# Patient Record
Sex: FEMALE | Race: OTHER | HISPANIC OR LATINO | Employment: OTHER | URBAN - METROPOLITAN AREA
[De-identification: names, ages, dates, MRNs, and addresses within clinical notes are randomized per-mention and may not be internally consistent; named-entity substitution may affect disease eponyms.]

---

## 2017-01-16 ENCOUNTER — APPOINTMENT (EMERGENCY)
Dept: RADIOLOGY | Facility: HOSPITAL | Age: 42
End: 2017-01-16
Payer: COMMERCIAL

## 2017-01-16 ENCOUNTER — HOSPITAL ENCOUNTER (EMERGENCY)
Facility: HOSPITAL | Age: 42
Discharge: HOME | End: 2017-01-16
Attending: EMERGENCY MEDICINE | Admitting: EMERGENCY MEDICINE
Payer: COMMERCIAL

## 2017-01-16 VITALS
RESPIRATION RATE: 18 BRPM | HEART RATE: 100 BPM | DIASTOLIC BLOOD PRESSURE: 86 MMHG | SYSTOLIC BLOOD PRESSURE: 138 MMHG | OXYGEN SATURATION: 98 % | TEMPERATURE: 98.3 F | WEIGHT: 293 LBS

## 2017-01-16 DIAGNOSIS — S76.012A STRAIN OF LEFT HIP, INITIAL ENCOUNTER: ICD-10-CM

## 2017-01-16 DIAGNOSIS — T14.8XXA CONTUSION: ICD-10-CM

## 2017-01-16 DIAGNOSIS — S83.92XA LEFT KNEE SPRAIN: ICD-10-CM

## 2017-01-16 DIAGNOSIS — R26.89 BALANCE PROBLEM: Primary | ICD-10-CM

## 2017-01-16 PROCEDURE — 99283 EMERGENCY DEPT VISIT LOW MDM: CPT

## 2017-01-16 PROCEDURE — 73564 X-RAY EXAM KNEE 4 OR MORE: CPT

## 2017-01-16 PROCEDURE — 73521 X-RAY EXAM HIPS BI 2 VIEWS: CPT

## 2017-01-16 RX ORDER — OXYCODONE HYDROCHLORIDE AND ACETAMINOPHEN 5; 325 MG/1; MG/1
1 TABLET ORAL EVERY 6 HOURS PRN
Qty: 15 TABLET | Refills: 0 | Status: SHIPPED | OUTPATIENT
Start: 2017-01-16 | End: 2017-01-26

## 2017-01-16 RX ORDER — OXYCODONE HYDROCHLORIDE AND ACETAMINOPHEN 5; 325 MG/1; MG/1
1 TABLET ORAL ONCE
Status: COMPLETED | OUTPATIENT
Start: 2017-01-16 | End: 2017-01-16

## 2017-01-16 RX ORDER — METHOCARBAMOL 500 MG/1
500-1000 TABLET, FILM COATED ORAL 3 TIMES DAILY PRN
Qty: 30 TABLET | Refills: 0 | Status: SHIPPED | OUTPATIENT
Start: 2017-01-16 | End: 2017-06-11 | Stop reason: ALTCHOICE

## 2017-01-16 RX ORDER — METHOCARBAMOL 500 MG/1
500 TABLET, FILM COATED ORAL ONCE
Status: COMPLETED | OUTPATIENT
Start: 2017-01-16 | End: 2017-01-16

## 2017-01-16 RX ADMIN — OXYCODONE HYDROCHLORIDE AND ACETAMINOPHEN 1 TABLET: 5; 325 TABLET ORAL at 09:46

## 2017-01-16 RX ADMIN — METHOCARBAMOL 500 MG: 500 TABLET ORAL at 09:46

## 2017-06-11 ENCOUNTER — HOSPITAL ENCOUNTER (EMERGENCY)
Facility: HOSPITAL | Age: 42
Discharge: LEFT BEFORE DISCHARGE | End: 2017-06-11
Attending: EMERGENCY MEDICINE
Payer: COMMERCIAL

## 2017-06-11 ENCOUNTER — APPOINTMENT (EMERGENCY)
Dept: CT IMAGING | Facility: HOSPITAL | Age: 42
End: 2017-06-11
Payer: COMMERCIAL

## 2017-06-11 VITALS
DIASTOLIC BLOOD PRESSURE: 81 MMHG | HEART RATE: 82 BPM | WEIGHT: 293 LBS | TEMPERATURE: 97.7 F | SYSTOLIC BLOOD PRESSURE: 114 MMHG | OXYGEN SATURATION: 100 % | RESPIRATION RATE: 18 BRPM

## 2017-06-11 DIAGNOSIS — F10.929 ALCOHOL INTOXICATION (HCC): ICD-10-CM
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
## 2017-06-11 LAB
ALBUMIN SERPL BCP-MCNC: 2.4 G/DL (ref 3.5–5)
ALP SERPL-CCNC: 172 U/L (ref 46–116)
ALT SERPL W P-5'-P-CCNC: 69 U/L (ref 12–78)
ANION GAP SERPL CALCULATED.3IONS-SCNC: 12 MMOL/L (ref 4–13)
AST SERPL W P-5'-P-CCNC: 147 U/L (ref 5–45)
BASOPHILS # BLD AUTO: 0.03 THOUSANDS/ΜL (ref 0–0.1)
BASOPHILS NFR BLD AUTO: 1 % (ref 0–1)
BILIRUB SERPL-MCNC: 0.92 MG/DL (ref 0.2–1)
BUN SERPL-MCNC: 2 MG/DL (ref 5–25)
CALCIUM SERPL-MCNC: 7.7 MG/DL (ref 8.3–10.1)
CHLORIDE SERPL-SCNC: 110 MMOL/L (ref 100–108)
CK MB SERPL-MCNC: 1.5 NG/ML (ref 0–5)
CK MB SERPL-MCNC: <1 % (ref 0–2.5)
CK SERPL-CCNC: 178 U/L (ref 26–192)
CO2 SERPL-SCNC: 26 MMOL/L (ref 21–32)
CREAT SERPL-MCNC: 0.81 MG/DL (ref 0.6–1.3)
EOSINOPHIL # BLD AUTO: 0.09 THOUSAND/ΜL (ref 0–0.61)
EOSINOPHIL NFR BLD AUTO: 2 % (ref 0–6)
ERYTHROCYTE [DISTWIDTH] IN BLOOD BY AUTOMATED COUNT: 18.8 % (ref 11.6–15.1)
ETHANOL SERPL-MCNC: 310 MG/DL (ref 0–3)
GFR SERPL CREATININE-BSD FRML MDRD: >60 ML/MIN/1.73SQ M
GLUCOSE SERPL-MCNC: 116 MG/DL (ref 65–140)
HCT VFR BLD AUTO: 33.3 % (ref 34.8–46.1)
HGB BLD-MCNC: 11.1 G/DL (ref 11.5–15.4)
LYMPHOCYTES # BLD AUTO: 2.31 THOUSANDS/ΜL (ref 0.6–4.47)
LYMPHOCYTES NFR BLD AUTO: 42 % (ref 14–44)
MCH RBC QN AUTO: 32.9 PG (ref 26.8–34.3)
MCHC RBC AUTO-ENTMCNC: 33.3 G/DL (ref 31.4–37.4)
MCV RBC AUTO: 99 FL (ref 82–98)
MONOCYTES # BLD AUTO: 0.73 THOUSAND/ΜL (ref 0.17–1.22)
MONOCYTES NFR BLD AUTO: 13 % (ref 4–12)
NEUTROPHILS # BLD AUTO: 2.4 THOUSANDS/ΜL (ref 1.85–7.62)
NEUTS SEG NFR BLD AUTO: 42 % (ref 43–75)
NRBC BLD AUTO-RTO: 0 /100 WBCS
NT-PROBNP SERPL-MCNC: 83 PG/ML
PLATELET # BLD AUTO: 280 THOUSANDS/UL (ref 149–390)
PMV BLD AUTO: 9.6 FL (ref 8.9–12.7)
POTASSIUM SERPL-SCNC: 3 MMOL/L (ref 3.5–5.3)
PROT SERPL-MCNC: 6.7 G/DL (ref 6.4–8.2)
RBC # BLD AUTO: 3.37 MILLION/UL (ref 3.81–5.12)
SODIUM SERPL-SCNC: 148 MMOL/L (ref 136–145)
SPECIMEN SOURCE: NORMAL
TROPONIN I BLD-MCNC: 0 NG/ML (ref 0–0.08)
WBC # BLD AUTO: 5.56 THOUSAND/UL (ref 4.31–10.16)

## 2017-06-11 PROCEDURE — 36415 COLL VENOUS BLD VENIPUNCTURE: CPT | Performed by: EMERGENCY MEDICINE

## 2017-06-11 PROCEDURE — 82550 ASSAY OF CK (CPK): CPT | Performed by: EMERGENCY MEDICINE

## 2017-06-11 PROCEDURE — 80320 DRUG SCREEN QUANTALCOHOLS: CPT | Performed by: EMERGENCY MEDICINE

## 2017-06-11 PROCEDURE — 82553 CREATINE MB FRACTION: CPT | Performed by: EMERGENCY MEDICINE

## 2017-06-11 PROCEDURE — 96375 TX/PRO/DX INJ NEW DRUG ADDON: CPT

## 2017-06-11 PROCEDURE — 99284 EMERGENCY DEPT VISIT MOD MDM: CPT

## 2017-06-11 PROCEDURE — 85025 COMPLETE CBC W/AUTO DIFF WBC: CPT | Performed by: EMERGENCY MEDICINE

## 2017-06-11 PROCEDURE — 83880 ASSAY OF NATRIURETIC PEPTIDE: CPT | Performed by: EMERGENCY MEDICINE

## 2017-06-11 PROCEDURE — 84484 ASSAY OF TROPONIN QUANT: CPT

## 2017-06-11 PROCEDURE — 96374 THER/PROPH/DIAG INJ IV PUSH: CPT

## 2017-06-11 PROCEDURE — 80053 COMPREHEN METABOLIC PANEL: CPT | Performed by: EMERGENCY MEDICINE

## 2017-06-11 RX ORDER — DIAZEPAM 5 MG/ML
5 INJECTION, SOLUTION INTRAMUSCULAR; INTRAVENOUS ONCE
Status: COMPLETED | OUTPATIENT
Start: 2017-06-11 | End: 2017-06-11

## 2017-06-11 RX ORDER — MIRTAZAPINE 30 MG/1
30 TABLET, FILM COATED ORAL
COMMUNITY
End: 2017-07-17

## 2017-06-11 RX ORDER — POTASSIUM CHLORIDE 20 MEQ/1
40 TABLET, EXTENDED RELEASE ORAL ONCE
Status: COMPLETED | OUTPATIENT
Start: 2017-06-11 | End: 2017-06-11

## 2017-06-11 RX ORDER — DIAZEPAM 5 MG/ML
INJECTION, SOLUTION INTRAMUSCULAR; INTRAVENOUS
Status: DISCONTINUED
Start: 2017-06-11 | End: 2017-06-12 | Stop reason: HOSPADM

## 2017-06-11 RX ORDER — QUETIAPINE FUMARATE 200 MG/1
200 TABLET, FILM COATED ORAL
COMMUNITY
End: 2017-07-17

## 2017-06-11 RX ORDER — KETOROLAC TROMETHAMINE 30 MG/ML
30 INJECTION, SOLUTION INTRAMUSCULAR; INTRAVENOUS ONCE
Status: COMPLETED | OUTPATIENT
Start: 2017-06-11 | End: 2017-06-11

## 2017-06-11 RX ADMIN — KETOROLAC TROMETHAMINE 30 MG: 30 INJECTION, SOLUTION INTRAMUSCULAR at 20:47

## 2017-06-11 RX ADMIN — POTASSIUM CHLORIDE 40 MEQ: 1500 TABLET, EXTENDED RELEASE ORAL at 21:44

## 2017-06-11 RX ADMIN — DIAZEPAM 5 MG: 5 INJECTION, SOLUTION INTRAMUSCULAR; INTRAVENOUS at 20:48

## 2017-06-22 ENCOUNTER — APPOINTMENT (EMERGENCY)
Dept: CT IMAGING | Facility: HOSPITAL | Age: 42
End: 2017-06-22
Payer: COMMERCIAL

## 2017-06-22 ENCOUNTER — HOSPITAL ENCOUNTER (EMERGENCY)
Facility: HOSPITAL | Age: 42
Discharge: HOME | End: 2017-06-22
Attending: EMERGENCY MEDICINE | Admitting: EMERGENCY MEDICINE
Payer: COMMERCIAL

## 2017-06-22 VITALS
BODY MASS INDEX: 41.95 KG/M2 | HEART RATE: 96 BPM | TEMPERATURE: 97.9 F | DIASTOLIC BLOOD PRESSURE: 74 MMHG | SYSTOLIC BLOOD PRESSURE: 133 MMHG | HEIGHT: 70 IN | OXYGEN SATURATION: 96 % | RESPIRATION RATE: 20 BRPM | WEIGHT: 293 LBS
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
## 2017-06-22 DIAGNOSIS — R10.32 LEFT LOWER QUADRANT PAIN: ICD-10-CM

## 2017-06-22 DIAGNOSIS — E87.6 HYPOKALEMIA: ICD-10-CM

## 2017-06-22 LAB
ALBUMIN SERPL BCP-MCNC: 2.2 G/DL (ref 3.5–5)
ALP SERPL-CCNC: 165 U/L (ref 46–116)
ALT SERPL W P-5'-P-CCNC: 59 U/L (ref 12–78)
ANION GAP SERPL CALCULATED.3IONS-SCNC: 5 MMOL/L (ref 4–13)
AST SERPL W P-5'-P-CCNC: 180 U/L (ref 5–45)
BASOPHILS # BLD AUTO: 0.03 THOUSANDS/ΜL (ref 0–0.1)
BASOPHILS NFR BLD AUTO: 1 % (ref 0–1)
BILIRUB SERPL-MCNC: 0.93 MG/DL (ref 0.2–1)
BUN SERPL-MCNC: 2 MG/DL (ref 5–25)
CALCIUM SERPL-MCNC: 7.8 MG/DL (ref 8.3–10.1)
CHLORIDE SERPL-SCNC: 112 MMOL/L (ref 100–108)
CO2 SERPL-SCNC: 28 MMOL/L (ref 21–32)
CREAT SERPL-MCNC: 0.68 MG/DL (ref 0.6–1.3)
EOSINOPHIL # BLD AUTO: 0.17 THOUSAND/ΜL (ref 0–0.61)
EOSINOPHIL NFR BLD AUTO: 4 % (ref 0–6)
ERYTHROCYTE [DISTWIDTH] IN BLOOD BY AUTOMATED COUNT: 19.5 % (ref 11.6–15.1)
GFR SERPL CREATININE-BSD FRML MDRD: >60 ML/MIN/1.73SQ M
GLUCOSE SERPL-MCNC: 119 MG/DL (ref 65–140)
HCT VFR BLD AUTO: 32.8 % (ref 34.8–46.1)
HGB BLD-MCNC: 11.1 G/DL (ref 11.5–15.4)
LIPASE SERPL-CCNC: 75 U/L (ref 73–393)
LYMPHOCYTES # BLD AUTO: 1.96 THOUSANDS/ΜL (ref 0.6–4.47)
LYMPHOCYTES NFR BLD AUTO: 45 % (ref 14–44)
MCH RBC QN AUTO: 34.5 PG (ref 26.8–34.3)
MCHC RBC AUTO-ENTMCNC: 33.8 G/DL (ref 31.4–37.4)
MCV RBC AUTO: 102 FL (ref 82–98)
MONOCYTES # BLD AUTO: 0.58 THOUSAND/ΜL (ref 0.17–1.22)
MONOCYTES NFR BLD AUTO: 14 % (ref 4–12)
NEUTROPHILS # BLD AUTO: 1.56 THOUSANDS/ΜL (ref 1.85–7.62)
NEUTS SEG NFR BLD AUTO: 36 % (ref 43–75)
PLATELET # BLD AUTO: 274 THOUSANDS/UL (ref 149–390)
PMV BLD AUTO: 9.6 FL (ref 8.9–12.7)
POTASSIUM SERPL-SCNC: 3 MMOL/L (ref 3.5–5.3)
PROT SERPL-MCNC: 6.2 G/DL (ref 6.4–8.2)
RBC # BLD AUTO: 3.22 MILLION/UL (ref 3.81–5.12)
SODIUM SERPL-SCNC: 145 MMOL/L (ref 136–145)
WBC # BLD AUTO: 4.3 THOUSAND/UL (ref 4.31–10.16)

## 2017-06-22 PROCEDURE — 83690 ASSAY OF LIPASE: CPT | Performed by: EMERGENCY MEDICINE

## 2017-06-22 PROCEDURE — 36415 COLL VENOUS BLD VENIPUNCTURE: CPT | Performed by: EMERGENCY MEDICINE

## 2017-06-22 PROCEDURE — 99284 EMERGENCY DEPT VISIT MOD MDM: CPT

## 2017-06-22 PROCEDURE — 96376 TX/PRO/DX INJ SAME DRUG ADON: CPT

## 2017-06-22 PROCEDURE — 85025 COMPLETE CBC W/AUTO DIFF WBC: CPT | Performed by: EMERGENCY MEDICINE

## 2017-06-22 PROCEDURE — 73700 CT LOWER EXTREMITY W/O DYE: CPT

## 2017-06-22 PROCEDURE — 74177 CT ABD & PELVIS W/CONTRAST: CPT

## 2017-06-22 PROCEDURE — 80053 COMPREHEN METABOLIC PANEL: CPT | Performed by: EMERGENCY MEDICINE

## 2017-06-22 PROCEDURE — 96374 THER/PROPH/DIAG INJ IV PUSH: CPT

## 2017-06-22 RX ORDER — MORPHINE SULFATE 4 MG/ML
4 INJECTION, SOLUTION INTRAMUSCULAR; INTRAVENOUS ONCE
Status: COMPLETED | OUTPATIENT
Start: 2017-06-22 | End: 2017-06-22

## 2017-06-22 RX ORDER — DICYCLOMINE HCL 20 MG
20 TABLET ORAL 2 TIMES DAILY
Qty: 10 TABLET | Refills: 0 | Status: SHIPPED | OUTPATIENT
Start: 2017-06-22 | End: 2017-07-17

## 2017-06-22 RX ORDER — POTASSIUM CHLORIDE 20 MEQ/1
40 TABLET, EXTENDED RELEASE ORAL ONCE
Status: COMPLETED | OUTPATIENT
Start: 2017-06-22 | End: 2017-06-22

## 2017-06-22 RX ADMIN — MORPHINE SULFATE 4 MG: 4 INJECTION, SOLUTION INTRAMUSCULAR; INTRAVENOUS at 12:54

## 2017-06-22 RX ADMIN — IOHEXOL 100 ML: 350 INJECTION, SOLUTION INTRAVENOUS at 12:40

## 2017-06-22 RX ADMIN — MORPHINE SULFATE 4 MG: 4 INJECTION, SOLUTION INTRAMUSCULAR; INTRAVENOUS at 11:21

## 2017-06-22 RX ADMIN — POTASSIUM CHLORIDE 40 MEQ: 1500 TABLET, EXTENDED RELEASE ORAL at 13:40

## 2017-07-17 ENCOUNTER — HOSPITAL ENCOUNTER (EMERGENCY)
Facility: HOSPITAL | Age: 42
Discharge: HOME | End: 2017-07-17
Attending: EMERGENCY MEDICINE
Payer: COMMERCIAL

## 2017-07-17 VITALS
OXYGEN SATURATION: 97 % | HEART RATE: 98 BPM | TEMPERATURE: 98.8 F | HEIGHT: 70 IN | SYSTOLIC BLOOD PRESSURE: 145 MMHG | RESPIRATION RATE: 18 BRPM | DIASTOLIC BLOOD PRESSURE: 94 MMHG | WEIGHT: 293 LBS | BODY MASS INDEX: 41.95 KG/M2

## 2017-07-17 DIAGNOSIS — G89.29 CHRONIC PAIN: Primary | ICD-10-CM

## 2017-07-17 DIAGNOSIS — R60.9 DEPENDENT EDEMA: ICD-10-CM

## 2017-07-17 LAB
ANION GAP SERPL CALCULATED.3IONS-SCNC: 9 MMOL/L (ref 4–13)
BASOPHILS # BLD AUTO: 0.04 THOUSANDS/ΜL (ref 0–0.1)
BASOPHILS NFR BLD AUTO: 1 % (ref 0–1)
BUN SERPL-MCNC: 3 MG/DL (ref 5–25)
CALCIUM SERPL-MCNC: 8.1 MG/DL (ref 8.3–10.1)
CHLORIDE SERPL-SCNC: 105 MMOL/L (ref 100–108)
CO2 SERPL-SCNC: 27 MMOL/L (ref 21–32)
CREAT SERPL-MCNC: 0.6 MG/DL (ref 0.6–1.3)
EOSINOPHIL # BLD AUTO: 0.1 THOUSAND/ΜL (ref 0–0.61)
EOSINOPHIL NFR BLD AUTO: 2 % (ref 0–6)
ERYTHROCYTE [DISTWIDTH] IN BLOOD BY AUTOMATED COUNT: 19.5 % (ref 11.6–15.1)
GFR SERPL CREATININE-BSD FRML MDRD: >60 ML/MIN/1.73SQ M
GLUCOSE SERPL-MCNC: 92 MG/DL (ref 65–140)
HCT VFR BLD AUTO: 31.5 % (ref 34.8–46.1)
HGB BLD-MCNC: 10.3 G/DL (ref 11.5–15.4)
LYMPHOCYTES # BLD AUTO: 1.21 THOUSANDS/ΜL (ref 0.6–4.47)
LYMPHOCYTES NFR BLD AUTO: 24 % (ref 14–44)
MCH RBC QN AUTO: 34.7 PG (ref 26.8–34.3)
MCHC RBC AUTO-ENTMCNC: 32.7 G/DL (ref 31.4–37.4)
MCV RBC AUTO: 106 FL (ref 82–98)
MONOCYTES # BLD AUTO: 0.68 THOUSAND/ΜL (ref 0.17–1.22)
MONOCYTES NFR BLD AUTO: 14 % (ref 4–12)
NEUTROPHILS # BLD AUTO: 2.97 THOUSANDS/ΜL (ref 1.85–7.62)
NEUTS SEG NFR BLD AUTO: 59 % (ref 43–75)
NRBC BLD AUTO-RTO: 0 /100 WBCS
NT-PROBNP SERPL-MCNC: 43 PG/ML
PLATELET # BLD AUTO: 209 THOUSANDS/UL (ref 149–390)
PMV BLD AUTO: 10.4 FL (ref 8.9–12.7)
POTASSIUM SERPL-SCNC: 3.1 MMOL/L (ref 3.5–5.3)
RBC # BLD AUTO: 2.97 MILLION/UL (ref 3.81–5.12)
SODIUM SERPL-SCNC: 141 MMOL/L (ref 136–145)
WBC # BLD AUTO: 5 THOUSAND/UL (ref 4.31–10.16)

## 2017-07-17 PROCEDURE — 36415 COLL VENOUS BLD VENIPUNCTURE: CPT | Performed by: EMERGENCY MEDICINE

## 2017-07-17 PROCEDURE — 83880 ASSAY OF NATRIURETIC PEPTIDE: CPT | Performed by: EMERGENCY MEDICINE

## 2017-07-17 PROCEDURE — 99284 EMERGENCY DEPT VISIT MOD MDM: CPT

## 2017-07-17 PROCEDURE — 80048 BASIC METABOLIC PNL TOTAL CA: CPT | Performed by: EMERGENCY MEDICINE

## 2017-07-17 PROCEDURE — 85025 COMPLETE CBC W/AUTO DIFF WBC: CPT | Performed by: EMERGENCY MEDICINE

## 2017-07-17 RX ORDER — ONDANSETRON 4 MG/1
4 TABLET, ORALLY DISINTEGRATING ORAL ONCE
Status: COMPLETED | OUTPATIENT
Start: 2017-07-17 | End: 2017-07-17

## 2017-07-17 RX ORDER — ACETAMINOPHEN 325 MG/1
650 TABLET ORAL ONCE
Status: COMPLETED | OUTPATIENT
Start: 2017-07-17 | End: 2017-07-17

## 2017-07-17 RX ADMIN — ONDANSETRON 4 MG: 4 TABLET, ORALLY DISINTEGRATING ORAL at 15:02

## 2017-07-17 RX ADMIN — ACETAMINOPHEN 650 MG: 325 TABLET, FILM COATED ORAL at 13:28

## 2017-08-29 ENCOUNTER — APPOINTMENT (EMERGENCY)
Dept: CT IMAGING | Facility: HOSPITAL | Age: 42
DRG: 058 | End: 2017-08-29
Payer: COMMERCIAL

## 2017-08-29 ENCOUNTER — APPOINTMENT (EMERGENCY)
Dept: RADIOLOGY | Facility: HOSPITAL | Age: 42
DRG: 058 | End: 2017-08-29
Payer: COMMERCIAL

## 2017-08-29 ENCOUNTER — HOSPITAL ENCOUNTER (EMERGENCY)
Facility: HOSPITAL | Age: 42
Discharge: HOME | DRG: 058 | End: 2017-08-29
Attending: EMERGENCY MEDICINE | Admitting: EMERGENCY MEDICINE
Payer: COMMERCIAL

## 2017-08-29 ENCOUNTER — HOSPITAL ENCOUNTER (INPATIENT)
Facility: HOSPITAL | Age: 42
LOS: 21 days | Discharge: HOME HEALTH | DRG: 058 | End: 2017-09-19
Attending: EMERGENCY MEDICINE | Admitting: INTERNAL MEDICINE
Payer: COMMERCIAL

## 2017-08-29 VITALS
RESPIRATION RATE: 20 BRPM | SYSTOLIC BLOOD PRESSURE: 114 MMHG | BODY MASS INDEX: 59.28 KG/M2 | DIASTOLIC BLOOD PRESSURE: 60 MMHG | HEART RATE: 95 BPM | OXYGEN SATURATION: 97 % | TEMPERATURE: 98.4 F | WEIGHT: 293 LBS

## 2017-08-29 DIAGNOSIS — R51.9 HEADACHE: ICD-10-CM

## 2017-08-29 DIAGNOSIS — R60.9 PERIPHERAL EDEMA: ICD-10-CM

## 2017-08-29 DIAGNOSIS — M54.9 ACUTE BACK PAIN: ICD-10-CM

## 2017-08-29 DIAGNOSIS — Z76.5 DRUG-SEEKING BEHAVIOR: ICD-10-CM

## 2017-08-29 DIAGNOSIS — R26.2 AMBULATORY DYSFUNCTION: Primary | ICD-10-CM

## 2017-08-29 DIAGNOSIS — E66.01 MORBID OBESITY DUE TO EXCESS CALORIES (HCC): ICD-10-CM

## 2017-08-29 DIAGNOSIS — R10.9 ACUTE ABDOMINAL PAIN: ICD-10-CM

## 2017-08-29 DIAGNOSIS — Z91.11 NONCOMPLIANCE WITH THERAPEUTIC PLAN: ICD-10-CM

## 2017-08-29 DIAGNOSIS — R29.6 FREQUENT FALLS: ICD-10-CM

## 2017-08-29 DIAGNOSIS — B37.2 CANDIDIASIS, CUTANEOUS: ICD-10-CM
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
## 2017-08-29 PROBLEM — M79.7 FIBROMYALGIA: Status: ACTIVE | Noted: 2017-08-29

## 2017-08-29 PROBLEM — G89.4 CHRONIC PAIN SYNDROME: Status: ACTIVE | Noted: 2017-08-29

## 2017-08-29 LAB
ANION GAP BLD CALC-SCNC: 19 MMOL/L (ref 4–13)
ANION GAP SERPL CALCULATED.3IONS-SCNC: 9 MMOL/L (ref 4–13)
BASOPHILS # BLD AUTO: 0.03 THOUSANDS/ΜL (ref 0–0.1)
BASOPHILS NFR BLD AUTO: 0 % (ref 0–1)
BUN BLD-MCNC: <3 MG/DL (ref 5–25)
BUN SERPL-MCNC: 1 MG/DL (ref 5–25)
CA-I BLD-SCNC: 1.03 MMOL/L (ref 1.12–1.32)
CALCIUM SERPL-MCNC: 7.8 MG/DL (ref 8.3–10.1)
CHLORIDE BLD-SCNC: 102 MMOL/L (ref 100–108)
CHLORIDE SERPL-SCNC: 105 MMOL/L (ref 100–108)
CO2 SERPL-SCNC: 27 MMOL/L (ref 21–32)
CREAT BLD-MCNC: 0.7 MG/DL (ref 0.6–1.3)
CREAT SERPL-MCNC: 0.75 MG/DL (ref 0.6–1.3)
EOSINOPHIL # BLD AUTO: 0.08 THOUSAND/ΜL (ref 0–0.61)
EOSINOPHIL NFR BLD AUTO: 1 % (ref 0–6)
ERYTHROCYTE [DISTWIDTH] IN BLOOD BY AUTOMATED COUNT: 15.9 % (ref 11.6–15.1)
GFR SERPL CREATININE-BSD FRML MDRD: 106 ML/MIN/1.73SQ M
GFR SERPL CREATININE-BSD FRML MDRD: 98 ML/MIN/1.73SQ M
GLUCOSE SERPL-MCNC: 89 MG/DL (ref 65–140)
GLUCOSE SERPL-MCNC: 94 MG/DL (ref 65–140)
HCT VFR BLD AUTO: 28.9 % (ref 34.8–46.1)
HCT VFR BLD CALC: 29 % (ref 34.8–46.1)
HGB BLD-MCNC: 9.9 G/DL (ref 11.5–15.4)
HGB BLDA-MCNC: 9.9 G/DL (ref 11.5–15.4)
LYMPHOCYTES # BLD AUTO: 1.36 THOUSANDS/ΜL (ref 0.6–4.47)
LYMPHOCYTES NFR BLD AUTO: 18 % (ref 14–44)
MCH RBC QN AUTO: 36.8 PG (ref 26.8–34.3)
MCHC RBC AUTO-ENTMCNC: 34.3 G/DL (ref 31.4–37.4)
MCV RBC AUTO: 107 FL (ref 82–98)
MONOCYTES # BLD AUTO: 1.25 THOUSAND/ΜL (ref 0.17–1.22)
MONOCYTES NFR BLD AUTO: 17 % (ref 4–12)
NEUTROPHILS # BLD AUTO: 4.69 THOUSANDS/ΜL (ref 1.85–7.62)
NEUTS SEG NFR BLD AUTO: 64 % (ref 43–75)
NRBC BLD AUTO-RTO: 0 /100 WBCS
PCO2 BLD: 23 MMOL/L (ref 21–32)
PLATELET # BLD AUTO: 263 THOUSANDS/UL (ref 149–390)
PMV BLD AUTO: 9.2 FL (ref 8.9–12.7)
POTASSIUM BLD-SCNC: 3.4 MMOL/L (ref 3.5–5.3)
POTASSIUM SERPL-SCNC: 4.1 MMOL/L (ref 3.5–5.3)
RBC # BLD AUTO: 2.69 MILLION/UL (ref 3.81–5.12)
SODIUM BLD-SCNC: 139 MMOL/L (ref 136–145)
SODIUM SERPL-SCNC: 141 MMOL/L (ref 136–145)
SPECIMEN SOURCE: ABNORMAL
WBC # BLD AUTO: 7.41 THOUSAND/UL (ref 4.31–10.16)

## 2017-08-29 PROCEDURE — 96361 HYDRATE IV INFUSION ADD-ON: CPT

## 2017-08-29 PROCEDURE — 96374 THER/PROPH/DIAG INJ IV PUSH: CPT

## 2017-08-29 PROCEDURE — 85025 COMPLETE CBC W/AUTO DIFF WBC: CPT | Performed by: EMERGENCY MEDICINE

## 2017-08-29 PROCEDURE — 74177 CT ABD & PELVIS W/CONTRAST: CPT

## 2017-08-29 PROCEDURE — 36415 COLL VENOUS BLD VENIPUNCTURE: CPT | Performed by: EMERGENCY MEDICINE

## 2017-08-29 PROCEDURE — 80047 BASIC METABLC PNL IONIZED CA: CPT

## 2017-08-29 PROCEDURE — 71260 CT THORAX DX C+: CPT

## 2017-08-29 PROCEDURE — 80048 BASIC METABOLIC PNL TOTAL CA: CPT | Performed by: EMERGENCY MEDICINE

## 2017-08-29 PROCEDURE — 96360 HYDRATION IV INFUSION INIT: CPT

## 2017-08-29 PROCEDURE — 70450 CT HEAD/BRAIN W/O DYE: CPT

## 2017-08-29 PROCEDURE — 99284 EMERGENCY DEPT VISIT MOD MDM: CPT

## 2017-08-29 PROCEDURE — 85014 HEMATOCRIT: CPT

## 2017-08-29 PROCEDURE — 99285 EMERGENCY DEPT VISIT HI MDM: CPT

## 2017-08-29 RX ORDER — GUAIFENESIN 600 MG
TABLET, EXTENDED RELEASE 12 HR ORAL
Status: COMPLETED
Start: 2017-08-29 | End: 2017-08-29

## 2017-08-29 RX ORDER — GUAIFENESIN 600 MG
600 TABLET, EXTENDED RELEASE 12 HR ORAL ONCE
Status: COMPLETED | OUTPATIENT
Start: 2017-08-29 | End: 2017-08-29

## 2017-08-29 RX ORDER — CALCIUM CARBONATE 500(1250)
TABLET ORAL
COMMUNITY
Start: 2016-04-11 | End: 2019-01-01

## 2017-08-29 RX ORDER — PANTOPRAZOLE SODIUM 40 MG/1
40 TABLET, DELAYED RELEASE ORAL
Status: DISCONTINUED | OUTPATIENT
Start: 2017-08-30 | End: 2017-09-11

## 2017-08-29 RX ORDER — FOLIC ACID 1 MG/1
1 TABLET ORAL DAILY
Status: DISCONTINUED | OUTPATIENT
Start: 2017-08-30 | End: 2017-09-19 | Stop reason: HOSPADM

## 2017-08-29 RX ORDER — FERROUS SULFATE 325(65) MG
325 TABLET ORAL DAILY
Status: DISCONTINUED | OUTPATIENT
Start: 2017-08-30 | End: 2017-09-19 | Stop reason: HOSPADM

## 2017-08-29 RX ORDER — FERROUS SULFATE 325(65) MG
1 TABLET ORAL DAILY
COMMUNITY
Start: 2016-04-18

## 2017-08-29 RX ORDER — LIDOCAINE 50 MG/G
2 PATCH TOPICAL EVERY 24 HOURS
Status: DISCONTINUED | OUTPATIENT
Start: 2017-08-30 | End: 2017-09-19 | Stop reason: HOSPADM

## 2017-08-29 RX ORDER — HYDROXYZINE HYDROCHLORIDE 25 MG/1
25 TABLET, FILM COATED ORAL ONCE
Status: COMPLETED | OUTPATIENT
Start: 2017-08-29 | End: 2017-08-29

## 2017-08-29 RX ORDER — CHOLECALCIFEROL (VITAMIN D3) 125 MCG
1000 CAPSULE ORAL DAILY
Status: DISCONTINUED | OUTPATIENT
Start: 2017-08-30 | End: 2017-09-19 | Stop reason: HOSPADM

## 2017-08-29 RX ORDER — CALCIUM CARBONATE 500(1250)
1 TABLET ORAL
Status: DISCONTINUED | OUTPATIENT
Start: 2017-08-30 | End: 2017-09-19 | Stop reason: HOSPADM

## 2017-08-29 RX ORDER — METHOCARBAMOL 500 MG/1
500 TABLET, FILM COATED ORAL EVERY 6 HOURS PRN
Status: DISCONTINUED | OUTPATIENT
Start: 2017-08-29 | End: 2017-08-30

## 2017-08-29 RX ORDER — LIDOCAINE 50 MG/G
1 PATCH TOPICAL EVERY 24 HOURS
Qty: 7 PATCH | Refills: 0 | Status: SHIPPED | OUTPATIENT
Start: 2017-08-29 | End: 2017-12-14 | Stop reason: HOSPADM

## 2017-08-29 RX ORDER — GLIPIZIDE 10 MG/1
10 TABLET ORAL DAILY
COMMUNITY
End: 2017-09-19 | Stop reason: HOSPADM

## 2017-08-29 RX ORDER — ONDANSETRON 2 MG/ML
4 INJECTION INTRAMUSCULAR; INTRAVENOUS EVERY 6 HOURS PRN
Status: DISCONTINUED | OUTPATIENT
Start: 2017-08-29 | End: 2017-09-05

## 2017-08-29 RX ORDER — FAMOTIDINE 40 MG/1
40 TABLET, FILM COATED ORAL DAILY
COMMUNITY
End: 2017-09-19 | Stop reason: HOSPADM

## 2017-08-29 RX ORDER — DOCUSATE SODIUM 100 MG/1
100 CAPSULE, LIQUID FILLED ORAL 2 TIMES DAILY
Status: DISCONTINUED | OUTPATIENT
Start: 2017-08-30 | End: 2017-09-19 | Stop reason: HOSPADM

## 2017-08-29 RX ORDER — GLIPIZIDE 10 MG/1
10 TABLET ORAL DAILY
Status: DISCONTINUED | OUTPATIENT
Start: 2017-08-30 | End: 2017-08-31

## 2017-08-29 RX ORDER — KETOROLAC TROMETHAMINE 30 MG/ML
30 INJECTION, SOLUTION INTRAMUSCULAR; INTRAVENOUS ONCE
Status: COMPLETED | OUTPATIENT
Start: 2017-08-29 | End: 2017-08-29

## 2017-08-29 RX ORDER — FOLIC ACID 1 MG/1
TABLET ORAL DAILY
COMMUNITY

## 2017-08-29 RX ORDER — KETOROLAC TROMETHAMINE 30 MG/ML
15 INJECTION, SOLUTION INTRAMUSCULAR; INTRAVENOUS EVERY 6 HOURS SCHEDULED
Status: DISCONTINUED | OUTPATIENT
Start: 2017-08-30 | End: 2017-08-30

## 2017-08-29 RX ORDER — LANOLIN ALCOHOL/MO/W.PET/CERES
CREAM (GRAM) TOPICAL
COMMUNITY
Start: 2016-04-20 | End: 2018-01-01 | Stop reason: ALTCHOICE

## 2017-08-29 RX ORDER — FAMOTIDINE 20 MG/1
40 TABLET, FILM COATED ORAL DAILY
Status: DISCONTINUED | OUTPATIENT
Start: 2017-08-30 | End: 2017-09-12

## 2017-08-29 RX ORDER — GABAPENTIN 300 MG/1
300 CAPSULE ORAL 3 TIMES DAILY
Status: DISCONTINUED | OUTPATIENT
Start: 2017-08-30 | End: 2017-09-10 | Stop reason: SDUPTHER

## 2017-08-29 RX ORDER — NYSTATIN 100000 [USP'U]/G
POWDER TOPICAL 4 TIMES DAILY
Qty: 30 G | Refills: 0 | Status: SHIPPED | OUTPATIENT
Start: 2017-08-29 | End: 2018-01-01 | Stop reason: HOSPADM

## 2017-08-29 RX ORDER — HYDROXYZINE HYDROCHLORIDE 25 MG/1
TABLET, FILM COATED ORAL
Status: COMPLETED
Start: 2017-08-29 | End: 2017-08-29

## 2017-08-29 RX ORDER — METHOCARBAMOL 750 MG/1
500 TABLET, FILM COATED ORAL EVERY 6 HOURS PRN
COMMUNITY
End: 2017-09-19 | Stop reason: HOSPADM

## 2017-08-29 RX ORDER — PANTOPRAZOLE SODIUM 40 MG/1
40 TABLET, DELAYED RELEASE ORAL DAILY
COMMUNITY
End: 2018-01-01

## 2017-08-29 RX ORDER — KETOROLAC TROMETHAMINE 30 MG/ML
INJECTION, SOLUTION INTRAMUSCULAR; INTRAVENOUS
Status: COMPLETED
Start: 2017-08-29 | End: 2017-08-29

## 2017-08-29 RX ADMIN — HYDROXYZINE HYDROCHLORIDE 25 MG: 25 TABLET, FILM COATED ORAL at 21:13

## 2017-08-29 RX ADMIN — SODIUM CHLORIDE 1000 ML: 0.9 INJECTION, SOLUTION INTRAVENOUS at 15:38

## 2017-08-29 RX ADMIN — KETOROLAC TROMETHAMINE 30 MG: 30 INJECTION, SOLUTION INTRAMUSCULAR at 21:13

## 2017-08-29 RX ADMIN — IOHEXOL 100 ML: 350 INJECTION, SOLUTION INTRAVENOUS at 16:33

## 2017-08-29 RX ADMIN — GUAIFENESIN 600 MG: 600 TABLET, EXTENDED RELEASE ORAL at 22:09

## 2017-08-29 RX ADMIN — SODIUM CHLORIDE 1000 ML: 0.9 INJECTION, SOLUTION INTRAVENOUS at 21:11

## 2017-08-29 RX ADMIN — KETOROLAC TROMETHAMINE 30 MG: 30 INJECTION, SOLUTION INTRAMUSCULAR; INTRAVENOUS at 21:13

## 2017-08-29 RX ADMIN — Medication 600 MG: at 22:09

## 2017-08-30 LAB
ANION GAP SERPL CALCULATED.3IONS-SCNC: 10 MMOL/L (ref 4–13)
BUN SERPL-MCNC: 1 MG/DL (ref 5–25)
CALCIUM SERPL-MCNC: 7.9 MG/DL (ref 8.3–10.1)
CHLORIDE SERPL-SCNC: 106 MMOL/L (ref 100–108)
CO2 SERPL-SCNC: 25 MMOL/L (ref 21–32)
CREAT SERPL-MCNC: 0.65 MG/DL (ref 0.6–1.3)
ERYTHROCYTE [DISTWIDTH] IN BLOOD BY AUTOMATED COUNT: 16 % (ref 11.6–15.1)
GFR SERPL CREATININE-BSD FRML MDRD: 109 ML/MIN/1.73SQ M
GLUCOSE SERPL-MCNC: 195 MG/DL (ref 65–140)
GLUCOSE SERPL-MCNC: 256 MG/DL (ref 65–140)
GLUCOSE SERPL-MCNC: 60 MG/DL (ref 65–140)
GLUCOSE SERPL-MCNC: 83 MG/DL (ref 65–140)
GLUCOSE SERPL-MCNC: 85 MG/DL (ref 65–140)
HCT VFR BLD AUTO: 27.6 % (ref 34.8–46.1)
HGB BLD-MCNC: 9.3 G/DL (ref 11.5–15.4)
MCH RBC QN AUTO: 36.5 PG (ref 26.8–34.3)
MCHC RBC AUTO-ENTMCNC: 33.7 G/DL (ref 31.4–37.4)
MCV RBC AUTO: 108 FL (ref 82–98)
PLATELET # BLD AUTO: 251 THOUSANDS/UL (ref 149–390)
PLATELET # BLD AUTO: 287 THOUSANDS/UL (ref 149–390)
PMV BLD AUTO: 9.5 FL (ref 8.9–12.7)
PMV BLD AUTO: 9.8 FL (ref 8.9–12.7)
POTASSIUM SERPL-SCNC: 3.6 MMOL/L (ref 3.5–5.3)
RBC # BLD AUTO: 2.55 MILLION/UL (ref 3.81–5.12)
SODIUM SERPL-SCNC: 141 MMOL/L (ref 136–145)
TSH SERPL DL<=0.05 MIU/L-ACNC: 3.12 UIU/ML (ref 0.36–3.74)
WBC # BLD AUTO: 6.26 THOUSAND/UL (ref 4.31–10.16)

## 2017-08-30 PROCEDURE — 80048 BASIC METABOLIC PNL TOTAL CA: CPT | Performed by: INTERNAL MEDICINE

## 2017-08-30 PROCEDURE — 82948 REAGENT STRIP/BLOOD GLUCOSE: CPT

## 2017-08-30 PROCEDURE — 85049 AUTOMATED PLATELET COUNT: CPT | Performed by: INTERNAL MEDICINE

## 2017-08-30 PROCEDURE — 85027 COMPLETE CBC AUTOMATED: CPT | Performed by: INTERNAL MEDICINE

## 2017-08-30 PROCEDURE — 84443 ASSAY THYROID STIM HORMONE: CPT | Performed by: INTERNAL MEDICINE

## 2017-08-30 RX ORDER — ALBUTEROL SULFATE 90 UG/1
2 AEROSOL, METERED RESPIRATORY (INHALATION) EVERY 4 HOURS PRN
Status: DISCONTINUED | OUTPATIENT
Start: 2017-08-30 | End: 2017-09-19 | Stop reason: HOSPADM

## 2017-08-30 RX ORDER — HEPARIN SODIUM 5000 [USP'U]/ML
5000 INJECTION, SOLUTION INTRAVENOUS; SUBCUTANEOUS EVERY 8 HOURS SCHEDULED
Status: DISCONTINUED | OUTPATIENT
Start: 2017-08-30 | End: 2017-09-04

## 2017-08-30 RX ORDER — FUROSEMIDE 10 MG/ML
20 INJECTION INTRAMUSCULAR; INTRAVENOUS DAILY
Status: DISCONTINUED | OUTPATIENT
Start: 2017-08-30 | End: 2017-09-02

## 2017-08-30 RX ORDER — TRAMADOL HYDROCHLORIDE 50 MG/1
50 TABLET ORAL EVERY 8 HOURS PRN
Status: DISCONTINUED | OUTPATIENT
Start: 2017-08-30 | End: 2017-08-31

## 2017-08-30 RX ORDER — GUAIFENESIN 100 MG/5ML
200 SOLUTION ORAL EVERY 4 HOURS PRN
Status: DISCONTINUED | OUTPATIENT
Start: 2017-08-30 | End: 2017-09-05

## 2017-08-30 RX ORDER — TRAMADOL HYDROCHLORIDE 50 MG/1
50 TABLET ORAL EVERY 6 HOURS PRN
Status: DISCONTINUED | OUTPATIENT
Start: 2017-08-30 | End: 2017-08-30

## 2017-08-30 RX ADMIN — CYANOCOBALAMIN TAB 500 MCG 1000 MCG: 500 TAB at 08:05

## 2017-08-30 RX ADMIN — HEPARIN SODIUM 5000 UNITS: 5000 INJECTION, SOLUTION INTRAVENOUS; SUBCUTANEOUS at 16:19

## 2017-08-30 RX ADMIN — FOLIC ACID 1 MG: 1 TABLET ORAL at 08:06

## 2017-08-30 RX ADMIN — KETOROLAC TROMETHAMINE 15 MG: 30 INJECTION, SOLUTION INTRAMUSCULAR at 12:05

## 2017-08-30 RX ADMIN — DOCUSATE SODIUM 100 MG: 100 CAPSULE, LIQUID FILLED ORAL at 08:06

## 2017-08-30 RX ADMIN — GUAIFENESIN 200 MG: 100 SOLUTION ORAL at 22:23

## 2017-08-30 RX ADMIN — INSULIN LISPRO 2 UNITS: 100 INJECTION, SOLUTION INTRAVENOUS; SUBCUTANEOUS at 22:23

## 2017-08-30 RX ADMIN — GABAPENTIN 300 MG: 300 CAPSULE ORAL at 22:23

## 2017-08-30 RX ADMIN — METHOCARBAMOL 500 MG: 500 TABLET ORAL at 08:00

## 2017-08-30 RX ADMIN — INSULIN LISPRO 2 UNITS: 100 INJECTION, SOLUTION INTRAVENOUS; SUBCUTANEOUS at 17:04

## 2017-08-30 RX ADMIN — FUROSEMIDE 20 MG: 10 INJECTION, SOLUTION INTRAMUSCULAR; INTRAVENOUS at 16:19

## 2017-08-30 RX ADMIN — PANTOPRAZOLE SODIUM 40 MG: 40 TABLET, DELAYED RELEASE ORAL at 06:04

## 2017-08-30 RX ADMIN — ALBUTEROL SULFATE 2 PUFF: 90 AEROSOL, METERED RESPIRATORY (INHALATION) at 16:19

## 2017-08-30 RX ADMIN — FERROUS SULFATE TAB 325 MG (65 MG ELEMENTAL FE) 325 MG: 325 (65 FE) TAB at 08:06

## 2017-08-30 RX ADMIN — GUAIFENESIN 200 MG: 100 SOLUTION ORAL at 16:19

## 2017-08-30 RX ADMIN — LIDOCAINE 2 PATCH: 50 PATCH CUTANEOUS at 08:14

## 2017-08-30 RX ADMIN — DOCUSATE SODIUM 100 MG: 100 CAPSULE, LIQUID FILLED ORAL at 17:04

## 2017-08-30 RX ADMIN — MENTHOL 5.4 MG: 5.4 LOZENGE ORAL at 12:56

## 2017-08-30 RX ADMIN — GABAPENTIN 300 MG: 300 CAPSULE ORAL at 16:19

## 2017-08-30 RX ADMIN — ALBUTEROL SULFATE 2 PUFF: 90 AEROSOL, METERED RESPIRATORY (INHALATION) at 09:45

## 2017-08-30 RX ADMIN — Medication 1 TABLET: at 07:59

## 2017-08-30 RX ADMIN — ENOXAPARIN SODIUM 40 MG: 40 INJECTION SUBCUTANEOUS at 08:01

## 2017-08-30 RX ADMIN — GLIPIZIDE 10 MG: 10 TABLET ORAL at 08:01

## 2017-08-30 RX ADMIN — HEPARIN SODIUM 5000 UNITS: 5000 INJECTION, SOLUTION INTRAVENOUS; SUBCUTANEOUS at 22:23

## 2017-08-30 RX ADMIN — GABAPENTIN 300 MG: 300 CAPSULE ORAL at 00:09

## 2017-08-30 RX ADMIN — TRAMADOL HYDROCHLORIDE 50 MG: 50 TABLET, COATED ORAL at 10:20

## 2017-08-30 RX ADMIN — GABAPENTIN 300 MG: 300 CAPSULE ORAL at 08:00

## 2017-08-30 RX ADMIN — MENTHOL 5.4 MG: 5.4 LOZENGE ORAL at 23:20

## 2017-08-30 RX ADMIN — TRAMADOL HYDROCHLORIDE 50 MG: 50 TABLET, COATED ORAL at 19:09

## 2017-08-30 RX ADMIN — KETOROLAC TROMETHAMINE 15 MG: 30 INJECTION, SOLUTION INTRAMUSCULAR at 00:11

## 2017-08-30 RX ADMIN — ENOXAPARIN SODIUM 40 MG: 40 INJECTION SUBCUTANEOUS at 00:09

## 2017-08-30 RX ADMIN — ALBUTEROL SULFATE 2 PUFF: 90 AEROSOL, METERED RESPIRATORY (INHALATION) at 22:24

## 2017-08-30 RX ADMIN — FAMOTIDINE 40 MG: 20 TABLET ORAL at 08:05

## 2017-08-30 RX ADMIN — METHOCARBAMOL 500 MG: 500 TABLET ORAL at 02:01

## 2017-08-30 RX ADMIN — KETOROLAC TROMETHAMINE 15 MG: 30 INJECTION, SOLUTION INTRAMUSCULAR at 06:04

## 2017-08-31 LAB
ANION GAP SERPL CALCULATED.3IONS-SCNC: 8 MMOL/L (ref 4–13)
BUN SERPL-MCNC: 1 MG/DL (ref 5–25)
CALCIUM SERPL-MCNC: 7.7 MG/DL (ref 8.3–10.1)
CHLORIDE SERPL-SCNC: 104 MMOL/L (ref 100–108)
CO2 SERPL-SCNC: 29 MMOL/L (ref 21–32)
CREAT SERPL-MCNC: 0.66 MG/DL (ref 0.6–1.3)
ERYTHROCYTE [DISTWIDTH] IN BLOOD BY AUTOMATED COUNT: 15.9 % (ref 11.6–15.1)
GFR SERPL CREATININE-BSD FRML MDRD: 109 ML/MIN/1.73SQ M
GLUCOSE SERPL-MCNC: 30 MG/DL (ref 65–140)
GLUCOSE SERPL-MCNC: 40 MG/DL (ref 65–140)
GLUCOSE SERPL-MCNC: 46 MG/DL (ref 65–140)
GLUCOSE SERPL-MCNC: 51 MG/DL (ref 65–140)
GLUCOSE SERPL-MCNC: 63 MG/DL (ref 65–140)
GLUCOSE SERPL-MCNC: 69 MG/DL (ref 65–140)
GLUCOSE SERPL-MCNC: 72 MG/DL (ref 65–140)
GLUCOSE SERPL-MCNC: 73 MG/DL (ref 65–140)
GLUCOSE SERPL-MCNC: 78 MG/DL (ref 65–140)
GLUCOSE SERPL-MCNC: 80 MG/DL (ref 65–140)
GLUCOSE SERPL-MCNC: 89 MG/DL (ref 65–140)
GLUCOSE SERPL-MCNC: 99 MG/DL (ref 65–140)
HCT VFR BLD AUTO: 28.4 % (ref 34.8–46.1)
HGB BLD-MCNC: 9.5 G/DL (ref 11.5–15.4)
MCH RBC QN AUTO: 36.5 PG (ref 26.8–34.3)
MCHC RBC AUTO-ENTMCNC: 33.5 G/DL (ref 31.4–37.4)
MCV RBC AUTO: 109 FL (ref 82–98)
PLATELET # BLD AUTO: 305 THOUSANDS/UL (ref 149–390)
PMV BLD AUTO: 9.3 FL (ref 8.9–12.7)
POTASSIUM SERPL-SCNC: 3.2 MMOL/L (ref 3.5–5.3)
RBC # BLD AUTO: 2.6 MILLION/UL (ref 3.81–5.12)
SODIUM SERPL-SCNC: 141 MMOL/L (ref 136–145)
WBC # BLD AUTO: 8.79 THOUSAND/UL (ref 4.31–10.16)

## 2017-08-31 PROCEDURE — 80048 BASIC METABOLIC PNL TOTAL CA: CPT | Performed by: INTERNAL MEDICINE

## 2017-08-31 PROCEDURE — 97163 PT EVAL HIGH COMPLEX 45 MIN: CPT | Performed by: PHYSICAL THERAPIST

## 2017-08-31 PROCEDURE — G8979 MOBILITY GOAL STATUS: HCPCS | Performed by: PHYSICAL THERAPIST

## 2017-08-31 PROCEDURE — G8978 MOBILITY CURRENT STATUS: HCPCS | Performed by: PHYSICAL THERAPIST

## 2017-08-31 PROCEDURE — 82948 REAGENT STRIP/BLOOD GLUCOSE: CPT

## 2017-08-31 PROCEDURE — 85027 COMPLETE CBC AUTOMATED: CPT | Performed by: INTERNAL MEDICINE

## 2017-08-31 PROCEDURE — 83036 HEMOGLOBIN GLYCOSYLATED A1C: CPT | Performed by: INTERNAL MEDICINE

## 2017-08-31 RX ORDER — DEXTROSE MONOHYDRATE 25 G/50ML
INJECTION, SOLUTION INTRAVENOUS
Status: DISPENSED
Start: 2017-08-31 | End: 2017-08-31

## 2017-08-31 RX ORDER — DEXTROSE MONOHYDRATE 25 G/50ML
50 INJECTION, SOLUTION INTRAVENOUS ONCE
Status: COMPLETED | OUTPATIENT
Start: 2017-08-31 | End: 2017-08-31

## 2017-08-31 RX ORDER — CALCIUM CARBONATE 200(500)MG
500 TABLET,CHEWABLE ORAL DAILY PRN
Status: DISCONTINUED | OUTPATIENT
Start: 2017-08-31 | End: 2017-09-05

## 2017-08-31 RX ORDER — POTASSIUM CHLORIDE 20 MEQ/1
40 TABLET, EXTENDED RELEASE ORAL ONCE
Status: COMPLETED | OUTPATIENT
Start: 2017-08-31 | End: 2017-08-31

## 2017-08-31 RX ORDER — DEXTROSE AND SODIUM CHLORIDE 5; .45 G/100ML; G/100ML
75 INJECTION, SOLUTION INTRAVENOUS CONTINUOUS
Status: DISCONTINUED | OUTPATIENT
Start: 2017-08-31 | End: 2017-09-01

## 2017-08-31 RX ORDER — DEXTROSE MONOHYDRATE 25 G/50ML
INJECTION, SOLUTION INTRAVENOUS
Status: COMPLETED
Start: 2017-08-31 | End: 2017-08-31

## 2017-08-31 RX ORDER — TRAMADOL HYDROCHLORIDE 50 MG/1
50 TABLET ORAL EVERY 12 HOURS PRN
Status: DISCONTINUED | OUTPATIENT
Start: 2017-08-31 | End: 2017-09-01

## 2017-08-31 RX ORDER — IBUPROFEN 400 MG/1
400 TABLET ORAL EVERY 6 HOURS PRN
Status: DISCONTINUED | OUTPATIENT
Start: 2017-08-31 | End: 2017-09-05

## 2017-08-31 RX ADMIN — HEPARIN SODIUM 5000 UNITS: 5000 INJECTION, SOLUTION INTRAVENOUS; SUBCUTANEOUS at 21:08

## 2017-08-31 RX ADMIN — GABAPENTIN 300 MG: 300 CAPSULE ORAL at 08:19

## 2017-08-31 RX ADMIN — FUROSEMIDE 20 MG: 10 INJECTION, SOLUTION INTRAMUSCULAR; INTRAVENOUS at 08:19

## 2017-08-31 RX ADMIN — DEXTROSE MONOHYDRATE 50 ML: 25 INJECTION, SOLUTION INTRAVENOUS at 23:15

## 2017-08-31 RX ADMIN — FOLIC ACID 1 MG: 1 TABLET ORAL at 08:19

## 2017-08-31 RX ADMIN — DEXTROSE AND SODIUM CHLORIDE 50 ML/HR: 5; .45 INJECTION, SOLUTION INTRAVENOUS at 12:38

## 2017-08-31 RX ADMIN — ALBUTEROL SULFATE 2 PUFF: 90 AEROSOL, METERED RESPIRATORY (INHALATION) at 03:05

## 2017-08-31 RX ADMIN — IBUPROFEN 400 MG: 400 TABLET ORAL at 19:39

## 2017-08-31 RX ADMIN — HEPARIN SODIUM 5000 UNITS: 5000 INJECTION, SOLUTION INTRAVENOUS; SUBCUTANEOUS at 06:07

## 2017-08-31 RX ADMIN — DOCUSATE SODIUM 100 MG: 100 CAPSULE, LIQUID FILLED ORAL at 08:19

## 2017-08-31 RX ADMIN — DEXTROSE MONOHYDRATE 50 ML: 25 INJECTION, SOLUTION INTRAVENOUS at 18:15

## 2017-08-31 RX ADMIN — DEXTROSE MONOHYDRATE 50 ML: 25 INJECTION, SOLUTION INTRAVENOUS at 16:16

## 2017-08-31 RX ADMIN — GUAIFENESIN 200 MG: 100 SOLUTION ORAL at 09:32

## 2017-08-31 RX ADMIN — DEXTROSE MONOHYDRATE 50 ML: 25 INJECTION, SOLUTION INTRAVENOUS at 11:07

## 2017-08-31 RX ADMIN — GABAPENTIN 300 MG: 300 CAPSULE ORAL at 17:05

## 2017-08-31 RX ADMIN — DEXTROSE MONOHYDRATE 50 ML: 25 INJECTION, SOLUTION INTRAVENOUS at 07:10

## 2017-08-31 RX ADMIN — FAMOTIDINE 40 MG: 20 TABLET ORAL at 08:19

## 2017-08-31 RX ADMIN — TRAMADOL HYDROCHLORIDE 50 MG: 50 TABLET, COATED ORAL at 03:04

## 2017-08-31 RX ADMIN — GABAPENTIN 300 MG: 300 CAPSULE ORAL at 21:07

## 2017-08-31 RX ADMIN — POTASSIUM CHLORIDE 40 MEQ: 1500 TABLET, EXTENDED RELEASE ORAL at 17:05

## 2017-08-31 RX ADMIN — LIDOCAINE 2 PATCH: 50 PATCH CUTANEOUS at 08:20

## 2017-08-31 RX ADMIN — CYANOCOBALAMIN TAB 500 MCG 1000 MCG: 500 TAB at 08:19

## 2017-08-31 RX ADMIN — MENTHOL 5.4 MG: 5.4 LOZENGE ORAL at 09:32

## 2017-08-31 RX ADMIN — ALBUTEROL SULFATE 2 PUFF: 90 AEROSOL, METERED RESPIRATORY (INHALATION) at 21:07

## 2017-08-31 RX ADMIN — DEXTROSE MONOHYDRATE 50 ML: 25 INJECTION, SOLUTION INTRAVENOUS at 16:01

## 2017-08-31 RX ADMIN — FERROUS SULFATE TAB 325 MG (65 MG ELEMENTAL FE) 325 MG: 325 (65 FE) TAB at 08:19

## 2017-08-31 RX ADMIN — TRAMADOL HYDROCHLORIDE 50 MG: 50 TABLET, COATED ORAL at 11:07

## 2017-08-31 RX ADMIN — PANTOPRAZOLE SODIUM 40 MG: 40 TABLET, DELAYED RELEASE ORAL at 06:07

## 2017-08-31 RX ADMIN — ALBUTEROL SULFATE 2 PUFF: 90 AEROSOL, METERED RESPIRATORY (INHALATION) at 07:45

## 2017-08-31 RX ADMIN — Medication 500 MG: at 18:22

## 2017-08-31 RX ADMIN — HEPARIN SODIUM 5000 UNITS: 5000 INJECTION, SOLUTION INTRAVENOUS; SUBCUTANEOUS at 14:13

## 2017-08-31 RX ADMIN — Medication 1 TABLET: at 08:19

## 2017-09-01 LAB
GLUCOSE SERPL-MCNC: 117 MG/DL (ref 65–140)
GLUCOSE SERPL-MCNC: 139 MG/DL (ref 65–140)
GLUCOSE SERPL-MCNC: 66 MG/DL (ref 65–140)
GLUCOSE SERPL-MCNC: 68 MG/DL (ref 65–140)
GLUCOSE SERPL-MCNC: 69 MG/DL (ref 65–140)
GLUCOSE SERPL-MCNC: 70 MG/DL (ref 65–140)
GLUCOSE SERPL-MCNC: 71 MG/DL (ref 65–140)
GLUCOSE SERPL-MCNC: 72 MG/DL (ref 65–140)
GLUCOSE SERPL-MCNC: 73 MG/DL (ref 65–140)
GLUCOSE SERPL-MCNC: 76 MG/DL (ref 65–140)
GLUCOSE SERPL-MCNC: 89 MG/DL (ref 65–140)
GLUCOSE SERPL-MCNC: 95 MG/DL (ref 65–140)
GLUCOSE SERPL-MCNC: 97 MG/DL (ref 65–140)

## 2017-09-01 PROCEDURE — 82948 REAGENT STRIP/BLOOD GLUCOSE: CPT

## 2017-09-01 RX ORDER — TRAMADOL HYDROCHLORIDE 50 MG/1
50 TABLET ORAL DAILY PRN
Status: DISCONTINUED | OUTPATIENT
Start: 2017-09-01 | End: 2017-09-02

## 2017-09-01 RX ORDER — DEXTROSE MONOHYDRATE 25 G/50ML
50 INJECTION, SOLUTION INTRAVENOUS ONCE
Status: COMPLETED | OUTPATIENT
Start: 2017-09-01 | End: 2017-09-01

## 2017-09-01 RX ORDER — DEXTROSE MONOHYDRATE 25 G/50ML
INJECTION, SOLUTION INTRAVENOUS
Status: DISPENSED
Start: 2017-09-01 | End: 2017-09-02

## 2017-09-01 RX ADMIN — LIDOCAINE 2 PATCH: 50 PATCH CUTANEOUS at 08:34

## 2017-09-01 RX ADMIN — FUROSEMIDE 20 MG: 10 INJECTION, SOLUTION INTRAMUSCULAR; INTRAVENOUS at 08:36

## 2017-09-01 RX ADMIN — ALBUTEROL SULFATE 2 PUFF: 90 AEROSOL, METERED RESPIRATORY (INHALATION) at 16:51

## 2017-09-01 RX ADMIN — GABAPENTIN 300 MG: 300 CAPSULE ORAL at 21:08

## 2017-09-01 RX ADMIN — CYANOCOBALAMIN TAB 500 MCG 1000 MCG: 500 TAB at 08:35

## 2017-09-01 RX ADMIN — DEXTROSE MONOHYDRATE 50 ML: 25 INJECTION, SOLUTION INTRAVENOUS at 16:23

## 2017-09-01 RX ADMIN — MENTHOL 5.4 MG: 5.4 LOZENGE ORAL at 12:06

## 2017-09-01 RX ADMIN — ALBUTEROL SULFATE 2 PUFF: 90 AEROSOL, METERED RESPIRATORY (INHALATION) at 03:02

## 2017-09-01 RX ADMIN — Medication 1 TABLET: at 08:35

## 2017-09-01 RX ADMIN — GABAPENTIN 300 MG: 300 CAPSULE ORAL at 08:36

## 2017-09-01 RX ADMIN — HEPARIN SODIUM 5000 UNITS: 5000 INJECTION, SOLUTION INTRAVENOUS; SUBCUTANEOUS at 14:50

## 2017-09-01 RX ADMIN — FOLIC ACID 1 MG: 1 TABLET ORAL at 08:35

## 2017-09-01 RX ADMIN — HEPARIN SODIUM 5000 UNITS: 5000 INJECTION, SOLUTION INTRAVENOUS; SUBCUTANEOUS at 21:08

## 2017-09-01 RX ADMIN — Medication 500 MG: at 08:35

## 2017-09-01 RX ADMIN — IBUPROFEN 400 MG: 400 TABLET ORAL at 08:35

## 2017-09-01 RX ADMIN — FERROUS SULFATE TAB 325 MG (65 MG ELEMENTAL FE) 325 MG: 325 (65 FE) TAB at 08:35

## 2017-09-01 RX ADMIN — GUAIFENESIN 200 MG: 100 SOLUTION ORAL at 08:35

## 2017-09-01 RX ADMIN — ALBUTEROL SULFATE 2 PUFF: 90 AEROSOL, METERED RESPIRATORY (INHALATION) at 10:40

## 2017-09-01 RX ADMIN — FAMOTIDINE 40 MG: 20 TABLET ORAL at 08:35

## 2017-09-01 RX ADMIN — GABAPENTIN 300 MG: 300 CAPSULE ORAL at 16:18

## 2017-09-01 RX ADMIN — DOCUSATE SODIUM 100 MG: 100 CAPSULE, LIQUID FILLED ORAL at 08:35

## 2017-09-01 RX ADMIN — HEPARIN SODIUM 5000 UNITS: 5000 INJECTION, SOLUTION INTRAVENOUS; SUBCUTANEOUS at 05:32

## 2017-09-02 LAB
GLUCOSE SERPL-MCNC: 83 MG/DL (ref 65–140)
GLUCOSE SERPL-MCNC: 84 MG/DL (ref 65–140)
GLUCOSE SERPL-MCNC: 84 MG/DL (ref 65–140)
GLUCOSE SERPL-MCNC: 85 MG/DL (ref 65–140)
GLUCOSE SERPL-MCNC: 89 MG/DL (ref 65–140)
GLUCOSE SERPL-MCNC: 92 MG/DL (ref 65–140)
GLUCOSE SERPL-MCNC: 95 MG/DL (ref 65–140)
GLUCOSE SERPL-MCNC: 95 MG/DL (ref 65–140)
GLUCOSE SERPL-MCNC: 97 MG/DL (ref 65–140)

## 2017-09-02 PROCEDURE — 82948 REAGENT STRIP/BLOOD GLUCOSE: CPT

## 2017-09-02 RX ADMIN — GABAPENTIN 300 MG: 300 CAPSULE ORAL at 16:40

## 2017-09-02 RX ADMIN — GUAIFENESIN 200 MG: 100 SOLUTION ORAL at 07:21

## 2017-09-02 RX ADMIN — GABAPENTIN 300 MG: 300 CAPSULE ORAL at 21:21

## 2017-09-02 RX ADMIN — GABAPENTIN 300 MG: 300 CAPSULE ORAL at 08:27

## 2017-09-02 RX ADMIN — GUAIFENESIN 200 MG: 100 SOLUTION ORAL at 16:42

## 2017-09-02 RX ADMIN — HEPARIN SODIUM 5000 UNITS: 5000 INJECTION, SOLUTION INTRAVENOUS; SUBCUTANEOUS at 21:21

## 2017-09-02 RX ADMIN — Medication 1 TABLET: at 07:11

## 2017-09-02 RX ADMIN — IBUPROFEN 400 MG: 400 TABLET ORAL at 22:04

## 2017-09-02 RX ADMIN — HEPARIN SODIUM 5000 UNITS: 5000 INJECTION, SOLUTION INTRAVENOUS; SUBCUTANEOUS at 15:00

## 2017-09-02 RX ADMIN — IBUPROFEN 400 MG: 400 TABLET ORAL at 07:11

## 2017-09-02 RX ADMIN — IBUPROFEN 400 MG: 400 TABLET ORAL at 16:42

## 2017-09-02 RX ADMIN — FUROSEMIDE 20 MG: 10 INJECTION, SOLUTION INTRAMUSCULAR; INTRAVENOUS at 08:00

## 2017-09-03 LAB
GLUCOSE SERPL-MCNC: 122 MG/DL (ref 65–140)
GLUCOSE SERPL-MCNC: 73 MG/DL (ref 65–140)
GLUCOSE SERPL-MCNC: 82 MG/DL (ref 65–140)
GLUCOSE SERPL-MCNC: 91 MG/DL (ref 65–140)
GLUCOSE SERPL-MCNC: 93 MG/DL (ref 65–140)

## 2017-09-03 PROCEDURE — 97530 THERAPEUTIC ACTIVITIES: CPT

## 2017-09-03 PROCEDURE — 97167 OT EVAL HIGH COMPLEX 60 MIN: CPT

## 2017-09-03 PROCEDURE — G8988 SELF CARE GOAL STATUS: HCPCS

## 2017-09-03 PROCEDURE — 97116 GAIT TRAINING THERAPY: CPT

## 2017-09-03 PROCEDURE — 82948 REAGENT STRIP/BLOOD GLUCOSE: CPT

## 2017-09-03 PROCEDURE — G8987 SELF CARE CURRENT STATUS: HCPCS

## 2017-09-03 RX ORDER — FUROSEMIDE 10 MG/ML
20 INJECTION INTRAMUSCULAR; INTRAVENOUS DAILY
Status: DISCONTINUED | OUTPATIENT
Start: 2017-09-03 | End: 2017-09-05

## 2017-09-03 RX ADMIN — LIDOCAINE 2 PATCH: 50 PATCH CUTANEOUS at 23:44

## 2017-09-03 RX ADMIN — FAMOTIDINE 40 MG: 20 TABLET ORAL at 08:05

## 2017-09-03 RX ADMIN — FERROUS SULFATE TAB 325 MG (65 MG ELEMENTAL FE) 325 MG: 325 (65 FE) TAB at 08:05

## 2017-09-03 RX ADMIN — FUROSEMIDE 20 MG: 10 INJECTION, SOLUTION INTRAMUSCULAR; INTRAVENOUS at 13:44

## 2017-09-03 RX ADMIN — HEPARIN SODIUM 5000 UNITS: 5000 INJECTION, SOLUTION INTRAVENOUS; SUBCUTANEOUS at 06:01

## 2017-09-03 RX ADMIN — GABAPENTIN 300 MG: 300 CAPSULE ORAL at 21:09

## 2017-09-03 RX ADMIN — IBUPROFEN 400 MG: 400 TABLET ORAL at 04:06

## 2017-09-03 RX ADMIN — GUAIFENESIN 200 MG: 100 SOLUTION ORAL at 21:08

## 2017-09-03 RX ADMIN — FOLIC ACID 1 MG: 1 TABLET ORAL at 08:04

## 2017-09-03 RX ADMIN — CYANOCOBALAMIN TAB 500 MCG 1000 MCG: 500 TAB at 08:04

## 2017-09-03 RX ADMIN — ALBUTEROL SULFATE 2 PUFF: 90 AEROSOL, METERED RESPIRATORY (INHALATION) at 21:08

## 2017-09-03 RX ADMIN — Medication 1 TABLET: at 08:04

## 2017-09-03 RX ADMIN — IBUPROFEN 400 MG: 400 TABLET ORAL at 12:24

## 2017-09-03 RX ADMIN — GABAPENTIN 300 MG: 300 CAPSULE ORAL at 08:04

## 2017-09-03 RX ADMIN — ALBUTEROL SULFATE 2 PUFF: 90 AEROSOL, METERED RESPIRATORY (INHALATION) at 08:30

## 2017-09-03 RX ADMIN — GABAPENTIN 300 MG: 300 CAPSULE ORAL at 16:48

## 2017-09-04 RX ADMIN — Medication 1 TABLET: at 08:22

## 2017-09-04 RX ADMIN — IBUPROFEN 400 MG: 400 TABLET ORAL at 03:44

## 2017-09-04 RX ADMIN — GABAPENTIN 300 MG: 300 CAPSULE ORAL at 21:03

## 2017-09-04 RX ADMIN — FERROUS SULFATE TAB 325 MG (65 MG ELEMENTAL FE) 325 MG: 325 (65 FE) TAB at 08:23

## 2017-09-04 RX ADMIN — ONDANSETRON 4 MG: 2 INJECTION INTRAMUSCULAR; INTRAVENOUS at 12:34

## 2017-09-04 RX ADMIN — CYANOCOBALAMIN TAB 500 MCG 1000 MCG: 500 TAB at 08:22

## 2017-09-04 RX ADMIN — IBUPROFEN 400 MG: 400 TABLET ORAL at 22:40

## 2017-09-04 RX ADMIN — GABAPENTIN 300 MG: 300 CAPSULE ORAL at 17:05

## 2017-09-04 RX ADMIN — ONDANSETRON 4 MG: 2 INJECTION INTRAMUSCULAR; INTRAVENOUS at 06:31

## 2017-09-04 RX ADMIN — IBUPROFEN 400 MG: 400 TABLET ORAL at 13:29

## 2017-09-04 RX ADMIN — HEPARIN SODIUM 5000 UNITS: 5000 INJECTION, SOLUTION INTRAVENOUS; SUBCUTANEOUS at 05:19

## 2017-09-04 RX ADMIN — Medication 500 MG: at 11:08

## 2017-09-04 RX ADMIN — GABAPENTIN 300 MG: 300 CAPSULE ORAL at 08:22

## 2017-09-04 RX ADMIN — ONDANSETRON 4 MG: 2 INJECTION INTRAMUSCULAR; INTRAVENOUS at 23:35

## 2017-09-04 RX ADMIN — ENOXAPARIN SODIUM 40 MG: 40 INJECTION SUBCUTANEOUS at 11:10

## 2017-09-04 RX ADMIN — ALBUTEROL SULFATE 2 PUFF: 90 AEROSOL, METERED RESPIRATORY (INHALATION) at 22:40

## 2017-09-05 LAB — GLUCOSE SERPL-MCNC: 94 MG/DL (ref 65–140)

## 2017-09-05 PROCEDURE — 82948 REAGENT STRIP/BLOOD GLUCOSE: CPT

## 2017-09-05 RX ORDER — ACETAMINOPHEN 325 MG/1
650 TABLET ORAL EVERY 6 HOURS PRN
Status: DISCONTINUED | OUTPATIENT
Start: 2017-09-05 | End: 2017-09-19 | Stop reason: HOSPADM

## 2017-09-05 RX ORDER — ALPRAZOLAM 0.5 MG/1
0.5 TABLET ORAL 3 TIMES DAILY PRN
Status: DISCONTINUED | OUTPATIENT
Start: 2017-09-05 | End: 2017-09-19 | Stop reason: HOSPADM

## 2017-09-05 RX ORDER — FUROSEMIDE 10 MG/ML
40 INJECTION INTRAMUSCULAR; INTRAVENOUS
Status: DISCONTINUED | OUTPATIENT
Start: 2017-09-05 | End: 2017-09-09

## 2017-09-05 RX ADMIN — FOLIC ACID 1 MG: 1 TABLET ORAL at 09:39

## 2017-09-05 RX ADMIN — CYANOCOBALAMIN TAB 500 MCG 1000 MCG: 500 TAB at 09:39

## 2017-09-05 RX ADMIN — GABAPENTIN 300 MG: 300 CAPSULE ORAL at 09:38

## 2017-09-05 RX ADMIN — ENOXAPARIN SODIUM 40 MG: 40 INJECTION SUBCUTANEOUS at 09:38

## 2017-09-05 RX ADMIN — FERROUS SULFATE TAB 325 MG (65 MG ELEMENTAL FE) 325 MG: 325 (65 FE) TAB at 09:39

## 2017-09-05 RX ADMIN — ONDANSETRON 4 MG: 2 INJECTION INTRAMUSCULAR; INTRAVENOUS at 06:13

## 2017-09-05 RX ADMIN — GABAPENTIN 300 MG: 300 CAPSULE ORAL at 17:06

## 2017-09-05 RX ADMIN — GABAPENTIN 300 MG: 300 CAPSULE ORAL at 21:10

## 2017-09-05 RX ADMIN — Medication 500 MG: at 10:16

## 2017-09-05 RX ADMIN — DOCUSATE SODIUM 100 MG: 100 CAPSULE, LIQUID FILLED ORAL at 17:07

## 2017-09-05 RX ADMIN — ALPRAZOLAM 0.5 MG: 0.5 TABLET ORAL at 23:29

## 2017-09-05 RX ADMIN — IBUPROFEN 400 MG: 400 TABLET ORAL at 15:08

## 2017-09-05 RX ADMIN — FAMOTIDINE 40 MG: 20 TABLET ORAL at 09:39

## 2017-09-05 RX ADMIN — ONDANSETRON 4 MG: 2 INJECTION INTRAMUSCULAR; INTRAVENOUS at 12:30

## 2017-09-06 RX ORDER — ONDANSETRON 4 MG/1
4 TABLET, ORALLY DISINTEGRATING ORAL EVERY 6 HOURS PRN
Status: DISCONTINUED | OUTPATIENT
Start: 2017-09-06 | End: 2017-09-19 | Stop reason: HOSPADM

## 2017-09-06 RX ADMIN — FAMOTIDINE 40 MG: 20 TABLET ORAL at 09:06

## 2017-09-06 RX ADMIN — GABAPENTIN 300 MG: 300 CAPSULE ORAL at 16:06

## 2017-09-06 RX ADMIN — ONDANSETRON 4 MG: 4 TABLET, ORALLY DISINTEGRATING ORAL at 16:06

## 2017-09-06 RX ADMIN — ALPRAZOLAM 0.5 MG: 0.5 TABLET ORAL at 16:06

## 2017-09-06 RX ADMIN — ONDANSETRON 4 MG: 4 TABLET, ORALLY DISINTEGRATING ORAL at 11:12

## 2017-09-06 RX ADMIN — GABAPENTIN 300 MG: 300 CAPSULE ORAL at 20:32

## 2017-09-06 RX ADMIN — GABAPENTIN 300 MG: 300 CAPSULE ORAL at 09:05

## 2017-09-06 RX ADMIN — PANTOPRAZOLE SODIUM 40 MG: 40 TABLET, DELAYED RELEASE ORAL at 05:55

## 2017-09-06 RX ADMIN — ALPRAZOLAM 0.5 MG: 0.5 TABLET ORAL at 05:58

## 2017-09-07 LAB
EST. AVERAGE GLUCOSE BLD GHB EST-MCNC: 91 MG/DL
HBA1C MFR BLD: 4.8 % (ref 4.2–6.3)

## 2017-09-07 PROCEDURE — 97116 GAIT TRAINING THERAPY: CPT | Performed by: PHYSICAL THERAPIST

## 2017-09-07 PROCEDURE — 97530 THERAPEUTIC ACTIVITIES: CPT | Performed by: PHYSICAL THERAPIST

## 2017-09-07 PROCEDURE — 97110 THERAPEUTIC EXERCISES: CPT | Performed by: PHYSICAL THERAPIST

## 2017-09-07 RX ADMIN — FOLIC ACID 1 MG: 1 TABLET ORAL at 09:26

## 2017-09-07 RX ADMIN — FUROSEMIDE 40 MG: 10 INJECTION, SOLUTION INTRAMUSCULAR; INTRAVENOUS at 17:06

## 2017-09-07 RX ADMIN — LIDOCAINE 1 PATCH: 50 PATCH CUTANEOUS at 00:16

## 2017-09-07 RX ADMIN — CYANOCOBALAMIN TAB 500 MCG 1000 MCG: 500 TAB at 09:26

## 2017-09-07 RX ADMIN — FERROUS SULFATE TAB 325 MG (65 MG ELEMENTAL FE) 325 MG: 325 (65 FE) TAB at 09:26

## 2017-09-07 RX ADMIN — DOCUSATE SODIUM 100 MG: 100 CAPSULE, LIQUID FILLED ORAL at 09:26

## 2017-09-07 RX ADMIN — ACETAMINOPHEN 650 MG: 325 TABLET, FILM COATED ORAL at 00:21

## 2017-09-07 RX ADMIN — GABAPENTIN 300 MG: 300 CAPSULE ORAL at 09:26

## 2017-09-07 RX ADMIN — Medication 1 TABLET: at 09:25

## 2017-09-07 RX ADMIN — GABAPENTIN 300 MG: 300 CAPSULE ORAL at 20:28

## 2017-09-07 RX ADMIN — FAMOTIDINE 40 MG: 20 TABLET ORAL at 09:26

## 2017-09-07 RX ADMIN — PANTOPRAZOLE SODIUM 40 MG: 40 TABLET, DELAYED RELEASE ORAL at 06:21

## 2017-09-07 RX ADMIN — GABAPENTIN 300 MG: 300 CAPSULE ORAL at 17:10

## 2017-09-07 RX ADMIN — ENOXAPARIN SODIUM 40 MG: 40 INJECTION SUBCUTANEOUS at 09:26

## 2017-09-08 PROCEDURE — 97116 GAIT TRAINING THERAPY: CPT

## 2017-09-08 PROCEDURE — 97535 SELF CARE MNGMENT TRAINING: CPT

## 2017-09-08 PROCEDURE — 97110 THERAPEUTIC EXERCISES: CPT

## 2017-09-08 PROCEDURE — 97530 THERAPEUTIC ACTIVITIES: CPT

## 2017-09-08 RX ORDER — GINSENG 100 MG
1 CAPSULE ORAL 2 TIMES DAILY
Status: DISCONTINUED | OUTPATIENT
Start: 2017-09-08 | End: 2017-09-19 | Stop reason: HOSPADM

## 2017-09-08 RX ADMIN — BACITRACIN ZINC 1 SMALL APPLICATION: 500 OINTMENT TOPICAL at 18:04

## 2017-09-08 RX ADMIN — FUROSEMIDE 40 MG: 10 INJECTION, SOLUTION INTRAMUSCULAR; INTRAVENOUS at 08:39

## 2017-09-08 RX ADMIN — Medication 1 TABLET: at 08:38

## 2017-09-08 RX ADMIN — LIDOCAINE 2 PATCH: 50 PATCH CUTANEOUS at 08:38

## 2017-09-08 RX ADMIN — ENOXAPARIN SODIUM 40 MG: 40 INJECTION SUBCUTANEOUS at 08:39

## 2017-09-08 RX ADMIN — FERROUS SULFATE TAB 325 MG (65 MG ELEMENTAL FE) 325 MG: 325 (65 FE) TAB at 08:40

## 2017-09-08 RX ADMIN — CYANOCOBALAMIN TAB 500 MCG 1000 MCG: 500 TAB at 08:39

## 2017-09-08 RX ADMIN — PANTOPRAZOLE SODIUM 40 MG: 40 TABLET, DELAYED RELEASE ORAL at 08:39

## 2017-09-08 RX ADMIN — FOLIC ACID 1 MG: 1 TABLET ORAL at 08:39

## 2017-09-08 RX ADMIN — DOCUSATE SODIUM 100 MG: 100 CAPSULE, LIQUID FILLED ORAL at 08:39

## 2017-09-08 RX ADMIN — FUROSEMIDE 40 MG: 10 INJECTION, SOLUTION INTRAMUSCULAR; INTRAVENOUS at 16:10

## 2017-09-08 RX ADMIN — GABAPENTIN 300 MG: 300 CAPSULE ORAL at 16:10

## 2017-09-08 RX ADMIN — GABAPENTIN 300 MG: 300 CAPSULE ORAL at 08:39

## 2017-09-08 RX ADMIN — FAMOTIDINE 40 MG: 20 TABLET ORAL at 08:39

## 2017-09-08 RX ADMIN — ALPRAZOLAM 0.5 MG: 0.5 TABLET ORAL at 17:02

## 2017-09-08 RX ADMIN — GABAPENTIN 300 MG: 300 CAPSULE ORAL at 21:54

## 2017-09-09 PROBLEM — R60.9 PERIPHERAL EDEMA: Status: ACTIVE | Noted: 2017-09-09

## 2017-09-09 RX ORDER — FUROSEMIDE 40 MG/1
40 TABLET ORAL
Status: DISCONTINUED | OUTPATIENT
Start: 2017-09-09 | End: 2017-09-15

## 2017-09-09 RX ADMIN — ALBUTEROL SULFATE 2 PUFF: 90 AEROSOL, METERED RESPIRATORY (INHALATION) at 16:05

## 2017-09-09 RX ADMIN — CYANOCOBALAMIN TAB 500 MCG 1000 MCG: 500 TAB at 08:59

## 2017-09-09 RX ADMIN — ALBUTEROL SULFATE 2 PUFF: 90 AEROSOL, METERED RESPIRATORY (INHALATION) at 09:39

## 2017-09-09 RX ADMIN — PANTOPRAZOLE SODIUM 40 MG: 40 TABLET, DELAYED RELEASE ORAL at 05:52

## 2017-09-09 RX ADMIN — FUROSEMIDE 40 MG: 40 TABLET ORAL at 09:39

## 2017-09-09 RX ADMIN — ALBUTEROL SULFATE 2 PUFF: 90 AEROSOL, METERED RESPIRATORY (INHALATION) at 02:46

## 2017-09-09 RX ADMIN — GABAPENTIN 300 MG: 300 CAPSULE ORAL at 21:38

## 2017-09-09 RX ADMIN — BACITRACIN ZINC 1 SMALL APPLICATION: 500 OINTMENT TOPICAL at 17:16

## 2017-09-09 RX ADMIN — GABAPENTIN 300 MG: 300 CAPSULE ORAL at 08:59

## 2017-09-09 RX ADMIN — GABAPENTIN 300 MG: 300 CAPSULE ORAL at 16:05

## 2017-09-09 RX ADMIN — ENOXAPARIN SODIUM 40 MG: 40 INJECTION SUBCUTANEOUS at 09:10

## 2017-09-09 RX ADMIN — ACETAMINOPHEN 650 MG: 325 TABLET, FILM COATED ORAL at 09:00

## 2017-09-09 RX ADMIN — FAMOTIDINE 40 MG: 20 TABLET ORAL at 08:59

## 2017-09-09 RX ADMIN — BACITRACIN ZINC 1 SMALL APPLICATION: 500 OINTMENT TOPICAL at 09:00

## 2017-09-09 RX ADMIN — FOLIC ACID 1 MG: 1 TABLET ORAL at 08:59

## 2017-09-09 RX ADMIN — FERROUS SULFATE TAB 325 MG (65 MG ELEMENTAL FE) 325 MG: 325 (65 FE) TAB at 08:59

## 2017-09-09 RX ADMIN — FUROSEMIDE 40 MG: 40 TABLET ORAL at 16:05

## 2017-09-09 RX ADMIN — Medication 1 TABLET: at 09:00

## 2017-09-10 LAB
ANION GAP SERPL CALCULATED.3IONS-SCNC: 2 MMOL/L (ref 4–13)
BASOPHILS # BLD AUTO: 0.04 THOUSANDS/ΜL (ref 0–0.1)
BASOPHILS NFR BLD AUTO: 1 % (ref 0–1)
BUN SERPL-MCNC: 5 MG/DL (ref 5–25)
CALCIUM SERPL-MCNC: 7.7 MG/DL (ref 8.3–10.1)
CHLORIDE SERPL-SCNC: 104 MMOL/L (ref 100–108)
CO2 SERPL-SCNC: 36 MMOL/L (ref 21–32)
CREAT SERPL-MCNC: 0.91 MG/DL (ref 0.6–1.3)
EOSINOPHIL # BLD AUTO: 0.12 THOUSAND/ΜL (ref 0–0.61)
EOSINOPHIL NFR BLD AUTO: 2 % (ref 0–6)
ERYTHROCYTE [DISTWIDTH] IN BLOOD BY AUTOMATED COUNT: 16.1 % (ref 11.6–15.1)
GFR SERPL CREATININE-BSD FRML MDRD: 78 ML/MIN/1.73SQ M
GLUCOSE SERPL-MCNC: 80 MG/DL (ref 65–140)
HCT VFR BLD AUTO: 28.6 % (ref 34.8–46.1)
HGB BLD-MCNC: 9.9 G/DL (ref 11.5–15.4)
LYMPHOCYTES # BLD AUTO: 1.62 THOUSANDS/ΜL (ref 0.6–4.47)
LYMPHOCYTES NFR BLD AUTO: 33 % (ref 14–44)
MCH RBC QN AUTO: 37.5 PG (ref 26.8–34.3)
MCHC RBC AUTO-ENTMCNC: 34.6 G/DL (ref 31.4–37.4)
MCV RBC AUTO: 108 FL (ref 82–98)
MONOCYTES # BLD AUTO: 0.95 THOUSAND/ΜL (ref 0.17–1.22)
MONOCYTES NFR BLD AUTO: 19 % (ref 4–12)
NEUTROPHILS # BLD AUTO: 2.2 THOUSANDS/ΜL (ref 1.85–7.62)
NEUTS SEG NFR BLD AUTO: 45 % (ref 43–75)
NRBC BLD AUTO-RTO: 0 /100 WBCS
PLATELET # BLD AUTO: 225 THOUSANDS/UL (ref 149–390)
PMV BLD AUTO: 9.6 FL (ref 8.9–12.7)
POTASSIUM SERPL-SCNC: 3 MMOL/L (ref 3.5–5.3)
RBC # BLD AUTO: 2.64 MILLION/UL (ref 3.81–5.12)
SODIUM SERPL-SCNC: 142 MMOL/L (ref 136–145)
WBC # BLD AUTO: 4.93 THOUSAND/UL (ref 4.31–10.16)

## 2017-09-10 PROCEDURE — 80048 BASIC METABOLIC PNL TOTAL CA: CPT | Performed by: INTERNAL MEDICINE

## 2017-09-10 PROCEDURE — 85025 COMPLETE CBC W/AUTO DIFF WBC: CPT | Performed by: INTERNAL MEDICINE

## 2017-09-10 RX ORDER — GABAPENTIN 300 MG/1
300 CAPSULE ORAL 3 TIMES DAILY
Status: DISCONTINUED | OUTPATIENT
Start: 2017-09-10 | End: 2017-09-19 | Stop reason: HOSPADM

## 2017-09-10 RX ORDER — POTASSIUM CHLORIDE 20 MEQ/1
40 TABLET, EXTENDED RELEASE ORAL 2 TIMES DAILY
Status: DISCONTINUED | OUTPATIENT
Start: 2017-09-10 | End: 2017-09-13

## 2017-09-10 RX ADMIN — ENOXAPARIN SODIUM 40 MG: 40 INJECTION SUBCUTANEOUS at 08:57

## 2017-09-10 RX ADMIN — ONDANSETRON 4 MG: 4 TABLET, ORALLY DISINTEGRATING ORAL at 14:45

## 2017-09-10 RX ADMIN — GABAPENTIN 300 MG: 300 CAPSULE ORAL at 21:39

## 2017-09-10 RX ADMIN — POTASSIUM CHLORIDE 40 MEQ: 1500 TABLET, EXTENDED RELEASE ORAL at 17:25

## 2017-09-10 RX ADMIN — BACITRACIN ZINC 1 SMALL APPLICATION: 500 OINTMENT TOPICAL at 08:56

## 2017-09-10 RX ADMIN — ALBUTEROL SULFATE 2 PUFF: 90 AEROSOL, METERED RESPIRATORY (INHALATION) at 04:41

## 2017-09-10 RX ADMIN — BACITRACIN ZINC 1 SMALL APPLICATION: 500 OINTMENT TOPICAL at 17:25

## 2017-09-10 RX ADMIN — FUROSEMIDE 40 MG: 40 TABLET ORAL at 08:57

## 2017-09-10 RX ADMIN — ALPRAZOLAM 0.5 MG: 0.5 TABLET ORAL at 21:39

## 2017-09-10 RX ADMIN — FUROSEMIDE 40 MG: 40 TABLET ORAL at 16:00

## 2017-09-10 RX ADMIN — ACETAMINOPHEN 650 MG: 325 TABLET, FILM COATED ORAL at 06:57

## 2017-09-10 RX ADMIN — CYANOCOBALAMIN TAB 500 MCG 1000 MCG: 500 TAB at 08:56

## 2017-09-10 RX ADMIN — ALBUTEROL SULFATE 2 PUFF: 90 AEROSOL, METERED RESPIRATORY (INHALATION) at 21:40

## 2017-09-10 RX ADMIN — Medication 1 TABLET: at 06:57

## 2017-09-10 RX ADMIN — GABAPENTIN 300 MG: 300 CAPSULE ORAL at 11:20

## 2017-09-10 RX ADMIN — PANTOPRAZOLE SODIUM 40 MG: 40 TABLET, DELAYED RELEASE ORAL at 05:32

## 2017-09-10 RX ADMIN — FERROUS SULFATE TAB 325 MG (65 MG ELEMENTAL FE) 325 MG: 325 (65 FE) TAB at 08:56

## 2017-09-10 RX ADMIN — GABAPENTIN 300 MG: 300 CAPSULE ORAL at 08:56

## 2017-09-10 RX ADMIN — FOLIC ACID 1 MG: 1 TABLET ORAL at 08:57

## 2017-09-10 RX ADMIN — ONDANSETRON 4 MG: 4 TABLET, ORALLY DISINTEGRATING ORAL at 08:58

## 2017-09-10 RX ADMIN — FAMOTIDINE 40 MG: 20 TABLET ORAL at 08:56

## 2017-09-11 LAB
GLUCOSE SERPL-MCNC: 114 MG/DL (ref 65–140)
GLUCOSE SERPL-MCNC: 80 MG/DL (ref 65–140)
GLUCOSE SERPL-MCNC: 88 MG/DL (ref 65–140)

## 2017-09-11 PROCEDURE — 97116 GAIT TRAINING THERAPY: CPT

## 2017-09-11 PROCEDURE — 97110 THERAPEUTIC EXERCISES: CPT

## 2017-09-11 PROCEDURE — 82948 REAGENT STRIP/BLOOD GLUCOSE: CPT

## 2017-09-11 PROCEDURE — 97530 THERAPEUTIC ACTIVITIES: CPT

## 2017-09-11 RX ADMIN — FUROSEMIDE 40 MG: 40 TABLET ORAL at 16:03

## 2017-09-11 RX ADMIN — Medication 1 TABLET: at 07:08

## 2017-09-11 RX ADMIN — FUROSEMIDE 40 MG: 40 TABLET ORAL at 08:05

## 2017-09-11 RX ADMIN — ALPRAZOLAM 0.5 MG: 0.5 TABLET ORAL at 21:39

## 2017-09-11 RX ADMIN — GABAPENTIN 300 MG: 300 CAPSULE ORAL at 08:05

## 2017-09-11 RX ADMIN — GABAPENTIN 300 MG: 300 CAPSULE ORAL at 16:02

## 2017-09-11 RX ADMIN — FAMOTIDINE 40 MG: 20 TABLET ORAL at 08:05

## 2017-09-11 RX ADMIN — POTASSIUM CHLORIDE 40 MEQ: 1500 TABLET, EXTENDED RELEASE ORAL at 08:06

## 2017-09-11 RX ADMIN — ENOXAPARIN SODIUM 40 MG: 40 INJECTION SUBCUTANEOUS at 08:06

## 2017-09-11 RX ADMIN — FOLIC ACID 1 MG: 1 TABLET ORAL at 08:05

## 2017-09-11 RX ADMIN — FERROUS SULFATE TAB 325 MG (65 MG ELEMENTAL FE) 325 MG: 325 (65 FE) TAB at 08:06

## 2017-09-11 RX ADMIN — GABAPENTIN 300 MG: 300 CAPSULE ORAL at 20:28

## 2017-09-11 RX ADMIN — BACITRACIN ZINC 1 SMALL APPLICATION: 500 OINTMENT TOPICAL at 17:02

## 2017-09-11 RX ADMIN — ACETAMINOPHEN 650 MG: 325 TABLET, FILM COATED ORAL at 20:34

## 2017-09-11 RX ADMIN — ONDANSETRON 4 MG: 4 TABLET, ORALLY DISINTEGRATING ORAL at 00:01

## 2017-09-11 RX ADMIN — BACITRACIN ZINC 1 SMALL APPLICATION: 500 OINTMENT TOPICAL at 08:07

## 2017-09-11 RX ADMIN — ALBUTEROL SULFATE 2 PUFF: 90 AEROSOL, METERED RESPIRATORY (INHALATION) at 21:39

## 2017-09-11 RX ADMIN — ACETAMINOPHEN 650 MG: 325 TABLET, FILM COATED ORAL at 06:07

## 2017-09-11 RX ADMIN — POTASSIUM CHLORIDE 40 MEQ: 1500 TABLET, EXTENDED RELEASE ORAL at 17:02

## 2017-09-11 RX ADMIN — CYANOCOBALAMIN TAB 500 MCG 1000 MCG: 500 TAB at 08:07

## 2017-09-11 RX ADMIN — ONDANSETRON 4 MG: 4 TABLET, ORALLY DISINTEGRATING ORAL at 23:56

## 2017-09-11 RX ADMIN — ONDANSETRON 4 MG: 4 TABLET, ORALLY DISINTEGRATING ORAL at 14:02

## 2017-09-11 RX ADMIN — ALPRAZOLAM 0.5 MG: 0.5 TABLET ORAL at 07:07

## 2017-09-12 LAB
GLUCOSE SERPL-MCNC: 107 MG/DL (ref 65–140)
GLUCOSE SERPL-MCNC: 69 MG/DL (ref 65–140)
GLUCOSE SERPL-MCNC: 97 MG/DL (ref 65–140)

## 2017-09-12 PROCEDURE — 97535 SELF CARE MNGMENT TRAINING: CPT

## 2017-09-12 PROCEDURE — 97110 THERAPEUTIC EXERCISES: CPT

## 2017-09-12 PROCEDURE — 82948 REAGENT STRIP/BLOOD GLUCOSE: CPT

## 2017-09-12 PROCEDURE — 97116 GAIT TRAINING THERAPY: CPT

## 2017-09-12 PROCEDURE — 97530 THERAPEUTIC ACTIVITIES: CPT

## 2017-09-12 RX ORDER — FAMOTIDINE 20 MG/1
20 TABLET, FILM COATED ORAL
Status: DISCONTINUED | OUTPATIENT
Start: 2017-09-13 | End: 2017-09-19 | Stop reason: HOSPADM

## 2017-09-12 RX ADMIN — ALBUTEROL SULFATE 2 PUFF: 90 AEROSOL, METERED RESPIRATORY (INHALATION) at 21:47

## 2017-09-12 RX ADMIN — ONDANSETRON 4 MG: 4 TABLET, ORALLY DISINTEGRATING ORAL at 08:52

## 2017-09-12 RX ADMIN — FUROSEMIDE 40 MG: 40 TABLET ORAL at 09:42

## 2017-09-12 RX ADMIN — ENOXAPARIN SODIUM 40 MG: 40 INJECTION SUBCUTANEOUS at 09:42

## 2017-09-12 RX ADMIN — ALPRAZOLAM 0.5 MG: 0.5 TABLET ORAL at 21:49

## 2017-09-12 RX ADMIN — POTASSIUM CHLORIDE 40 MEQ: 1500 TABLET, EXTENDED RELEASE ORAL at 10:44

## 2017-09-12 RX ADMIN — POTASSIUM CHLORIDE 40 MEQ: 1500 TABLET, EXTENDED RELEASE ORAL at 19:23

## 2017-09-12 RX ADMIN — BACITRACIN ZINC 1 SMALL APPLICATION: 500 OINTMENT TOPICAL at 09:43

## 2017-09-12 RX ADMIN — GABAPENTIN 300 MG: 300 CAPSULE ORAL at 21:23

## 2017-09-12 RX ADMIN — GABAPENTIN 300 MG: 300 CAPSULE ORAL at 17:11

## 2017-09-12 RX ADMIN — ALPRAZOLAM 0.5 MG: 0.5 TABLET ORAL at 10:03

## 2017-09-12 RX ADMIN — FAMOTIDINE 40 MG: 20 TABLET ORAL at 09:43

## 2017-09-13 LAB
ANION GAP SERPL CALCULATED.3IONS-SCNC: 3 MMOL/L (ref 4–13)
BASOPHILS # BLD AUTO: 0.05 THOUSANDS/ΜL (ref 0–0.1)
BASOPHILS NFR BLD AUTO: 1 % (ref 0–1)
BUN SERPL-MCNC: 5 MG/DL (ref 5–25)
CALCIUM SERPL-MCNC: 7.6 MG/DL (ref 8.3–10.1)
CHLORIDE SERPL-SCNC: 103 MMOL/L (ref 100–108)
CO2 SERPL-SCNC: 32 MMOL/L (ref 21–32)
CREAT SERPL-MCNC: 0.86 MG/DL (ref 0.6–1.3)
EOSINOPHIL # BLD AUTO: 0.18 THOUSAND/ΜL (ref 0–0.61)
EOSINOPHIL NFR BLD AUTO: 2 % (ref 0–6)
ERYTHROCYTE [DISTWIDTH] IN BLOOD BY AUTOMATED COUNT: 16.6 % (ref 11.6–15.1)
FOLATE SERPL-MCNC: 16.6 NG/ML (ref 3.1–17.5)
GFR SERPL CREATININE-BSD FRML MDRD: 83 ML/MIN/1.73SQ M
GLUCOSE SERPL-MCNC: 116 MG/DL (ref 65–140)
GLUCOSE SERPL-MCNC: 77 MG/DL (ref 65–140)
GLUCOSE SERPL-MCNC: 96 MG/DL (ref 65–140)
HCT VFR BLD AUTO: 29 % (ref 34.8–46.1)
HGB BLD-MCNC: 9.7 G/DL (ref 11.5–15.4)
LYMPHOCYTES # BLD AUTO: 2.91 THOUSANDS/ΜL (ref 0.6–4.47)
LYMPHOCYTES NFR BLD AUTO: 39 % (ref 14–44)
MCH RBC QN AUTO: 35.8 PG (ref 26.8–34.3)
MCHC RBC AUTO-ENTMCNC: 33.4 G/DL (ref 31.4–37.4)
MCV RBC AUTO: 107 FL (ref 82–98)
MONOCYTES # BLD AUTO: 1.22 THOUSAND/ΜL (ref 0.17–1.22)
MONOCYTES NFR BLD AUTO: 16 % (ref 4–12)
NEUTROPHILS # BLD AUTO: 3.14 THOUSANDS/ΜL (ref 1.85–7.62)
NEUTS SEG NFR BLD AUTO: 42 % (ref 43–75)
NRBC BLD AUTO-RTO: 0 /100 WBCS
PLATELET # BLD AUTO: 239 THOUSANDS/UL (ref 149–390)
PMV BLD AUTO: 9.8 FL (ref 8.9–12.7)
POTASSIUM SERPL-SCNC: 3.4 MMOL/L (ref 3.5–5.3)
RBC # BLD AUTO: 2.71 MILLION/UL (ref 3.81–5.12)
SODIUM SERPL-SCNC: 138 MMOL/L (ref 136–145)
VIT B12 SERPL-MCNC: 1844 PG/ML (ref 100–900)
WBC # BLD AUTO: 7.5 THOUSAND/UL (ref 4.31–10.16)

## 2017-09-13 PROCEDURE — 97530 THERAPEUTIC ACTIVITIES: CPT

## 2017-09-13 PROCEDURE — 80048 BASIC METABOLIC PNL TOTAL CA: CPT | Performed by: INTERNAL MEDICINE

## 2017-09-13 PROCEDURE — 97116 GAIT TRAINING THERAPY: CPT

## 2017-09-13 PROCEDURE — 97110 THERAPEUTIC EXERCISES: CPT

## 2017-09-13 PROCEDURE — 82948 REAGENT STRIP/BLOOD GLUCOSE: CPT

## 2017-09-13 PROCEDURE — 82746 ASSAY OF FOLIC ACID SERUM: CPT | Performed by: INTERNAL MEDICINE

## 2017-09-13 PROCEDURE — 82607 VITAMIN B-12: CPT | Performed by: INTERNAL MEDICINE

## 2017-09-13 PROCEDURE — 85025 COMPLETE CBC W/AUTO DIFF WBC: CPT | Performed by: INTERNAL MEDICINE

## 2017-09-13 RX ORDER — SPIRONOLACTONE 50 MG/1
50 TABLET, FILM COATED ORAL 2 TIMES DAILY
Status: DISCONTINUED | OUTPATIENT
Start: 2017-09-13 | End: 2017-09-19

## 2017-09-13 RX ADMIN — ONDANSETRON 4 MG: 4 TABLET, ORALLY DISINTEGRATING ORAL at 06:22

## 2017-09-13 RX ADMIN — ALPRAZOLAM 0.5 MG: 0.5 TABLET ORAL at 22:10

## 2017-09-13 RX ADMIN — SPIRONOLACTONE 50 MG: 50 TABLET ORAL at 19:01

## 2017-09-13 RX ADMIN — ALBUTEROL SULFATE 2 PUFF: 90 AEROSOL, METERED RESPIRATORY (INHALATION) at 22:09

## 2017-09-13 RX ADMIN — GABAPENTIN 300 MG: 300 CAPSULE ORAL at 22:09

## 2017-09-13 RX ADMIN — CYANOCOBALAMIN TAB 500 MCG 1000 MCG: 500 TAB at 09:11

## 2017-09-13 RX ADMIN — GABAPENTIN 300 MG: 300 CAPSULE ORAL at 09:11

## 2017-09-13 RX ADMIN — ALPRAZOLAM 0.5 MG: 0.5 TABLET ORAL at 09:11

## 2017-09-13 RX ADMIN — FUROSEMIDE 40 MG: 40 TABLET ORAL at 16:58

## 2017-09-13 RX ADMIN — BACITRACIN ZINC 1 SMALL APPLICATION: 500 OINTMENT TOPICAL at 09:11

## 2017-09-13 RX ADMIN — FOLIC ACID 1 MG: 1 TABLET ORAL at 09:11

## 2017-09-13 RX ADMIN — POTASSIUM CHLORIDE 40 MEQ: 1500 TABLET, EXTENDED RELEASE ORAL at 09:11

## 2017-09-13 RX ADMIN — GABAPENTIN 300 MG: 300 CAPSULE ORAL at 16:58

## 2017-09-13 RX ADMIN — ENOXAPARIN SODIUM 40 MG: 40 INJECTION SUBCUTANEOUS at 09:10

## 2017-09-13 RX ADMIN — POTASSIUM CHLORIDE 40 MEQ: 1500 TABLET, EXTENDED RELEASE ORAL at 17:00

## 2017-09-13 RX ADMIN — Medication 1 TABLET: at 09:11

## 2017-09-13 RX ADMIN — FAMOTIDINE 20 MG: 20 TABLET ORAL at 06:22

## 2017-09-13 RX ADMIN — FERROUS SULFATE TAB 325 MG (65 MG ELEMENTAL FE) 325 MG: 325 (65 FE) TAB at 09:11

## 2017-09-13 RX ADMIN — BACITRACIN ZINC 1 SMALL APPLICATION: 500 OINTMENT TOPICAL at 17:00

## 2017-09-13 RX ADMIN — BACITRACIN ZINC 1 SMALL APPLICATION: 500 OINTMENT TOPICAL at 02:01

## 2017-09-13 RX ADMIN — FUROSEMIDE 40 MG: 40 TABLET ORAL at 09:13

## 2017-09-13 RX ADMIN — ACETAMINOPHEN 650 MG: 325 TABLET, FILM COATED ORAL at 13:31

## 2017-09-14 PROCEDURE — 97530 THERAPEUTIC ACTIVITIES: CPT | Performed by: PHYSICAL THERAPIST

## 2017-09-14 PROCEDURE — 97116 GAIT TRAINING THERAPY: CPT | Performed by: PHYSICAL THERAPIST

## 2017-09-14 RX ADMIN — ALBUTEROL SULFATE 2 PUFF: 90 AEROSOL, METERED RESPIRATORY (INHALATION) at 22:22

## 2017-09-14 RX ADMIN — CYANOCOBALAMIN TAB 500 MCG 1000 MCG: 500 TAB at 08:51

## 2017-09-14 RX ADMIN — ALPRAZOLAM 0.5 MG: 0.5 TABLET ORAL at 15:41

## 2017-09-14 RX ADMIN — ONDANSETRON 4 MG: 4 TABLET, ORALLY DISINTEGRATING ORAL at 07:35

## 2017-09-14 RX ADMIN — BACITRACIN ZINC 1 SMALL APPLICATION: 500 OINTMENT TOPICAL at 08:50

## 2017-09-14 RX ADMIN — GABAPENTIN 300 MG: 300 CAPSULE ORAL at 20:59

## 2017-09-14 RX ADMIN — SPIRONOLACTONE 50 MG: 50 TABLET ORAL at 17:05

## 2017-09-14 RX ADMIN — FUROSEMIDE 40 MG: 40 TABLET ORAL at 17:05

## 2017-09-14 RX ADMIN — DOCUSATE SODIUM 100 MG: 100 CAPSULE, LIQUID FILLED ORAL at 17:05

## 2017-09-14 RX ADMIN — GABAPENTIN 300 MG: 300 CAPSULE ORAL at 08:49

## 2017-09-14 RX ADMIN — FUROSEMIDE 40 MG: 40 TABLET ORAL at 08:49

## 2017-09-14 RX ADMIN — Medication 1 TABLET: at 07:35

## 2017-09-14 RX ADMIN — ACETAMINOPHEN 650 MG: 325 TABLET, FILM COATED ORAL at 00:51

## 2017-09-14 RX ADMIN — BACITRACIN ZINC 1 SMALL APPLICATION: 500 OINTMENT TOPICAL at 17:05

## 2017-09-14 RX ADMIN — GABAPENTIN 300 MG: 300 CAPSULE ORAL at 17:05

## 2017-09-14 RX ADMIN — FAMOTIDINE 20 MG: 20 TABLET ORAL at 06:21

## 2017-09-14 RX ADMIN — SPIRONOLACTONE 50 MG: 50 TABLET ORAL at 08:50

## 2017-09-14 RX ADMIN — ACETAMINOPHEN 650 MG: 325 TABLET, FILM COATED ORAL at 23:55

## 2017-09-14 RX ADMIN — FERROUS SULFATE TAB 325 MG (65 MG ELEMENTAL FE) 325 MG: 325 (65 FE) TAB at 08:50

## 2017-09-14 RX ADMIN — ALPRAZOLAM 0.5 MG: 0.5 TABLET ORAL at 22:22

## 2017-09-14 RX ADMIN — ENOXAPARIN SODIUM 40 MG: 40 INJECTION SUBCUTANEOUS at 08:50

## 2017-09-14 RX ADMIN — FOLIC ACID 1 MG: 1 TABLET ORAL at 08:49

## 2017-09-15 PROCEDURE — 97116 GAIT TRAINING THERAPY: CPT

## 2017-09-15 PROCEDURE — 97110 THERAPEUTIC EXERCISES: CPT

## 2017-09-15 RX ORDER — FUROSEMIDE 40 MG/1
80 TABLET ORAL
Status: DISCONTINUED | OUTPATIENT
Start: 2017-09-15 | End: 2017-09-19 | Stop reason: HOSPADM

## 2017-09-15 RX ADMIN — GABAPENTIN 300 MG: 300 CAPSULE ORAL at 20:18

## 2017-09-15 RX ADMIN — SPIRONOLACTONE 50 MG: 50 TABLET ORAL at 19:24

## 2017-09-15 RX ADMIN — ALPRAZOLAM 0.5 MG: 0.5 TABLET ORAL at 20:18

## 2017-09-15 RX ADMIN — FOLIC ACID 1 MG: 1 TABLET ORAL at 08:26

## 2017-09-15 RX ADMIN — ACETAMINOPHEN 650 MG: 325 TABLET, FILM COATED ORAL at 21:12

## 2017-09-15 RX ADMIN — BACITRACIN ZINC 1 SMALL APPLICATION: 500 OINTMENT TOPICAL at 08:27

## 2017-09-15 RX ADMIN — ENOXAPARIN SODIUM 40 MG: 40 INJECTION SUBCUTANEOUS at 08:26

## 2017-09-15 RX ADMIN — FERROUS SULFATE TAB 325 MG (65 MG ELEMENTAL FE) 325 MG: 325 (65 FE) TAB at 08:26

## 2017-09-15 RX ADMIN — Medication 1 TABLET: at 08:26

## 2017-09-15 RX ADMIN — GABAPENTIN 300 MG: 300 CAPSULE ORAL at 16:06

## 2017-09-15 RX ADMIN — SPIRONOLACTONE 50 MG: 50 TABLET ORAL at 08:26

## 2017-09-15 RX ADMIN — DOCUSATE SODIUM 100 MG: 100 CAPSULE, LIQUID FILLED ORAL at 08:26

## 2017-09-15 RX ADMIN — ACETAMINOPHEN 650 MG: 325 TABLET, FILM COATED ORAL at 06:16

## 2017-09-15 RX ADMIN — BACITRACIN ZINC 1 SMALL APPLICATION: 500 OINTMENT TOPICAL at 19:24

## 2017-09-15 RX ADMIN — ALBUTEROL SULFATE 2 PUFF: 90 AEROSOL, METERED RESPIRATORY (INHALATION) at 15:10

## 2017-09-15 RX ADMIN — FUROSEMIDE 80 MG: 40 TABLET ORAL at 16:06

## 2017-09-15 RX ADMIN — CYANOCOBALAMIN TAB 500 MCG 1000 MCG: 500 TAB at 08:26

## 2017-09-15 RX ADMIN — GABAPENTIN 300 MG: 300 CAPSULE ORAL at 08:26

## 2017-09-15 RX ADMIN — FUROSEMIDE 40 MG: 40 TABLET ORAL at 08:26

## 2017-09-15 RX ADMIN — FAMOTIDINE 20 MG: 20 TABLET ORAL at 06:16

## 2017-09-16 LAB
ANION GAP SERPL CALCULATED.3IONS-SCNC: 6 MMOL/L (ref 4–13)
BASOPHILS # BLD MANUAL: 0 THOUSAND/UL (ref 0–0.1)
BASOPHILS NFR MAR MANUAL: 0 % (ref 0–1)
BUN SERPL-MCNC: 6 MG/DL (ref 5–25)
CALCIUM SERPL-MCNC: 8.1 MG/DL (ref 8.3–10.1)
CHLORIDE SERPL-SCNC: 102 MMOL/L (ref 100–108)
CO2 SERPL-SCNC: 32 MMOL/L (ref 21–32)
CREAT SERPL-MCNC: 0.83 MG/DL (ref 0.6–1.3)
EOSINOPHIL # BLD MANUAL: 0.26 THOUSAND/UL (ref 0–0.4)
EOSINOPHIL NFR BLD MANUAL: 3 % (ref 0–6)
ERYTHROCYTE [DISTWIDTH] IN BLOOD BY AUTOMATED COUNT: 16 % (ref 11.6–15.1)
GFR SERPL CREATININE-BSD FRML MDRD: 87 ML/MIN/1.73SQ M
GLUCOSE SERPL-MCNC: 95 MG/DL (ref 65–140)
HCT VFR BLD AUTO: 28.1 % (ref 34.8–46.1)
HGB BLD-MCNC: 9.3 G/DL (ref 11.5–15.4)
LYMPHOCYTES # BLD AUTO: 2.88 THOUSAND/UL (ref 0.6–4.47)
LYMPHOCYTES # BLD AUTO: 33 % (ref 14–44)
MACROCYTES BLD QL AUTO: PRESENT
MCH RBC QN AUTO: 35.2 PG (ref 26.8–34.3)
MCHC RBC AUTO-ENTMCNC: 33.1 G/DL (ref 31.4–37.4)
MCV RBC AUTO: 106 FL (ref 82–98)
MONOCYTES # BLD AUTO: 1.05 THOUSAND/UL (ref 0–1.22)
MONOCYTES NFR BLD: 12 % (ref 4–12)
MYELOCYTES NFR BLD MANUAL: 1 % (ref 0–1)
NEUTROPHILS # BLD MANUAL: 4.28 THOUSAND/UL (ref 1.85–7.62)
NEUTS SEG NFR BLD AUTO: 49 % (ref 43–75)
NRBC BLD AUTO-RTO: 0 /100 WBCS
PLATELET # BLD AUTO: 280 THOUSANDS/UL (ref 149–390)
PLATELET BLD QL SMEAR: ADEQUATE
PMV BLD AUTO: 9.9 FL (ref 8.9–12.7)
POTASSIUM SERPL-SCNC: 3.5 MMOL/L (ref 3.5–5.3)
RBC # BLD AUTO: 2.64 MILLION/UL (ref 3.81–5.12)
SODIUM SERPL-SCNC: 140 MMOL/L (ref 136–145)
TOTAL CELLS COUNTED SPEC: 100
VARIANT LYMPHS # BLD AUTO: 2 %
WBC # BLD AUTO: 8.74 THOUSAND/UL (ref 4.31–10.16)

## 2017-09-16 PROCEDURE — 80048 BASIC METABOLIC PNL TOTAL CA: CPT | Performed by: INTERNAL MEDICINE

## 2017-09-16 PROCEDURE — 85027 COMPLETE CBC AUTOMATED: CPT | Performed by: INTERNAL MEDICINE

## 2017-09-16 PROCEDURE — 85007 BL SMEAR W/DIFF WBC COUNT: CPT | Performed by: INTERNAL MEDICINE

## 2017-09-16 RX ADMIN — ONDANSETRON 4 MG: 4 TABLET, ORALLY DISINTEGRATING ORAL at 15:25

## 2017-09-16 RX ADMIN — FUROSEMIDE 80 MG: 40 TABLET ORAL at 09:45

## 2017-09-16 RX ADMIN — GABAPENTIN 300 MG: 300 CAPSULE ORAL at 21:12

## 2017-09-16 RX ADMIN — ONDANSETRON 4 MG: 4 TABLET, ORALLY DISINTEGRATING ORAL at 08:01

## 2017-09-16 RX ADMIN — SPIRONOLACTONE 50 MG: 50 TABLET ORAL at 09:45

## 2017-09-16 RX ADMIN — ALPRAZOLAM 0.5 MG: 0.5 TABLET ORAL at 21:12

## 2017-09-16 RX ADMIN — FERROUS SULFATE TAB 325 MG (65 MG ELEMENTAL FE) 325 MG: 325 (65 FE) TAB at 09:45

## 2017-09-16 RX ADMIN — CYANOCOBALAMIN TAB 500 MCG 1000 MCG: 500 TAB at 09:45

## 2017-09-16 RX ADMIN — FAMOTIDINE 20 MG: 20 TABLET ORAL at 06:21

## 2017-09-16 RX ADMIN — ONDANSETRON 4 MG: 4 TABLET, ORALLY DISINTEGRATING ORAL at 21:12

## 2017-09-16 RX ADMIN — Medication 1 TABLET: at 09:44

## 2017-09-16 RX ADMIN — FOLIC ACID 1 MG: 1 TABLET ORAL at 09:45

## 2017-09-16 RX ADMIN — GABAPENTIN 300 MG: 300 CAPSULE ORAL at 09:45

## 2017-09-16 RX ADMIN — ENOXAPARIN SODIUM 40 MG: 40 INJECTION SUBCUTANEOUS at 09:46

## 2017-09-17 LAB
BASOPHILS # BLD AUTO: 0.08 THOUSANDS/ΜL (ref 0–0.1)
BASOPHILS NFR BLD AUTO: 1 % (ref 0–1)
EOSINOPHIL # BLD AUTO: 0.18 THOUSAND/ΜL (ref 0–0.61)
EOSINOPHIL NFR BLD AUTO: 2 % (ref 0–6)
ERYTHROCYTE [DISTWIDTH] IN BLOOD BY AUTOMATED COUNT: 15.9 % (ref 11.6–15.1)
HCT VFR BLD AUTO: 30.8 % (ref 34.8–46.1)
HGB BLD-MCNC: 10.3 G/DL (ref 11.5–15.4)
LYMPHOCYTES # BLD AUTO: 3.81 THOUSANDS/ΜL (ref 0.6–4.47)
LYMPHOCYTES NFR BLD AUTO: 39 % (ref 14–44)
MCH RBC QN AUTO: 35.6 PG (ref 26.8–34.3)
MCHC RBC AUTO-ENTMCNC: 33.4 G/DL (ref 31.4–37.4)
MCV RBC AUTO: 107 FL (ref 82–98)
MONOCYTES # BLD AUTO: 1.52 THOUSAND/ΜL (ref 0.17–1.22)
MONOCYTES NFR BLD AUTO: 16 % (ref 4–12)
NEUTROPHILS # BLD AUTO: 4.21 THOUSANDS/ΜL (ref 1.85–7.62)
NEUTS SEG NFR BLD AUTO: 42 % (ref 43–75)
NRBC BLD AUTO-RTO: 0 /100 WBCS
PLATELET # BLD AUTO: 355 THOUSANDS/UL (ref 149–390)
PMV BLD AUTO: 10 FL (ref 8.9–12.7)
RBC # BLD AUTO: 2.89 MILLION/UL (ref 3.81–5.12)
WBC # BLD AUTO: 9.8 THOUSAND/UL (ref 4.31–10.16)

## 2017-09-17 PROCEDURE — 97110 THERAPEUTIC EXERCISES: CPT

## 2017-09-17 PROCEDURE — 97116 GAIT TRAINING THERAPY: CPT

## 2017-09-17 PROCEDURE — 85025 COMPLETE CBC W/AUTO DIFF WBC: CPT | Performed by: INTERNAL MEDICINE

## 2017-09-17 RX ADMIN — ONDANSETRON 4 MG: 4 TABLET, ORALLY DISINTEGRATING ORAL at 09:24

## 2017-09-17 RX ADMIN — FUROSEMIDE 80 MG: 40 TABLET ORAL at 15:23

## 2017-09-17 RX ADMIN — SPIRONOLACTONE 50 MG: 50 TABLET ORAL at 17:48

## 2017-09-17 RX ADMIN — SPIRONOLACTONE 50 MG: 50 TABLET ORAL at 10:00

## 2017-09-17 RX ADMIN — GABAPENTIN 300 MG: 300 CAPSULE ORAL at 10:00

## 2017-09-17 RX ADMIN — ALBUTEROL SULFATE 2 PUFF: 90 AEROSOL, METERED RESPIRATORY (INHALATION) at 21:10

## 2017-09-17 RX ADMIN — Medication 1 TABLET: at 10:00

## 2017-09-17 RX ADMIN — ALBUTEROL SULFATE 2 PUFF: 90 AEROSOL, METERED RESPIRATORY (INHALATION) at 07:36

## 2017-09-17 RX ADMIN — ALPRAZOLAM 0.5 MG: 0.5 TABLET ORAL at 21:10

## 2017-09-17 RX ADMIN — FAMOTIDINE 20 MG: 20 TABLET ORAL at 07:34

## 2017-09-17 RX ADMIN — FUROSEMIDE 80 MG: 40 TABLET ORAL at 10:00

## 2017-09-17 RX ADMIN — LIDOCAINE 2 PATCH: 50 PATCH CUTANEOUS at 09:59

## 2017-09-17 RX ADMIN — GABAPENTIN 300 MG: 300 CAPSULE ORAL at 15:23

## 2017-09-17 RX ADMIN — FERROUS SULFATE TAB 325 MG (65 MG ELEMENTAL FE) 325 MG: 325 (65 FE) TAB at 10:00

## 2017-09-17 RX ADMIN — GABAPENTIN 300 MG: 300 CAPSULE ORAL at 21:10

## 2017-09-17 RX ADMIN — CYANOCOBALAMIN TAB 500 MCG 1000 MCG: 500 TAB at 10:00

## 2017-09-17 RX ADMIN — FOLIC ACID 1 MG: 1 TABLET ORAL at 10:00

## 2017-09-17 RX ADMIN — ENOXAPARIN SODIUM 40 MG: 40 INJECTION SUBCUTANEOUS at 10:00

## 2017-09-18 LAB
ANION GAP SERPL CALCULATED.3IONS-SCNC: 5 MMOL/L (ref 4–13)
BUN SERPL-MCNC: 7 MG/DL (ref 5–25)
CALCIUM SERPL-MCNC: 8.2 MG/DL (ref 8.3–10.1)
CHLORIDE SERPL-SCNC: 101 MMOL/L (ref 100–108)
CO2 SERPL-SCNC: 34 MMOL/L (ref 21–32)
CREAT SERPL-MCNC: 0.8 MG/DL (ref 0.6–1.3)
GFR SERPL CREATININE-BSD FRML MDRD: 91 ML/MIN/1.73SQ M
GLUCOSE SERPL-MCNC: 84 MG/DL (ref 65–140)
POTASSIUM SERPL-SCNC: 2.7 MMOL/L (ref 3.5–5.3)
SODIUM SERPL-SCNC: 140 MMOL/L (ref 136–145)

## 2017-09-18 PROCEDURE — 80048 BASIC METABOLIC PNL TOTAL CA: CPT | Performed by: INTERNAL MEDICINE

## 2017-09-18 RX ORDER — POTASSIUM CHLORIDE 20 MEQ/1
20 TABLET, EXTENDED RELEASE ORAL
Status: DISCONTINUED | OUTPATIENT
Start: 2017-09-18 | End: 2017-09-19 | Stop reason: HOSPADM

## 2017-09-18 RX ORDER — NYSTATIN 100000 [USP'U]/G
POWDER TOPICAL 2 TIMES DAILY
Status: DISCONTINUED | OUTPATIENT
Start: 2017-09-18 | End: 2017-09-19 | Stop reason: HOSPADM

## 2017-09-18 RX ORDER — POTASSIUM CHLORIDE 20 MEQ/1
40 TABLET, EXTENDED RELEASE ORAL ONCE
Status: COMPLETED | OUTPATIENT
Start: 2017-09-18 | End: 2017-09-18

## 2017-09-18 RX ORDER — POTASSIUM CHLORIDE 20 MEQ/1
20 TABLET, EXTENDED RELEASE ORAL 2 TIMES DAILY
Status: DISCONTINUED | OUTPATIENT
Start: 2017-09-18 | End: 2017-09-18

## 2017-09-18 RX ORDER — IBUPROFEN 400 MG/1
400 TABLET ORAL EVERY 6 HOURS PRN
Status: DISCONTINUED | OUTPATIENT
Start: 2017-09-18 | End: 2017-09-19 | Stop reason: HOSPADM

## 2017-09-18 RX ORDER — POTASSIUM CHLORIDE 20 MEQ/1
40 TABLET, EXTENDED RELEASE ORAL ONCE
Status: DISCONTINUED | OUTPATIENT
Start: 2017-09-18 | End: 2017-09-18

## 2017-09-18 RX ADMIN — GABAPENTIN 300 MG: 300 CAPSULE ORAL at 20:33

## 2017-09-18 RX ADMIN — POTASSIUM CHLORIDE 20 MEQ: 1500 TABLET, EXTENDED RELEASE ORAL at 08:30

## 2017-09-18 RX ADMIN — POTASSIUM CHLORIDE 20 MEQ: 1500 TABLET, EXTENDED RELEASE ORAL at 21:17

## 2017-09-18 RX ADMIN — IBUPROFEN 400 MG: 400 TABLET, FILM COATED ORAL at 20:34

## 2017-09-18 RX ADMIN — CYANOCOBALAMIN TAB 500 MCG 1000 MCG: 500 TAB at 08:30

## 2017-09-18 RX ADMIN — NYSTATIN: 100000 POWDER TOPICAL at 17:50

## 2017-09-18 RX ADMIN — DOCUSATE SODIUM 100 MG: 100 CAPSULE, LIQUID FILLED ORAL at 08:30

## 2017-09-18 RX ADMIN — GABAPENTIN 300 MG: 300 CAPSULE ORAL at 15:52

## 2017-09-18 RX ADMIN — NYSTATIN: 100000 POWDER TOPICAL at 11:53

## 2017-09-18 RX ADMIN — SPIRONOLACTONE 50 MG: 50 TABLET ORAL at 08:29

## 2017-09-18 RX ADMIN — GABAPENTIN 300 MG: 300 CAPSULE ORAL at 08:29

## 2017-09-18 RX ADMIN — FERROUS SULFATE TAB 325 MG (65 MG ELEMENTAL FE) 325 MG: 325 (65 FE) TAB at 08:30

## 2017-09-18 RX ADMIN — ACETAMINOPHEN 650 MG: 325 TABLET, FILM COATED ORAL at 23:08

## 2017-09-18 RX ADMIN — POTASSIUM CHLORIDE 40 MEQ: 1500 TABLET, EXTENDED RELEASE ORAL at 07:20

## 2017-09-18 RX ADMIN — POTASSIUM CHLORIDE 20 MEQ: 1500 TABLET, EXTENDED RELEASE ORAL at 11:55

## 2017-09-18 RX ADMIN — ALPRAZOLAM 0.5 MG: 0.5 TABLET ORAL at 20:33

## 2017-09-18 RX ADMIN — ENOXAPARIN SODIUM 40 MG: 40 INJECTION SUBCUTANEOUS at 08:31

## 2017-09-18 RX ADMIN — Medication 1 TABLET: at 08:31

## 2017-09-18 RX ADMIN — FAMOTIDINE 20 MG: 20 TABLET ORAL at 08:31

## 2017-09-18 RX ADMIN — BACITRACIN ZINC 1 SMALL APPLICATION: 500 OINTMENT TOPICAL at 17:51

## 2017-09-18 RX ADMIN — ALBUTEROL SULFATE 2 PUFF: 90 AEROSOL, METERED RESPIRATORY (INHALATION) at 21:20

## 2017-09-18 RX ADMIN — FOLIC ACID 1 MG: 1 TABLET ORAL at 08:30

## 2017-09-18 RX ADMIN — FUROSEMIDE 80 MG: 40 TABLET ORAL at 08:29

## 2017-09-18 RX ADMIN — ONDANSETRON 4 MG: 4 TABLET, ORALLY DISINTEGRATING ORAL at 15:52

## 2017-09-18 RX ADMIN — BACITRACIN ZINC 1 SMALL APPLICATION: 500 OINTMENT TOPICAL at 08:33

## 2017-09-18 RX ADMIN — LIDOCAINE 2 PATCH: 50 PATCH CUTANEOUS at 08:32

## 2017-09-18 RX ADMIN — ONDANSETRON 4 MG: 4 TABLET, ORALLY DISINTEGRATING ORAL at 09:51

## 2017-09-18 RX ADMIN — FUROSEMIDE 80 MG: 40 TABLET ORAL at 15:52

## 2017-09-19 VITALS
OXYGEN SATURATION: 92 % | TEMPERATURE: 97.5 F | WEIGHT: 293 LBS | SYSTOLIC BLOOD PRESSURE: 112 MMHG | HEIGHT: 70 IN | DIASTOLIC BLOOD PRESSURE: 55 MMHG | BODY MASS INDEX: 41.95 KG/M2 | RESPIRATION RATE: 18 BRPM | HEART RATE: 83 BPM

## 2017-09-19 PROCEDURE — 97535 SELF CARE MNGMENT TRAINING: CPT

## 2017-09-19 PROCEDURE — 97530 THERAPEUTIC ACTIVITIES: CPT

## 2017-09-19 RX ORDER — SPIRONOLACTONE 50 MG/1
50 TABLET, FILM COATED ORAL 2 TIMES DAILY
Status: DISCONTINUED | OUTPATIENT
Start: 2017-09-19 | End: 2017-09-19 | Stop reason: HOSPADM

## 2017-09-19 RX ORDER — POTASSIUM CHLORIDE 20 MEQ/1
20 TABLET, EXTENDED RELEASE ORAL
Qty: 60 TABLET | Refills: 0 | Status: ON HOLD | OUTPATIENT
Start: 2017-09-19 | End: 2019-01-01 | Stop reason: SDUPTHER

## 2017-09-19 RX ORDER — SPIRONOLACTONE 50 MG/1
50 TABLET, FILM COATED ORAL 2 TIMES DAILY
Qty: 60 TABLET | Refills: 0 | Status: ON HOLD | OUTPATIENT
Start: 2017-09-19 | End: 2018-01-01

## 2017-09-19 RX ORDER — FUROSEMIDE 80 MG
80 TABLET ORAL 2 TIMES DAILY
Qty: 60 TABLET | Refills: 0 | Status: SHIPPED | OUTPATIENT
Start: 2017-09-19 | End: 2018-01-01 | Stop reason: HOSPADM

## 2017-09-19 RX ORDER — ALPRAZOLAM 0.5 MG/1
0.5 TABLET ORAL 3 TIMES DAILY PRN
Qty: 30 TABLET | Refills: 0 | Status: SHIPPED | OUTPATIENT
Start: 2017-09-19 | End: 2017-12-14 | Stop reason: HOSPADM

## 2017-09-19 RX ORDER — ACETAMINOPHEN 325 MG/1
650 TABLET ORAL EVERY 4 HOURS PRN
Qty: 30 TABLET | Refills: 0 | Status: SHIPPED | OUTPATIENT
Start: 2017-09-19 | End: 2017-12-14 | Stop reason: HOSPADM

## 2017-09-19 RX ADMIN — FUROSEMIDE 80 MG: 40 TABLET ORAL at 09:09

## 2017-09-19 RX ADMIN — NYSTATIN: 100000 POWDER TOPICAL at 09:09

## 2017-09-19 RX ADMIN — SPIRONOLACTONE 50 MG: 50 TABLET ORAL at 09:09

## 2017-09-19 RX ADMIN — FERROUS SULFATE TAB 325 MG (65 MG ELEMENTAL FE) 325 MG: 325 (65 FE) TAB at 09:09

## 2017-09-19 RX ADMIN — GABAPENTIN 300 MG: 300 CAPSULE ORAL at 09:08

## 2017-09-19 RX ADMIN — FAMOTIDINE 20 MG: 20 TABLET ORAL at 09:08

## 2017-09-19 RX ADMIN — ONDANSETRON 4 MG: 4 TABLET, ORALLY DISINTEGRATING ORAL at 16:30

## 2017-09-19 RX ADMIN — POTASSIUM CHLORIDE 20 MEQ: 1500 TABLET, EXTENDED RELEASE ORAL at 12:41

## 2017-09-19 RX ADMIN — IBUPROFEN 400 MG: 400 TABLET, FILM COATED ORAL at 09:07

## 2017-09-19 RX ADMIN — FOLIC ACID 1 MG: 1 TABLET ORAL at 09:09

## 2017-09-19 RX ADMIN — ALPRAZOLAM 0.5 MG: 0.5 TABLET ORAL at 15:04

## 2017-09-19 RX ADMIN — POTASSIUM CHLORIDE 20 MEQ: 1500 TABLET, EXTENDED RELEASE ORAL at 16:29

## 2017-09-19 RX ADMIN — Medication 1 TABLET: at 09:08

## 2017-09-19 RX ADMIN — POTASSIUM CHLORIDE 20 MEQ: 1500 TABLET, EXTENDED RELEASE ORAL at 09:08

## 2017-09-19 RX ADMIN — ENOXAPARIN SODIUM 40 MG: 40 INJECTION SUBCUTANEOUS at 09:08

## 2017-09-19 RX ADMIN — CYANOCOBALAMIN TAB 500 MCG 1000 MCG: 500 TAB at 09:08

## 2017-09-19 RX ADMIN — GABAPENTIN 300 MG: 300 CAPSULE ORAL at 16:30

## 2017-11-25 ENCOUNTER — APPOINTMENT (EMERGENCY)
Dept: RADIOLOGY | Facility: HOSPITAL | Age: 42
DRG: 280 | End: 2017-11-25
Payer: COMMERCIAL

## 2017-11-25 ENCOUNTER — APPOINTMENT (EMERGENCY)
Dept: CT IMAGING | Facility: HOSPITAL | Age: 42
DRG: 280 | End: 2017-11-25
Payer: COMMERCIAL

## 2017-11-25 ENCOUNTER — HOSPITAL ENCOUNTER (INPATIENT)
Facility: HOSPITAL | Age: 42
LOS: 19 days | Discharge: NURSING, REHAB OR LONG-TERM CARE | DRG: 280 | End: 2017-12-14
Attending: INTERNAL MEDICINE | Admitting: INTERNAL MEDICINE
Payer: COMMERCIAL

## 2017-11-25 DIAGNOSIS — E87.6 HYPOKALEMIA: ICD-10-CM

## 2017-11-25 DIAGNOSIS — R29.6 RECURRENT FALLS: ICD-10-CM

## 2017-11-25 DIAGNOSIS — F32.A DEPRESSION: ICD-10-CM

## 2017-11-25 DIAGNOSIS — E66.01 MORBID OBESITY (HCC): Primary | ICD-10-CM

## 2017-11-25 DIAGNOSIS — R26.2 AMBULATORY DYSFUNCTION: ICD-10-CM

## 2017-11-25 DIAGNOSIS — R60.1 ANASARCA: ICD-10-CM

## 2017-11-25 DIAGNOSIS — M25.572 ANKLE PAIN, LEFT: ICD-10-CM

## 2017-11-25 DIAGNOSIS — R50.9 FEBRILE ILLNESS: ICD-10-CM

## 2017-11-25 DIAGNOSIS — G89.4 CHRONIC PAIN SYNDROME: ICD-10-CM

## 2017-11-25 DIAGNOSIS — E66.01 MORBID OBESITY DUE TO EXCESS CALORIES (HCC): ICD-10-CM

## 2017-11-25 DIAGNOSIS — R07.9 CHEST PAIN: ICD-10-CM

## 2017-11-25 DIAGNOSIS — R60.9 PERIPHERAL EDEMA: ICD-10-CM

## 2017-11-25 DIAGNOSIS — K74.60 CIRRHOSIS (HCC): ICD-10-CM

## 2017-11-25 LAB
ALBUMIN SERPL BCP-MCNC: 1.4 G/DL (ref 3.5–5)
ALP SERPL-CCNC: 157 U/L (ref 46–116)
ALT SERPL W P-5'-P-CCNC: 25 U/L (ref 12–78)
ANION GAP SERPL CALCULATED.3IONS-SCNC: 10 MMOL/L (ref 4–13)
AST SERPL W P-5'-P-CCNC: 75 U/L (ref 5–45)
BASOPHILS # BLD AUTO: 0.06 THOUSANDS/ΜL (ref 0–0.1)
BASOPHILS NFR BLD AUTO: 1 % (ref 0–1)
BILIRUB SERPL-MCNC: 4.65 MG/DL (ref 0.2–1)
BUN SERPL-MCNC: 4 MG/DL (ref 5–25)
CALCIUM SERPL-MCNC: 8.3 MG/DL (ref 8.3–10.1)
CHLORIDE SERPL-SCNC: 103 MMOL/L (ref 100–108)
CO2 SERPL-SCNC: 28 MMOL/L (ref 21–32)
CREAT SERPL-MCNC: 0.88 MG/DL (ref 0.6–1.3)
EOSINOPHIL # BLD AUTO: 0.05 THOUSAND/ΜL (ref 0–0.61)
EOSINOPHIL NFR BLD AUTO: 1 % (ref 0–6)
ERYTHROCYTE [DISTWIDTH] IN BLOOD BY AUTOMATED COUNT: 19.6 % (ref 11.6–15.1)
GFR SERPL CREATININE-BSD FRML MDRD: 81 ML/MIN/1.73SQ M
GLUCOSE SERPL-MCNC: 88 MG/DL (ref 65–140)
HCT VFR BLD AUTO: 28.9 % (ref 34.8–46.1)
HGB BLD-MCNC: 9.7 G/DL (ref 11.5–15.4)
LYMPHOCYTES # BLD AUTO: 1.57 THOUSANDS/ΜL (ref 0.6–4.47)
LYMPHOCYTES NFR BLD AUTO: 18 % (ref 14–44)
MCH RBC QN AUTO: 36.2 PG (ref 26.8–34.3)
MCHC RBC AUTO-ENTMCNC: 33.6 G/DL (ref 31.4–37.4)
MCV RBC AUTO: 108 FL (ref 82–98)
MONOCYTES # BLD AUTO: 1.54 THOUSAND/ΜL (ref 0.17–1.22)
MONOCYTES NFR BLD AUTO: 18 % (ref 4–12)
NEUTROPHILS # BLD AUTO: 5.56 THOUSANDS/ΜL (ref 1.85–7.62)
NEUTS SEG NFR BLD AUTO: 62 % (ref 43–75)
NRBC BLD AUTO-RTO: 0 /100 WBCS
PLATELET # BLD AUTO: 253 THOUSANDS/UL (ref 149–390)
PMV BLD AUTO: 9.4 FL (ref 8.9–12.7)
POTASSIUM SERPL-SCNC: 3 MMOL/L (ref 3.5–5.3)
PROT SERPL-MCNC: 7.1 G/DL (ref 6.4–8.2)
RBC # BLD AUTO: 2.68 MILLION/UL (ref 3.81–5.12)
SODIUM SERPL-SCNC: 141 MMOL/L (ref 136–145)
WBC # BLD AUTO: 8.78 THOUSAND/UL (ref 4.31–10.16)

## 2017-11-25 PROCEDURE — 71275 CT ANGIOGRAPHY CHEST: CPT

## 2017-11-25 PROCEDURE — 83735 ASSAY OF MAGNESIUM: CPT | Performed by: INTERNAL MEDICINE

## 2017-11-25 PROCEDURE — 80053 COMPREHEN METABOLIC PANEL: CPT | Performed by: PHYSICIAN ASSISTANT

## 2017-11-25 PROCEDURE — 74177 CT ABD & PELVIS W/CONTRAST: CPT

## 2017-11-25 PROCEDURE — 85025 COMPLETE CBC W/AUTO DIFF WBC: CPT | Performed by: PHYSICIAN ASSISTANT

## 2017-11-25 PROCEDURE — 73564 X-RAY EXAM KNEE 4 OR MORE: CPT

## 2017-11-25 PROCEDURE — 36415 COLL VENOUS BLD VENIPUNCTURE: CPT | Performed by: PHYSICIAN ASSISTANT

## 2017-11-25 PROCEDURE — 73610 X-RAY EXAM OF ANKLE: CPT

## 2017-11-25 PROCEDURE — 93005 ELECTROCARDIOGRAM TRACING: CPT | Performed by: PHYSICIAN ASSISTANT

## 2017-11-25 RX ADMIN — IOHEXOL 100 ML: 350 INJECTION, SOLUTION INTRAVENOUS at 22:33

## 2017-11-26 PROBLEM — E87.6 HYPOKALEMIA: Status: ACTIVE | Noted: 2017-11-26

## 2017-11-26 PROBLEM — R60.1 ANASARCA: Status: ACTIVE | Noted: 2017-11-26

## 2017-11-26 PROBLEM — R07.9 CHEST PAIN: Status: ACTIVE | Noted: 2017-11-26

## 2017-11-26 LAB
AMMONIA PLAS-SCNC: 55 UMOL/L (ref 11–35)
ANION GAP SERPL CALCULATED.3IONS-SCNC: 7 MMOL/L (ref 4–13)
ATRIAL RATE: 92 BPM
BUN SERPL-MCNC: 3 MG/DL (ref 5–25)
CALCIUM SERPL-MCNC: 8.1 MG/DL (ref 8.3–10.1)
CHLORIDE SERPL-SCNC: 104 MMOL/L (ref 100–108)
CO2 SERPL-SCNC: 29 MMOL/L (ref 21–32)
CREAT SERPL-MCNC: 0.79 MG/DL (ref 0.6–1.3)
ERYTHROCYTE [DISTWIDTH] IN BLOOD BY AUTOMATED COUNT: 19.5 % (ref 11.6–15.1)
GFR SERPL CREATININE-BSD FRML MDRD: 92 ML/MIN/1.73SQ M
GLUCOSE SERPL-MCNC: 95 MG/DL (ref 65–140)
GLUCOSE SERPL-MCNC: 95 MG/DL (ref 65–140)
HCT VFR BLD AUTO: 27.9 % (ref 34.8–46.1)
HGB BLD-MCNC: 9.1 G/DL (ref 11.5–15.4)
MAGNESIUM SERPL-MCNC: 1.6 MG/DL (ref 1.6–2.6)
MCH RBC QN AUTO: 35.7 PG (ref 26.8–34.3)
MCHC RBC AUTO-ENTMCNC: 32.6 G/DL (ref 31.4–37.4)
MCV RBC AUTO: 109 FL (ref 82–98)
NT-PROBNP SERPL-MCNC: 616 PG/ML
P AXIS: 55 DEGREES
PLATELET # BLD AUTO: 237 THOUSANDS/UL (ref 149–390)
PMV BLD AUTO: 9.6 FL (ref 8.9–12.7)
POTASSIUM SERPL-SCNC: 2.8 MMOL/L (ref 3.5–5.3)
PR INTERVAL: 150 MS
QRS AXIS: 63 DEGREES
QRSD INTERVAL: 88 MS
QT INTERVAL: 326 MS
QTC INTERVAL: 403 MS
RBC # BLD AUTO: 2.55 MILLION/UL (ref 3.81–5.12)
SODIUM SERPL-SCNC: 140 MMOL/L (ref 136–145)
T WAVE AXIS: 151 DEGREES
TROPONIN I SERPL-MCNC: 0.05 NG/ML
TROPONIN I SERPL-MCNC: 0.05 NG/ML
TROPONIN I SERPL-MCNC: 0.06 NG/ML
VENTRICULAR RATE: 92 BPM
WBC # BLD AUTO: 11.73 THOUSAND/UL (ref 4.31–10.16)

## 2017-11-26 PROCEDURE — 97163 PT EVAL HIGH COMPLEX 45 MIN: CPT

## 2017-11-26 PROCEDURE — G8982 BODY POS GOAL STATUS: HCPCS

## 2017-11-26 PROCEDURE — 80048 BASIC METABOLIC PNL TOTAL CA: CPT | Performed by: PHYSICIAN ASSISTANT

## 2017-11-26 PROCEDURE — G8981 BODY POS CURRENT STATUS: HCPCS

## 2017-11-26 PROCEDURE — 99285 EMERGENCY DEPT VISIT HI MDM: CPT

## 2017-11-26 PROCEDURE — 84484 ASSAY OF TROPONIN QUANT: CPT | Performed by: PHYSICIAN ASSISTANT

## 2017-11-26 PROCEDURE — 85027 COMPLETE CBC AUTOMATED: CPT | Performed by: PHYSICIAN ASSISTANT

## 2017-11-26 PROCEDURE — 82948 REAGENT STRIP/BLOOD GLUCOSE: CPT

## 2017-11-26 PROCEDURE — 82140 ASSAY OF AMMONIA: CPT | Performed by: INTERNAL MEDICINE

## 2017-11-26 PROCEDURE — 83880 ASSAY OF NATRIURETIC PEPTIDE: CPT | Performed by: PHYSICIAN ASSISTANT

## 2017-11-26 RX ORDER — ACETAMINOPHEN 325 MG/1
650 TABLET ORAL EVERY 4 HOURS PRN
Status: DISCONTINUED | OUTPATIENT
Start: 2017-11-26 | End: 2017-11-30

## 2017-11-26 RX ORDER — NYSTATIN 100000 [USP'U]/G
POWDER TOPICAL 4 TIMES DAILY
Status: DISCONTINUED | OUTPATIENT
Start: 2017-11-26 | End: 2017-12-14 | Stop reason: HOSPADM

## 2017-11-26 RX ORDER — ONDANSETRON 2 MG/ML
4 INJECTION INTRAMUSCULAR; INTRAVENOUS EVERY 6 HOURS PRN
Status: DISCONTINUED | OUTPATIENT
Start: 2017-11-26 | End: 2017-12-13

## 2017-11-26 RX ORDER — MAGNESIUM HYDROXIDE/ALUMINUM HYDROXICE/SIMETHICONE 120; 1200; 1200 MG/30ML; MG/30ML; MG/30ML
30 SUSPENSION ORAL EVERY 6 HOURS PRN
Status: DISCONTINUED | OUTPATIENT
Start: 2017-11-26 | End: 2017-12-14 | Stop reason: HOSPADM

## 2017-11-26 RX ORDER — FERROUS SULFATE 325(65) MG
325 TABLET ORAL DAILY
Status: DISCONTINUED | OUTPATIENT
Start: 2017-11-26 | End: 2017-11-26

## 2017-11-26 RX ORDER — LORAZEPAM 2 MG/ML
1 INJECTION INTRAMUSCULAR ONCE
Status: COMPLETED | OUTPATIENT
Start: 2017-11-26 | End: 2017-11-26

## 2017-11-26 RX ORDER — POTASSIUM CHLORIDE 20 MEQ/1
20 TABLET, EXTENDED RELEASE ORAL
Status: DISCONTINUED | OUTPATIENT
Start: 2017-11-26 | End: 2017-11-26

## 2017-11-26 RX ORDER — LACTULOSE 20 G/30ML
20 SOLUTION ORAL 2 TIMES DAILY
Status: DISCONTINUED | OUTPATIENT
Start: 2017-11-26 | End: 2017-12-02

## 2017-11-26 RX ORDER — SORBITOL SOLUTION 70 %
30 SOLUTION, ORAL MISCELLANEOUS 2 TIMES DAILY
Status: DISCONTINUED | OUTPATIENT
Start: 2017-11-26 | End: 2017-11-26

## 2017-11-26 RX ORDER — FUROSEMIDE 10 MG/ML
20 INJECTION INTRAMUSCULAR; INTRAVENOUS ONCE
Status: COMPLETED | OUTPATIENT
Start: 2017-11-26 | End: 2017-11-26

## 2017-11-26 RX ORDER — POTASSIUM CHLORIDE 20 MEQ/1
40 TABLET, EXTENDED RELEASE ORAL 2 TIMES DAILY
Status: DISCONTINUED | OUTPATIENT
Start: 2017-11-26 | End: 2017-11-26

## 2017-11-26 RX ORDER — ONDANSETRON 2 MG/ML
4 INJECTION INTRAMUSCULAR; INTRAVENOUS ONCE AS NEEDED
Status: DISCONTINUED | OUTPATIENT
Start: 2017-11-26 | End: 2017-11-27

## 2017-11-26 RX ORDER — ALPRAZOLAM 0.5 MG/1
0.5 TABLET ORAL 3 TIMES DAILY PRN
Status: DISCONTINUED | OUTPATIENT
Start: 2017-11-26 | End: 2017-11-26

## 2017-11-26 RX ORDER — POTASSIUM CHLORIDE 20 MEQ/1
40 TABLET, EXTENDED RELEASE ORAL
Status: DISCONTINUED | OUTPATIENT
Start: 2017-11-26 | End: 2017-11-27

## 2017-11-26 RX ORDER — LORAZEPAM 2 MG/ML
0.5 INJECTION INTRAMUSCULAR EVERY 6 HOURS PRN
Status: DISCONTINUED | OUTPATIENT
Start: 2017-11-26 | End: 2017-11-27

## 2017-11-26 RX ORDER — FUROSEMIDE 10 MG/ML
40 INJECTION INTRAMUSCULAR; INTRAVENOUS
Status: DISCONTINUED | OUTPATIENT
Start: 2017-11-26 | End: 2017-11-27

## 2017-11-26 RX ORDER — CHOLECALCIFEROL (VITAMIN D3) 125 MCG
1000 CAPSULE ORAL DAILY
Status: DISCONTINUED | OUTPATIENT
Start: 2017-11-26 | End: 2017-11-26

## 2017-11-26 RX ORDER — SPIRONOLACTONE 50 MG/1
50 TABLET, FILM COATED ORAL 2 TIMES DAILY
Status: DISCONTINUED | OUTPATIENT
Start: 2017-11-26 | End: 2017-12-14 | Stop reason: HOSPADM

## 2017-11-26 RX ORDER — GABAPENTIN 300 MG/1
300 CAPSULE ORAL 3 TIMES DAILY
Status: DISCONTINUED | OUTPATIENT
Start: 2017-11-26 | End: 2017-12-14 | Stop reason: HOSPADM

## 2017-11-26 RX ORDER — FOLIC ACID 1 MG/1
1 TABLET ORAL DAILY
Status: DISCONTINUED | OUTPATIENT
Start: 2017-11-26 | End: 2017-11-26

## 2017-11-26 RX ORDER — PANTOPRAZOLE SODIUM 40 MG/1
40 TABLET, DELAYED RELEASE ORAL
Status: DISCONTINUED | OUTPATIENT
Start: 2017-11-26 | End: 2017-12-14 | Stop reason: HOSPADM

## 2017-11-26 RX ORDER — CALCIUM CARBONATE 500(1250)
1 TABLET ORAL
Status: DISCONTINUED | OUTPATIENT
Start: 2017-11-26 | End: 2017-11-26

## 2017-11-26 RX ADMIN — GABAPENTIN 300 MG: 300 CAPSULE ORAL at 16:59

## 2017-11-26 RX ADMIN — LORAZEPAM 1 MG: 2 INJECTION INTRAMUSCULAR; INTRAVENOUS at 03:39

## 2017-11-26 RX ADMIN — SPIRONOLACTONE 50 MG: 50 TABLET ORAL at 09:05

## 2017-11-26 RX ADMIN — GABAPENTIN 300 MG: 300 CAPSULE ORAL at 09:06

## 2017-11-26 RX ADMIN — ACETAMINOPHEN 650 MG: 325 TABLET, FILM COATED ORAL at 09:12

## 2017-11-26 RX ADMIN — POTASSIUM CHLORIDE 40 MEQ: 1500 TABLET, EXTENDED RELEASE ORAL at 16:59

## 2017-11-26 RX ADMIN — GABAPENTIN 300 MG: 300 CAPSULE ORAL at 21:16

## 2017-11-26 RX ADMIN — Medication 1 TABLET: at 09:05

## 2017-11-26 RX ADMIN — HYDROMORPHONE HYDROCHLORIDE 0.5 MG: 1 INJECTION, SOLUTION INTRAMUSCULAR; INTRAVENOUS; SUBCUTANEOUS at 01:59

## 2017-11-26 RX ADMIN — FERROUS SULFATE TAB 325 MG (65 MG ELEMENTAL FE) 325 MG: 325 (65 FE) TAB at 09:05

## 2017-11-26 RX ADMIN — SORBITOL SOLUTION (BULK) 30 ML: 70 SOLUTION at 13:13

## 2017-11-26 RX ADMIN — FOLIC ACID 1 MG: 1 TABLET ORAL at 09:05

## 2017-11-26 RX ADMIN — FUROSEMIDE 20 MG: 10 INJECTION, SOLUTION INTRAMUSCULAR; INTRAVENOUS at 01:59

## 2017-11-26 RX ADMIN — ALPRAZOLAM 0.5 MG: 0.5 TABLET ORAL at 01:59

## 2017-11-26 RX ADMIN — NYSTATIN: 100000 POWDER TOPICAL at 21:16

## 2017-11-26 RX ADMIN — NYSTATIN: 100000 POWDER TOPICAL at 17:00

## 2017-11-26 RX ADMIN — NYSTATIN: 100000 POWDER TOPICAL at 12:59

## 2017-11-26 RX ADMIN — HYDROMORPHONE HYDROCHLORIDE 1 MG: 1 INJECTION, SOLUTION INTRAMUSCULAR; INTRAVENOUS; SUBCUTANEOUS at 03:39

## 2017-11-26 RX ADMIN — NYSTATIN: 100000 POWDER TOPICAL at 01:59

## 2017-11-26 RX ADMIN — POTASSIUM CHLORIDE 40 MEQ: 1500 TABLET, EXTENDED RELEASE ORAL at 12:55

## 2017-11-26 RX ADMIN — ENOXAPARIN SODIUM 40 MG: 40 INJECTION SUBCUTANEOUS at 09:06

## 2017-11-26 RX ADMIN — PANTOPRAZOLE SODIUM 40 MG: 40 TABLET, DELAYED RELEASE ORAL at 05:40

## 2017-11-26 RX ADMIN — CYANOCOBALAMIN TAB 500 MCG 1000 MCG: 500 TAB at 09:06

## 2017-11-26 RX ADMIN — SPIRONOLACTONE 50 MG: 50 TABLET ORAL at 17:00

## 2017-11-26 RX ADMIN — POTASSIUM CHLORIDE 20 MEQ: 1500 TABLET, EXTENDED RELEASE ORAL at 09:06

## 2017-11-26 RX ADMIN — FUROSEMIDE 40 MG: 10 INJECTION, SOLUTION INTRAMUSCULAR; INTRAVENOUS at 10:42

## 2017-11-26 RX ADMIN — LORAZEPAM 0.5 MG: 2 INJECTION INTRAMUSCULAR; INTRAVENOUS at 19:39

## 2017-11-26 RX ADMIN — FUROSEMIDE 40 MG: 10 INJECTION, SOLUTION INTRAMUSCULAR; INTRAVENOUS at 16:59

## 2017-11-26 RX ADMIN — NYSTATIN: 100000 POWDER TOPICAL at 09:07

## 2017-11-26 NOTE — ED PROVIDER NOTES
History  Chief Complaint   Patient presents with    Fall     Pt reports tripped and fell off toliet, reports did not hit head and no LOC, pt denies taking blood thinners, c/o left leg pain  18-year-old female well known to the department and hospital for chronic pain ambulatory dysfunction anxiety and other significant comorbidities  Patient presents to the emergency department for left ankle pain status post fall this day  Patient has chronic history of multiple falls in past   Patient has pain everywhere on exam   Patient has edematous skin diffusely  Patient recently discharged from the hospital with a weight of 150 or so kilos patient is now 171 kilos  Patient is noncooperative during evaluation  Will line lab obtain x-rays of knee and ankle  Will obtain CT of the chest abdomen pelvis  Will observe  Prior to Admission Medications   Prescriptions Last Dose Informant Patient Reported? Taking?    ALPRAZolam (XANAX) 0 5 mg tablet   No Yes   Sig: Take 1 tablet by mouth 3 (three) times a day as needed for anxiety for up to 10 days   GABAPENTIN PO   Yes No   Sig: Take 300 mg by mouth 3 (three) times a day     Oyster Shell (OYSTER CALCIUM) 500 MG TABS   Yes No   acetaminophen (TYLENOL) 325 mg tablet   No No   Sig: Take 2 tablets by mouth every 4 (four) hours as needed for mild pain   cyanocobalamin (VITAMIN B-12) 1,000 mcg tablet   Yes No   Si tablet daily- not taking   ferrous sulfate 325 (65 Fe) mg tablet   Yes No   Sig: Take 1 tablet by mouth daily   folic acid (FOLVITE) 1 mg tablet   Yes No   Sig: Take by mouth daily   furosemide (LASIX) 80 mg tablet   No No   Sig: Take 1 tablet by mouth 2 (two) times a day   lidocaine (LIDODERM) 5 %   No No   Sig: Place 1 patch on the skin every 24 hours for 7 doses Remove & Discard patch within 12 hours or as directed by MD   nystatin (MYCOSTATIN) powder   No No   Sig: Apply topically 4 (four) times a day   pantoprazole (PROTONIX) 40 mg tablet   Yes No Sig: Take 40 mg by mouth daily   potassium chloride (K-DUR,KLOR-CON) 20 mEq tablet   No No   Sig: Take 1 tablet by mouth 4 (four) times a day (with meals and at bedtime)   spironolactone (ALDACTONE) 50 mg tablet   No No   Sig: Take 1 tablet by mouth 2 (two) times a day      Facility-Administered Medications: None       Past Medical History:   Diagnosis Date    Arthritis     Chronic pain     Fibromyalgia     Herniated cervical disc     Hypertension     Obesities, morbid ([address] )     Psychiatric disorder     Sarcoidosis     Scoliosis        Past Surgical History:   Procedure Laterality Date    GASTRIC BYPASS      HERNIA REPAIR      TOTAL ABDOMINAL HYSTERECTOMY W/ BILATERAL SALPINGOOPHORECTOMY      TUBAL LIGATION         History reviewed  No pertinent family history  I have reviewed and agree with the history as documented  Social History   Substance Use Topics    Smoking status: Never Smoker    Smokeless tobacco: Never Used    Alcohol use Yes      Comment: occassional        Review of Systems   Constitutional: Positive for fatigue  Negative for chills and fever  HENT: Negative for ear pain  Eyes: Negative for pain  Respiratory: Negative for cough, chest tightness and shortness of breath  Cardiovascular: Negative for chest pain  Gastrointestinal: Positive for abdominal pain and constipation  Genitourinary: Negative for flank pain  Musculoskeletal: Positive for arthralgias, back pain, gait problem, myalgias and neck pain  Neurological: Positive for light-headedness and headaches  Patient admits to generalized weakness         Physical Exam  ED Triage Vitals   Temperature Pulse Respirations Blood Pressure SpO2   11/25/17 1934 11/25/17 1934 11/25/17 1934 11/25/17 1934 11/25/17 1934   98 3 °F (36 8 °C) 97 20 139/68 94 %      Temp Source Heart Rate Source Patient Position - Orthostatic VS BP Location FiO2 (%)   11/25/17 1934 11/25/17 1934 11/25/17 1934 11/25/17 2045 --   Oral Monitor Lying Left arm       Pain Score       11/25/17 1934       Worst Possible Pain           Orthostatic Vital Signs  Vitals:    11/25/17 2045 11/25/17 2241 11/25/17 2250 11/26/17 0123   BP: 125/60 122/68 123/95 136/54   Pulse: 92 97 97 91   Patient Position - Orthostatic VS: Lying Lying Sitting Lying       Physical Exam   Constitutional: She appears well-developed and well-nourished  Significant emotional distress on exam    HENT:   Head: Normocephalic and atraumatic  Right Ear: External ear normal    Left Ear: External ear normal    Nose: Nose normal    Mouth/Throat: Oropharynx is clear and moist    Eyes: Conjunctivae are normal    Neck: Neck supple  No JVD present  Cardiovascular: Normal rate and normal heart sounds  Pulmonary/Chest: Effort normal    Abdominal:   Morbid obese abdomen  Diffuse tenderness  Musculoskeletal: She exhibits tenderness  She exhibits no edema  Tenderness appreciated throughout the whole spine  Lymphadenopathy:     She has no cervical adenopathy  Neurological: She is alert  Patient alert to person only not time or date  Skin: Skin is warm  Capillary refill takes less than 2 seconds  Diffuse edema noted  Bilateral lower extremity pitting edema  Psychiatric: Her affect is inappropriate  Her speech is rapid and/or pressured  She is agitated  Thought content is delusional  She expresses inappropriate judgment  She exhibits a depressed mood  She is inattentive  Nursing note and vitals reviewed        ED Medications  Medications   ondansetron (ZOFRAN) injection 4 mg (not administered)   acetaminophen (TYLENOL) tablet 650 mg (not administered)   cyanocobalamin (VITAMIN B-12) tablet 1,000 mcg (not administered)   ferrous sulfate tablet 325 mg (not administered)   folic acid (FOLVITE) tablet 1 mg (not administered)   gabapentin (NEURONTIN) capsule 300 mg (not administered)   nystatin (MYCOSTATIN) powder ( Topical Given 11/26/17 0159)   calcium carbonate (OYSTER SHELL,OSCAL) 500 mg tablet 1 tablet (not administered)   pantoprazole (PROTONIX) EC tablet 40 mg (not administered)   potassium chloride (K-DUR,KLOR-CON) CR tablet 20 mEq (not administered)   spironolactone (ALDACTONE) tablet 50 mg (not administered)   HYDROmorphone (DILAUDID) 1 mg/mL injection 0 5 mg (0 5 mg Intravenous Given 11/26/17 0159)   ondansetron (ZOFRAN) injection 4 mg (not administered)   aluminum-magnesium hydroxide-simethicone (MYLANTA) 200-200-20 mg/5 mL oral suspension 30 mL (not administered)   enoxaparin (LOVENOX) subcutaneous injection 40 mg (not administered)   ALPRAZolam (XANAX) tablet 0 5 mg (0 5 mg Oral Given 11/26/17 0159)   iohexol (OMNIPAQUE) 350 MG/ML injection (MULTI-DOSE) 100 mL (100 mL Intravenous Given 11/25/17 2233)   furosemide (LASIX) injection 20 mg (20 mg Intravenous Given 11/26/17 0159)   HYDROmorphone (DILAUDID) 1 mg/mL injection 1 mg (1 mg Intravenous Given 11/26/17 0339)   LORazepam (ATIVAN) 2 mg/mL injection 1 mg (1 mg Intravenous Given 11/26/17 0339)       Diagnostic Studies  Results Reviewed     Procedure Component Value Units Date/Time    Comprehensive metabolic panel [[id number]]  (Abnormal) Collected:  11/25/17 2038    Lab Status:  Final result Specimen:  Blood from Arm, Right Updated:  11/25/17 2127     Sodium 141 mmol/L      Potassium 3 0 (L) mmol/L      Chloride 103 mmol/L      CO2 28 mmol/L      Anion Gap 10 mmol/L      BUN 4 (L) mg/dL      Creatinine 0 88 mg/dL      Glucose 88 mg/dL      Calcium 8 3 mg/dL      AST 75 (H) U/L      ALT 25 U/L      Alkaline Phosphatase 157 (H) U/L      Total Protein 7 1 g/dL      Albumin 1 4 (L) g/dL      Total Bilirubin 4 65 (H) mg/dL      eGFR 81 ml/min/1 73sq m     Narrative:         National Kidney Disease Education Program recommendations are as follows:  GFR calculation is accurate only with a steady state creatinine  Chronic Kidney disease less than 60 ml/min/1 73 sq  meters  Kidney failure less than 15 ml/min/1 73 sq  meters  CBC and differential [[id number]]  (Abnormal) Collected:  11/25/17 2038    Lab Status:  Final result Specimen:  Blood from Arm, Right Updated:  11/25/17 2106     WBC 8 78 Thousand/uL      RBC 2 68 (L) Million/uL      Hemoglobin 9 7 (L) g/dL      Hematocrit 28 9 (L) %       (H) fL      MCH 36 2 (H) pg      MCHC 33 6 g/dL      RDW 19 6 (H) %      MPV 9 4 fL      Platelets 357 Thousands/uL      nRBC 0 /100 WBCs      Neutrophils Relative 62 %      Lymphocytes Relative 18 %      Monocytes Relative 18 (H) %      Eosinophils Relative 1 %      Basophils Relative 1 %      Neutrophils Absolute 5 56 Thousands/µL      Lymphocytes Absolute 1 57 Thousands/µL      Monocytes Absolute 1 54 (H) Thousand/µL      Eosinophils Absolute 0 05 Thousand/µL      Basophils Absolute 0 06 Thousands/µL                  PE Study with CT Abdomen and Pelvis with contrast   ED Interpretation by Eder Hansen PA-C (11/25 3766)   IMPRESSION:   1  There is motion artifact  Given this limitation there is no definite pulmonary embolism seen with   the distal arteries not well-visualized  The pulmonary trunk appears to be prominent suggesting   pulmonary arterial hypertension  2  Patchy alveolar densities are noted in the upper lobes presumably infectious or inflammatory  Correlate  Possible small nodule left upper lobe medially adjacent to the fissure  3  Bibasilar atelectasis  4  Areas of air trapping in both lungs  IMPRESSION:   1  Diffuse fatty infiltration of the liver  2  Previous gastric surgery  3  Diffuse anasarca  4  Free fluid in the abdomen and pelvis is of uncertain etiology, volume overall small  5  Moderate fecal retention  Thank you for allowing us to participate in the care of your patient     Dictated and Authenticated by: Mague Snyder MD   11/25/2017 11:23 PM [place] Time ([address])      XR knee 4+ vw left injury   ED Interpretation by Eder Hansen PA-C (11/25 9325)   No evidence of fracture dislocation  XR ankle 3+ views LEFT   ED Interpretation by Satish Jiménez PA-C (11/25 7722)   No evidence of fracture dislocation  Procedures  Procedures       Phone Contacts  ED Phone Contact    ED Course  ED Course                                MDM  Number of Diagnoses or Management Options  Ambulatory dysfunction:   Anasarca: Ankle pain, left:   Chronic pain syndrome:   Hypokalemia:   Morbid obesity [facility]):   Peripheral edema:   Recurrent falls:   Diagnosis management comments: CT reviewed  Labs reviewed  X-rays of knee and ankle reviewed  Patient unable to become ambulatory  Patient with increase weight gain  Patient with anasarca noted on exam   Patient with significant underlying psychiatric disorder  Per history patient noncompliant with medications or medical management  Due to findings on CT scan and laboratory data as well as patient's ambulatory dysfunction patient is felt need for inpatient management and evaluation  Patient and  agreeable  Patient remained stable while in the department and upon transfer to the standard nursing care floor  Amount and/or Complexity of Data Reviewed  Clinical lab tests: ordered and reviewed  Tests in the radiology section of CPT®: ordered and reviewed      CritCare Time    Disposition  Final diagnoses:    Morbid obesity ([facility] )   Ambulatory dysfunction   Ankle pain, left   Recurrent falls   Anasarca   Peripheral edema   Hypokalemia   Chronic pain syndrome     Time reflects when diagnosis was documented in both MDM as applicable and the Disposition within this note     Time User Action Codes Description Comment    11/25/2017 10:30 PM Gely Furnish Add [E66 01] Morbid obesity ([facility] )     11/25/2017 10:30 PM Gely Furnish Add [R26 2] Ambulatory dysfunction     11/25/2017 10:31 PM Gely Furnish Add [M25 572] Ankle pain, left     11/25/2017 10:31 PM Gely Furnish Add [R29 6] Recurrent falls     11/25/2017 10:32 PM Alfonso Rocha Anneliese [R60 1] Anasarca     11/25/2017 10:33 PM Mabeline Adas Add [R60 9] Peripheral edema     11/25/2017 10:33 PM Mabeline Adas Add [E87 6] Hypokalemia     11/25/2017 10:33 PM Adonay Rocha Add [G89 4] Chronic pain syndrome     11/26/2017  1:03 AM Saritha Rain Add [R07 9] Chest pain       ED Disposition     ED Disposition Condition Comment    Admit  Case was discussed with ERON and the patient's admission status was agreed to be Admission Status: inpatient status to the service of Dr Jose Tolentino             Follow-up Information    None       Current Discharge Medication List      CONTINUE these medications which have NOT CHANGED    Details   ALPRAZolam (XANAX) 0 5 mg tablet Take 1 tablet by mouth 3 (three) times a day as needed for anxiety for up to 10 days  Qty: 30 tablet, Refills: 0      acetaminophen (TYLENOL) 325 mg tablet Take 2 tablets by mouth every 4 (four) hours as needed for mild pain  Qty: 30 tablet, Refills: 0      cyanocobalamin (VITAMIN B-12) 1,000 mcg tablet 1 tablet daily- not taking      ferrous sulfate 325 (65 Fe) mg tablet Take 1 tablet by mouth daily      folic acid (FOLVITE) 1 mg tablet Take by mouth daily      furosemide (LASIX) 80 mg tablet Take 1 tablet by mouth 2 (two) times a day  Qty: 60 tablet, Refills: 0      GABAPENTIN PO Take 300 mg by mouth 3 (three) times a day        lidocaine (LIDODERM) 5 % Place 1 patch on the skin every 24 hours for 7 doses Remove & Discard patch within 12 hours or as directed by MD  Qty: 7 patch, Refills: 0      nystatin (MYCOSTATIN) powder Apply topically 4 (four) times a day  Qty: 30 g, Refills: 0      Oyster Shell (OYSTER CALCIUM) 500 MG TABS       pantoprazole (PROTONIX) 40 mg tablet Take 40 mg by mouth daily      potassium chloride (K-DUR,KLOR-CON) 20 mEq tablet Take 1 tablet by mouth 4 (four) times a day (with meals and at bedtime)  Qty: 60 tablet, Refills: 0      spironolactone (ALDACTONE) 50 mg tablet Take 1 tablet by mouth 2 (two) times a day  Qty: 60 tablet, Refills: 0           No discharge procedures on file      ED Provider  Electronically Signed by           Zuleyma Jasso PA-C  11/26/17 5281

## 2017-11-26 NOTE — CONSULTS
Consult - Cardiology   Edenilson Núñez 36 y o  female MRN: [id number]  Unit/Bed#: [place] [address] -01 Encounter: [id number]          Reason For Consult:  Edema  History Of Present Illness: The patient is a 54-year-old female with history of morbid obesity  She has had known anasarca presumably due to liver disease  She also has diabetes mellitus, ambulatory dysfunction, sarcoidosis, restrictive lung disease and anemia  She apparently fell at home which is not unusual for her  We are being consulted for apparent increase in edema  The patient is a poor historian and very groggy at this time  She does complain of shortness of breath  She denies chest pain  She is aware of the edema but can't tell me if this is worse or not  She denies palpitations or lightheadedness at this time  Past Medical History:   Chronic lower extremity edema/anasarca which has been attributed to liver disease  Fibromyalgia  Morbid obesity  Restrictive lung disease  Lumbar spondylosis  Sarcoidosis  Hypertension  Depression  Migraine history  History of hemorrhoids  Anemia            Allergy:  Allergies   Allergen Reactions    Pregabalin Swelling    Clonazepam Anxiety       Medications:  Tylenol p r n  Xanax p r n  Furosemide 80 mg b i d  Potassium dose unspecified q i d  Spironolactone 50 mg b i d  Iron  Vitamin B12  Lighted derm patch  Calcium  Folic acid  Gabapentin  Protonix    Family History:  Noncontributory    Social History:  Quit smoking 5 years ago  Denies heavy alcohol use although her records indicate there may have been alcohol abuse in the past      ROS:  Fatigue  Diffuse body aches    Exam:  148/62 pulse 95  General:  Morbidly obese middle-aged female  Somewhat groggy  In no acute distress   Head: Normocephalic, atraumatic  Eyes:  No Icterus    pale Conjunctiva  Oropharynx: normal-appearing mucosa and no pharyngitis, no exudate  Neck: supple, symmetrical, trachea midline, thyroid: not enlarged, symmetric, no tenderness/mass/nodules, no carotid bruit and no JVD   Heart: RRR, No: murmer, rub or gallop,   Lungs:  Decreased breath sounds without wheezing rhonchi or rales  Abdomen: obese, normal findings: no organomegaly and soft, non-tender  Lower Limbs:  3+ lower extremity edema which is only partially pitting  Cannot palpate distal pulses in the lower extremity    EKG shows sinus rhythm with nonspecific T-wave abnormalities  Hemoglobin 9 1, white blood count 11 73, platelets [id number]  Troponin peak 0 06  ProBNP 616  Potassium 3, BUN 4, creatinine 0 88, AST 75, glucose 88 bilirubin 4 65 albumin 1 4      ASSESSMENT AND PLAN:   1  Fluid overload apparently on the basis of chronic liver disease  I suspect patient has low albumin may have a lot to do with this  Believe in this case a diuretics may have limited benefit and just lead to electrolyte disturbances  Would cautiously diurese the patient intravenously as ordered  Replete potassium and continue spironolactone at her usual dosage  There does not appear to be much in the way of pulmonary congestion in this patient  An echocardiogram has been ordered  Hopefully we will get some information from this study since I expect her to have a very limited study  2   Systemic hypertension  Currently not on antihypertensive medications  Might consider adding nadolol for treatment of this in addition to her chronic liver disease  Prognosis of this patient appears to be poor due to morbid obesity and poor overall status  Will review echo and make further suggestions tomorrow        Michela Snyder MD

## 2017-11-26 NOTE — PLAN OF CARE
Problem: PHYSICAL THERAPY ADULT  Goal: Performs mobility at highest level of function for planned discharge setting  See evaluation for individualized goals  Treatment/Interventions: LE strengthening/ROM, Therapeutic exercise, Endurance training, Patient/family training, Bed mobility, Compensatory technique education, Continued evaluation  Equipment Recommended: Other (Comment) (pt needs dependent means for out of bed mobilization )       See flowsheet documentation for full assessment, interventions and recommendations  Prognosis: Fair  Problem List: Decreased strength, Decreased range of motion, Decreased endurance, Decreased mobility, Decreased cognition, Decreased safety awareness, Obesity, Pain  Assessment: Pt presents with fall at home last night  Has had several recent falls  Pt lives at home with her   Pt does use a walker  Has required admission for these falls in the past  Denies any injury but was unable to get herself up off the floor  Pt has had increase in her leg swelling and weight gain  She had an episode of chest pain yesterday  Dx: anasarca, hypokalemia, falls, encephalopathy, and morbid obesity  order placed for PT eval and tx, w/ activity order of up w/ A  pt presents w/ comorbidities of arthritis, chronic pain/fibromyalgia, herniated cervical disc, HTN, morbid obesity, and scoliosis and personal factors of lives in 2 story house, ambulating w/ assistive device and positive fall history  pt presents w/ pain, weakness, decreased ROM, decreased endurance, impaired cognition, decreased safety awareness and fall risk  these impairments are evident in findings from physical examination (weakness and decreased ROM), mobility assessment (need for total assist for rolling and repositioning in bed, inability to sit edge of bed, need for frequent cues for task focus/mobility technique), and Barthel Index: 0/100  pt needed input for task focus and mobility technique   pt is at risk for falls due to physical and safety awareness deficits  pt's clinical presentation is unstable/unpredictable (evident in need for total assist for rolling/repositioning in bed when usually mobilizing independently, pain impacting overall mobility status, pending x-ray results, need for input for task focus and mobility technique/safety w/ limited carryover of education provided)  pt needs inpatient PT tx to improve mobility deficits  discharge recommendation is for IP rehab to reduce fall risk and maximize level of functional independence  Pt would benefit from OT consult to address cognition and safety awareness  Pt presently needs dependent means for out of bed mobilization  Pt would benefit from [address] to accommodate body habitus  Pt would likely benefit from imaging of left foot (dependent on imaging finding from those already taken)  Recommendation: OT consult, Post acute IP rehab [facility] Total Care Bed, dedicated imaging left foot )          See flowsheet documentation for full assessment

## 2017-11-26 NOTE — PHYSICAL THERAPY NOTE
PHYSICAL THERAPY EVALUATION NOTE    Patient Name: Kj MORRISON Date: 11/26/2017  AGE:   36 y o  Mrn:   [id number]  ADMIT DX:  Morbid obesity ([facility] [address] ) [E66 01]  Hypokalemia [E87 6]  Anasarca [R60 1]  Chronic pain syndrome [G89 4]  Peripheral edema [R60 9]  Toe injury [S99 929A]  Ankle pain, left [M25 572]  Recurrent falls [R29 6]  Ambulatory dysfunction [R26 2]    Past Medical History:   Diagnosis Date    Arthritis     Chronic pain     Fibromyalgia     Herniated cervical disc     Hypertension     Obesities, morbid ([facility] [address] )     Psychiatric disorder     Sarcoidosis     Scoliosis      Length Of Stay: 1  PHYSICAL THERAPY EVALUATION :    11/26/17 1519   Pain Assessment   Pain Assessment 0-10   Pain Score Worst Possible Pain   Pain Location Leg   Pain Orientation Left   Home Living   Type of Home House   Home Layout Two level;Bed/bath upstairs   Additional Comments lives w/ spouse  ambulated w/ roller walker  owns cane  had more than 10 falls in last 6 months (pt was unable to specify quantity)  Prior Function   Comments pt seen supine in bed  pt needed frequent input to maintain task focus  had eyes closed during most of session  pt followed 50% of 1 step commands and followed 50% of 1 step commands  Restrictions/Precautions   Other Precautions Contact/isolation;Cognitive; Bed Alarm;Telemetry; Fall Risk;Pain   General   Additional Pertinent History pt is pending x-rays of left knee and ankle  Dr Ibarra Learn requested pt be evaluated due to current status  Family/Caregiver Present No   Cognition   Overall Cognitive Status Impaired   Arousal/Participation Persistent stimuli required   Following Commands Follows one step commands inconsistently   Comments pt was tender to palpation of left foot, especially at MTP joint  dedicated x-rays of left foot may be needed (depending on findings from x-rays already performed)      JAY Assessment   RUE Assessment X  (3-/5)   LUE Assessment   LUE Assessment X  (3-/5)   RLE Assessment   RLE Assessment X  (3-/5)   LLE Assessment   LLE Assessment (not tested due to pending x-rays )   Bed Mobility   Rolling R 1  Dependent   Additional items Assist x 1; Increased time required;Verbal cues;LE management  (unable to fully roll onto side due to bed width)   Rolling L 1  Dependent   Additional items Assist x 1; Increased time required;Verbal cues;LE management  (unable to fully roll onto side due to bed width)   Supine to Sit Unable to assess   Additional Comments pt needed total assist for rolling and repositioning in bed  sitting on edge of bed not appropriate due to pending x-rays, limited communication/active participation, and poor ability to assist w/ rolling and repositioning in bed  Balance   Static Sitting Zero   Activity Tolerance   Activity Tolerance Patient limited by fatigue;Patient limited by pain   Nurse Made Aware spoke to Dr Alyson Leung   Assessment   Prognosis Fair   Problem List Decreased strength;Decreased range of motion;Decreased endurance;Decreased mobility; Decreased cognition;Decreased safety awareness; Obesity;Pain   Assessment Pt presents with fall at home last night  Has had several recent falls  Pt lives at home with her   Pt does use a walker  Has required admission for these falls in the past  Denies any injury but was unable to get herself up off the floor  Pt has had increase in her leg swelling and weight gain  She had an episode of chest pain yesterday  Dx: anasarca, hypokalemia, falls, encephalopathy, and morbid obesity  order placed for PT eval and tx, w/ activity order of up w/ A  pt presents w/ comorbidities of arthritis, chronic pain/fibromyalgia, herniated cervical disc, HTN, morbid obesity, and scoliosis and personal factors of lives in 2 story house, ambulating w/ assistive device and positive fall history   pt presents w/ pain, weakness, decreased ROM, decreased endurance, impaired cognition, decreased safety awareness and fall risk  these impairments are evident in findings from physical examination (weakness and decreased ROM), mobility assessment (need for total assist for rolling and repositioning in bed, inability to sit edge of bed, need for frequent cues for task focus/mobility technique), and Barthel Index: 0/100  pt needed input for task focus and mobility technique  pt is at risk for falls due to physical and safety awareness deficits  pt's clinical presentation is unstable/unpredictable (evident in need for total assist for rolling/repositioning in bed when usually mobilizing independently, pain impacting overall mobility status, pending x-ray results, need for input for task focus and mobility technique/safety w/ limited carryover of education provided)  pt needs inpatient PT tx to improve mobility deficits  discharge recommendation is for IP rehab to reduce fall risk and maximize level of functional independence  Pt would benefit from OT consult to address cognition and safety awareness  Pt presently needs dependent means for out of bed mobilization  Pt would benefit from [address] to accommodate body habitus  Pt would likely benefit from imaging of left foot (dependent on imaging finding from those already taken)  Goals   Patient Goals pt was unable to state goals when asked   STG Expiration Date 12/03/17   Short Term Goal #1 Pt will: 1  follow all 1 step commands and provide responses to all simple questions 2  roll and reposition in bed w/ modx1 3  improve R LE strength to 3+/5 or greater 4  complete mobility assessment   Treatment Day 0   Plan   Treatment/Interventions LE strengthening/ROM; Therapeutic exercise; Endurance training;Patient/family training;Bed mobility; Compensatory technique education;Continued evaluation   PT Frequency 5x/wk  (PT to see next session to complete mobility assessment)   Recommendation Recommendation OT consult; Post acute IP rehab  [facility] Total Care Bed, dedicated imaging left foot )   Equipment Recommended Other (Comment)  (pt needs dependent means for out of bed mobilization )   Additional Comments Pt would benefit from OT consult to address cognition and safety awareness  Pt presently needs dependent means for out of bed mobilization  Pt would benefit from [address] to accommodate body habitus  Pt would likely benefit from imaging of left foot (dependent on imaging finding from x-rays already taken)  Barthel Index   Feeding 0   Bathing 0   Grooming Score 0   Dressing Score 0   Bladder Score 0   Bowels Score 0   Toilet Use Score 0   Transfers (Bed/Chair) Score 0   Mobility (Level Surface) Score 0   Stairs Score 0   Barthel Index Score 0     Skilled PT recommended while in hospital and upon DC to progress pt toward treatment goals       Mary Ann Mckeon , PT
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
## 2017-11-26 NOTE — CASE MANAGEMENT
Initial Clinical Review    Admission: Date/Time/Statement: 11/25/17 @ 2340     Orders Placed This Encounter   Procedures    Inpatient Admission (expected length of stay for this patient is greater than two midnights)     Standing Status:   Standing     Number of Occurrences:   1     Order Specific Question:   Admitting Physician     Answer:   Carolann Esqueda     Order Specific Question:   Level of Care     Answer:   Med Surg [16]     Order Specific Question:   Estimated length of stay     Answer:   More than 2 Midnights     Order Specific Question:   Certification     Answer:   I certify that inpatient services are medically necessary for this patient for a duration of greater than two midnights  See H&P and MD Progress Notes for additional information about the patient's course of treatment  ED: Date/Time/Mode of Arrival:   ED Arrival Information     Expected Arrival Acuity Means of Arrival Escorted By Service Admission Type    - 11/25/2017 19:28 Urgent Ambulance [facility] Urgent    Arrival Complaint    FALL- FOOT PAIN          Chief Complaint:   Chief Complaint   Patient presents with    Fall     Pt reports tripped and fell off toliet, reports did not hit head and no LOC, pt denies taking blood thinners, c/o left leg pain  History of Illness:   Milena Gee is a 36 y o  female who presents with fall at home last night  She has had several recent falls  She lives at home with her   She does use a walker  She has required admission for these falls in the past  She denies any injury but she was unable to get herself up off the floor  She has had increase in her leg swelling and weight gain   She had an episode of chest pain yesterday         ED Vital Signs:   ED Triage Vitals   Temperature Pulse Respirations Blood Pressure SpO2   11/25/17 1934 11/25/17 1934 11/25/17 1934 11/25/17 1934 11/25/17 1934   98 3 °F (36 8 °C) 97 20 139/68 94 %      Temp Source Heart Rate Source Patient Position - Orthostatic VS BP Location FiO2 (%)   11/25/17 1934 11/25/17 1934 11/25/17 1934 11/25/17 2045 --   Oral Monitor Lying Left arm       Pain Score       11/25/17 1934       Worst Possible Pain        Wt Readings from Last 1 Encounters:   11/25/17 (!) 171 kg (377 lb 10 4 oz)       Vital Signs (abnormal):    above    Abnormal Labs/Diagnostic Test Results:    K  3 0  BUN  4  AST   75  Albumin   1 4  Total  Bili   4 65  Alk phos  157  H/H   9 7/28 9  Rbc  2 68  cT  ABD/PELVIS:  No acute traumatic abnormality  Patchy alveolar densities in the lung apices likely infectious or inflammatory in origin  5 mm nodule adjacent to the major fissure, left upper lobe (series 2 image 18)  Negative for pulmonary embolism within the limitations of the study  X ray  L ankle:no fracture    ED Treatment:   Medication Administration from 11/25/2017 1928 to 11/26/2017 0041       Date/Time Order Dose Route Action Action by Comments     11/25/2017 2233 iohexol (OMNIPAQUE) 350 MG/ML injection (MULTI-DOSE) 100 mL 100 mL Intravenous Given Dayana Friday           Past Medical/Surgical History:    Active Ambulatory Problems     Diagnosis Date Noted    Ambulatory dysfunction 08/29/2017    Recurrent falls 08/29/2017    Chronic pain syndrome 08/29/2017    Morbid obesity due to excess calories ([address] ) 08/29/2017    Peripheral edema 09/09/2017     Resolved Ambulatory Problems     Diagnosis Date Noted    No Resolved Ambulatory Problems     Past Medical History:   Diagnosis Date    Arthritis     Chronic pain     Fibromyalgia     Herniated cervical disc     Hypertension     Obesities, morbid ([address] )     Psychiatric disorder     Sarcoidosis     Scoliosis        Admitting Diagnosis: Morbid obesity ([address] ) [E66 01]  Hypokalemia [E87 6]  Anasarca [R60 1]  Chronic pain syndrome [G89 4]  Peripheral edema [R60 9]  Toe injury [S99 929A]  Ankle pain, left [M25 572]  Recurrent falls [R29 6]  Ambulatory dysfunction [R26 2]    Age/Sex: 36 y o  female    · Assessment/Plan:    Recurrent falls/ambulatory dysfunction  ? Admit to med/surg on telemetry  Patient with significant swelling and weight gain, see below  Will need PT/OT/case management consult  CT negative for acute injuries     Plan for Additional Problems:   · Anasarca/edema- lower leg swelling severe at present  Her discharge weight in September was 159kg and weight today is 171kg  Give 1 dose of IV lasix and monitor for response  She has not had a recent ECHO, will order  Will also add BNP to admission bloodwork  · Hypokalemia- on PO potassium QID, recheck in AM  · Chest pain- trend troponin and consult cardiology       VTE Prophylaxis: Enoxaparin (Lovenox)  / reason for no mechanical VTE prophylaxis severe swelling   Code Status: full code  POLST: There is no POLST form on file for this patient (pre-hospital)     Anticipated Length of Stay:  Patient will be admitted on an Inpatient basis with an anticipated length of stay of  Greater than 2 midnights  Justification for Hospital Stay: patient requires IV lasix, PT/OT consult    Admission Orders:    IP     11/25  @    2340  Scheduled Meds:   calcium carbonate 1 tablet Oral Daily With Breakfast   cyanocobalamin 1,000 mcg Oral Daily   enoxaparin 40 mg Subcutaneous Daily   ferrous sulfate 325 mg Oral Daily   folic acid 1 mg Oral Daily   gabapentin 300 mg Oral TID   nystatin  Topical 4x Daily   pantoprazole 40 mg Oral Early Morning   potassium chloride 20 mEq Oral 4x Daily (with meals and at bedtime)   spironolactone 50 mg Oral BID     Continuous Infusions:    PRN Meds:   acetaminophen    ALPRAZolam    aluminum-magnesium hydroxide-simethicone    HYDROmorphone    ondansetron    ondansetron     Tele  Reg diet  Serial troponin  2 DE  PT/OT  Cons cardiology  IV  Lasix  BID    Per  Cardiology  Consult:  Fluid overload apparently on the basis of chronic liver disease    I suspect patient has low albumin may have a lot to do with this  Believe in this case a diuretics may have limited benefit and just lead to electrolyte disturbances  Would cautiously diurese the patient intravenously as ordered  Replete potassium and continue spironolactone at her usual dosage  There does not appear to be much in the way of pulmonary congestion in this patient  An echocardiogram has been ordered  Hopefully we will get some information from this study since I expect her to have a very limited study  2   Systemic hypertension  Currently not on antihypertensive medications  Might consider adding nadolol for treatment of this in addition to her chronic liver disease      Prognosis of this patient appears to be poor due to morbid obesity and poor overall status  Will review echo and make further suggestions tomorrow           [facility] in the [facility] by Orlando Gomez for 2017  Network Utilization Review Department  Phone: [phone]; Fax [phone]  ATTENTION: The Network Utilization Review Department is now centralized for our 7 Facilities  Make a note that we have a new phone and fax numbers for our Department  Please call with any questions or concerns to [phone] and carefully follow the prompts so that you are directed to the right person  All voicemails are confidential  Fax any determinations, approvals, denials, and requests for initial or continue stay review clinical to [phone]  Due to HIGH CALL volume, it would be easier if you could please send faxed requests to expedite your requests and in part, help us provide discharge notifications faster

## 2017-11-26 NOTE — H&P
History and Physical - [facility] Internal Medicine    Patient Information: Ezequiel Gloria 36 y o  female MRN: [id number]  [facility] [address] [place] [address] [id number] Encounter: [id number]  Admitting Physician: Nick Villagomez PA-C  PCP: Adina Brandt MD  Date of Admission:  11/26/17    Assessment/Plan:    Hospital Problem List:     Principal Problem:    Recurrent falls  Active Problems:    Ambulatory dysfunction    Chronic pain syndrome    Morbid obesity due to excess calories ([address] )    Peripheral edema    Hypokalemia    Anasarca    Chest pain      Plan for the Primary Problem(s):  · Recurrent falls/ambulatory dysfunction  · Admit to med/surg on telemetry  Patient with significant swelling and weight gain, see below  Will need PT/OT/case management consult  CT negative for acute injuries    Plan for Additional Problems:   · Anasarca/edema- lower leg swelling severe at present  Her discharge weight in September was 159kg and weight today is 171kg  Give 1 dose of IV lasix and monitor for response  She has not had a recent ECHO, will order  Will also add BNP to admission bloodwork  · Hypokalemia- on PO potassium QID, recheck in AM  · Chest pain- trend troponin and consult cardiology  VTE Prophylaxis: Enoxaparin (Lovenox)  / reason for no mechanical VTE prophylaxis severe swelling   Code Status: full code  POLST: There is no POLST form on file for this patient (pre-hospital)    Anticipated Length of Stay:  Patient will be admitted on an Inpatient basis with an anticipated length of stay of  Greater than 2 midnights  Justification for Hospital Stay: patient requires IV lasix, PT/OT consult    Total Time for Visit, including Counseling / Coordination of Care: 45 minutes  Greater than 50% of this total time spent on direct patient counseling and coordination of care  Chief Complaint:   Recurrent falls at home    History of Present Illness:    Ezequiel Gloria is a 36 y o  female who presents with fall at home last night   She has had several recent falls  She lives at home with her   She does use a walker  She has required admission for these falls in the past  She denies any injury but she was unable to get herself up off the floor  She has had increase in her leg swelling and weight gain  She had an episode of chest pain yesterday  Review of Systems:    Review of Systems   Constitutional: Positive for activity change and unexpected weight change  HENT: Negative  Eyes: Negative  Respiratory: Negative  Cardiovascular: Positive for chest pain and leg swelling  Gastrointestinal: Negative  Endocrine: Negative  Genitourinary: Negative  Musculoskeletal: Positive for arthralgias, back pain and gait problem  Skin: Negative  Allergic/Immunologic: Negative  Hematological: Negative  Psychiatric/Behavioral: The patient is nervous/anxious  Past Medical and Surgical History:     Past Medical History:   Diagnosis Date    Arthritis     Chronic pain     Fibromyalgia     Herniated cervical disc     Hypertension     Obesities, morbid ([address] )     Psychiatric disorder     Sarcoidosis     Scoliosis        Past Surgical History:   Procedure Laterality Date    GASTRIC BYPASS      HERNIA REPAIR      TOTAL ABDOMINAL HYSTERECTOMY W/ BILATERAL SALPINGOOPHORECTOMY      TUBAL LIGATION         Meds/Allergies:    Prior to Admission medications    Medication Sig Start Date End Date Taking?  Authorizing Provider   acetaminophen (TYLENOL) 325 mg tablet Take 2 tablets by mouth every 4 (four) hours as needed for mild pain 9/19/17   Marky Burris MD   ALPRAZolam Areatha Solano) 0 5 mg tablet Take 1 tablet by mouth 3 (three) times a day as needed for anxiety for up to 10 days 9/19/17 9/29/17  Marky Burris MD   cyanocobalamin (VITAMIN B-12) 1,000 mcg tablet 1 tablet daily- not taking 4/20/16   Historical Provider, MD   ferrous sulfate 325 (65 Fe) mg tablet Take 1 tablet by mouth daily 4/18/16   Historical Provider, MD   folic acid (FOLVITE) 1 mg tablet Take by mouth daily    Historical Provider, MD   furosemide (LASIX) 80 mg tablet Take 1 tablet by mouth 2 (two) times a day 9/19/17   Willie Nguyen MD   GABAPENTIN PO Take 300 mg by mouth 3 (three) times a day      Historical Provider, MD   lidocaine (LIDODERM) 5 % Place 1 patch on the skin every 24 hours for 7 doses Remove & Discard patch within 12 hours or as directed by MD 8/29/17 9/5/17  Marsha Blanc MD   nystatin (MYCOSTATIN) powder Apply topically 4 (four) times a day 8/29/17   Marsha Blanc MD   Kanwal Huy (OYSTER CALCIUM) 500 MG TABS  4/11/16   Historical Provider, MD   pantoprazole (PROTONIX) 40 mg tablet Take 40 mg by mouth daily    Historical Provider, MD   potassium chloride (K-DUR,KLOR-CON) 20 mEq tablet Take 1 tablet by mouth 4 (four) times a day (with meals and at bedtime) 9/19/17   Willie Nguyen MD   spironolactone (ALDACTONE) 50 mg tablet Take 1 tablet by mouth 2 (two) times a day 9/19/17   Willie Nguyen MD     I have reviewed home medications with patient personally  Allergies:    Allergies   Allergen Reactions    Pregabalin Swelling    Clonazepam Anxiety       Social History:     Marital Status:      Patient Pre-hospital Living Situation: lives with   Patient Pre-hospital Level of Mobility: limited, frequent falls  Patient Pre-hospital Diet Restrictions: diabetic  Substance Use History:   History   Alcohol Use    Yes     Comment: occassional     History   Smoking Status    Never Smoker   Smokeless Tobacco    Never Used     History   Drug Use No       Family History:    non-contributory    Physical Exam:     Vitals:   Blood Pressure: 123/95 (11/25/17 2250)  Pulse: 97 (11/25/17 2250)  Temperature: 98 3 °F (36 8 °C) (11/25/17 1934)  Temp Source: Oral (11/25/17 1934)  Respirations: 18 (11/25/17 2250)  Weight - Scale: (!) 171 kg (377 lb 10 4 oz) (11/25/17 1934)  SpO2: 97 % (11/25/17 2250)    Physical Exam   Constitutional: She is oriented to person, place, and time  Obese, crying because she feels cold   HENT:   Head: Normocephalic and atraumatic  Eyes: Conjunctivae are normal  Pupils are equal, round, and reactive to light  Neck: Normal range of motion  Neck supple  No JVD present  No tracheal deviation present  No thyromegaly present  Cardiovascular: Normal rate and regular rhythm  Pulmonary/Chest: Effort normal and breath sounds normal  No respiratory distress  Abdominal: Soft  Bowel sounds are normal  She exhibits no distension  There is no tenderness  Musculoskeletal: She exhibits no deformity  Severe pitting pretibial edema bilaterally with associated pain in both legs  Lymphadenopathy:     She has no cervical adenopathy  Neurological: She is alert and oriented to person, place, and time  Skin: Skin is warm and dry  Psychiatric:   Anxious and crying   Vitals reviewed  Additional Data:     Lab Results: I have personally reviewed pertinent reports  Results from last 7 days  Lab Units 11/25/17 2038   WBC Thousand/uL 8 78   HEMOGLOBIN g/dL 9 7*   HEMATOCRIT % 28 9*   PLATELETS Thousands/uL 253   NEUTROS PCT % 62   LYMPHS PCT % 18   MONOS PCT % 18*   EOS PCT % 1       Results from last 7 days  Lab Units 11/25/17 2038   SODIUM mmol/L 141   POTASSIUM mmol/L 3 0*   CHLORIDE mmol/L 103   CO2 mmol/L 28   BUN mg/dL 4*   CREATININE mg/dL 0 88   CALCIUM mg/dL 8 3   TOTAL PROTEIN g/dL 7 1   BILIRUBIN TOTAL mg/dL 4 65*   ALK PHOS U/L 157*   ALT U/L 25   AST U/L 75*   GLUCOSE RANDOM mg/dL 88           Imaging: I have personally reviewed pertinent reports  No results found  EKG, Pathology, and Other Studies Reviewed on Admission:   · EKG: no ischemic changes    [facility] Records Reviewed: Yes     ** Please Note: This note has been constructed using a voice recognition system   **

## 2017-11-27 LAB
ALBUMIN SERPL BCP-MCNC: 1.2 G/DL (ref 3.5–5)
ALP SERPL-CCNC: 133 U/L (ref 46–116)
ALT SERPL W P-5'-P-CCNC: 25 U/L (ref 12–78)
ANION GAP SERPL CALCULATED.3IONS-SCNC: 8 MMOL/L (ref 4–13)
AST SERPL W P-5'-P-CCNC: 106 U/L (ref 5–45)
BILIRUB SERPL-MCNC: 3.87 MG/DL (ref 0.2–1)
BUN SERPL-MCNC: 4 MG/DL (ref 5–25)
C DIFF TOX GENS STL QL NAA+PROBE: NORMAL
CALCIUM SERPL-MCNC: 7.8 MG/DL (ref 8.3–10.1)
CHLORIDE SERPL-SCNC: 105 MMOL/L (ref 100–108)
CO2 SERPL-SCNC: 28 MMOL/L (ref 21–32)
CREAT SERPL-MCNC: 0.67 MG/DL (ref 0.6–1.3)
GFR SERPL CREATININE-BSD FRML MDRD: 108 ML/MIN/1.73SQ M
GLUCOSE SERPL-MCNC: 82 MG/DL (ref 65–140)
MAGNESIUM SERPL-MCNC: 1.5 MG/DL (ref 1.6–2.6)
POTASSIUM SERPL-SCNC: 4.2 MMOL/L (ref 3.5–5.3)
PROT SERPL-MCNC: 6.6 G/DL (ref 6.4–8.2)
SODIUM SERPL-SCNC: 141 MMOL/L (ref 136–145)

## 2017-11-27 PROCEDURE — 97167 OT EVAL HIGH COMPLEX 60 MIN: CPT

## 2017-11-27 PROCEDURE — G8988 SELF CARE GOAL STATUS: HCPCS

## 2017-11-27 PROCEDURE — 83735 ASSAY OF MAGNESIUM: CPT | Performed by: INTERNAL MEDICINE

## 2017-11-27 PROCEDURE — G8987 SELF CARE CURRENT STATUS: HCPCS

## 2017-11-27 PROCEDURE — 97530 THERAPEUTIC ACTIVITIES: CPT

## 2017-11-27 PROCEDURE — 80053 COMPREHEN METABOLIC PANEL: CPT | Performed by: INTERNAL MEDICINE

## 2017-11-27 PROCEDURE — 87493 C DIFF AMPLIFIED PROBE: CPT | Performed by: INTERNAL MEDICINE

## 2017-11-27 RX ORDER — FUROSEMIDE 10 MG/ML
80 INJECTION INTRAMUSCULAR; INTRAVENOUS
Status: DISCONTINUED | OUTPATIENT
Start: 2017-11-27 | End: 2017-11-28

## 2017-11-27 RX ORDER — LEVALBUTEROL 1.25 MG/.5ML
1.25 SOLUTION, CONCENTRATE RESPIRATORY (INHALATION) EVERY 6 HOURS PRN
Status: DISCONTINUED | OUTPATIENT
Start: 2017-11-27 | End: 2017-12-05

## 2017-11-27 RX ORDER — METOLAZONE 5 MG/1
5 TABLET ORAL DAILY
Status: DISCONTINUED | OUTPATIENT
Start: 2017-11-27 | End: 2017-11-30

## 2017-11-27 RX ORDER — POTASSIUM CHLORIDE 20 MEQ/1
40 TABLET, EXTENDED RELEASE ORAL 4 TIMES DAILY
Status: DISCONTINUED | OUTPATIENT
Start: 2017-11-27 | End: 2017-12-04

## 2017-11-27 RX ADMIN — FUROSEMIDE 40 MG: 10 INJECTION, SOLUTION INTRAMUSCULAR; INTRAVENOUS at 08:51

## 2017-11-27 RX ADMIN — GABAPENTIN 300 MG: 300 CAPSULE ORAL at 08:50

## 2017-11-27 RX ADMIN — ACETAMINOPHEN 650 MG: 325 TABLET, FILM COATED ORAL at 22:19

## 2017-11-27 RX ADMIN — POTASSIUM CHLORIDE 40 MEQ: 1500 TABLET, EXTENDED RELEASE ORAL at 08:51

## 2017-11-27 RX ADMIN — NYSTATIN: 100000 POWDER TOPICAL at 22:34

## 2017-11-27 RX ADMIN — NYSTATIN: 100000 POWDER TOPICAL at 08:58

## 2017-11-27 RX ADMIN — METOLAZONE 5 MG: 5 TABLET ORAL at 14:07

## 2017-11-27 RX ADMIN — SPIRONOLACTONE 50 MG: 50 TABLET ORAL at 08:51

## 2017-11-27 RX ADMIN — LACTULOSE 20 G: 20 SOLUTION ORAL at 08:50

## 2017-11-27 RX ADMIN — LORAZEPAM 0.5 MG: 2 INJECTION INTRAMUSCULAR; INTRAVENOUS at 01:53

## 2017-11-27 RX ADMIN — POTASSIUM CHLORIDE 40 MEQ: 1500 TABLET, EXTENDED RELEASE ORAL at 14:07

## 2017-11-27 RX ADMIN — FUROSEMIDE 80 MG: 10 INJECTION, SOLUTION INTRAMUSCULAR; INTRAVENOUS at 17:07

## 2017-11-27 RX ADMIN — PANTOPRAZOLE SODIUM 40 MG: 40 TABLET, DELAYED RELEASE ORAL at 05:43

## 2017-11-27 RX ADMIN — GABAPENTIN 300 MG: 300 CAPSULE ORAL at 22:33

## 2017-11-27 RX ADMIN — ENOXAPARIN SODIUM 40 MG: 40 INJECTION SUBCUTANEOUS at 08:50

## 2017-11-27 RX ADMIN — POTASSIUM CHLORIDE 40 MEQ: 1500 TABLET, EXTENDED RELEASE ORAL at 22:34

## 2017-11-27 RX ADMIN — NYSTATIN: 100000 POWDER TOPICAL at 14:08

## 2017-11-27 NOTE — PROGRESS NOTES
Patient yelling out, refusing to cooperate and stating " Im not taking any medication until you give my ativan"  Pt made aware it was stopped and rationale  Dr Meme Cabrales aware of patients behaviors and no new orders  Pt refusing all pm medications including diuretics and stating " Ill only take it when I get the medications I want, I want ativan"  Reoffered and educated use of medications multiple times and pt refusing
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
## 2017-11-27 NOTE — OCCUPATIONAL THERAPY NOTE
[address]  Evaluation     Patient Name: Patt Veliz  AWPGW'Z Date: 11/27/2017  Problem List  Patient Active Problem List   Diagnosis    Ambulatory dysfunction    Recurrent falls    Chronic pain syndrome    Morbid obesity due to excess calories ([address] )    Peripheral edema    Hypokalemia    Anasarca    Chest pain     Past Medical History  Past Medical History:   Diagnosis Date    Arthritis     Chronic pain     Fibromyalgia     Herniated cervical disc     Hypertension     Obesities, morbid ([address] )     Psychiatric disorder     Sarcoidosis     Scoliosis      Past Surgical History  Past Surgical History:   Procedure Laterality Date    GASTRIC BYPASS      HERNIA REPAIR      TOTAL ABDOMINAL HYSTERECTOMY W/ BILATERAL SALPINGOOPHORECTOMY      TUBAL LIGATION             11/27/17 1025   Note Type   Note type Eval only   Restrictions/Precautions   Other Precautions Contact/isolation;Cognitive; Bed Alarm;Telemetry; Fall Risk;Pain   Pain Assessment   Pain Assessment 0-10   Pain Score 9   Pain Type Chronic pain   Pain Location Foot;Leg   Pain Orientation [facility] Pain Intervention(s) Ambulation/increased activity;Repositioned; Emotional support   Response to Interventions tolerated   Home Living   Type of [address] [place] [address] Two level;Bed/bath upstairs;Stairs to enter with rails   Bathroom Shower/Tub Tub/shower unit   Bathroom Toilet Standard   Bathroom Equipment Shower chair   Bathroom Accessibility Accessible   Home Equipment Walker;Cane   Additional Comments lives w/ spouse, ambulated w/ RW; owns cane; reports falls every month   Prior Function   Level of [place] Needs assistance with IADLs; Independent with ADLs and functional mobility  (RW)   Lives With Spouse   Receives Help From Family;Home health  (home aide 2x wk for cleaning and bathing)   ADL Assistance Independent   IADLs Needs assistance   Falls in the last 6 months >10   Vocational On disability   Comments pt provided w/ emotional support t/o session, pt tearful and saying, "I can't do this, I have too much pain and keep going to the bathroom"   Lifestyle   Autonomy per pt independent w/ ADLs, independent mobility w/ RW, assist for cleaning and cooking, reports home aides assist w/ cooking, reports spouse comes and goes   Reciprocal Relationships spouse and mother   Service to Others on disability   Intrinsic Gratification watching tv   Psychosocial   Psychosocial (WDL) X   Patient Behaviors/Mood Anxious; Tearful   ADL   Where Assessed Edge of bed   Eating Assistance 5  Supervision/Setup   Grooming Assistance 3  Moderate Assistance   UB Bathing Assistance 3  Moderate Assistance   LB Bathing Assistance 2  Maximal [address] 3  Moderate Assistance   LB Dressing Assistance 2  Maximal [address]  1  Total Assistance   Functional Assistance 2  Maximal Assistance   Additional Comments pt self-limiting   Bed Mobility   Rolling R 2  Maximal assistance   Additional items Assist x 2; Increased time required;Verbal cues;LE management; Bedrails   Rolling L 2  Maximal assistance   Additional items Assist x 2; Increased time required;Verbal cues;LE management; Bedrails   Supine to Sit 2  Maximal assistance   Additional items Assist x 2; Increased time required;Verbal cues;LE management; Bedrails   Sit to Supine 2  Maximal assistance   Additional items Assist x 2;Assist x 3; Increased time required;Verbal cues;LE management; Bedrails  (assist x 3 w/ VCs)   Additional Comments pt EOB w/ Fair - sitting balance for 10-15 minutes, pt declining sit>stand from bed due to pain   Transfers   Sit to Stand Unable to assess  (2* pt refused due to pain in L foot)   Balance   Static Sitting Fair -   Dynamic Sitting Poor +   Activity Tolerance   Activity Tolerance Patient limited by fatigue;Patient limited by pain   Medical Staff [address]   Nurse Made Aware appropriate to see per Mahamed NOVAK RUE Assessment WFL  (3/5)   LUE Assessment   LUE Assessment WFL  (3/5)   Hand Function   Gross Motor Coordination Functional   Fine Motor Coordination Impaired   Sensation   Light Touch Partial deficits in the RUE;Partial deficits in the LUE;Partial deficits in the RLE;Partial deficits in the LLE   Proprioception   Proprioception No apparent deficits   Vision-Basic Assessment   Current Vision No visual deficits   Vision - Complex Assessment   Ocular Range of Motion WFL   Acuity Able to read clock/calendar on wall without difficulty   Perception   Inattention/Neglect Appears intact   Cognition   Overall Cognitive Status Impaired   Arousal/Participation Alert;Lethargic   Attention Attends with cues to redirect   Orientation Level Oriented to person;Oriented to place;Oriented to time;Disoriented to situation   Memory Decreased short term memory   Following Commands Follows one step commands with increased time or repetition   Comments pt w/ impaired insight, impaired safety awareness, impaired judgment, pt self-limiting, pt tearful and changes in mood t/o session   Cognition Assessment Tools (to be assessed)   Assessment   Limitation Decreased ADL status; Decreased UE strength;Decreased cognition;Decreased Safe judgement during ADL;Decreased endurance;Decreased self-care trans;Decreased high-level ADLs   Prognosis Fair   Assessment Pt is a 36 y o  female seen for OT evaluation s/p admit to [facility] on [facility]/25/2017 w/ Recurrent falls, anasarca/edema (LEs), hypokalemia  Xray L LE (-)  Comorbidities affecting pt's functional performance at time of assessment include: morbid obesity, peripheral edema, fibromyalgia, psychiatric disorder, hypokalemia, chronic pain syndrome, ambulatory dysfunction, HTN  Personal factors affecting pt at time of IE include:impaired insight and judgment, increased pain; limited support system and inaccessible home environment   Prior to admission, pt was living w/ spouse (reports he comes and goes)   Prior to admission, pt was independent w/ self-care tasks, use of toilet aide for toileting, dressing, bathing, toileting, assist for home aides for cleaning, light cooking tasks, reports mother does grocery shopping for her and provides transportation  Upon evaluation: Pt requires MAX assist x 2 rolling in bed for bed pan and removal/donning of bed pads, MAX assist x2 supine>sit bed mobility w/ increased time and VCs, MAX assist x 2-3 sit>supine bed mobility w/ VCs and max encouragement to participate, total assist toileting care, MAX assist LB ADLs, MOD assist UB ADLs, 15 EOB sitting tolerance (pt declines OOB sit>stand 2* pain, pt stating "I can't move, I have too much pain, I can't do this") 2* the following deficits impacting occupational performance:increased pain, impaired insight, impaired safety awareness, self-limiting behaviors, impaired balance, impaired endurance, poor activity tolerance, h/o falls, incontinent of urine multiple timesPt to benefit from continued skilled OT tx while in the hospital to address deficits as defined above and maximize level of functional independence w ADL's and functional mobility  Occupational Performance areas to address include: grooming, bathing/shower, toilet hygiene, dressing, functional mobility, clothing management and meal prep, formal cognitive test  From OT standpoint, recommendation at time of d/c would be short term rehab  Goals   Patient Goals "to get to the bathroom"   LTG Time Frame 7-10   Long Term Goal please see below goals   Plan   Treatment Interventions ADL retraining;Functional transfer training;UE strengthening/ROM; Endurance training;Patient/family training; Compensatory technique education; Energy conservation; Activityengagement;Equipment evaluation/education   Goal Expiration Date 12/07/17   OT Frequency 3-5x/wk   Recommendation   Recommendation Geriatric Consult   OT Discharge Recommendation Short Term Rehab   Barthel Index Feeding 5   Bathing 0   Grooming Score 0   Dressing Score 5   Bladder Score 0   Bowels Score 5   Toilet Use Score 0   Transfers (Bed/Chair) Score 0   Mobility (Level Surface) Score 0   Stairs Score 0   Barthel Index Score 15     Occupational Therapy Goals to be met in 7-10 days:  1) Pt will improve activity tolerance to G for min 30 min txment sessions  2) Pt will complete ADLs/self care w/ min assist   3) Pt will complete toileting w/ min assist w/ G hygiene/thoroughness using DME PRN  4) Pt will improve functional transfers on/off all surfaces using DME PRN w/ G balance/safety including toileting w/ min assist  5) Pt will improve fx'l mobility during I/ADl/leisure tasks using DME PRN w/ g balance/safety w/ min assist  6) Pt will engage in ongoing cognitive assessment w/ G participation to A w/ safe d/c planning/recommendations  7) Pt will demonstrate G carryover of pt/caregiver education and training as appropriate w/ mod I  w/ G tolerance  8) Pt will engage in depression screen/leisure interest checklist w/ G participation to monitor s/s depression and ID 3 positive coping strategies to A w/ emotional regulation and management  9) Pt will demonstrate 100% carryover of E C  techniques w/ mod I t/o fx'l I/ADL/leisure tasks w/o cues s/p skilled education  10) Pt will tolerate bed mobility and EOB seated tasks w/ min A for 30 mins to engage in fx'l I/ADL/leisure tasks w/ min A w/ min cues  11) Pt will demonstrate improved b/l UE strength by 1 MMT grade to enhance ADLS and functional transfers    Documentation completed by: Rachelle James MS, OTR/L

## 2017-11-27 NOTE — PLAN OF CARE
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
## 2017-11-27 NOTE — PLAN OF CARE
Problem: Potential for Falls  Goal: Patient will remain free of falls  INTERVENTIONS:  - Assess patient frequently for physical needs  -  Identify cognitive and physical deficits and behaviors that affect risk of falls    -  [place] fall precautions as indicated by assessment   - Educate patient/family on patient safety including physical limitations  - Instruct patient to call for assistance with activity based on assessment  - Modify environment to reduce risk of injury  - Consider OT/PT consult to assist with strengthening/mobility   Outcome: Progressing      Problem: Prexisting or High Potential for Compromised Skin Integrity  Goal: Skin integrity is maintained or improved  INTERVENTIONS:  - Identify patients at risk for skin breakdown  - Assess and monitor skin integrity  - Assess and monitor nutrition and hydration status  - Monitor labs (i e  albumin)  - Assess for incontinence   - Turn and reposition patient  - Assist with mobility/ambulation  - Relieve pressure over bony prominences  - Avoid friction and shearing  - Provide appropriate hygiene as needed including keeping skin clean and dry  - Evaluate need for skin moisturizer/barrier cream  - Collaborate with interdisciplinary team (i e  Nutrition, Rehabilitation, etc )   - Patient/family teaching   Outcome: Progressing      Problem: GASTROINTESTINAL - ADULT  Goal: Maintains or returns to baseline bowel function  INTERVENTIONS:  - Assess bowel function  - Encourage oral fluids to ensure adequate hydration  - Administer IV fluids as ordered to ensure adequate hydration  - Administer ordered medications as needed  - Encourage mobilization and activity  - Nutrition services referral to assist patient with appropriate food choices   Outcome: Progressing    Goal: Maintains adequate nutritional intake  INTERVENTIONS:  - Monitor percentage of each meal consumed  - Identify factors contributing to decreased intake, treat as appropriate  - Assist with meals as needed  - Monitor I&O, WT and lab values  - Obtain nutrition services referral as needed   Outcome: Progressing      Problem: METABOLIC, FLUID AND ELECTROLYTES - ADULT  Goal: Electrolytes maintained within normal limits  INTERVENTIONS:  - Monitor labs and assess patient for signs and symptoms of electrolyte imbalances  - Administer electrolyte replacement as ordered  - Monitor response to electrolyte replacements, including repeat lab results as appropriate  - Instruct patient on fluid and nutrition as appropriate   Outcome: Progressing    Goal: Fluid balance maintained  INTERVENTIONS:  - Monitor labs and assess for signs and symptoms of volume excess or deficit  - Monitor I/O and WT  - Instruct patient on fluid and nutrition as appropriate   Outcome: Progressing    Goal: Glucose maintained within target range  INTERVENTIONS:  - Monitor Blood Glucose as ordered  - Assess for signs and symptoms of hyperglycemia and hypoglycemia  - Administer ordered medications to maintain glucose within target range  - Assess nutritional intake and initiate nutrition service referral as needed   Outcome: Progressing      Problem: SKIN/TISSUE INTEGRITY - ADULT  Goal: Skin integrity remains intact  INTERVENTIONS  - Identify patients at risk for skin breakdown  - Assess and monitor skin integrity  - Assess and monitor nutrition and hydration status  - Monitor labs (i e  albumin)  - Assess for incontinence   - Turn and reposition patient  - Assist with mobility/ambulation  - Relieve pressure over bony prominences  - Avoid friction and shearing  - Provide appropriate hygiene as needed including keeping skin clean and dry  - Evaluate need for skin moisturizer/barrier cream  - Collaborate with interdisciplinary team (i e  Nutrition, Rehabilitation, etc )   - Patient/family teaching   Outcome: Progressing

## 2017-11-27 NOTE — PLAN OF CARE
GASTROINTESTINAL - ADULT     Maintains or returns to baseline bowel function Progressing     Maintains adequate nutritional intake Progressing        METABOLIC, FLUID AND ELECTROLYTES - ADULT     Electrolytes maintained within normal limits Progressing     Fluid balance maintained Progressing     Glucose maintained within target range Progressing        Potential for Falls     Patient will remain free of falls Progressing        Prexisting or High Potential for Compromised Skin Integrity     Skin integrity is maintained or improved Progressing        SKIN/TISSUE INTEGRITY - ADULT     Skin integrity remains intact Progressing

## 2017-11-27 NOTE — PROGRESS NOTES
Progress Note - Cardiology   Kj Verma 36 y o  female MRN: [id number]  Unit/Bed#: [place] [address] -01 Encounter: [id number]      Assessment:     1  Volume overload/Anasarca  2  Chronic liver disease w/ hypoalbuminemia  3  HTN  4  Morbid obesity    Discussion/Recommendations:    · Augment diuresis--will increase furosemide to 80 IV BID and add metolazone 5 mg daily  · Increase KCL to 40 meq QID  · Con't spironolactone  · Follow UO, renal fx, lytes daily  Weight at d/c last admission in September was 350 lb      Subjective: Pt seen/examined    Denies shortness of breath, complains of continued edema      Physical Exam:  GEN:  NAD  HEENT:  MMM, NCAT, pink conjunctiva, EOMI, nonicteric sclera  CV:  NO JVD/HJR, distant heart sounds, regular rhythm, NO M/R/G, +S1/S2, NO PARASTERNAL HEAVE/THRILL, 3+LE EDEMA up to the thighs, NO HEPATIC SYSTOLIC PULSATION, WARM EXTREMITIES  RESP:  CTAB/L  ABD:  SOFT, NT, NO GROSS ORGANOMEGALY        Vitals:   /59   Pulse 89   Temp 99 5 °F (37 5 °C) (Tympanic)   Resp 19   Wt (!) 171 kg (377 lb 10 4 oz)   SpO2 93%   BMI 54 19 kg/m²   Vitals:    11/25/17 1934   Weight: (!) 171 kg (377 lb 10 4 oz)       Intake/Output Summary (Last 24 hours) at 11/27/17 0944  Last data filed at 11/27/17 0254   Gross per 24 hour   Intake                0 ml   Output             1850 ml   Net            -1850 ml         TELEMETRY:   No events    Lab Results:    Results from last 7 days  Lab Units 11/26/17  0505   WBC Thousand/uL 11 73*   HEMOGLOBIN g/dL 9 1*   HEMATOCRIT % 27 9*   PLATELETS Thousands/uL 237       Results from last 7 days  Lab Units 11/27/17  0828   SODIUM mmol/L 141   POTASSIUM mmol/L 4 2   CHLORIDE mmol/L 105   CO2 mmol/L 28   BUN mg/dL 4*   CREATININE mg/dL 0 67   CALCIUM mg/dL 7 8*   TOTAL PROTEIN g/dL 6 6   BILIRUBIN TOTAL mg/dL 3 87*   ALK PHOS U/L 133*   ALT U/L 25   AST U/L 106*   GLUCOSE RANDOM mg/dL 82       Results from last 7 days  Lab Units 11/27/17  0828   SODIUM mmol/L 141 POTASSIUM mmol/L 4 2   CHLORIDE mmol/L 105   CO2 mmol/L 28   BUN mg/dL 4*   CREATININE mg/dL 0 67   GLUCOSE RANDOM mg/dL 82   CALCIUM mg/dL 7 8*             Medications:    Current Facility-Administered Medications:     acetaminophen (TYLENOL) tablet 650 mg, 650 mg, Oral, Q4H PRN, Geroldine Pickle, PA-C, 650 mg at 11/26/17 0912    aluminum-magnesium hydroxide-simethicone (MYLANTA) 200-200-20 mg/5 mL oral suspension 30 mL, 30 mL, Oral, Q6H PRN, Geroldine Pickle, PA-C    enoxaparin (LOVENOX) subcutaneous injection 40 mg, 40 mg, Subcutaneous, Daily, Geroldine Pickle, PA-C, 40 mg at 11/27/17 0850    furosemide (LASIX) injection 40 mg, 40 mg, Intravenous, BID (diuretic), Nogueira Ray, DO, 40 mg at 11/27/17 0851    gabapentin (NEURONTIN) capsule 300 mg, 300 mg, Oral, TID, Geroldine Pickle, PA-C, 300 mg at 11/27/17 0850    lactulose 20 g/30 mL oral solution 20 g, 20 g, Oral, BID, Nogueira Ray, DO, 20 g at 11/27/17 0850    LORazepam (ATIVAN) 2 mg/mL injection 0 5 mg, 0 5 mg, Intravenous, Q6H PRN, Nogueira Ray, DO, 0 5 mg at 11/27/17 0153    nystatin (MYCOSTATIN) powder, , Topical, 4x Daily, Geroldine Ines, PA-C    ondansetron [facility]) injection 4 mg, 4 mg, Intravenous, Once PRN, MICHAEL Gonzalez-CALI    ondansetron [facility]) injection 4 mg, 4 mg, Intravenous, Q6H PRN, Geroldine Pickle, PA-C    pantoprazole (PROTONIX) EC tablet 40 mg, 40 mg, Oral, Early Morning, Geroldine Pickle, PA-C, 40 mg at 11/27/17 0543    potassium chloride (K-DUR,KLOR-CON) CR tablet 40 mEq, 40 mEq, Oral, TID With Meals, Nogueira Ray, DO, 40 mEq at 11/27/17 0851    spironolactone (ALDACTONE) tablet 50 mg, 50 mg, Oral, BID, Geroldine FLOR Chacon, 50 mg at 11/27/17 506    Portions of the record may have been created with voice recognition software  Occasional wrong word or "sound a like" substitutions may have occurred due to the inherent limitations of voice recognition software   Read the chart carefully and recognize, using context, where substitutions have occurred

## 2017-11-27 NOTE — PLAN OF CARE
Problem: PHYSICAL THERAPY ADULT  Goal: Performs mobility at highest level of function for planned discharge setting  See evaluation for individualized goals  Treatment/Interventions: LE strengthening/ROM, Therapeutic exercise, Endurance training, Patient/family training, Bed mobility, Compensatory technique education, Continued evaluation  Equipment Recommended: Other (Comment) (pt needs dependent means for out of bed mobilization )       See flowsheet documentation for full assessment, interventions and recommendations  Outcome: Progressing  Prognosis: Fair  Problem List: Decreased strength, Decreased range of motion, Decreased endurance, Impaired balance, Decreased mobility, Obesity, Pain  Assessment: pt requesting use of bed pan upon arrival for PT session  reports increased urination + incontinence 2* "water pills"  able to roll R/L while supine max (A)x2  performed supine>sit transition max (A)x2 c increased time + encouragement  pt tolerated unsupported sitting x15min at EOB  incontinent of urine multiple times  attempted sit>stand, however pt refused 2* LE pain  pt tearful during session 2* pain + med status  able to be consoled by PT  pt requesting use of BSC, however educated pt unsafe at this time 2* inability to initiate standing  offerred LE ther ex, however pt declined 2* pain stating, "I just can't move right now"  pt returned to supine at end of session c all needs in reach  would benefit from cont skilled PT to maximize functional mobility + improve quality of life  recommend STR upon d/c 2* unstable social situation at home, DUTCH, + multiple falls  Barriers to Discharge: Inaccessible home environment, Decreased caregiver support     Recommendation: Short-term skilled PT     PT - OK to Discharge: No    See flowsheet documentation for full assessment

## 2017-11-27 NOTE — PLAN OF CARE
Problem: OCCUPATIONAL THERAPY ADULT  Goal: Performs self-care activities at highest level of function for planned discharge setting  See evaluation for individualized goals  Treatment Interventions: ADL retraining, Functional transfer training, UE strengthening/ROM, Endurance training, Patient/family training, Compensatory technique education, Energy conservation, Activityengagement, Equipment evaluation/education          See flowsheet documentation for full assessment, interventions and recommendations  Limitation: Decreased ADL status, Decreased UE strength, Decreased cognition, Decreased Safe judgement during ADL, Decreased endurance, Decreased self-care trans, Decreased high-level ADLs  Prognosis: Fair  Assessment: Pt is a 36 y o  female seen for OT evaluation s/p admit to [facility] [address] on 11/25/2017 w/ Recurrent falls, anasarca/edema (LEs), hypokalemia  Xray L LE (-)  Comorbidities affecting pt's functional performance at time of assessment include: morbid obesity, peripheral edema, fibromyalgia, psychiatric disorder, hypokalemia, chronic pain syndrome, ambulatory dysfunction, HTN  Personal factors affecting pt at time of IE include:impaired insight and judgment, increased pain; limited support system and inaccessible home environment  Prior to admission, pt was living w/ spouse (reports he comes and goes)   Prior to admission, pt was independent w/ self-care tasks, dressing, bathing, toileting, assist for home aides for cleaning, light cooking tasks, reports mother does grocery shopping for her and provides transportation   Upon evaluation: Pt requires MAX assist x 2 rolling in bed for bed pan and removal/donning of bed pads, MAX assist x2 supine>sit bed mobility w/ increased time and VCs, MAX assist x 2-3 sit>supine bed mobility w/ VCs and max encouragement to participate, total assist toileting care, MAX assist LB ADLs, MOD assist UB ADLs, 15 EOB sitting tolerance (pt declines OOB sit>stand 2* pain, pt stating "I can't move, I have too much pain, I can't do this") 2* the following deficits impacting occupational performance:increased pain, impaired insight, impaired safety awareness, self-limiting behaviors, impaired balance, impaired endurance, poor activity tolerance, h/o falls, incontinent of urine multiple timesPt to benefit from continued skilled OT tx while in the hospital to address deficits as defined above and maximize level of functional independence w ADL's and functional mobility  Occupational Performance areas to address include: grooming, bathing/shower, toilet hygiene, dressing, functional mobility, clothing management and meal prep, formal cognitive test  From OT standpoint, recommendation at time of d/c would be short term rehab    Recommendation: Geriatric Consult  OT Discharge Recommendation: Short Term Rehab         Comments: Winnie Jesus MS, OTR/L

## 2017-11-27 NOTE — PROGRESS NOTES
Progress Note -  Internal Medicine / Hospitalists  Scotty Villa 36 y o  female MRN: [id number]  Unit/Bed#: [place] [address] -01 Encounter: [id number]      ASSESSMENT AND PLAN:  1  Anasarca-secondary alcohol cirrhosis, possible component of congestive heart failure  Appreciate Cardiology input on this patient and diuresis  Approximate weight in September was 350 lb on admission is 377  Request daily weights  The patient is on Lasix 80 mg b i d  plus spironolactone 50 mg b i d  Noemy Silver Zaroxolyn also recently started per Cardiology  Echocardiogram has been ordered and is currently pending to assess congestive heart failure  Will also kindly request a Gastroenterology consult the patient has not seen a gastroenterologist in the past   Her liver enzymes are trending downward  And her abdomen is benign  2  Metabolic encephalopathy-likely secondary to hyper ammonia  Her ammonia level was 55 yesterday  She was started on lactulose, 1 bowel movement has been documented in the past 24 hours  Will check an ammonia in the morning  Hold benzodiazepines and narcotics  3  Hypokalemia-currently on potassium 40 mEq q i d   4   Falls and ambulatory dysfunction-physical therapy and occupational therapy  No acute fractures or dislocations present on admission  The patient is is significantly deconditioned  PT OT     5   Morbid obesity-increase mobility decrease intake  6  Asthma - albuterol hfa, xopenex prn        VTE Prophylaxis: Enoxaparin (Lovenox)    Dispo: needs IV diuresis for #1; GI workup; PT/OT eval and probable placement  ______________________________________________________________________    SUBJECTIVE:   Patient seen and examined  Sleepy but easily arousable  The patient denies any pain  She claims she does not like to take her diuretics because it makes her urinate a lot  Denies any abdominal pain nausea vomiting diarrhea constipation dysuria or urinary retention no fevers or chills  No substernal chest pain  She does claim that she gets short of breath at times  She denies any palpitations  She denies and coughing  The patient tells me that she has liver disease  But she denies any alcohol use  She also told me she has sarcoid and asthma  OBJECTIVE:   Principal Problem:    Anasarca  Active Problems:    Ambulatory dysfunction    Recurrent falls    Chronic pain syndrome    Morbid obesity due to excess calories (HCC)    Peripheral edema    Hypokalemia    Chest pain      Vitals:   HR:  [88-95] 89  Resp:  [19-20] 19  BP: (117-119)/(58-68) 118/68  SpO2:  [93 %-97 %] 93 %  Temp (24hrs), Av 6 °F (37 6 °C), Min:99 5 °F (37 5 °C), Max:99 7 °F (37 6 °C)  Current: Temperature: 99 5 °F (37 5 °C)    Intake/Output Summary (Last 24 hours) at 17 1350  Last data filed at 17 1030   Gross per 24 hour   Intake                0 ml   Output             1300 ml   Net            -1300 ml       Physical Exam:     General Appearance:    Somnolent easily arousable and quickly falls asleep no acute distress   Head:    Normocephalic, without obvious abnormality, atraumatic   Eyes:    Conjunctiva/corneas clear, EOM's intact       Neck:   Supple, no adenopathy, no JVD   Back:     Symmetric, no curvature, ROM normal, no CVA tenderness   Lungs:     Course, exam limited by body habitus and significant deconditioning, no respiratory distress   Heart:    Regular rate and rhythm, S1 and S2 normal, no murmur, rub   or gallop   Abdomen:     Soft, non-tender, bowel sounds decreased secondary to body habitus, obese   Extremities:   3+ pitting bilateral lower extremity edema   Psych:   Depressed mood and affect   Neurologic:   CNII-XII intact   Weak and deconditioned     Lab, Imaging and other studies:      Results from last 7 days  Lab Units 17  0505   WBC Thousand/uL 11 73*   HEMOGLOBIN g/dL 9 1*   HEMATOCRIT % 27 9*   PLATELETS Thousands/uL 237       Results from last 7 days  Lab Units 17  0828   SODIUM mmol/L 141 POTASSIUM mmol/L 4 2   CHLORIDE mmol/L 105   CO2 mmol/L 28   BUN mg/dL 4*   CREATININE mg/dL 0 67   CALCIUM mg/dL 7 8*   TOTAL PROTEIN g/dL 6 6   BILIRUBIN TOTAL mg/dL 3 87*   ALK PHOS U/L 133*   ALT U/L 25   AST U/L 106*   GLUCOSE RANDOM mg/dL 82       Results from last 7 days  Lab Units 11/26/17  1204 11/26/17  0929 11/26/17  0505   TROPONIN I ng/mL 0 06* 0 05* 0 05*     No results found for: Mirela Deep, SPUTUMCULTUR    Ammonia 55 (11/26/17)    Scheduled Meds:    enoxaparin 40 mg Subcutaneous Daily   furosemide 80 mg Intravenous BID (diuretic)   gabapentin 300 mg Oral TID   lactulose 20 g Oral BID   metolazone 5 mg Oral Daily   nystatin  Topical 4x Daily   pantoprazole 40 mg Oral Early Morning   potassium chloride 40 mEq Oral 4x Daily   spironolactone 50 mg Oral BID       Continuous Infusions:       PRN Meds:    acetaminophen    aluminum-magnesium hydroxide-simethicone    LORazepam    ondansetron    ondansetron      Counseling / Coordination of Care  Total floor / unit time spent today 30 minutes  minutes  Greater than 50% of total time was spent with the patient and / or family counseling and / or coordination of care   A description of the counseling / coordination of care: review pmh in epic/QRGL; coordinate plan of care with nursing; review case with IM Attending      This note has been constructed using a voice recognition system

## 2017-11-28 ENCOUNTER — APPOINTMENT (INPATIENT)
Dept: NON INVASIVE DIAGNOSTICS | Facility: HOSPITAL | Age: 42
DRG: 280 | End: 2017-11-28
Payer: COMMERCIAL

## 2017-11-28 PROBLEM — Z91.14 NONCOMPLIANCE WITH MEDICATION REGIMEN: Status: ACTIVE | Noted: 2017-07-03

## 2017-11-28 PROBLEM — K70.9 ALCOHOLIC LIVER DISEASE (HCC): Status: ACTIVE | Noted: 2017-11-28

## 2017-11-28 LAB
ALBUMIN SERPL BCP-MCNC: 1.2 G/DL (ref 3.5–5)
ALP SERPL-CCNC: 151 U/L (ref 46–116)
ALT SERPL W P-5'-P-CCNC: 26 U/L (ref 12–78)
AMMONIA PLAS-SCNC: 38 UMOL/L (ref 11–35)
ANION GAP SERPL CALCULATED.3IONS-SCNC: 4 MMOL/L (ref 4–13)
AST SERPL W P-5'-P-CCNC: 107 U/L (ref 5–45)
BASOPHILS # BLD AUTO: 0.04 THOUSANDS/ΜL (ref 0–0.1)
BASOPHILS NFR BLD AUTO: 1 % (ref 0–1)
BILIRUB SERPL-MCNC: 3.71 MG/DL (ref 0.2–1)
BUN SERPL-MCNC: 3 MG/DL (ref 5–25)
CALCIUM SERPL-MCNC: 8 MG/DL (ref 8.3–10.1)
CHLORIDE SERPL-SCNC: 104 MMOL/L (ref 100–108)
CO2 SERPL-SCNC: 31 MMOL/L (ref 21–32)
CREAT SERPL-MCNC: 0.82 MG/DL (ref 0.6–1.3)
EOSINOPHIL # BLD AUTO: 0.3 THOUSAND/ΜL (ref 0–0.61)
EOSINOPHIL NFR BLD AUTO: 4 % (ref 0–6)
ERYTHROCYTE [DISTWIDTH] IN BLOOD BY AUTOMATED COUNT: 20.2 % (ref 11.6–15.1)
FERRITIN SERPL-MCNC: 98 NG/ML (ref 8–388)
GFR SERPL CREATININE-BSD FRML MDRD: 88 ML/MIN/1.73SQ M
GLUCOSE SERPL-MCNC: 79 MG/DL (ref 65–140)
HCT VFR BLD AUTO: 27 % (ref 34.8–46.1)
HGB BLD-MCNC: 8.8 G/DL (ref 11.5–15.4)
IRON SATN MFR SERPL: 56 %
IRON SERPL-MCNC: 55 UG/DL (ref 50–170)
LYMPHOCYTES # BLD AUTO: 2.41 THOUSANDS/ΜL (ref 0.6–4.47)
LYMPHOCYTES NFR BLD AUTO: 31 % (ref 14–44)
MAGNESIUM SERPL-MCNC: 1.4 MG/DL (ref 1.6–2.6)
MCH RBC QN AUTO: 35.9 PG (ref 26.8–34.3)
MCHC RBC AUTO-ENTMCNC: 32.6 G/DL (ref 31.4–37.4)
MCV RBC AUTO: 110 FL (ref 82–98)
MONOCYTES # BLD AUTO: 1.24 THOUSAND/ΜL (ref 0.17–1.22)
MONOCYTES NFR BLD AUTO: 16 % (ref 4–12)
NEUTROPHILS # BLD AUTO: 3.86 THOUSANDS/ΜL (ref 1.85–7.62)
NEUTS SEG NFR BLD AUTO: 48 % (ref 43–75)
NRBC BLD AUTO-RTO: 0 /100 WBCS
PLATELET # BLD AUTO: 230 THOUSANDS/UL (ref 149–390)
PMV BLD AUTO: 9.6 FL (ref 8.9–12.7)
POTASSIUM SERPL-SCNC: 4.1 MMOL/L (ref 3.5–5.3)
PROT SERPL-MCNC: 6.7 G/DL (ref 6.4–8.2)
RBC # BLD AUTO: 2.45 MILLION/UL (ref 3.81–5.12)
SODIUM SERPL-SCNC: 139 MMOL/L (ref 136–145)
TIBC SERPL-MCNC: 98 UG/DL (ref 250–450)
WBC # BLD AUTO: 7.85 THOUSAND/UL (ref 4.31–10.16)

## 2017-11-28 PROCEDURE — 94760 N-INVAS EAR/PLS OXIMETRY 1: CPT

## 2017-11-28 PROCEDURE — 83550 IRON BINDING TEST: CPT | Performed by: FAMILY MEDICINE

## 2017-11-28 PROCEDURE — 82140 ASSAY OF AMMONIA: CPT | Performed by: PHYSICIAN ASSISTANT

## 2017-11-28 PROCEDURE — 94640 AIRWAY INHALATION TREATMENT: CPT

## 2017-11-28 PROCEDURE — 83540 ASSAY OF IRON: CPT | Performed by: FAMILY MEDICINE

## 2017-11-28 PROCEDURE — 93306 TTE W/DOPPLER COMPLETE: CPT

## 2017-11-28 PROCEDURE — 85025 COMPLETE CBC W/AUTO DIFF WBC: CPT | Performed by: PHYSICIAN ASSISTANT

## 2017-11-28 PROCEDURE — 82728 ASSAY OF FERRITIN: CPT | Performed by: FAMILY MEDICINE

## 2017-11-28 PROCEDURE — 80053 COMPREHEN METABOLIC PANEL: CPT | Performed by: PHYSICIAN ASSISTANT

## 2017-11-28 PROCEDURE — 83735 ASSAY OF MAGNESIUM: CPT | Performed by: INTERNAL MEDICINE

## 2017-11-28 RX ORDER — SODIUM CHLORIDE FOR INHALATION 0.9 %
VIAL, NEBULIZER (ML) INHALATION
Status: DISPENSED
Start: 2017-11-28 | End: 2017-11-29

## 2017-11-28 RX ORDER — SODIUM CHLORIDE FOR INHALATION 0.9 %
3 VIAL, NEBULIZER (ML) INHALATION EVERY 6 HOURS PRN
Status: DISCONTINUED | OUTPATIENT
Start: 2017-11-28 | End: 2017-12-05

## 2017-11-28 RX ADMIN — LACTULOSE 20 G: 20 SOLUTION ORAL at 09:14

## 2017-11-28 RX ADMIN — POTASSIUM CHLORIDE 40 MEQ: 1500 TABLET, EXTENDED RELEASE ORAL at 22:33

## 2017-11-28 RX ADMIN — PERFLUTREN 1.2 ML/MIN: 6.52 INJECTION, SUSPENSION INTRAVENOUS at 11:30

## 2017-11-28 RX ADMIN — FUROSEMIDE 100 MG: 10 INJECTION, SOLUTION INTRAMUSCULAR; INTRAVENOUS at 17:13

## 2017-11-28 RX ADMIN — NYSTATIN 1 APPLICATION: 100000 POWDER TOPICAL at 09:28

## 2017-11-28 RX ADMIN — GABAPENTIN 300 MG: 300 CAPSULE ORAL at 09:14

## 2017-11-28 RX ADMIN — POTASSIUM CHLORIDE 40 MEQ: 1500 TABLET, EXTENDED RELEASE ORAL at 09:14

## 2017-11-28 RX ADMIN — LACTULOSE 20 G: 20 SOLUTION ORAL at 17:14

## 2017-11-28 RX ADMIN — ISODIUM CHLORIDE 3 ML: 0.03 SOLUTION RESPIRATORY (INHALATION) at 12:13

## 2017-11-28 RX ADMIN — ONDANSETRON 4 MG: 2 INJECTION INTRAMUSCULAR; INTRAVENOUS at 15:09

## 2017-11-28 RX ADMIN — NYSTATIN: 100000 POWDER TOPICAL at 22:36

## 2017-11-28 RX ADMIN — GABAPENTIN 300 MG: 300 CAPSULE ORAL at 22:33

## 2017-11-28 RX ADMIN — NYSTATIN 1 APPLICATION: 100000 POWDER TOPICAL at 11:36

## 2017-11-28 RX ADMIN — NYSTATIN: 100000 POWDER TOPICAL at 17:26

## 2017-11-28 RX ADMIN — LEVALBUTEROL 1.25 MG: 1.25 SOLUTION, CONCENTRATE RESPIRATORY (INHALATION) at 12:12

## 2017-11-28 RX ADMIN — GABAPENTIN 300 MG: 300 CAPSULE ORAL at 17:14

## 2017-11-28 RX ADMIN — POTASSIUM CHLORIDE 40 MEQ: 1500 TABLET, EXTENDED RELEASE ORAL at 11:35

## 2017-11-28 RX ADMIN — ONDANSETRON 4 MG: 2 INJECTION INTRAMUSCULAR; INTRAVENOUS at 09:22

## 2017-11-28 RX ADMIN — ENOXAPARIN SODIUM 40 MG: 40 INJECTION SUBCUTANEOUS at 09:25

## 2017-11-28 RX ADMIN — POTASSIUM CHLORIDE 40 MEQ: 1500 TABLET, EXTENDED RELEASE ORAL at 17:14

## 2017-11-28 RX ADMIN — ALUMINUM HYDROXIDE, MAGNESIUM HYDROXIDE, AND SIMETHICONE 30 ML: 200; 200; 20 SUSPENSION ORAL at 13:22

## 2017-11-28 RX ADMIN — PANTOPRAZOLE SODIUM 40 MG: 40 TABLET, DELAYED RELEASE ORAL at 06:11

## 2017-11-28 NOTE — ASSESSMENT & PLAN NOTE
· Patient was counseled to abstinence from alcohol  · Imaging suggestive of liver steatosis rather than cirrhosis  · Continue to monitor liver function  · GI input appreciated

## 2017-11-28 NOTE — PHYSICAL THERAPY NOTE
Physical Therapy Cancellation Note    Pt having echo performed at time of attempted PT session  Will follow up as appropriate       Jackie Norman, PT

## 2017-11-28 NOTE — ASSESSMENT & PLAN NOTE
· Patient c/o anxiety and is emotionally labile  · Does not follow with psychiatry  · Requested psychiatry evaluation

## 2017-11-28 NOTE — ASSESSMENT & PLAN NOTE
· Continue Gabapentin  · Limited with pain medications - will avoid NSAIDs and Tylenol due to liver disease, avoid narcotics due to hx of drug-seeking behaviors  · Patient would benefit from utilizing non-pharmacologic modalities, out of bed and increased activity with support

## 2017-11-28 NOTE — PROGRESS NOTES
Progress Note - Cardiology   Dany San 36 y o  female MRN: [id number]  Unit/Bed#: [place] [address] -01 Encounter: [id number]      Assessment:     1  Volume overload/Anasarca  2  Chronic liver disease w/ hypoalbuminemia  3  HTN  4  Morbid obesity    Discussion/Recommendations:    · Will continue to attempt augmenting diuresis, by increasing furosemide to 100 mg twice daily, and continuing metolazone  Continue potassium supplement  Continue spironolactone  Check daily weight, urine output, renal function  Discharge weight last admission in September was 350 lb  Subjective:  Patient seen and examined, still feels swelling, but is more satisfied with level of urine output        Physical Exam:  GEN:  NAD  HEENT:  MMM, NCAT, pink conjunctiva, EOMI, nonicteric sclera  CV:  NO JVD/HJR, RR, NO M/R/G but distant heart sounds, +S1/S2, NO PARASTERNAL HEAVE/THRILL, +++LE EDEMA, NO HEPATIC SYSTOLIC PULSATION, WARM EXTREMITIES  RESP:  CTAB/L, distant breath sounds  ABD:  SOFT, NT, NO GROSS ORGANOMEGALY        Vitals:   /53   Pulse 88   Temp 98 1 °F (36 7 °C) (Tympanic)   Resp 18   Wt (!) 171 kg (377 lb 10 4 oz)   SpO2 96%   BMI 54 19 kg/m²   Vitals:    11/25/17 1934   Weight: (!) 171 kg (377 lb 10 4 oz)       Intake/Output Summary (Last 24 hours) at 11/28/17 1053  Last data filed at 11/27/17 2100   Gross per 24 hour   Intake                0 ml   Output             1528 ml   Net            -1528 ml         TELEMETRY:  Off telemetry  Lab Results:    Results from last 7 days  Lab Units 11/28/17  0605   WBC Thousand/uL 7 85   HEMOGLOBIN g/dL 8 8*   HEMATOCRIT % 27 0*   PLATELETS Thousands/uL 230       Results from last 7 days  Lab Units 11/28/17  0605   SODIUM mmol/L 139   POTASSIUM mmol/L 4 1   CHLORIDE mmol/L 104   CO2 mmol/L 31   BUN mg/dL 3*   CREATININE mg/dL 0 82   CALCIUM mg/dL 8 0*   TOTAL PROTEIN g/dL 6 7   BILIRUBIN TOTAL mg/dL 3 71*   ALK PHOS U/L 151*   ALT U/L 26   AST U/L 107*   GLUCOSE RANDOM mg/dL 79       Results from last 7 days  Lab Units 11/28/17  0605   SODIUM mmol/L 139   POTASSIUM mmol/L 4 1   CHLORIDE mmol/L 104   CO2 mmol/L 31   BUN mg/dL 3*   CREATININE mg/dL 0 82   GLUCOSE RANDOM mg/dL 79   CALCIUM mg/dL 8 0*             Medications:    Current Facility-Administered Medications:     acetaminophen (TYLENOL) tablet 650 mg, 650 mg, Oral, Q4H PRN, MICHAEL Lopez-C, 650 mg at 11/27/17 2219    aluminum-magnesium hydroxide-simethicone (MYLANTA) 200-200-20 mg/5 mL oral suspension 30 mL, 30 mL, Oral, Q6H PRN, Derrell Julian PA-C    enoxaparin (LOVENOX) subcutaneous injection 40 mg, 40 mg, Subcutaneous, Daily, Derrell Julian PA-C, 40 mg at 11/28/17 0925    furosemide (LASIX) injection 80 mg, 80 mg, Intravenous, BID (diuretic), Rujul Evans, DO, 80 mg at 11/27/17 1707    gabapentin (NEURONTIN) capsule 300 mg, 300 mg, Oral, TID, Derrell Julian PA-C, 300 mg at 11/28/17 0914    lactulose 20 g/30 mL oral solution 20 g, 20 g, Oral, BID, Travis Mo, DO, 20 g at 11/28/17 0914    levalbuterol (XOPENEX) inhalation solution 1 25 mg, 1 25 mg, Nebulization, Q6H PRN, U S  Yokasta PA-C    metolazone (ZAROXOLYN) tablet 5 mg, 5 mg, Oral, Daily, Rujul Evans, DO, 5 mg at 11/27/17 1407    nystatin (MYCOSTATIN) powder, , Topical, 4x Daily, Derrell Julian PA-C, 1 application at 21/66/93 0928    ondansetron (ZOFRAN) injection 4 mg, 4 mg, Intravenous, Q6H PRN, Derrell Julian PA-C, 4 mg at 11/28/17 8303    pantoprazole (PROTONIX) EC tablet 40 mg, 40 mg, Oral, Early Morning, MICHAEL Lopez-C, 40 mg at 11/28/17 7386    potassium chloride (K-DUR,KLOR-CON) CR tablet 40 mEq, 40 mEq, Oral, 4x Daily, Rujul DO Nathan, 40 mEq at 11/28/17 0914    spironolactone (ALDACTONE) tablet 50 mg, 50 mg, Oral, BID, Derrell Julian PA-C, 50 mg at 11/27/17 5798    Portions of the record may have been created with voice recognition software   Occasional wrong word or "sound a like" substitutions may have occurred due to the inherent limitations of voice recognition software  Read the chart carefully and recognize, using context, where substitutions have occurred

## 2017-11-28 NOTE — PROGRESS NOTES
Progress Note - [place] Escalante 36 y o  female MRN: [id number]    Unit/Bed#: [place] [address] -01 Encounter: [id number]        * Anasarca   Assessment & Plan    · Most likely secondary to liver disease  · 2D Echo revealed normal systolic and diastolic function  · Diuretics were adjusted   · Cardiology and GI input appreciated        Alcoholic liver disease ([place] [address] )   Assessment & Plan    · Patient was counseled to abstinence from alcohol  · Imaging suggestive of liver steatosis rather than cirrhosis  · Continue to monitor liver function  · GI input appreciated        Noncompliance with medication regimen   Assessment & Plan    · Encourage medical compliance and  on potential risks of noncompliance        Other bipolar disorder ([place] [address] )   Assessment & Plan    · Patient c/o anxiety and is emotionally labile  · Does not follow with psychiatry  · Requested psychiatry evaluation        Morbid obesity due to excess calories ([place] [address] )   Assessment & Plan    · Patient would clearly benefit from weight loss  · Ongoing counseling/nutrition consult        Chronic pain syndrome   Assessment & Plan    · Continue Gabapentin  · Limited with pain medications - will avoid NSAIDs and Tylenol due to liver disease, avoid narcotics due to hx of drug-seeking behaviors  · Patient would benefit from utilizing non-pharmacologic modalities, out of bed and increased activity with support         Ambulatory dysfunction   Assessment & Plan    · Multifactorial  · PT/OT          Pharmacologic: Enoxaparin (Lovenox)  Mechanical VTE Prophylaxis in Place: No    Patient Centered Rounds: I have performed bedside rounds with nursing staff today  Discussions with Specialists or Other Care Team Provider: Reviewed provider documentation    Education and Discussions with Family / Patient: Patient    Time Spent for Care: 30 minutes  More than 50% of total time spent on counseling and coordination of care as described above      Current Length of Stay: 3 day(s)    Current Patient Status: Inpatient   Certification Statement: The patient will continue to require additional inpatient hospital stay due to need for IV managmenet and close monitoring    Discharge Plan / Estimated Discharge Date: to be determined      Code Status: Level 1 - Full Code      Subjective:   Patient seen and examined  She appears more awake today  She is c/o abdominal pain and nausea, decreased appetite  No chills or fever  Denies SOB or chest pain  No o/n events  Objective:     Vitals:   Temp (24hrs), Av 1 °F (36 7 °C), Min:98 °F (36 7 °C), Max:98 2 °F (36 8 °C)    HR:  [88-95] 88  Resp:  [18-20] 18  BP: (100-117)/(50-57) 100/53  SpO2:  [96 %-99 %] 96 %  Body mass index is 54 19 kg/m²  Input and Output Summary (last 24 hours): Intake/Output Summary (Last 24 hours) at 17 1500  Last data filed at 17 2100   Gross per 24 hour   Intake                0 ml   Output             1000 ml   Net            -1000 ml       Physical Exam:     Physical Exam   Constitutional: She is oriented to person, place, and time  Obese   HENT:   Head: Normocephalic  Eyes: Conjunctivae are normal    Cardiovascular: Normal rate and regular rhythm  Exam reveals no gallop and no friction rub  No murmur heard  Pulmonary/Chest: Effort normal  No stridor  No respiratory distress  She has no wheezes  Abdominal: Soft  She exhibits no distension  There is no tenderness  There is no guarding  Musculoskeletal: She exhibits edema  Neurological: She is alert and oriented to person, place, and time  Skin: Skin is warm and dry         Additional Data:     Labs:      Results from last 7 days  Lab Units 17  0605   WBC Thousand/uL 7 85   HEMOGLOBIN g/dL 8 8*   HEMATOCRIT % 27 0*   PLATELETS Thousands/uL 230   NEUTROS PCT % 48   LYMPHS PCT % 31   MONOS PCT % 16*   EOS PCT % 4       Results from last 7 days  Lab Units 17  0605   SODIUM mmol/L 139   POTASSIUM mmol/L 4 1   CHLORIDE mmol/L 104   CO2 mmol/L 31   BUN mg/dL 3*   CREATININE mg/dL 0 82   CALCIUM mg/dL 8 0*   TOTAL PROTEIN g/dL 6 7   BILIRUBIN TOTAL mg/dL 3 71*   ALK PHOS U/L 151*   ALT U/L 26   AST U/L 107*   GLUCOSE RANDOM mg/dL 79             Recent Cultures (last 7 days):       Results from last 7 days  Lab Units 11/27/17  0246   C DIFF TOXIN B  NEGATIVE for C difficle toxin by PCR          Last 24 Hours Medication List:     enoxaparin 40 mg Subcutaneous Daily   furosemide 100 mg Intravenous BID (diuretic)   gabapentin 300 mg Oral TID   lactulose 20 g Oral BID   metolazone 5 mg Oral Daily   nystatin  Topical 4x Daily   pantoprazole 40 mg Oral Early Morning   potassium chloride 40 mEq Oral 4x Daily   sodium chloride      spironolactone 50 mg Oral BID        Today, Patient Was Seen By: Matthew Hutton MD

## 2017-11-28 NOTE — ASSESSMENT & PLAN NOTE
· Most likely secondary to liver disease  · 2D Echo revealed normal systolic and diastolic function  · Diuretics were adjusted   · Cardiology and GI input appreciated

## 2017-11-29 LAB
ALBUMIN SERPL BCP-MCNC: 1.2 G/DL (ref 3.5–5)
ALP SERPL-CCNC: 136 U/L (ref 46–116)
ALT SERPL W P-5'-P-CCNC: 25 U/L (ref 12–78)
ANION GAP SERPL CALCULATED.3IONS-SCNC: 6 MMOL/L (ref 4–13)
AST SERPL W P-5'-P-CCNC: 90 U/L (ref 5–45)
BASOPHILS # BLD AUTO: 0.04 THOUSANDS/ΜL (ref 0–0.1)
BASOPHILS NFR BLD AUTO: 1 % (ref 0–1)
BILIRUB SERPL-MCNC: 3.56 MG/DL (ref 0.2–1)
BILIRUB UR QL STRIP: NEGATIVE
BUN SERPL-MCNC: 2 MG/DL (ref 5–25)
CALCIUM SERPL-MCNC: 8 MG/DL (ref 8.3–10.1)
CHLORIDE SERPL-SCNC: 102 MMOL/L (ref 100–108)
CLARITY UR: CLEAR
CO2 SERPL-SCNC: 27 MMOL/L (ref 21–32)
COLOR UR: YELLOW
CREAT SERPL-MCNC: 0.79 MG/DL (ref 0.6–1.3)
EOSINOPHIL # BLD AUTO: 0.31 THOUSAND/ΜL (ref 0–0.61)
EOSINOPHIL NFR BLD AUTO: 4 % (ref 0–6)
ERYTHROCYTE [DISTWIDTH] IN BLOOD BY AUTOMATED COUNT: 20.1 % (ref 11.6–15.1)
GFR SERPL CREATININE-BSD FRML MDRD: 92 ML/MIN/1.73SQ M
GLUCOSE SERPL-MCNC: 78 MG/DL (ref 65–140)
GLUCOSE UR STRIP-MCNC: NEGATIVE MG/DL
HCT VFR BLD AUTO: 26.8 % (ref 34.8–46.1)
HGB BLD-MCNC: 8.8 G/DL (ref 11.5–15.4)
HGB UR QL STRIP.AUTO: NEGATIVE
INR PPP: 1.57 (ref 0.86–1.16)
KETONES UR STRIP-MCNC: NEGATIVE MG/DL
LEUKOCYTE ESTERASE UR QL STRIP: NEGATIVE
LYMPHOCYTES # BLD AUTO: 3.03 THOUSANDS/ΜL (ref 0.6–4.47)
LYMPHOCYTES NFR BLD AUTO: 34 % (ref 14–44)
MCH RBC QN AUTO: 36.4 PG (ref 26.8–34.3)
MCHC RBC AUTO-ENTMCNC: 32.8 G/DL (ref 31.4–37.4)
MCV RBC AUTO: 111 FL (ref 82–98)
MONOCYTES # BLD AUTO: 1.57 THOUSAND/ΜL (ref 0.17–1.22)
MONOCYTES NFR BLD AUTO: 18 % (ref 4–12)
NEUTROPHILS # BLD AUTO: 3.88 THOUSANDS/ΜL (ref 1.85–7.62)
NEUTS SEG NFR BLD AUTO: 43 % (ref 43–75)
NITRITE UR QL STRIP: NEGATIVE
NRBC BLD AUTO-RTO: 0 /100 WBCS
PH UR STRIP.AUTO: 5.5 [PH] (ref 4.5–8)
PLATELET # BLD AUTO: 213 THOUSANDS/UL (ref 149–390)
PMV BLD AUTO: 9.6 FL (ref 8.9–12.7)
POTASSIUM SERPL-SCNC: 3.7 MMOL/L (ref 3.5–5.3)
PROT SERPL-MCNC: 6.6 G/DL (ref 6.4–8.2)
PROT UR STRIP-MCNC: NEGATIVE MG/DL
PROT UR-MCNC: <6 MG/DL
PROTHROMBIN TIME: 18.9 SECONDS (ref 12.1–14.4)
RBC # BLD AUTO: 2.42 MILLION/UL (ref 3.81–5.12)
SODIUM SERPL-SCNC: 135 MMOL/L (ref 136–145)
SP GR UR STRIP.AUTO: 1.02 (ref 1–1.03)
UROBILINOGEN UR QL STRIP.AUTO: 0.2 E.U./DL
WBC # BLD AUTO: 8.83 THOUSAND/UL (ref 4.31–10.16)

## 2017-11-29 PROCEDURE — 85025 COMPLETE CBC W/AUTO DIFF WBC: CPT | Performed by: FAMILY MEDICINE

## 2017-11-29 PROCEDURE — 80053 COMPREHEN METABOLIC PANEL: CPT | Performed by: FAMILY MEDICINE

## 2017-11-29 PROCEDURE — 82570 ASSAY OF URINE CREATININE: CPT | Performed by: FAMILY MEDICINE

## 2017-11-29 PROCEDURE — 85610 PROTHROMBIN TIME: CPT | Performed by: PHYSICIAN ASSISTANT

## 2017-11-29 PROCEDURE — 84156 ASSAY OF PROTEIN URINE: CPT | Performed by: FAMILY MEDICINE

## 2017-11-29 PROCEDURE — 94640 AIRWAY INHALATION TREATMENT: CPT

## 2017-11-29 PROCEDURE — 94760 N-INVAS EAR/PLS OXIMETRY 1: CPT

## 2017-11-29 PROCEDURE — 97110 THERAPEUTIC EXERCISES: CPT

## 2017-11-29 PROCEDURE — 81003 URINALYSIS AUTO W/O SCOPE: CPT | Performed by: STUDENT IN AN ORGANIZED HEALTH CARE EDUCATION/TRAINING PROGRAM

## 2017-11-29 PROCEDURE — 97530 THERAPEUTIC ACTIVITIES: CPT

## 2017-11-29 RX ORDER — QUETIAPINE FUMARATE 25 MG/1
50 TABLET, FILM COATED ORAL
Status: DISCONTINUED | OUTPATIENT
Start: 2017-11-29 | End: 2017-12-14 | Stop reason: HOSPADM

## 2017-11-29 RX ADMIN — ONDANSETRON 4 MG: 2 INJECTION INTRAMUSCULAR; INTRAVENOUS at 23:13

## 2017-11-29 RX ADMIN — SPIRONOLACTONE 50 MG: 50 TABLET ORAL at 18:39

## 2017-11-29 RX ADMIN — ONDANSETRON 4 MG: 2 INJECTION INTRAMUSCULAR; INTRAVENOUS at 00:43

## 2017-11-29 RX ADMIN — GABAPENTIN 300 MG: 300 CAPSULE ORAL at 21:04

## 2017-11-29 RX ADMIN — PANTOPRAZOLE SODIUM 40 MG: 40 TABLET, DELAYED RELEASE ORAL at 05:06

## 2017-11-29 RX ADMIN — ONDANSETRON 4 MG: 2 INJECTION INTRAMUSCULAR; INTRAVENOUS at 08:52

## 2017-11-29 RX ADMIN — NYSTATIN: 100000 POWDER TOPICAL at 18:12

## 2017-11-29 RX ADMIN — POTASSIUM CHLORIDE 40 MEQ: 1500 TABLET, EXTENDED RELEASE ORAL at 12:16

## 2017-11-29 RX ADMIN — ONDANSETRON 4 MG: 2 INJECTION INTRAMUSCULAR; INTRAVENOUS at 15:18

## 2017-11-29 RX ADMIN — NYSTATIN: 100000 POWDER TOPICAL at 21:04

## 2017-11-29 RX ADMIN — NYSTATIN: 100000 POWDER TOPICAL at 12:16

## 2017-11-29 RX ADMIN — LEVALBUTEROL 1.25 MG: 1.25 SOLUTION, CONCENTRATE RESPIRATORY (INHALATION) at 08:02

## 2017-11-29 RX ADMIN — GABAPENTIN 300 MG: 300 CAPSULE ORAL at 16:07

## 2017-11-29 RX ADMIN — FUROSEMIDE 100 MG: 10 INJECTION, SOLUTION INTRAMUSCULAR; INTRAVENOUS at 16:08

## 2017-11-29 RX ADMIN — LACTULOSE 20 G: 20 SOLUTION ORAL at 18:12

## 2017-11-29 RX ADMIN — POTASSIUM CHLORIDE 40 MEQ: 1500 TABLET, EXTENDED RELEASE ORAL at 18:12

## 2017-11-29 RX ADMIN — POTASSIUM CHLORIDE 40 MEQ: 1500 TABLET, EXTENDED RELEASE ORAL at 21:04

## 2017-11-29 RX ADMIN — ACETAMINOPHEN 650 MG: 325 TABLET, FILM COATED ORAL at 19:48

## 2017-11-29 RX ADMIN — QUETIAPINE FUMARATE 50 MG: 25 TABLET ORAL at 23:13

## 2017-11-29 RX ADMIN — ISODIUM CHLORIDE 3 ML: 0.03 SOLUTION RESPIRATORY (INHALATION) at 08:02

## 2017-11-29 RX ADMIN — ACETAMINOPHEN 650 MG: 325 TABLET, FILM COATED ORAL at 03:22

## 2017-11-29 NOTE — PROGRESS NOTES
[facility] Internal Medicine Progress Note  Patient: Ml Shore 36 y o  female   MRN: [id number]  PCP: Florence Dior MD  [facility] [address] [address] Encounter: [id number]  Date Of Visit: 11/29/17    Assessment:    Principal Problem:    Anasarca  Active Problems:    Ambulatory dysfunction    Recurrent falls    Chronic pain syndrome    Morbid obesity due to excess calories (HCC)    Peripheral edema    Hypokalemia    Chest pain    Other bipolar disorder ([facility] )    Noncompliance with medication regimen    Alcoholic liver disease ([facility] )      Plan:    · Anasarca: most likely liver disease; 2D ECHO normal; will continue diuresis and continue to monitor  · Elevated LFTs: GI ordered chronic hepatitis panel, will await results and monitor LFTs and INR  · Ordered venous duplex given left calf pain upon palpation and immobility to r/o DVT  · Ordered UA to r/u proteinuria, may get Nephro consult  · Per Gi, pt may benefit from EGD to r/o esophageal varices; may need liver biopsy with portal pressure measurements  · NAD, VSS  · Will c/w gabapentin and avoid NSAIDs and opiates due to hx of bariatric sx and hx of drug-seeking behavior      VTE Pharmacologic Prophylaxis:   Pharmacologic: Enoxaparin (Lovenox)  Mechanical VTE Prophylaxis in Place: No    Patient Centered Rounds: I have performed bedside rounds with nursing staff today  Education and Discussions with Family / Patient: Patient    Time Spent for Care: 15 minutes  More than 50% of total time spent on counseling and coordination of care as described above  Current Length of Stay: 4 day(s)    Current Patient Status: Inpatient       Discharge Plan / Estimated Discharge Date: TBD    Code Status: Level 1 - Full Code      Subjective:   Pt seen at bedside and in no acute distress  She reports upper right abdominal pain and pain in her left leg  She state she vomited some after breakfast but denies SOB or diarrhea  She feels as though she cannot get out of bed  Objective:     Vitals:   Temp (24hrs), Av 9 °F (37 2 °C), Min:98 6 °F (37 °C), Max:99 °F (37 2 °C)    HR:  [86-95] 89  Resp:  [18] 18  BP: (101-118)/(50-59) 112/56  SpO2:  [94 %-98 %] 98 %  Body mass index is 54 19 kg/m²  Input and Output Summary (last 24 hours): Intake/Output Summary (Last 24 hours) at 17 1132  Last data filed at 17 2230   Gross per 24 hour   Intake                0 ml   Output              600 ml   Net             -600 ml       Physical Exam:     Physical Exam    General: well-developed, obese, non-tachypnic, non-dyspnic  Head: normocephalic, atraumatic  Neck: supple, no lymphadenopathy  Heart: regular rate and rhythm   Lungs: clear to auscultation b/l, no wheezing  Abdomen: soft, tender to upper right quadrant  Extremities: b/l LE edema  Neuro: alert and oriented to person, place and time  Skin: warm and dry    Additional Data:     Labs:      Results from last 7 days  Lab Units 17  0505   WBC Thousand/uL 8 83   HEMOGLOBIN g/dL 8 8*   HEMATOCRIT % 26 8*   PLATELETS Thousands/uL 213   NEUTROS PCT % 43   LYMPHS PCT % 34   MONOS PCT % 18*   EOS PCT % 4       Results from last 7 days  Lab Units 17  0505   SODIUM mmol/L 135*   POTASSIUM mmol/L 3 7   CHLORIDE mmol/L 102   CO2 mmol/L 27   BUN mg/dL 2*   CREATININE mg/dL 0 79   CALCIUM mg/dL 8 0*   TOTAL PROTEIN g/dL 6 6   BILIRUBIN TOTAL mg/dL 3 56*   ALK PHOS U/L 136*   ALT U/L 25   AST U/L 90*   GLUCOSE RANDOM mg/dL 78       Results from last 7 days  Lab Units 17  0505   INR  1 57*       * I Have Reviewed All Lab Data Listed Above  * Additional Pertinent Lab Tests Reviewed: All Labs Within Last 24 Hours Reviewed        Recent Cultures (last 7 days):       Results from last 7 days  Lab Units 17  0246   C DIFF TOXIN B  NEGATIVE for C difficle toxin by PCR          Last 24 Hours Medication List:     enoxaparin 40 mg Subcutaneous Daily   furosemide 100 mg Intravenous BID (diuretic)   gabapentin 300 mg Oral TID   lactulose 20 g Oral BID   metolazone 5 mg Oral Daily   nystatin  Topical 4x Daily   pantoprazole 40 mg Oral Early Morning   potassium chloride 40 mEq Oral 4x Daily   spironolactone 50 mg Oral BID        Today, Patient Was Seen By: Momo King DPM    ** Please Note: This note has been constructed using a voice recognition system   **

## 2017-11-29 NOTE — PROGRESS NOTES
Progress Note - Cardiology   Steve Garcia 36 y o  female MRN: [id number]  Unit/Bed#: [facility] 2 [facility]01 Encounter: [id number]      Assessment:     1  Anasarca  2  Alcoholic liver disease with very significant hypoalbuminemia  3  Hypertension  4  Morbid obesity  5  Anemia that is macrocytic    Discussion/Recommendations:    Diuresing well  Would consider ruling out obstructive sleep apnea as outpatient  Subjective:    Notes abdominal pain and occasional few seconds of chest discomfort      Physical Exam:  GEN:  NAD  HEENT:  MMM, NCAT, pink conjunctiva, EOMI, nonicteric sclera  CV:  NO JVD/HJR, RR, NO M/R/G, +S1/S2, NO PARASTERNAL HEAVE/THRILL, SIGNIFICANT LOWER EXTREMITY EDEMA NOTED BILATERALLY, NO HEPATIC SYSTOLIC PULSATION, WARM EXTREMITIES  RESP:  CTAB/L  ABD:  SOFT, NT, ABDOMINAL WALL EDEMA NOTED    Vitals:   /56   Pulse 89   Temp 98 6 °F (37 °C) (Tympanic)   Resp 18   Wt (!) 171 kg (377 lb 10 4 oz)   SpO2 98%   BMI 54 19 kg/m²   Vitals:    11/25/17 1934   Weight: (!) 171 kg (377 lb 10 4 oz)       Intake/Output Summary (Last 24 hours) at 11/29/17 1336  Last data filed at 11/28/17 2230   Gross per 24 hour   Intake                0 ml   Output              600 ml   Net             -600 ml         Lab Results:    Results from last 7 days  Lab Units 11/29/17  0505   WBC Thousand/uL 8 83   HEMOGLOBIN g/dL 8 8*   HEMATOCRIT % 26 8*   PLATELETS Thousands/uL 213       Results from last 7 days  Lab Units 11/29/17  0505   SODIUM mmol/L 135*   POTASSIUM mmol/L 3 7   CHLORIDE mmol/L 102   CO2 mmol/L 27   BUN mg/dL 2*   CREATININE mg/dL 0 79   CALCIUM mg/dL 8 0*   TOTAL PROTEIN g/dL 6 6   BILIRUBIN TOTAL mg/dL 3 56*   ALK PHOS U/L 136*   ALT U/L 25   AST U/L 90*   GLUCOSE RANDOM mg/dL 78       Results from last 7 days  Lab Units 11/29/17  0505   SODIUM mmol/L 135*   POTASSIUM mmol/L 3 7   CHLORIDE mmol/L 102   CO2 mmol/L 27   BUN mg/dL 2*   CREATININE mg/dL 0 79   GLUCOSE RANDOM mg/dL 78   CALCIUM mg/dL 8 0*             Medications:    Current Facility-Administered Medications:     acetaminophen (TYLENOL) tablet 650 mg, 650 mg, Oral, Q4H PRN, Nick Villagomez PA-C, 650 mg at 11/29/17 0322    aluminum-magnesium hydroxide-simethicone (MYLANTA) 200-200-20 mg/5 mL oral suspension 30 mL, 30 mL, Oral, Q6H PRN, NEVA RamirezC, 30 mL at 11/28/17 1322    enoxaparin (LOVENOX) subcutaneous injection 40 mg, 40 mg, Subcutaneous, Daily, MICHAEL Ramirez-C, 40 mg at 11/28/17 0925    furosemide (LASIX) 100 mg in dextrose 5 % 50 mL IVPB, 100 mg, Intravenous, BID (diuretic), Rujul DO Nathan, Last Rate: 100 mL/hr at 11/28/17 1713, 100 mg at 11/28/17 1713    gabapentin (NEURONTIN) capsule 300 mg, 300 mg, Oral, TID, Nick Villagomez PA-C, 300 mg at 11/28/17 2233    lactulose 20 g/30 mL oral solution 20 g, 20 g, Oral, BID, Marshal Rutledge DO, 20 g at 11/28/17 1714    levalbuterol (Jefry Low) inhalation solution 1 25 mg, 1 25 mg, Nebulization, Q6H PRN, Sonya Carlos PA-C, 1 25 mg at 11/29/17 0802    metolazone (ZAROXOLYN) tablet 5 mg, 5 mg, Oral, Daily, Yonjul DO Nathan, 5 mg at 11/27/17 1407    nystatin (MYCOSTATIN) powder, , Topical, 4x Daily, Nick Villagomez PA-C    ondansetron [facility]) injection 4 mg, 4 mg, Intravenous, Q6H PRN, Nick Villagomez PA-C, 4 mg at 11/29/17 0852    pantoprazole (PROTONIX) EC tablet 40 mg, 40 mg, Oral, Early Morning, MICHAEL Ramirez-C, 40 mg at 11/29/17 0506    potassium chloride (K-DUR,KLOR-CON) CR tablet 40 mEq, 40 mEq, Oral, 4x Daily, Carly Evans DO, 40 mEq at 11/29/17 1216    sodium chloride 0 9 % inhalation solution 3 mL, 3 mL, Nebulization, Q6H PRN, Monique Romero MD, 3 mL at 11/29/17 0802    spironolactone (ALDACTONE) tablet 50 mg, 50 mg, Oral, BID, Nick Villagomez PA-C, 50 mg at 11/27/17 9879    Portions of the record may have been created with voice recognition software   Occasional wrong word or "sound a like" substitutions may have occurred due to the inherent limitations of voice recognition software  Read the chart carefully and recognize, using context, where substitutions have occurred

## 2017-11-29 NOTE — PLAN OF CARE
Problem: PHYSICAL THERAPY ADULT  Goal: Performs mobility at highest level of function for planned discharge setting  See evaluation for individualized goals  Treatment/Interventions: LE strengthening/ROM, Therapeutic exercise, Endurance training, Patient/family training, Bed mobility, Compensatory technique education, Continued evaluation  Equipment Recommended: Other (Comment) (pt needs dependent means for out of bed mobilization )       See flowsheet documentation for full assessment, interventions and recommendations  Outcome: Progressing  Prognosis: Fair  Problem List: Decreased strength, Decreased range of motion, Decreased endurance, Impaired balance, Decreased mobility, Obesity, Pain  Assessment: pt agreed to PT session  less pain reported of 6/10 in abdomen  pt more cooperative  performed bed mobility mod (A)x1 c HOB elevated + use of bed rails  attempted sit>stand, however pt unable 2* pain + fear of falling  stated, "I'm going to fall on my knees"  initiated lateral scoots toward HOB min (A)x1 c ability to clear approx 25% of buttocks off surface  able to wt shift toward HOB in supine min (A)x1 c cues + use of bed rails  performed supine AROM ther ex B/L LE x10 reps c min cues for technique  AAROM required for L LE  will cont skilled PT to further maximize functional mobility + return to PLOF  pt encouraged to sit EOB for meals as tolerated  will cont to assess OOB mobility  cont to recommend STR  Barriers to Discharge: Inaccessible home environment, Decreased caregiver support     Recommendation: Short-term skilled PT     PT - OK to Discharge: No    See flowsheet documentation for full assessment

## 2017-11-29 NOTE — PROGRESS NOTES
Pt  Refused all AM medications this morning  Pt  C/o nausea  Was medicated with zofran with relief noted  Pt  Continues to refuses medications  Pt  Educated importance of taken ordered medication  Pt  [place] to refuses  Pt  C/o pain  Offered PRN tylenol  Pt  Refused

## 2017-11-29 NOTE — PROGRESS NOTES
Progress Note - Fabiene Essex 36 y o  female MRN: [id number]    Unit/Bed#: [facility] 2 -01 Encounter: [id number]    Subjective:     Patient seen and examined at bedside  She complains of severe RUQ and LUQ pain and nausea  She hopes for some pain medication because she does not want to self-medicate with alcohol anymore  Objective:     Vitals: Blood pressure 112/56, pulse 89, temperature 98 6 °F (37 °C), temperature source Tympanic, resp  rate 18, weight (!) 171 kg (377 lb 10 4 oz), SpO2 98 %  ,Body mass index is 54 19 kg/m²  Intake/Output Summary (Last 24 hours) at 11/29/17 1128  Last data filed at 11/28/17 2230   Gross per 24 hour   Intake                0 ml   Output              600 ml   Net             -600 ml       Physical Exam:     General Appearance: Alert and oriented x 3, obese female, appears stated age and cooperative  HEENT: Mild scleral icterus  Skin: Mild jaundice  Lungs: Clear to auscultation bilaterally, no rales or rhonchi, no labored breathing/accessory muscle use  Heart: Regular rate and rhythm, S1, S2 normal, no murmur, click, rub or gallop  Abdomen: Obese  Normal bowel sounds  Soft  Mild epigastric tenderness to palpation  No rebound or guarding    Extremities: LE edema    Invasive Devices     Peripheral Intravenous Line            Peripheral IV 11/28/17 Right Antecubital less than 1 day                Lab Results:    Results from last 7 days  Lab Units 11/29/17  0505   WBC Thousand/uL 8 83   HEMOGLOBIN g/dL 8 8*   HEMATOCRIT % 26 8*   PLATELETS Thousands/uL 213   NEUTROS PCT % 43   LYMPHS PCT % 34   MONOS PCT % 18*   EOS PCT % 4       Results from last 7 days  Lab Units 11/29/17  0505   SODIUM mmol/L 135*   POTASSIUM mmol/L 3 7   CHLORIDE mmol/L 102   CO2 mmol/L 27   BUN mg/dL 2*   CREATININE mg/dL 0 79   CALCIUM mg/dL 8 0*   TOTAL PROTEIN g/dL 6 6   BILIRUBIN TOTAL mg/dL 3 56*   ALK PHOS U/L 136*   ALT U/L 25   AST U/L 90*   GLUCOSE RANDOM mg/dL 78       Results from last 7 days  Lab Units 11/29/17  0505   INR  1 57*           Imaging Studies: I have personally reviewed pertinent imaging studies  Xr Knee 4+ Vw Left Injury    Result Date: 11/27/2017  Impression: No acute osseous abnormality  Generalized soft tissue swelling with stranding in the subcutaneous fat which might indicate edema or cellulitis Workstation performed: [id number]     Xr Ankle 3+ Views Left    Result Date: 11/27/2017  Impression: No acute osseous abnormality  Soft tissue swelling  Workstation performed: [id number]     Pe Study With Ct Abdomen And Pelvis With Contrast    Result Date: 11/26/2017  Impression: No acute traumatic abnormality  Patchy alveolar densities in the lung apices likely infectious or inflammatory in origin  5 mm nodule adjacent to the major fissure, left upper lobe (series 2 image 18)  Negative for pulmonary embolism within the limitations of the study  See above additional nonacute findings  Findings are consistent with the preliminary report from [employer] which was read at 11:23 PM November 25, 2017 [facility]  49  time  Workstation performed: [id number]       Assessment and Plan:    69-year-old morbidly obese female with prior gastric bypass surgery, sarcoidosis, and alcohol abuse presenting after a fall at home found to have anasarca with elevated LFTs     1) Elevated LFTs: Likely secondary to alcoholic liver disease/fatty liver  Her echo was unremarkable  Liver function testing reveals AST:ALT > 2:1, hyperbilirubinemia, and hypoalbuminemia, and elevated INR of 1 57  Discriminant function is low  Her platelets are normal and CT scan shows hepatomegaly, making a diagnosis of cirrhosis less likely   However, the elevated INR is concerning      -Await chronic hepatitis panel  -Monitor LFTs and INR  -Reinforced the importance of alcohol cessation, patient states she wants to live for her 9 grandchildren  -Patient would likely benefit from EGD to screen for esophageal varices  -She may need liver biopsy with portal pressure measurements to assess for cirrhosis     2) Anasarca: Likely related to hypoalbuminemia and underlying liver disease   She only has small amount of ascites in the mesentery      -Agree with diuretics  -Monitor BMP  -2 gram sodium diet  -Recommend urinalysis to evaluate for proteinuria  -Consider nephrology consultation

## 2017-11-29 NOTE — PLAN OF CARE
DISCHARGE PLANNING - CARE MANAGEMENT     Discharge to post-acute care or home with appropriate resources Progressing        GASTROINTESTINAL - ADULT     Maintains or returns to baseline bowel function Progressing     Maintains adequate nutritional intake Progressing        METABOLIC, FLUID AND ELECTROLYTES - ADULT     Electrolytes maintained within normal limits Progressing     Fluid balance maintained Progressing     Glucose maintained within target range Progressing        Potential for Falls     Patient will remain free of falls Progressing        Prexisting or High Potential for Compromised Skin Integrity     Skin integrity is maintained or improved Progressing        SKIN/TISSUE INTEGRITY - ADULT     Skin integrity remains intact Progressing

## 2017-11-29 NOTE — PHYSICAL THERAPY NOTE
PT Progress Note (38min)  (15:00-15:38)       11/29/17 1538   Pain Assessment   Pain Assessment 0-10   Pain Score 6   Pain Type Chronic pain   Pain Location Abdomen   Pain Orientation Left   Hospital Pain Intervention(s) Repositioned   Restrictions/Precautions   Other Precautions Fall Risk;Pain  (blue)   General   Chart Reviewed Yes   Response to Previous Treatment Patient with no complaints from previous session  Family/Caregiver Present No   Cognition   Orientation Level Oriented X4   Subjective   Subjective pt agreed to PT session  "good scales said they would take me, but I need to do better with you guys first"  Bed Mobility   Supine to Sit 3  Moderate assistance   Additional items Assist x 1;HOB elevated; Increased time required;Verbal cues   Sit to Supine 3  Moderate assistance   Additional items Assist x 2;Verbal cues;LE management   Transfers   Sit to Stand (attempted; pt unable 2* pain + fear of falling)   Additional Comments pt intitiated lateral scoots at EOB min (A)x1; able to clear <25% of buttocks off surface  repositions toward HOB in supine min (A)x1 c use of bed rails  Ambulation/Elevation   Gait pattern Not appropriate   Balance   Static Sitting Fair  (tolerated sitting EOB x10min)   Dynamic Sitting Fair   Activity Tolerance   Activity Tolerance Patient limited by fatigue;Patient limited by pain   Nurse [facility] 90   Exercises   Quad Sets Supine;10 reps;AROM; Bilateral   Heelslides Supine;10 reps;AROM; Bilateral   Glute Sets Supine;10 reps;AROM; Bilateral   Hip Abduction Supine;10 reps;AAROM;AROM; Bilateral  (AAROM L LE)   Knee AROM Short Arc Quad Supine;10 reps;AROM; Bilateral   Ankle Pumps Supine;10 reps;AROM; Bilateral   Assessment   Prognosis Fair   Problem List Decreased strength;Decreased range of motion;Decreased endurance; Impaired balance;Decreased mobility;Obesity;Pain   Assessment pt agreed to PT session  less pain reported of 6/10 in abdomen  pt more cooperative   performed bed mobility mod (A)x1 c HOB elevated + use of bed rails  attempted sit>stand, however pt unable 2* pain + fear of falling  stated, "I'm going to fall on my knees"  initiated lateral scoots toward HOB min (A)x1 c ability to clear approx 25% of buttocks off surface  able to wt shift toward HOB in supine min (A)x1 c cues + use of bed rails  performed supine AROM ther ex B/L LE x10 reps c min cues for technique  AAROM required for L LE  will cont skilled PT to further maximize functional mobility + return to PLOF  pt encouraged to sit EOB for meals as tolerated  will cont to assess OOB mobility  cont to recommend STR  Barriers to Discharge Inaccessible home environment;Decreased caregiver support   Goals   Patient Goals "to get better"  STG Expiration Date 11/03/17   Short Term Goal #1 in addition to goals on 11/26: 1  perform lateral scoots c (S)   Treatment Day 2   Plan   Treatment/Interventions LE strengthening/ROM; Therapeutic exercise; Endurance training;Patient/family training;Bed mobility;Continued evaluation;Spoke to nursing   Progress Progressing toward goals   PT Frequency 5x/wk; Weekend  (1x wkd)   Recommendation   Recommendation Short-term skilled PT   PT - OK to Discharge No     Billy Crane, PT
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
## 2017-11-30 ENCOUNTER — APPOINTMENT (INPATIENT)
Dept: NON INVASIVE DIAGNOSTICS | Facility: HOSPITAL | Age: 42
DRG: 280 | End: 2017-11-30
Payer: COMMERCIAL

## 2017-11-30 LAB
ALBUMIN SERPL BCP-MCNC: 1.2 G/DL (ref 3.5–5)
ALP SERPL-CCNC: 134 U/L (ref 46–116)
ALT SERPL W P-5'-P-CCNC: 24 U/L (ref 12–78)
ANION GAP SERPL CALCULATED.3IONS-SCNC: 3 MMOL/L (ref 4–13)
AST SERPL W P-5'-P-CCNC: 90 U/L (ref 5–45)
BASOPHILS # BLD AUTO: 0.03 THOUSANDS/ΜL (ref 0–0.1)
BASOPHILS NFR BLD AUTO: 1 % (ref 0–1)
BILIRUB SERPL-MCNC: 3.22 MG/DL (ref 0.2–1)
BUN SERPL-MCNC: 2 MG/DL (ref 5–25)
CALCIUM SERPL-MCNC: 7.9 MG/DL (ref 8.3–10.1)
CHLORIDE SERPL-SCNC: 102 MMOL/L (ref 100–108)
CHOLEST SERPL-MCNC: 175 MG/DL (ref 50–200)
CO2 SERPL-SCNC: 30 MMOL/L (ref 21–32)
CREAT SERPL-MCNC: 0.83 MG/DL (ref 0.6–1.3)
CREAT UR-MCNC: <13 MG/DL
EOSINOPHIL # BLD AUTO: 0.21 THOUSAND/ΜL (ref 0–0.61)
EOSINOPHIL NFR BLD AUTO: 3 % (ref 0–6)
ERYTHROCYTE [DISTWIDTH] IN BLOOD BY AUTOMATED COUNT: 19.8 % (ref 11.6–15.1)
GFR SERPL CREATININE-BSD FRML MDRD: 87 ML/MIN/1.73SQ M
GLUCOSE SERPL-MCNC: 87 MG/DL (ref 65–140)
HBV CORE AB SER QL: NORMAL
HBV CORE IGM SER QL: NORMAL
HBV SURFACE AG SER QL: NORMAL
HCT VFR BLD AUTO: 26.2 % (ref 34.8–46.1)
HCV AB SER QL: NORMAL
HDLC SERPL-MCNC: 13 MG/DL (ref 40–60)
HGB BLD-MCNC: 8.6 G/DL (ref 11.5–15.4)
INR PPP: 1.62 (ref 0.86–1.16)
LDLC SERPL CALC-MCNC: 129 MG/DL (ref 0–100)
LYMPHOCYTES # BLD AUTO: 2.39 THOUSANDS/ΜL (ref 0.6–4.47)
LYMPHOCYTES NFR BLD AUTO: 38 % (ref 14–44)
MCH RBC QN AUTO: 36.6 PG (ref 26.8–34.3)
MCHC RBC AUTO-ENTMCNC: 32.8 G/DL (ref 31.4–37.4)
MCV RBC AUTO: 112 FL (ref 82–98)
MONOCYTES # BLD AUTO: 0.99 THOUSAND/ΜL (ref 0.17–1.22)
MONOCYTES NFR BLD AUTO: 16 % (ref 4–12)
NEUTROPHILS # BLD AUTO: 2.75 THOUSANDS/ΜL (ref 1.85–7.62)
NEUTS SEG NFR BLD AUTO: 42 % (ref 43–75)
NRBC BLD AUTO-RTO: 0 /100 WBCS
PLATELET # BLD AUTO: 177 THOUSANDS/UL (ref 149–390)
PMV BLD AUTO: 9.3 FL (ref 8.9–12.7)
POTASSIUM SERPL-SCNC: 3.6 MMOL/L (ref 3.5–5.3)
PROT SERPL-MCNC: 6.5 G/DL (ref 6.4–8.2)
PROT UR-MCNC: <6 MG/DL
PROTHROMBIN TIME: 19.4 SECONDS (ref 12.1–14.4)
RBC # BLD AUTO: 2.35 MILLION/UL (ref 3.81–5.12)
SODIUM SERPL-SCNC: 135 MMOL/L (ref 136–145)
TRIGL SERPL-MCNC: 167 MG/DL
WBC # BLD AUTO: 6.37 THOUSAND/UL (ref 4.31–10.16)

## 2017-11-30 PROCEDURE — 94760 N-INVAS EAR/PLS OXIMETRY 1: CPT

## 2017-11-30 PROCEDURE — 85610 PROTHROMBIN TIME: CPT | Performed by: STUDENT IN AN ORGANIZED HEALTH CARE EDUCATION/TRAINING PROGRAM

## 2017-11-30 PROCEDURE — 86803 HEPATITIS C AB TEST: CPT | Performed by: PHYSICIAN ASSISTANT

## 2017-11-30 PROCEDURE — 80053 COMPREHEN METABOLIC PANEL: CPT | Performed by: STUDENT IN AN ORGANIZED HEALTH CARE EDUCATION/TRAINING PROGRAM

## 2017-11-30 PROCEDURE — 80061 LIPID PANEL: CPT | Performed by: FAMILY MEDICINE

## 2017-11-30 PROCEDURE — 86704 HEP B CORE ANTIBODY TOTAL: CPT | Performed by: PHYSICIAN ASSISTANT

## 2017-11-30 PROCEDURE — 87340 HEPATITIS B SURFACE AG IA: CPT | Performed by: PHYSICIAN ASSISTANT

## 2017-11-30 PROCEDURE — 94640 AIRWAY INHALATION TREATMENT: CPT

## 2017-11-30 PROCEDURE — 85025 COMPLETE CBC W/AUTO DIFF WBC: CPT | Performed by: FAMILY MEDICINE

## 2017-11-30 PROCEDURE — 86705 HEP B CORE ANTIBODY IGM: CPT | Performed by: PHYSICIAN ASSISTANT

## 2017-11-30 PROCEDURE — 93971 EXTREMITY STUDY: CPT

## 2017-11-30 RX ORDER — METOLAZONE 5 MG/1
5 TABLET ORAL 2 TIMES WEEKLY
Status: DISCONTINUED | OUTPATIENT
Start: 2017-12-04 | End: 2017-12-14 | Stop reason: HOSPADM

## 2017-11-30 RX ORDER — DICYCLOMINE HYDROCHLORIDE 10 MG/1
10 CAPSULE ORAL
Status: DISCONTINUED | OUTPATIENT
Start: 2017-11-30 | End: 2017-12-11

## 2017-11-30 RX ORDER — ACETAMINOPHEN 325 MG/1
650 TABLET ORAL EVERY 6 HOURS PRN
Status: DISCONTINUED | OUTPATIENT
Start: 2017-11-30 | End: 2017-12-04

## 2017-11-30 RX ORDER — FUROSEMIDE 10 MG/ML
80 INJECTION INTRAMUSCULAR; INTRAVENOUS
Status: DISCONTINUED | OUTPATIENT
Start: 2017-11-30 | End: 2017-12-01

## 2017-11-30 RX ADMIN — LACTULOSE 20 G: 20 SOLUTION ORAL at 17:51

## 2017-11-30 RX ADMIN — DICYCLOMINE HYDROCHLORIDE 10 MG: 10 CAPSULE ORAL at 16:12

## 2017-11-30 RX ADMIN — NYSTATIN: 100000 POWDER TOPICAL at 08:21

## 2017-11-30 RX ADMIN — ONDANSETRON 4 MG: 2 INJECTION INTRAMUSCULAR; INTRAVENOUS at 10:21

## 2017-11-30 RX ADMIN — ENOXAPARIN SODIUM 40 MG: 40 INJECTION SUBCUTANEOUS at 08:19

## 2017-11-30 RX ADMIN — DICYCLOMINE HYDROCHLORIDE 10 MG: 10 CAPSULE ORAL at 11:22

## 2017-11-30 RX ADMIN — ISODIUM CHLORIDE 3 ML: 0.03 SOLUTION RESPIRATORY (INHALATION) at 18:44

## 2017-11-30 RX ADMIN — LEVALBUTEROL 1.25 MG: 1.25 SOLUTION, CONCENTRATE RESPIRATORY (INHALATION) at 18:44

## 2017-11-30 RX ADMIN — POTASSIUM CHLORIDE 40 MEQ: 1500 TABLET, EXTENDED RELEASE ORAL at 17:49

## 2017-11-30 RX ADMIN — LACTULOSE 20 G: 20 SOLUTION ORAL at 08:19

## 2017-11-30 RX ADMIN — POTASSIUM CHLORIDE 40 MEQ: 1500 TABLET, EXTENDED RELEASE ORAL at 21:49

## 2017-11-30 RX ADMIN — GABAPENTIN 300 MG: 300 CAPSULE ORAL at 16:12

## 2017-11-30 RX ADMIN — NYSTATIN: 100000 POWDER TOPICAL at 21:49

## 2017-11-30 RX ADMIN — GABAPENTIN 300 MG: 300 CAPSULE ORAL at 21:50

## 2017-11-30 RX ADMIN — POTASSIUM CHLORIDE 40 MEQ: 1500 TABLET, EXTENDED RELEASE ORAL at 12:33

## 2017-11-30 RX ADMIN — GABAPENTIN 300 MG: 300 CAPSULE ORAL at 08:20

## 2017-11-30 RX ADMIN — PANTOPRAZOLE SODIUM 40 MG: 40 TABLET, DELAYED RELEASE ORAL at 06:07

## 2017-11-30 RX ADMIN — NYSTATIN: 100000 POWDER TOPICAL at 12:33

## 2017-11-30 RX ADMIN — QUETIAPINE FUMARATE 50 MG: 25 TABLET ORAL at 21:50

## 2017-11-30 RX ADMIN — NYSTATIN: 100000 POWDER TOPICAL at 17:49

## 2017-11-30 RX ADMIN — DICYCLOMINE HYDROCHLORIDE 10 MG: 10 CAPSULE ORAL at 21:50

## 2017-11-30 RX ADMIN — POTASSIUM CHLORIDE 40 MEQ: 1500 TABLET, EXTENDED RELEASE ORAL at 08:19

## 2017-11-30 NOTE — PROGRESS NOTES
[facility] Internal [facility] Progress Note  Patient: Anali Keller 36 y o  female   MRN: [id number]  PCP: Marcy Wright MD  Unit/Bed#: [facility] 87 226-01 Encounter: [id number]  Date Of Visit: 17    Assessment:    Principal Problem:    Anasarca  Active Problems:    Ambulatory dysfunction    Recurrent falls    Chronic pain syndrome    Morbid obesity due to excess calories (HCC)    Peripheral edema    Hypokalemia    Chest pain    Other bipolar disorder ([facility] )    Noncompliance with medication regimen    Alcoholic liver disease ([facility] )      Plan:    · Pt seen by Cardiology today: reduced furosemide to 80 mg IV bid, decreased metolazone to 2 times weekly, c/w potassium and spironolactone  · Added Bentyl for GI pain 4 times daily  · Behavioral Health started Seroquel 50 mg po q HS for mood and depression  · Plan for patient to be discharged to rehab  · Venous duplex results pending      VTE Pharmacologic Prophylaxis:   Pharmacologic: Enoxaparin (Lovenox)  Mechanical VTE Prophylaxis in Place: Yes    Patient Centered Rounds: I have performed bedside rounds with nursing staff today  Education and Discussions with Family / Patient: Patient    Time Spent for Care: 15 minutes  More than 50% of total time spent on counseling and coordination of care as described above  Current Length of Stay: 5 day(s)    Current Patient Status: Inpatient     Discharge Plan / Estimated Discharge Date: TBD    Code Status: Level 1 - Full Code      Subjective:   Pt seen at bedside  She is in no acute distress  She denies NVFC or SOB today  She still complains of abdominal pain  Objective:     Vitals:   Temp (24hrs), Av 8 °F (36 6 °C), Min:97 2 °F (36 2 °C), Max:98 4 °F (36 9 °C)    HR:  [] 86  Resp:  [18-20] 18  BP: ()/(51-64) 95/51  SpO2:  [94 %-97 %] 97 %  Body mass index is 54 19 kg/m²  Input and Output Summary (last 24 hours):        Intake/Output Summary (Last 24 hours) at 17 1105  Last data filed at 11/30/17 1042   Gross per 24 hour   Intake                0 ml   Output             1700 ml   Net            -1700 ml       Physical Exam:     Physical Exam    General: well-developed, obese, non-tachypnic  Head:normocephalic, atraumatic  Neck: supple, no lympadenopathy  Heart: regular rate and rhythm  Lungs: clear to auscultation b/l  Abdomen: no tenderness to palpation  Skin: warm and dry    Additional Data:     Labs:      Results from last 7 days  Lab Units 11/30/17  0615   WBC Thousand/uL 6 37   HEMOGLOBIN g/dL 8 6*   HEMATOCRIT % 26 2*   PLATELETS Thousands/uL 177   NEUTROS PCT % 42*   LYMPHS PCT % 38   MONOS PCT % 16*   EOS PCT % 3       Results from last 7 days  Lab Units 11/30/17  0615   SODIUM mmol/L 135*   POTASSIUM mmol/L 3 6   CHLORIDE mmol/L 102   CO2 mmol/L 30   BUN mg/dL 2*   CREATININE mg/dL 0 83   CALCIUM mg/dL 7 9*   TOTAL PROTEIN g/dL 6 5   BILIRUBIN TOTAL mg/dL 3 22*   ALK PHOS U/L 134*   ALT U/L 24   AST U/L 90*   GLUCOSE RANDOM mg/dL 87       Results from last 7 days  Lab Units 11/30/17  0615   INR  1 62*       * I Have Reviewed All Lab Data Listed Above  * Additional Pertinent Lab Tests Reviewed: All Labs Within Last 24 Hours Reviewed        Recent Cultures (last 7 days):       Results from last 7 days  Lab Units 11/27/17  0246   C DIFF TOXIN B  NEGATIVE for C difficle toxin by PCR  Last 24 Hours Medication List:     dicyclomine 10 mg Oral 4x Daily (AC & HS)   enoxaparin 40 mg Subcutaneous Daily   furosemide 80 mg Intravenous BID (diuretic)   gabapentin 300 mg Oral TID   lactulose 20 g Oral BID   [START ON 12/4/2017] metolazone 5 mg Oral Once per day on Mon Thu   nystatin  Topical 4x Daily   pantoprazole 40 mg Oral Early Morning   potassium chloride 40 mEq Oral 4x Daily   QUEtiapine 50 mg Oral HS   spironolactone 50 mg Oral BID        Today, Patient Was Seen By: Hi Castro DPM    ** Please Note: This note has been constructed using a voice recognition system   **

## 2017-11-30 NOTE — PROGRESS NOTES
Progress notes - Behavioral Health       Assessment/Plan  Principal Problem:  F41 1 Generalized Anxiety Disorder  F10 20 Alcohol Use DO NOS    PLAN:   1) Start Seroquel 50 mg Po Q HS for mood and depression  2) Communicated with RN  INTERVAL HISTORY:   Patient said that's he is a lt better today but still not sleeping and has anxiety and depression  She wants to be on Seroquel  She said that case mami has told her that she will be going to [facility] and she is [id number] with that  She denied any suicidal or homicidal ideas  No hallucinations or delusians  Scheduled Meds:    enoxaparin 40 mg Subcutaneous Daily   furosemide 100 mg Intravenous BID (diuretic)   gabapentin 300 mg Oral TID   lactulose 20 g Oral BID   metolazone 5 mg Oral Daily   nystatin  Topical 4x Daily   pantoprazole 40 mg Oral Early Morning   potassium chloride 40 mEq Oral 4x Daily   QUEtiapine 50 mg Oral HS   spironolactone 50 mg Oral BID     Continuous Infusions:   PRN Meds:   acetaminophen    aluminum-magnesium hydroxide-simethicone    levalbuterol    ondansetron    sodium chloride    Vitals:    11/29/17 1814   BP:    Pulse: 88   Resp: 18   Temp:    SpO2: 95%         Mental Status Evaluation:  Appearance:  Older then her age, in distress   Behavior:   behavior was cooperative    Speech:   speech is normal goal directed    Mood:  Depressed   Affect Improved  Language: naming objects and Goal directed  Thought Process:   logical and linear    Thought Content:  Normal   Perceptual Disturbances:  denying any auditory and visual hallucinations      Risk Potential: None   Sensorium:  person, place, time/date, situation, day of week, month of year and year   Cognition:  grossly intact   Consciousness:   alert, awake, and oriented ×3    Attention: Poor   Intellect: Normal   Fund of Knowledge: awareness of current events: normal    Insight:  Fair   Judgment: Fair   Muscle Strength and Tone: Normal    Gait/Station:  gait was not assessed Motor Activity: no abnormal movements     Lab Results: I have personally reviewed pertinent lab results  NOTE: Total of 15 mints were spent    Yoav Triana MD

## 2017-11-30 NOTE — PROGRESS NOTES
Progress Note - Tacos Escalante 36 y o  female MRN: [id number]    Unit/Bed#: [place] [address] -01 Encounter: [id number]  Subjective:   Still short of breath  Has chest pressure  Still with edema  No lightheadedness or palpitations  Objective:   Vitals: Blood pressure 95/51, pulse 86, temperature (!) 97 2 °F (36 2 °C), temperature source Tympanic, resp  rate 18, weight (!) 171 kg (377 lb 10 4 oz), SpO2 97 %  ,Body mass index is 54 19 kg/m²  CBC with diff:   Results from last 7 days  Lab Units 11/30/17  0615   WBC Thousand/uL 6 37   RBC Million/uL 2 35*   HEMOGLOBIN g/dL 8 6*   HEMATOCRIT % 26 2*   MCV fL 112*   MCH pg 36 6*   MCHC g/dL 32 8   RDW % 19 8*   MPV fL 9 3   PLATELETS Thousands/uL 177     CMP:   Results from last 7 days  Lab Units 11/30/17  0615   SODIUM mmol/L 135*   POTASSIUM mmol/L 3 6   CHLORIDE mmol/L 102   CO2 mmol/L 30   ANION GAP mmol/L 3*   BUN mg/dL 2*   CREATININE mg/dL 0 83   GLUCOSE RANDOM mg/dL 87   CALCIUM mg/dL 7 9*   AST U/L 90*   ALT U/L 24   ALK PHOS U/L 134*   TOTAL PROTEIN g/dL 6 5   ALBUMIN g/dL 1 2*   BILIRUBIN TOTAL mg/dL 3 22*   EGFR ml/min/1 73sq m 87     Coags:   Results from last 7 days  Lab Units 11/30/17  0615   INR  1 62*     Lipid Profile:   Results from last 7 days  Lab Units 11/30/17  0615   HDL mg/dL 13*   CHOLESTEROL mg/dL 175   LDL CALC mg/dL 129*   TRIGLYCERIDES mg/dL 167*           Physical exam:  Lungs-decreased breath sounds without wheezing rhonchi or rales  Heart-regular without murmur rub or gallop  Abdomen-obese without mass tenderness organomegaly  Extremities -2 to 3+ lower extremity edema which is not really pitting  Could not palpate distal pulses    Assessment:  1  Apparent alcoholic liver disease with fluid overload secondary to this  In other words noncardiogenic congestive heart failure  2  Morbid obesity status post bariatric surgery  3  Severe hypoalbuminemia  4  Bipolar disorder  5  Fluid overload   6    Cirrhosis apparently due to alcohol abuse      Plan:   I am not sure how much patient is benefitting from diuretic therapy  Note proBNP assay was not all that high on admission indicating not much in the way of left heart failure  Will reduce furosemide to 80 mg IV b i d  Decrease metolazone to twice weekly  Continue same potassium supplementation  Continue spironolactone 50 mg b i d    Prognosis poor    Suman Pedraza MD

## 2017-11-30 NOTE — SOCIAL WORK
[facility] notified patient is medically stable  Patient being evaluated for [facility]

## 2017-12-01 ENCOUNTER — APPOINTMENT (INPATIENT)
Dept: CT IMAGING | Facility: HOSPITAL | Age: 42
DRG: 280 | End: 2017-12-01
Payer: COMMERCIAL

## 2017-12-01 PROBLEM — R10.11 RUQ PAIN: Status: ACTIVE | Noted: 2017-12-01

## 2017-12-01 LAB — LIPASE SERPL-CCNC: 146 U/L (ref 73–393)

## 2017-12-01 PROCEDURE — 97535 SELF CARE MNGMENT TRAINING: CPT

## 2017-12-01 PROCEDURE — 83690 ASSAY OF LIPASE: CPT | Performed by: FAMILY MEDICINE

## 2017-12-01 PROCEDURE — 97530 THERAPEUTIC ACTIVITIES: CPT

## 2017-12-01 PROCEDURE — 71260 CT THORAX DX C+: CPT

## 2017-12-01 PROCEDURE — 74177 CT ABD & PELVIS W/CONTRAST: CPT

## 2017-12-01 RX ORDER — LIDOCAINE 50 MG/G
1 PATCH TOPICAL DAILY
Status: DISCONTINUED | OUTPATIENT
Start: 2017-12-01 | End: 2017-12-14 | Stop reason: HOSPADM

## 2017-12-01 RX ORDER — FUROSEMIDE 40 MG/1
80 TABLET ORAL
Status: DISCONTINUED | OUTPATIENT
Start: 2017-12-01 | End: 2017-12-03

## 2017-12-01 RX ADMIN — ONDANSETRON 4 MG: 2 INJECTION INTRAMUSCULAR; INTRAVENOUS at 08:18

## 2017-12-01 RX ADMIN — LIDOCAINE 1 PATCH: 50 PATCH CUTANEOUS at 20:43

## 2017-12-01 RX ADMIN — GABAPENTIN 300 MG: 300 CAPSULE ORAL at 20:44

## 2017-12-01 RX ADMIN — ONDANSETRON 4 MG: 2 INJECTION INTRAMUSCULAR; INTRAVENOUS at 21:41

## 2017-12-01 RX ADMIN — NYSTATIN: 100000 POWDER TOPICAL at 11:07

## 2017-12-01 RX ADMIN — DICYCLOMINE HYDROCHLORIDE 10 MG: 10 CAPSULE ORAL at 17:55

## 2017-12-01 RX ADMIN — DICYCLOMINE HYDROCHLORIDE 10 MG: 10 CAPSULE ORAL at 21:35

## 2017-12-01 RX ADMIN — GABAPENTIN 300 MG: 300 CAPSULE ORAL at 17:55

## 2017-12-01 RX ADMIN — QUETIAPINE FUMARATE 50 MG: 25 TABLET ORAL at 21:35

## 2017-12-01 RX ADMIN — POTASSIUM CHLORIDE 40 MEQ: 1500 TABLET, EXTENDED RELEASE ORAL at 21:35

## 2017-12-01 RX ADMIN — NYSTATIN 1 APPLICATION: 100000 POWDER TOPICAL at 21:35

## 2017-12-01 RX ADMIN — POTASSIUM CHLORIDE 40 MEQ: 1500 TABLET, EXTENDED RELEASE ORAL at 11:06

## 2017-12-01 RX ADMIN — LACTULOSE 20 G: 20 SOLUTION ORAL at 17:55

## 2017-12-01 RX ADMIN — SPIRONOLACTONE 50 MG: 50 TABLET ORAL at 17:55

## 2017-12-01 RX ADMIN — PANTOPRAZOLE SODIUM 40 MG: 40 TABLET, DELAYED RELEASE ORAL at 06:05

## 2017-12-01 RX ADMIN — LACTULOSE 20 G: 20 SOLUTION ORAL at 08:18

## 2017-12-01 RX ADMIN — IOHEXOL 100 ML: 350 INJECTION, SOLUTION INTRAVENOUS at 17:24

## 2017-12-01 RX ADMIN — GABAPENTIN 300 MG: 300 CAPSULE ORAL at 08:18

## 2017-12-01 RX ADMIN — NYSTATIN: 100000 POWDER TOPICAL at 08:28

## 2017-12-01 RX ADMIN — NYSTATIN: 100000 POWDER TOPICAL at 17:55

## 2017-12-01 RX ADMIN — IOHEXOL 50 ML: 240 INJECTION, SOLUTION INTRATHECAL; INTRAVASCULAR; INTRAVENOUS; ORAL at 17:24

## 2017-12-01 RX ADMIN — POTASSIUM CHLORIDE 40 MEQ: 1500 TABLET, EXTENDED RELEASE ORAL at 17:55

## 2017-12-01 RX ADMIN — POTASSIUM CHLORIDE 40 MEQ: 1500 TABLET, EXTENDED RELEASE ORAL at 08:18

## 2017-12-01 RX ADMIN — FUROSEMIDE 80 MG: 10 INJECTION, SOLUTION INTRAMUSCULAR; INTRAVENOUS at 17:55

## 2017-12-01 RX ADMIN — ENOXAPARIN SODIUM 40 MG: 40 INJECTION SUBCUTANEOUS at 08:18

## 2017-12-01 RX ADMIN — DICYCLOMINE HYDROCHLORIDE 10 MG: 10 CAPSULE ORAL at 11:07

## 2017-12-01 RX ADMIN — DICYCLOMINE HYDROCHLORIDE 10 MG: 10 CAPSULE ORAL at 06:05

## 2017-12-01 NOTE — PLAN OF CARE
Problem: OCCUPATIONAL THERAPY ADULT  Goal: Performs self-care activities at highest level of function for planned discharge setting  See evaluation for individualized goals  Treatment Interventions: ADL retraining, Functional transfer training, UE strengthening/ROM, Endurance training, Patient/family training, Compensatory technique education, Energy conservation, Activityengagement, Equipment evaluation/education          See flowsheet documentation for full assessment, interventions and recommendations  Outcome: Progressing  Limitation: Decreased ADL status, Decreased UE strength, Decreased cognition, Decreased Safe judgement during ADL, Decreased endurance, Decreased self-care trans, Decreased high-level ADLs  Prognosis: Fair  Assessment: Pt was seen for skilled OT with focus on completion of BUE theraband exercises, BUE ROM exercises, bed mobility and review of current plan of care  Pt with c/o of pain and fatigue this tx sessions stating, "They don't want me OOB because I may fall "  Educated Pt on the importance of functional mobility to gain strength  Pt able to complete BUE ROM exercises/theraband exercises BID with cues for safety and appropriate techs  See above levels of A required for all functional tasks  Pt may benefit from further rehab with focus on achieving optimal performance levels with all functional tasks     Recommendation: Geriatric Consult  OT Discharge Recommendation: Short Term Rehab         Comments: Tayler Hussein, [address]

## 2017-12-01 NOTE — SOCIAL WORK
[facility] unable to accept pt  CM sent info to [address] and [place]  Pt states she does not want to leave [facility] area  Left VM for Wendy Hein from the Office of [address] [place] [address] to determine if pt needs to be optioned again  Pt was optioned in September but did not admit to SNF at that time  There have been no changes in the PASRR  CM following

## 2017-12-01 NOTE — OCCUPATIONAL THERAPY NOTE
Occupational Therapy Treatment Note:         12/01/17 1545   Restrictions/Precautions   Other Precautions Fall Risk;Pain;Cognitive; Chair Alarm; Bed Alarm  (blue)   Pain Assessment   Pain Assessment 0-10   Pain Score 6   Pain Type Chronic pain   Pain Location Leg   Pain Orientation Left   ADL   Where Assessed Supine, bed   Grooming Assistance 4  Minimal Assistance   Grooming Deficit Setup;Verbal cueing;Supervision/safety; Increased time to complete;Wash/dry hands; Wash/dry face;Brushing hair   Grooming Comments Limited functional reach with activity  LB Dressing Assistance 2  Maximal Assistance   LB Dressing Deficit Setup;Don/doff R sock; Don/doff L sock   LB Dressing Comments Limited functional reach noted  Toileting Comments Pt currently using bed pan with occassional incontinence noted  Functional Standing Tolerance   Time unable to assess  Bed Mobility   Rolling R 2  Maximal assistance   Additional items Assist x 2   Rolling L 2  Maximal assistance   Additional items Assist x 2   Transfers   Additional Comments unable to assess at this time  Cognition   Overall Cognitive Status Impaired   Arousal/Participation Responsive   Attention Attends with cues to redirect   Orientation Level Oriented to person;Oriented to place   Memory Decreased short term memory;Decreased recall of precautions;Decreased recall of recent events   Following Commands Follows multistep commands with increased time or repetition   Comments Pt with limited carry over of newly learned information  Additional Activities   Additional Activities Other (Comment)  (reviewed current plan of care  )   Additional Activities Comments Pt with limited motivation due to noted pain and fatigue levels  Activity Tolerance   Activity Tolerance Patient limited by fatigue;Patient limited by pain   Medical Staff Made Aware Reported all findings to nursing staff      Assessment   Assessment Pt was seen for skilled OT with focus on completion of BUE theraband exercises, BUE ROM exercises, bed mobility and review of current plan of care  Pt with c/o of pain and fatigue this tx sessions stating, "They don't want me OOB because I may fall "  Educated Pt on the importance of functional mobility to gain strength  Pt able to complete BUE ROM exercises/theraband exercises BID with cues for safety and appropriate techs  See above levels of A required for all functional tasks  Pt may benefit from further rehab with focus on achieving optimal performance levels with all functional tasks  Plan   Treatment Interventions ADL retraining;UE strengthening/ROM; Functional transfer training; Endurance training;Cognitive reorientation   Goal Expiration Date 12/07/17   Treatment Day 1   OT Frequency 3-5x/wk   Recommendation   OT Discharge Recommendation Short Term Rehab   Anders Waters

## 2017-12-01 NOTE — PROGRESS NOTES
Progress Note - Cardiology   Tacos Escalante 36 y o  female MRN: [id number]  Unit/Bed#: [facility] 2 -01 Encounter: [id number]      Assessment:     1  Anasarca secondary to liver disease  2  Alcoholic liver disease with very significant hypoalbuminemia  3  Hypertension  4  Morbid obesity  5  Anemia that is macrocytic    Discussion/Recommendations: On diuretics  Would suggest continuing to monitor BUN and creatinine as well as bicarbonate  Will sign off please call with questions  Subjective:     Complains of pain in abdomen  Wants to go home      Physical Exam:  GEN:  NAD  HEENT:  MMM, NCAT, pink conjunctiva, EOMI, nonicteric sclera  CV:  NO JVD/HJR, RR, NO M/R/G, +S1/S2, NO PARASTERNAL HEAVE/THRILL, 2+ LE EDEMA IN LEGS BILATERALLY TO THE THIGHS, NO HEPATIC SYSTOLIC PULSATION, WARM EXTREMITIES  RESP:  CTAB/L  ABD:  SOFT, NT, NO GROSS ORGANOMEGALY ABDOMINAL  WALL EDEMA        Vitals:   /50   Pulse 92   Temp 98 6 °F (37 °C) (Tympanic)   Resp 16   Wt (!) 171 kg (377 lb 10 4 oz)   SpO2 94%   BMI 54 19 kg/m²   Vitals:    11/25/17 1934   Weight: (!) 171 kg (377 lb 10 4 oz)       Intake/Output Summary (Last 24 hours) at 12/01/17 1017  Last data filed at 12/01/17 2536   Gross per 24 hour   Intake              360 ml   Output              899 ml   Net             -539 ml         Lab Results:    Results from last 7 days  Lab Units 11/30/17  0615   WBC Thousand/uL 6 37   HEMOGLOBIN g/dL 8 6*   HEMATOCRIT % 26 2*   PLATELETS Thousands/uL 177       Results from last 7 days  Lab Units 11/30/17  0615   SODIUM mmol/L 135*   POTASSIUM mmol/L 3 6   CHLORIDE mmol/L 102   CO2 mmol/L 30   BUN mg/dL 2*   CREATININE mg/dL 0 83   CALCIUM mg/dL 7 9*   TOTAL PROTEIN g/dL 6 5   BILIRUBIN TOTAL mg/dL 3 22*   ALK PHOS U/L 134*   ALT U/L 24   AST U/L 90*   GLUCOSE RANDOM mg/dL 87       Results from last 7 days  Lab Units 11/30/17  0615   SODIUM mmol/L 135*   POTASSIUM mmol/L 3 6   CHLORIDE mmol/L 102   CO2 mmol/L 30   BUN mg/dL 2*   CREATININE mg/dL 0 83   GLUCOSE RANDOM mg/dL 87   CALCIUM mg/dL 7 9*       Results from last 7 days  Lab Units 11/30/17  0615   CHOLESTEROL mg/dL 175         Medications:    Current Facility-Administered Medications:     acetaminophen (TYLENOL) tablet 650 mg, 650 mg, Oral, Q6H PRN, Carlos Weiner MD    aluminum-magnesium hydroxide-simethicone (MYLANTA) 200-200-20 mg/5 mL oral suspension 30 mL, 30 mL, Oral, Q6H PRN, Judith Felix PA-C, 30 mL at 11/28/17 1322    dicyclomine (BENTYL) capsule 10 mg, 10 mg, Oral, 4x Daily (AC & HS), Carlos Weiner MD, 10 mg at 12/01/17 0605    enoxaparin (LOVENOX) subcutaneous injection 40 mg, 40 mg, Subcutaneous, Daily, Judith Felix PA-C, 40 mg at 12/01/17 0818    furosemide (LASIX) injection 80 mg, 80 mg, Intravenous, BID (diuretic), Karey Zheng MD    gabapentin (NEURONTIN) capsule 300 mg, 300 mg, Oral, TID, Judith Felix PA-C, 300 mg at 12/01/17 0818    lactulose 20 g/30 mL oral solution 20 g, 20 g, Oral, BID, Marshal Rutledge DO, 20 g at 12/01/17 0818    levalbuterol (XOPENEX) inhalation solution 1 25 mg, 1 25 mg, Nebulization, Q6H PRN, Sonya Carlos PA-C, 1 25 mg at 11/30/17 1844    [START ON 12/4/2017] metolazone (ZAROXOLYN) tablet 5 mg, 5 mg, Oral, Once per day on Mon Thu, Karey Zheng MD    nystatin (MYCOSTATIN) powder, , Topical, 4x Daily, Judith Felix PA-C    ondansetron [facility]) injection 4 mg, 4 mg, Intravenous, Q6H PRN, Judith Felix PA-C, 4 mg at 12/01/17 0818    pantoprazole (PROTONIX) EC tablet 40 mg, 40 mg, Oral, Early Morning, Judith Felix PA-C, 40 mg at 12/01/17 0605    potassium chloride (K-DUR,KLOR-CON) CR tablet 40 mEq, 40 mEq, Oral, 4x Daily, Carly Evans DO, 40 mEq at 12/01/17 0818    QUEtiapine (SEROquel) tablet 50 mg, 50 mg, Oral, HS, Kari Elizondo MD, 50 mg at 11/30/17 2150    sodium chloride 0 9 % inhalation solution 3 mL, 3 mL, Nebulization, Q6H PRN, Carlos Weiner MD, 3 mL at 11/30/17 1844    spironolactone (ALDACTONE) tablet 50 mg, 50 mg, Oral, BID, Sonia Diaz PA-C, 50 mg at 11/29/17 6633    Portions of the record may have been created with voice recognition software  Occasional wrong word or "sound a like" substitutions may have occurred due to the inherent limitations of voice recognition software  Read the chart carefully and recognize, using context, where substitutions have occurred  Statement Selected

## 2017-12-02 PROCEDURE — 94640 AIRWAY INHALATION TREATMENT: CPT

## 2017-12-02 PROCEDURE — 94760 N-INVAS EAR/PLS OXIMETRY 1: CPT

## 2017-12-02 RX ORDER — LACTULOSE 20 G/30ML
20 SOLUTION ORAL DAILY
Status: DISCONTINUED | OUTPATIENT
Start: 2017-12-03 | End: 2017-12-14 | Stop reason: HOSPADM

## 2017-12-02 RX ORDER — AMITRIPTYLINE HYDROCHLORIDE 25 MG/1
25 TABLET, FILM COATED ORAL
Status: DISCONTINUED | OUTPATIENT
Start: 2017-12-02 | End: 2017-12-11

## 2017-12-02 RX ADMIN — SPIRONOLACTONE 50 MG: 50 TABLET ORAL at 17:27

## 2017-12-02 RX ADMIN — AMITRIPTYLINE HYDROCHLORIDE 25 MG: 25 TABLET, FILM COATED ORAL at 21:44

## 2017-12-02 RX ADMIN — FUROSEMIDE 80 MG: 40 TABLET ORAL at 15:58

## 2017-12-02 RX ADMIN — NYSTATIN: 100000 POWDER TOPICAL at 17:28

## 2017-12-02 RX ADMIN — DICYCLOMINE HYDROCHLORIDE 10 MG: 10 CAPSULE ORAL at 11:46

## 2017-12-02 RX ADMIN — PANTOPRAZOLE SODIUM 40 MG: 40 TABLET, DELAYED RELEASE ORAL at 06:37

## 2017-12-02 RX ADMIN — LACTULOSE 20 G: 20 SOLUTION ORAL at 08:24

## 2017-12-02 RX ADMIN — ISODIUM CHLORIDE 3 ML: 0.03 SOLUTION RESPIRATORY (INHALATION) at 19:45

## 2017-12-02 RX ADMIN — DICYCLOMINE HYDROCHLORIDE 10 MG: 10 CAPSULE ORAL at 15:58

## 2017-12-02 RX ADMIN — NYSTATIN: 100000 POWDER TOPICAL at 11:49

## 2017-12-02 RX ADMIN — POTASSIUM CHLORIDE 40 MEQ: 1500 TABLET, EXTENDED RELEASE ORAL at 17:27

## 2017-12-02 RX ADMIN — POTASSIUM CHLORIDE 40 MEQ: 1500 TABLET, EXTENDED RELEASE ORAL at 08:25

## 2017-12-02 RX ADMIN — LIDOCAINE 1 PATCH: 50 PATCH CUTANEOUS at 21:44

## 2017-12-02 RX ADMIN — DICYCLOMINE HYDROCHLORIDE 10 MG: 10 CAPSULE ORAL at 21:44

## 2017-12-02 RX ADMIN — GABAPENTIN 300 MG: 300 CAPSULE ORAL at 21:44

## 2017-12-02 RX ADMIN — QUETIAPINE FUMARATE 50 MG: 25 TABLET ORAL at 21:44

## 2017-12-02 RX ADMIN — NYSTATIN: 100000 POWDER TOPICAL at 08:26

## 2017-12-02 RX ADMIN — LEVALBUTEROL 1.25 MG: 1.25 SOLUTION, CONCENTRATE RESPIRATORY (INHALATION) at 19:45

## 2017-12-02 RX ADMIN — ONDANSETRON 4 MG: 2 INJECTION INTRAMUSCULAR; INTRAVENOUS at 11:47

## 2017-12-02 RX ADMIN — NYSTATIN: 100000 POWDER TOPICAL at 21:45

## 2017-12-02 RX ADMIN — GABAPENTIN 300 MG: 300 CAPSULE ORAL at 15:58

## 2017-12-02 RX ADMIN — DICYCLOMINE HYDROCHLORIDE 10 MG: 10 CAPSULE ORAL at 06:37

## 2017-12-02 RX ADMIN — ENOXAPARIN SODIUM 40 MG: 40 INJECTION SUBCUTANEOUS at 08:24

## 2017-12-02 RX ADMIN — POTASSIUM CHLORIDE 40 MEQ: 1500 TABLET, EXTENDED RELEASE ORAL at 11:47

## 2017-12-02 RX ADMIN — GABAPENTIN 300 MG: 300 CAPSULE ORAL at 08:24

## 2017-12-02 NOTE — ASSESSMENT & PLAN NOTE
· Secondary to ambulatory dysfunction  · Patient will need bedside commode and wheelchair at home per family discussion

## 2017-12-02 NOTE — ASSESSMENT & PLAN NOTE
· CT chest, abd and pelvis with contrast was obtained and revealed improved findings from prior  · Pain is likely multifactorial with obesity, hepatomegaly, ambulatory dysfunction  · Added Lidocaine patch, aqua K pad and amitriptyline (takes weeks for the latter to be effective), continue gabapentin and tylenol PRN

## 2017-12-02 NOTE — ASSESSMENT & PLAN NOTE
· CT chest, abd and pelvis with contrast was obtained and revealed improved findings from prior  · Pain is likely multifactorial with obesity, hepatomegaly, ambulatory dysfunction  · Will add Lidocaine patch, continue gabapentin and tylenol PRN

## 2017-12-02 NOTE — ASSESSMENT & PLAN NOTE
· Patient was counseled to abstinence from alcohol  · Imaging suggestive of liver steatosis rather than cirrhosis  · Continue to monitor liver function - AST to ALT ratio consistent with alcoholic disease  · GI input appreciated

## 2017-12-02 NOTE — PROGRESS NOTES
Progress Note - Chayito Joshi 36 y o  female MRN: [id number]    Unit/Bed#: [facility] 2 -01 Encounter: [id number]        * Anasarca   Assessment & Plan    · Most likely secondary to liver disease  · Improved  · 2D Echo revealed normal systolic and diastolic function  · Nephrotic syndrome workup negative  · Diuretics changed to PO          RUQ pain   Assessment & Plan    · CT chest, abd and pelvis with contrast was obtained and revealed improved findings from prior  · Pain is likely multifactorial with obesity, hepatomegaly, ambulatory dysfunction  · Will add Lidocaine patch, continue gabapentin and tylenol PRN          Alcoholic liver disease ([facility] 75 )   Assessment & Plan    · Patient was counseled to abstinence from alcohol  · Imaging suggestive of liver steatosis rather than cirrhosis  · Continue to monitor liver function - AST to ALT ratio consistent with alcoholic disease  · GI input appreciated        Noncompliance with medication regimen   Assessment & Plan    · Encourage medical compliance and  on potential risks of noncompliance        Other bipolar disorder ([facility] 75 )   Assessment & Plan    · Patient started on Seroquel, sleep improved  · Would consider addition of amitriptyline as may help with fibromyalgia - will d/w psychiatry        Morbid obesity due to excess calories ([facility] 75 )   Assessment & Plan    · Patient would clearly benefit from weight loss  · Ongoing counseling/nutrition consult        Chronic pain syndrome   Assessment & Plan    · Continue Gabapentin  · Limited with pain medications - will avoid NSAIDs and Tylenol due to liver disease, avoid narcotics due to hx of drug-seeking behaviors  · Patient would benefit from utilizing non-pharmacologic modalities, out of bed and increased activity with support   · Added lidocaine patch        Recurrent falls   Assessment & Plan    · Secondary to ambulatory dysfunction  · Patient will need for bedside commode and wheelchair at home per family discussion Ambulatory dysfunction   Assessment & Plan    · Multifactorial  · PT/OT  · Discussed with family regarding need for bedside commode and wheel chair at home            Pharmacologic: Enoxaparin (Lovenox)  Mechanical VTE Prophylaxis in Place: Yes    Patient Centered Rounds: I have performed bedside rounds with nursing staff today  Discussions with Specialists or Other Care Team Provider: Gastroenterology, Cardiology    Education and Discussions with Family / Patient: Yes    Time Spent for Care: 30 minutes  More than 50% of total time spent on counseling and coordination of care as described above  Current Length of Stay: 6 day(s)    Current Patient Status: Inpatient   Certification Statement: The patient will continue to require additional inpatient hospital stay due to discharge preparation    Discharge Plan / Estimated Discharge Date: 2 days      Code Status: Level 1 - Full Code      Subjective:   Patient seen and examined  She is c/o RUQ pain and states this is affecting her appetite  Denies nausea or vomiting  Has lose stools  Denies chest pain, palpitations or SOB  Objective:     Vitals:   Temp (24hrs), Av 7 °F (37 1 °C), Min:98 3 °F (36 8 °C), Max:99 2 °F (37 3 °C)    HR:  [92] 92  Resp:  [16-18] 18  BP: (101-130)/(50-58) 130/58  SpO2:  [94 %-96 %] 96 %  Body mass index is 54 19 kg/m²  Input and Output Summary (last 24 hours): Intake/Output Summary (Last 24 hours) at 17  Last data filed at 17 1327   Gross per 24 hour   Intake              360 ml   Output              549 ml   Net             -189 ml       Physical Exam:     Physical Exam   Constitutional: She is oriented to person, place, and time  Obese   HENT:   Head: Normocephalic  Eyes: Conjunctivae are normal    Neck: Normal range of motion  Neck supple  Cardiovascular: Normal rate and regular rhythm  Exam reveals no gallop and no friction rub  No murmur heard    Pulmonary/Chest: Effort normal  No respiratory distress  She has no wheezes  She has no rales  Abdominal: Soft  She exhibits distension  There is tenderness in the right upper quadrant  There is no guarding  Musculoskeletal: She exhibits edema  Neurological: She is alert and oriented to person, place, and time  Skin: Skin is warm and dry  Additional Data:     Labs:      Results from last 7 days  Lab Units 11/30/17  0615   WBC Thousand/uL 6 37   HEMOGLOBIN g/dL 8 6*   HEMATOCRIT % 26 2*   PLATELETS Thousands/uL 177   NEUTROS PCT % 42*   LYMPHS PCT % 38   MONOS PCT % 16*   EOS PCT % 3       Results from last 7 days  Lab Units 11/30/17  0615   SODIUM mmol/L 135*   POTASSIUM mmol/L 3 6   CHLORIDE mmol/L 102   CO2 mmol/L 30   BUN mg/dL 2*   CREATININE mg/dL 0 83   CALCIUM mg/dL 7 9*   TOTAL PROTEIN g/dL 6 5   BILIRUBIN TOTAL mg/dL 3 22*   ALK PHOS U/L 134*   ALT U/L 24   AST U/L 90*   GLUCOSE RANDOM mg/dL 87       Results from last 7 days  Lab Units 11/30/17  0615   INR  1 62*         Recent Cultures (last 7 days):       Results from last 7 days  Lab Units 11/27/17  0246   C DIFF TOXIN B  NEGATIVE for C difficle toxin by PCR          Last 24 Hours Medication List:     dicyclomine 10 mg Oral 4x Daily (AC & HS)   enoxaparin 40 mg Subcutaneous Daily   furosemide 80 mg Oral BID (diuretic)   gabapentin 300 mg Oral TID   lactulose 20 g Oral BID   lidocaine 1 patch Transdermal Daily   [START ON 12/4/2017] metolazone 5 mg Oral Once per day on Mon Thu   nystatin  Topical 4x Daily   pantoprazole 40 mg Oral Early Morning   potassium chloride 40 mEq Oral 4x Daily   QUEtiapine 50 mg Oral HS   spironolactone 50 mg Oral BID        Today, Patient Was Seen By: Mando Mcguire MD

## 2017-12-02 NOTE — ASSESSMENT & PLAN NOTE
· Secondary to liver disease  · Improved  · 2D Echo revealed normal systolic and diastolic function  · Nephrotic syndrome workup negative  · Diuretics changed to PO  · Patient has been in negative balance  · Will request to weigh patient  · Patient nearing discharge, awaiting placement at rehab

## 2017-12-02 NOTE — ASSESSMENT & PLAN NOTE
· Multifactorial  · PT/OT  · Discussed with family regarding need for bedside commode and wheel chair at home

## 2017-12-02 NOTE — ASSESSMENT & PLAN NOTE
· Patient started on Seroquel, sleep improved  · Added amitriptyline as may help with fibromyalgia   · Psychiatry on board

## 2017-12-02 NOTE — ASSESSMENT & PLAN NOTE
· Continue Gabapentin  · Limited with pain medications - will avoid NSAIDs and Tylenol due to liver disease, avoid narcotics due to hx of drug-seeking behaviors  · Patient would benefit from utilizing non-pharmacologic modalities, out of bed and increased activity with support   · Added lidocaine patch

## 2017-12-02 NOTE — ASSESSMENT & PLAN NOTE
· Encourage medical compliance and  on potential risks of noncompliance  · Patient continues to threaten to leave AMA if not given narcotics, however, she is possible to redirect and agrees to stay after discussing potential risks of her leaving AMA

## 2017-12-02 NOTE — ASSESSMENT & PLAN NOTE
· With daily complaints of generalized pain  · Continue Gabapentin  · Limited with pain medications - will avoid NSAIDs and Tylenol due to liver disease, avoid narcotics due to hx of drug-seeking behaviors  · Patient would benefit from utilizing non-pharmacologic modalities, out of bed and increased activity with support   · Added lidocaine patch and amitriptyline along with Aqua K pad

## 2017-12-02 NOTE — ASSESSMENT & PLAN NOTE
· Patient started on Seroquel, sleep improved  · Would consider addition of amitriptyline as may help with fibromyalgia - will d/w psychiatry

## 2017-12-02 NOTE — ASSESSMENT & PLAN NOTE
· Most likely secondary to liver disease  · Improved  · 2D Echo revealed normal systolic and diastolic function  · Nephrotic syndrome workup negative  · Diuretics changed to PO

## 2017-12-02 NOTE — PROGRESS NOTES
Progress Note - Kj Verma 36 y o  female MRN: [id number]    Unit/Bed#: [facility] 2 -01 Encounter: [id number]        * Anasarca   Assessment & Plan    · Secondary to liver disease  · Improved  · 2D Echo revealed normal systolic and diastolic function  · Nephrotic syndrome workup negative  · Diuretics changed to PO  · Patient has been in negative balance  · Will request to weigh patient  · Patient nearing discharge, awaiting placement at rehab          RUQ pain   Assessment & Plan    · CT chest, abd and pelvis with contrast was obtained and revealed improved findings from prior  · Pain is likely multifactorial with obesity, hepatomegaly, ambulatory dysfunction  · Added Lidocaine patch, aqua K pad and amitriptyline (takes weeks for the latter to be effective), continue gabapentin and tylenol PRN          Alcoholic liver disease ([facility] 75 )   Assessment & Plan    · Patient was counseled to abstinence from alcohol  · Imaging suggestive of liver steatosis rather than cirrhosis  · Continue to monitor liver function - AST to ALT ratio consistent with alcoholic disease  · GI input appreciated        Noncompliance with medication regimen   Assessment & Plan    · Encourage medical compliance and  on potential risks of noncompliance  · Patient continues to threaten to leave AMA if not given narcotics, however, she is possible to redirect and agrees to stay after discussing potential risks of her leaving AMA        Other bipolar disorder ([facility] )   Assessment & Plan    · Patient started on Seroquel, sleep improved  · Added amitriptyline as may help with fibromyalgia   · Psychiatry on board        Morbid obesity due to excess calories ([facility] )   Assessment & Plan    · Patient would clearly benefit from weight loss  · Ongoing counseling/nutrition consult        Chronic pain syndrome   Assessment & Plan    · With daily complaints of generalized pain  · Continue Gabapentin  · Limited with pain medications - will avoid NSAIDs and Tylenol due to liver disease, avoid narcotics due to hx of drug-seeking behaviors  · Patient would benefit from utilizing non-pharmacologic modalities, out of bed and increased activity with support   · Added lidocaine patch and amitriptyline along with Aqua K pad        Recurrent falls   Assessment & Plan    · Secondary to ambulatory dysfunction  · Patient will need bedside commode and wheelchair at home per family discussion         Ambulatory dysfunction   Assessment & Plan    · Multifactorial  · PT/OT  · Discussed with family regarding need for bedside commode and wheel chair at home  · Plan for rehab placement            Pharmacologic: Enoxaparin (Lovenox)  Mechanical VTE Prophylaxis in Place: No    Patient Centered Rounds: I have performed bedside rounds with nursing staff today  Discussions with Specialists or Other Care Team Provider: Gastroenterology, Cardiology    Education and Discussions with Family / Patient: Yes, patient, mother, sisters    Time Spent for Care: 30 minutes  More than 50% of total time spent on counseling and coordination of care as described above  Current Length of Stay: 7 day(s)    Current Patient Status: Inpatient   Certification Statement: The patient will continue to require additional inpatient hospital stay due to need for pain managment and placement    Discharge Plan / Estimated Discharge Date: Monday      Code Status: Level 1 - Full Code      Subjective:   Patient seen and examined  She is still c/o RUQ pain and states she feels it is not addressed  She states that the only thing that works for her is narcotic medications and she wants to go home and drink alcohol by signing herself out AMA if not given narcotics  However, with further discussion she is agreeable on staying at the hospital and trying other pain management modalities with family in the room who are agreeable  The patient reports occasional nausea but no vomiting  Good oral intake   No SOB, chest pain or dyspnea  Has multiple soft stools daily  Objective:     Vitals:   Temp (24hrs), Av 8 °F (37 1 °C), Min:98 3 °F (36 8 °C), Max:99 1 °F (37 3 °C)    HR:  [88-99] 99  Resp:  [18-21] 18  BP: (102-130)/(51-58) 108/51  SpO2:  [94 %-96 %] 94 %  Body mass index is 54 19 kg/m²  Input and Output Summary (last 24 hours): Intake/Output Summary (Last 24 hours) at 17 1359  Last data filed at 17 2151   Gross per 24 hour   Intake                0 ml   Output                2 ml   Net               -2 ml       Physical Exam:     Physical Exam   Constitutional: She is oriented to person, place, and time  Obese   HENT:   Mouth/Throat: Oropharynx is clear and moist    Eyes: Conjunctivae are normal    Cardiovascular: Normal rate and regular rhythm  Exam reveals no gallop and no friction rub  No murmur heard  Pulmonary/Chest: Effort normal  No stridor  No respiratory distress  She has no wheezes  Abdominal: Bowel sounds are normal  There is tenderness in the right upper quadrant  There is no guarding  Obese abdomen   Musculoskeletal: She exhibits edema  Neurological: She is alert and oriented to person, place, and time  Skin: Skin is warm and dry         Additional Data:     Labs:      Results from last 7 days  Lab Units 17  0615   WBC Thousand/uL 6 37   HEMOGLOBIN g/dL 8 6*   HEMATOCRIT % 26 2*   PLATELETS Thousands/uL 177   NEUTROS PCT % 42*   LYMPHS PCT % 38   MONOS PCT % 16*   EOS PCT % 3       Results from last 7 days  Lab Units 17  0615   SODIUM mmol/L 135*   POTASSIUM mmol/L 3 6   CHLORIDE mmol/L 102   CO2 mmol/L 30   BUN mg/dL 2*   CREATININE mg/dL 0 83   CALCIUM mg/dL 7 9*   TOTAL PROTEIN g/dL 6 5   BILIRUBIN TOTAL mg/dL 3 22*   ALK PHOS U/L 134*   ALT U/L 24   AST U/L 90*   GLUCOSE RANDOM mg/dL 87       Results from last 7 days  Lab Units 17  0615   INR  1 62*         Recent Cultures (last 7 days):       Results from last 7 days  Lab Units 17  0246   C DIFF TOXIN B  NEGATIVE for C difficle toxin by PCR          Last 24 Hours Medication List:     amitriptyline 25 mg Oral HS   dicyclomine 10 mg Oral 4x Daily (AC & HS)   enoxaparin 40 mg Subcutaneous Daily   furosemide 80 mg Oral BID (diuretic)   gabapentin 300 mg Oral TID   [START ON 12/3/2017] lactulose 20 g Oral Daily   lidocaine 1 patch Transdermal Daily   [START ON 12/4/2017] metolazone 5 mg Oral Once per day on Mon Thu   nystatin  Topical 4x Daily   pantoprazole 40 mg Oral Early Morning   potassium chloride 40 mEq Oral 4x Daily   QUEtiapine 50 mg Oral HS   spironolactone 50 mg Oral BID        Today, Patient Was Seen By: Lyudmila Bell MD

## 2017-12-02 NOTE — ASSESSMENT & PLAN NOTE
· Multifactorial  · PT/OT  · Discussed with family regarding need for bedside commode and wheel chair at home  · Plan for rehab placement

## 2017-12-02 NOTE — ASSESSMENT & PLAN NOTE
9/26/2017        Geovany Marte    1964  [id number]    Dear Dr. Tiffanie Scales,     Your patient was seen in our office on 9/26/2017 for a dilated diabetic eye exam.      Findings:    No Retinopathy  OU - Bilaterally (both)    Comments:   Dilated eye exam.  No diabetic retinopathy.    Glaucoma suspect- schedule evaluation at [facility].    RTC 1 year or sooner at your discretion.      Sincerely,    Yoseph Ortiz, OD  [address] [id number] [phone]                       · Patient would clearly benefit from weight loss  · Ongoing counseling/nutrition consult

## 2017-12-03 LAB
ALBUMIN SERPL BCP-MCNC: 1.2 G/DL (ref 3.5–5)
ALP SERPL-CCNC: 128 U/L (ref 46–116)
ALT SERPL W P-5'-P-CCNC: 20 U/L (ref 12–78)
ANION GAP SERPL CALCULATED.3IONS-SCNC: 5 MMOL/L (ref 4–13)
AST SERPL W P-5'-P-CCNC: 85 U/L (ref 5–45)
BASOPHILS # BLD AUTO: 0.02 THOUSANDS/ΜL (ref 0–0.1)
BASOPHILS NFR BLD AUTO: 0 % (ref 0–1)
BILIRUB SERPL-MCNC: 2.84 MG/DL (ref 0.2–1)
BUN SERPL-MCNC: 4 MG/DL (ref 5–25)
CALCIUM SERPL-MCNC: 7.9 MG/DL (ref 8.3–10.1)
CHLORIDE SERPL-SCNC: 103 MMOL/L (ref 100–108)
CO2 SERPL-SCNC: 28 MMOL/L (ref 21–32)
CREAT SERPL-MCNC: 0.83 MG/DL (ref 0.6–1.3)
EOSINOPHIL # BLD AUTO: 0.15 THOUSAND/ΜL (ref 0–0.61)
EOSINOPHIL NFR BLD AUTO: 3 % (ref 0–6)
ERYTHROCYTE [DISTWIDTH] IN BLOOD BY AUTOMATED COUNT: 19.5 % (ref 11.6–15.1)
GFR SERPL CREATININE-BSD FRML MDRD: 87 ML/MIN/1.73SQ M
GLUCOSE SERPL-MCNC: 77 MG/DL (ref 65–140)
HCT VFR BLD AUTO: 27.9 % (ref 34.8–46.1)
HGB BLD-MCNC: 8.9 G/DL (ref 11.5–15.4)
LYMPHOCYTES # BLD AUTO: 2.32 THOUSANDS/ΜL (ref 0.6–4.47)
LYMPHOCYTES NFR BLD AUTO: 38 % (ref 14–44)
MCH RBC QN AUTO: 36.3 PG (ref 26.8–34.3)
MCHC RBC AUTO-ENTMCNC: 31.9 G/DL (ref 31.4–37.4)
MCV RBC AUTO: 114 FL (ref 82–98)
MONOCYTES # BLD AUTO: 0.96 THOUSAND/ΜL (ref 0.17–1.22)
MONOCYTES NFR BLD AUTO: 16 % (ref 4–12)
NEUTROPHILS # BLD AUTO: 2.67 THOUSANDS/ΜL (ref 1.85–7.62)
NEUTS SEG NFR BLD AUTO: 43 % (ref 43–75)
NRBC BLD AUTO-RTO: 0 /100 WBCS
PLATELET # BLD AUTO: 189 THOUSANDS/UL (ref 149–390)
PMV BLD AUTO: 9.5 FL (ref 8.9–12.7)
POTASSIUM SERPL-SCNC: 4.6 MMOL/L (ref 3.5–5.3)
PROT SERPL-MCNC: 6.6 G/DL (ref 6.4–8.2)
RBC # BLD AUTO: 2.45 MILLION/UL (ref 3.81–5.12)
SODIUM SERPL-SCNC: 136 MMOL/L (ref 136–145)
WBC # BLD AUTO: 6.12 THOUSAND/UL (ref 4.31–10.16)

## 2017-12-03 PROCEDURE — 97116 GAIT TRAINING THERAPY: CPT

## 2017-12-03 PROCEDURE — 97530 THERAPEUTIC ACTIVITIES: CPT

## 2017-12-03 PROCEDURE — 80053 COMPREHEN METABOLIC PANEL: CPT | Performed by: FAMILY MEDICINE

## 2017-12-03 PROCEDURE — 85025 COMPLETE CBC W/AUTO DIFF WBC: CPT | Performed by: FAMILY MEDICINE

## 2017-12-03 RX ORDER — FUROSEMIDE 40 MG/1
80 TABLET ORAL
Status: DISCONTINUED | OUTPATIENT
Start: 2017-12-04 | End: 2017-12-06

## 2017-12-03 RX ADMIN — DICYCLOMINE HYDROCHLORIDE 10 MG: 10 CAPSULE ORAL at 21:30

## 2017-12-03 RX ADMIN — LIDOCAINE 1 PATCH: 50 PATCH CUTANEOUS at 19:04

## 2017-12-03 RX ADMIN — NYSTATIN: 100000 POWDER TOPICAL at 11:56

## 2017-12-03 RX ADMIN — GABAPENTIN 300 MG: 300 CAPSULE ORAL at 17:09

## 2017-12-03 RX ADMIN — LACTULOSE 20 G: 20 SOLUTION ORAL at 08:05

## 2017-12-03 RX ADMIN — POTASSIUM CHLORIDE 40 MEQ: 1500 TABLET, EXTENDED RELEASE ORAL at 11:55

## 2017-12-03 RX ADMIN — GABAPENTIN 300 MG: 300 CAPSULE ORAL at 08:07

## 2017-12-03 RX ADMIN — AMITRIPTYLINE HYDROCHLORIDE 25 MG: 25 TABLET, FILM COATED ORAL at 21:30

## 2017-12-03 RX ADMIN — ENOXAPARIN SODIUM 40 MG: 40 INJECTION SUBCUTANEOUS at 08:05

## 2017-12-03 RX ADMIN — DICYCLOMINE HYDROCHLORIDE 10 MG: 10 CAPSULE ORAL at 17:09

## 2017-12-03 RX ADMIN — POTASSIUM CHLORIDE 40 MEQ: 1500 TABLET, EXTENDED RELEASE ORAL at 21:30

## 2017-12-03 RX ADMIN — POTASSIUM CHLORIDE 40 MEQ: 1500 TABLET, EXTENDED RELEASE ORAL at 08:05

## 2017-12-03 RX ADMIN — ONDANSETRON 4 MG: 2 INJECTION INTRAMUSCULAR; INTRAVENOUS at 14:11

## 2017-12-03 RX ADMIN — NYSTATIN: 100000 POWDER TOPICAL at 08:07

## 2017-12-03 RX ADMIN — GABAPENTIN 300 MG: 300 CAPSULE ORAL at 21:30

## 2017-12-03 RX ADMIN — QUETIAPINE FUMARATE 50 MG: 25 TABLET ORAL at 21:30

## 2017-12-03 RX ADMIN — DICYCLOMINE HYDROCHLORIDE 10 MG: 10 CAPSULE ORAL at 06:18

## 2017-12-03 RX ADMIN — POTASSIUM CHLORIDE 40 MEQ: 1500 TABLET, EXTENDED RELEASE ORAL at 17:09

## 2017-12-03 RX ADMIN — NYSTATIN: 100000 POWDER TOPICAL at 17:10

## 2017-12-03 RX ADMIN — DICYCLOMINE HYDROCHLORIDE 10 MG: 10 CAPSULE ORAL at 11:55

## 2017-12-03 RX ADMIN — NYSTATIN: 100000 POWDER TOPICAL at 21:31

## 2017-12-03 RX ADMIN — PANTOPRAZOLE SODIUM 40 MG: 40 TABLET, DELAYED RELEASE ORAL at 05:25

## 2017-12-03 NOTE — PHYSICAL THERAPY NOTE
PT Progress Note (40min)  (14:00-14:40)       12/03/17 1440   Pain Assessment   Pain Assessment 0-10   Pain Score 5   Pain Type Chronic pain   Pain Location Back;Leg   Pain Orientation Left   Hospital Pain Intervention(s) Ambulation/increased activity;Repositioned   Restrictions/Precautions   Other Precautions Fall Risk;Pain  (blue)   General   Chart Reviewed Yes   Response to Previous Treatment Patient with no complaints from previous session  Family/Caregiver Present No   Cognition   Orientation Level Oriented X4   Subjective   Subjective "I need a minute to wake up  I need to walk so I can get to [facility]"  Bed Mobility   Supine to Sit 4  Minimal assistance   Additional items Assist x 1;HOB elevated; Increased time required;Verbal cues   Sit to Supine 3  Moderate assistance   Additional items Assist x 2;Verbal cues;LE management   Transfers   Sit to Stand 3  Moderate assistance   Additional items Assist x 2;Verbal cues; Increased time required  (multiple trials)   Stand to Sit 3  Moderate assistance   Additional items Assist x 2;Verbal cues   Ambulation/Elevation   Gait pattern Antalgic; Wide LEONIE; Decreased foot clearance; Improper Weight shift; Poor UE support   Gait Assistance 3  Moderate assist   Additional items Assist x 2;Verbal cues   Assistive Device Bariatric Rolling walker   Distance 1' + 2' laterally at EOB c seated rest   Balance   Static Sitting Fair   Dynamic Sitting Fair   Static Standing Poor  (standing tolerance 1min c RW support)   Dynamic Standing Poor   Ambulatory Poor   Activity Tolerance   Activity Tolerance Patient limited by fatigue;Patient limited by pain   Nurse Made Aware [place]   Assessment   Prognosis Fair   Problem List Decreased strength;Decreased endurance; Impaired balance;Decreased mobility; Decreased range of motion;Obesity;Pain   Assessment pt able to complete OBO mobility assessment   performed transfers mod (A)x2  pt extremely fearfull of falling in standing, however reassured by PT  standing tolerance 1min c support of RW  initiated lateral stepping at EOB 1' + 2' c blue RW mod (A)x2  seated rest required btwn trials 2* weakness + fatigue  pt required frequent encouragement throughout session to complete tasks  pt very anxious  able to attempt OOB>recliner next session  will cont skilled PT to further maximize functional mobility + improve quality of life  upon d/c, recommend STR  Barriers to Discharge Inaccessible home environment;Decreased caregiver support   Goals   Patient Goals "to walk"  [facility] Expiration Date 12/13/17   Short Term Goal #1 1  increase strength 1/2 grade, 2  perform bed mobility c (S), 3  safely perform transfers min (A)x1, 4  ambulate 25' c RW min (A)x1 to improve quality of life   Treatment Day 3   Plan   Treatment/Interventions Functional transfer training;LE strengthening/ROM; Therapeutic exercise; Endurance training;Patient/family training;Equipment eval/education; Bed mobility;Gait training;Spoke to nursing   Progress Progressing toward goals   PT Frequency 5x/wk; Weekend  (1x wkd)   Recommendation   Recommendation Short-term skilled PT   PT - OK to Discharge Yes  (to STR )     Jackie Norman, PT

## 2017-12-03 NOTE — PLAN OF CARE
Problem: PHYSICAL THERAPY ADULT  Goal: Performs mobility at highest level of function for planned discharge setting  See evaluation for individualized goals  Treatment/Interventions: LE strengthening/ROM, Therapeutic exercise, Endurance training, Patient/family training, Bed mobility, Compensatory technique education, Continued evaluation  Equipment Recommended: Other (Comment) (pt needs dependent means for out of bed mobilization )       See flowsheet documentation for full assessment, interventions and recommendations  Outcome: Progressing  Prognosis: Fair  Problem List: Decreased strength, Decreased endurance, Impaired balance, Decreased mobility, Decreased range of motion, Obesity, Pain  Assessment: pt able to complete OBO mobility assessment  performed transfers mod (A)x2  pt extremely fearfull of falling in standing, however reassured by PT  standing tolerance 1min c support of RW  initiated lateral stepping at EOB 1' + 2' c blue RW mod (A)x2  seated rest required btwn trials 2* weakness + fatigue  pt required frequent encouragement throughout session to complete tasks  pt very anxious  able to attempt OOB>recliner next session  will cont skilled PT to further maximize functional mobility + improve quality of life  upon d/c, recommend STR  Barriers to Discharge: Inaccessible home environment, Decreased caregiver support     Recommendation: Short-term skilled PT     PT - OK to Discharge: Yes    See flowsheet documentation for full assessment

## 2017-12-04 LAB
ALBUMIN SERPL BCP-MCNC: 1.2 G/DL (ref 3.5–5)
ALP SERPL-CCNC: 125 U/L (ref 46–116)
ALT SERPL W P-5'-P-CCNC: 21 U/L (ref 12–78)
ANION GAP SERPL CALCULATED.3IONS-SCNC: 5 MMOL/L (ref 4–13)
AST SERPL W P-5'-P-CCNC: 75 U/L (ref 5–45)
BILIRUB SERPL-MCNC: 2.68 MG/DL (ref 0.2–1)
BUN SERPL-MCNC: 4 MG/DL (ref 5–25)
CALCIUM SERPL-MCNC: 8.2 MG/DL (ref 8.3–10.1)
CHLORIDE SERPL-SCNC: 105 MMOL/L (ref 100–108)
CO2 SERPL-SCNC: 26 MMOL/L (ref 21–32)
CREAT SERPL-MCNC: 0.75 MG/DL (ref 0.6–1.3)
GFR SERPL CREATININE-BSD FRML MDRD: 98 ML/MIN/1.73SQ M
GLUCOSE SERPL-MCNC: 103 MG/DL (ref 65–140)
POTASSIUM SERPL-SCNC: 4.6 MMOL/L (ref 3.5–5.3)
PROT SERPL-MCNC: 6.6 G/DL (ref 6.4–8.2)
SODIUM SERPL-SCNC: 136 MMOL/L (ref 136–145)

## 2017-12-04 PROCEDURE — 80053 COMPREHEN METABOLIC PANEL: CPT | Performed by: FAMILY MEDICINE

## 2017-12-04 PROCEDURE — 97530 THERAPEUTIC ACTIVITIES: CPT

## 2017-12-04 PROCEDURE — 94760 N-INVAS EAR/PLS OXIMETRY 1: CPT

## 2017-12-04 PROCEDURE — 97110 THERAPEUTIC EXERCISES: CPT

## 2017-12-04 RX ORDER — ALBUMIN (HUMAN) 12.5 G/50ML
25 SOLUTION INTRAVENOUS 3 TIMES DAILY
Status: DISCONTINUED | OUTPATIENT
Start: 2017-12-04 | End: 2017-12-06

## 2017-12-04 RX ORDER — POTASSIUM CHLORIDE 20 MEQ/1
20 TABLET, EXTENDED RELEASE ORAL 4 TIMES DAILY
Status: DISCONTINUED | OUTPATIENT
Start: 2017-12-04 | End: 2017-12-04

## 2017-12-04 RX ORDER — ACETAMINOPHEN 325 MG/1
650 TABLET ORAL EVERY 6 HOURS PRN
Status: DISCONTINUED | OUTPATIENT
Start: 2017-12-04 | End: 2017-12-14 | Stop reason: HOSPADM

## 2017-12-04 RX ADMIN — AMITRIPTYLINE HYDROCHLORIDE 25 MG: 25 TABLET, FILM COATED ORAL at 21:45

## 2017-12-04 RX ADMIN — PANTOPRAZOLE SODIUM 40 MG: 40 TABLET, DELAYED RELEASE ORAL at 06:13

## 2017-12-04 RX ADMIN — DICYCLOMINE HYDROCHLORIDE 10 MG: 10 CAPSULE ORAL at 21:45

## 2017-12-04 RX ADMIN — NYSTATIN: 100000 POWDER TOPICAL at 08:31

## 2017-12-04 RX ADMIN — GABAPENTIN 300 MG: 300 CAPSULE ORAL at 08:30

## 2017-12-04 RX ADMIN — DICYCLOMINE HYDROCHLORIDE 10 MG: 10 CAPSULE ORAL at 11:26

## 2017-12-04 RX ADMIN — POTASSIUM CHLORIDE 40 MEQ: 1500 TABLET, EXTENDED RELEASE ORAL at 08:30

## 2017-12-04 RX ADMIN — NYSTATIN: 100000 POWDER TOPICAL at 12:31

## 2017-12-04 RX ADMIN — ALBUMIN HUMAN 25 G: 0.25 SOLUTION INTRAVENOUS at 21:45

## 2017-12-04 RX ADMIN — ONDANSETRON 4 MG: 2 INJECTION INTRAMUSCULAR; INTRAVENOUS at 09:21

## 2017-12-04 RX ADMIN — DICYCLOMINE HYDROCHLORIDE 10 MG: 10 CAPSULE ORAL at 16:59

## 2017-12-04 RX ADMIN — GABAPENTIN 300 MG: 300 CAPSULE ORAL at 16:58

## 2017-12-04 RX ADMIN — ALBUMIN HUMAN 25 G: 0.25 SOLUTION INTRAVENOUS at 16:59

## 2017-12-04 RX ADMIN — ALBUMIN HUMAN 25 G: 0.25 SOLUTION INTRAVENOUS at 12:31

## 2017-12-04 RX ADMIN — DICYCLOMINE HYDROCHLORIDE 10 MG: 10 CAPSULE ORAL at 06:13

## 2017-12-04 RX ADMIN — NYSTATIN: 100000 POWDER TOPICAL at 18:08

## 2017-12-04 RX ADMIN — GABAPENTIN 300 MG: 300 CAPSULE ORAL at 21:45

## 2017-12-04 RX ADMIN — LIDOCAINE 1 PATCH: 50 PATCH CUTANEOUS at 20:34

## 2017-12-04 RX ADMIN — LACTULOSE 20 G: 20 SOLUTION ORAL at 08:30

## 2017-12-04 RX ADMIN — QUETIAPINE FUMARATE 50 MG: 25 TABLET ORAL at 21:45

## 2017-12-04 RX ADMIN — ENOXAPARIN SODIUM 40 MG: 40 INJECTION SUBCUTANEOUS at 08:30

## 2017-12-04 NOTE — ASSESSMENT & PLAN NOTE
· Secondary to liver disease  · Improved  · 2D Echo revealed normal systolic and diastolic function  · Nephrotic syndrome workup negative  · Diuretics changed to Po, and holding parameters changed to hold for SBP less than 100 as patient has not been receiving her Lasix with holding parameters before for at SBP less than 110  · Patient has been in negative balance  · Will request to weigh patient  · Patient nearing discharge, awaiting placement at rehab

## 2017-12-04 NOTE — PROGRESS NOTES
[facility] Progress Note  Patient: Milena Gee 36 y o  female   MRN: [id number]  PCP: Jacquie Herrera MD  Unit/Bed#: [address] 87 226-01 Encounter: [id number]  Date Of Visit: 12/04/17    Assessment  Principal Problem:    Anasarca  Active Problems:    Ambulatory dysfunction    Recurrent falls    Chronic pain syndrome    Morbid obesity due to excess calories ([facility] )    Peripheral edema    Hypokalemia    Other bipolar disorder ([facility] )    Noncompliance with medication regimen    Alcoholic liver disease ([facility] )  Resolved Problems:    * No resolved hospital problems  *        Plan  1  Anasarca  Secondary to alcoholic liver disease/cirrhosis  Furosemide has been held the last several doses due to hypotension  Will give IV albumin to see if this helps with hypotension  2  Alcoholic cirrhosis  3  Bipolar depression  Stable on quetiapine and amitriptyline  4  Morbid obesity  Needs to lose more weight  5  Hypokalemia  Will discontinue doses of potassium as furosemide has been held last several doses  6  Noncompliance with medical regimen  Patient was still consuming alcohol present to arrival  7  Ambulatory dysfunction  Discharge when patient:    VTE Pharmacologic Prophylaxis: Enoxaparin (Lovenox)    Patient Centered Rounds: I have performed bedside rounds with nursing staff today  Discussions with Specialists or Other Care Team Provider:     Education and Discussions with Family / Patient:     Time Spent for Care: 35 mins  More than 50% of total time spent on counseling and coordination of care as described above  Current Length of Stay: 9 day(s)    Current Patient Status: Inpatient   Certification Statement: The patient will continue to require additional inpatient hospital stay due to anasarca    Discharge Plan / Estimated Discharge Date:   To be determined    Code Status: Level 1 - Full Code  ______________________________________________________________________________    Subjective:   Patient seen and examined  Still complaining of diffuse swelling and pains  Lasix has been held due to hypotension  Objective:   Vitals: Blood pressure 108/52, pulse 98, temperature 99 4 °F (37 4 °C), temperature source Tympanic, resp  rate 20, weight (!) 161 kg (354 lb 8 oz), SpO2 97 %  Physical Exam:   General appearance: alert, appears older than stated age, cooperative and no distress  Head: Normocephalic, without obvious abnormality, atraumatic  Lungs: diminished breath sounds bibasilar  Heart: regular rate and rhythm  Abdomen: Soft, massively obese  Back: range of motion normal  Extremities: edema +3 lower extremities bilaterally  Neurologic: Grossly normal    Additional Data:   Labs:    Results from last 7 days  Lab Units 12/03/17  0526 11/30/17  0615 11/29/17  0505 11/28/17  0605   WBC Thousand/uL 6 12 6 37 8 83 7 85   HEMOGLOBIN g/dL 8 9* 8 6* 8 8* 8 8*   HEMATOCRIT % 27 9* 26 2* 26 8* 27 0*   MCV fL 114* 112* 111* 110*   PLATELETS Thousands/uL 189 177 213 230   INR   --  1 62* 1 57*  --        Results from last 7 days  Lab Units 12/04/17  0618 12/03/17  0527 11/30/17  0615 11/29/17  0505 11/28/17  0605   SODIUM mmol/L 136 136 135* 135* 139   POTASSIUM mmol/L 4 6 4 6 3 6 3 7 4 1   CHLORIDE mmol/L 105 103 102 102 104   CO2 mmol/L 26 28 30 27 31   ANION GAP mmol/L 5 5 3* 6 4   BUN mg/dL 4* 4* 2* 2* 3*   CREATININE mg/dL 0 75 0 83 0 83 0 79 0 82   CALCIUM mg/dL 8 2* 7 9* 7 9* 8 0* 8 0*   ALBUMIN g/dL 1 2* 1 2* 1 2* 1 2* 1 2*   BILIRUBIN TOTAL mg/dL 2 68* 2 84* 3 22* 3 56* 3 71*   ALK PHOS U/L 125* 128* 134* 136* 151*   ALT U/L 21 20 24 25 26   AST U/L 75* 85* 90* 90* 107*   EGFR ml/min/1 73sq m 98 87 87 92 88   GLUCOSE RANDOM mg/dL 103 77 87 78 79       Results from last 7 days  Lab Units 11/28/17  0605   MAGNESIUM mg/dL 1 4*                              * I Have Reviewed All Lab Data Listed Above      Cultures:           Imaging:  Imaging Reports Reviewed Today Include:       Scheduled Meds:  amitriptyline 25 mg Oral HS   dicyclomine 10 mg Oral 4x Daily (AC & HS)   enoxaparin 40 mg Subcutaneous Daily   furosemide 80 mg Oral BID (diuretic)   gabapentin 300 mg Oral TID   lactulose 20 g Oral Daily   lidocaine 1 patch Transdermal Daily   metolazone 5 mg Oral Once per day on Mon Thu   nystatin  Topical 4x Daily   pantoprazole 40 mg Oral Early Morning   potassium chloride 40 mEq Oral 4x Daily   QUEtiapine 50 mg Oral HS   spironolactone 50 mg Oral BID     Continuous Infusions:   PRN Meds:    acetaminophen    aluminum-magnesium hydroxide-simethicone    levalbuterol    ondansetron    sodium chloride     Ricky Petit, DO

## 2017-12-04 NOTE — ASSESSMENT & PLAN NOTE
· Secondary to ambulatory dysfunction likely multifactorial - obesity, deconditioning  · Patient will need bedside commode and wheelchair at home per family discussion

## 2017-12-04 NOTE — PHYSICAL THERAPY NOTE
Physical Therapy Progress Note     12/04/17 1125   Pain Assessment   Pain Assessment 0-10   Pain Score 7   Pain Type Acute pain   Pain Location Toe (Comment which one)  (5th)   Pain Orientation Left   Hospital Pain Intervention(s) Ambulation/increased activity;Repositioned; Emotional support   Response to Interventions Poorly Tolerated  Restrictions/Precautions   Other Precautions Fall Risk;Pain   General   Chart Reviewed Yes   Response to Previous Treatment Patient reporting fatigue but able to participate  Family/Caregiver Present No   Subjective   Subjective Willing to participate in therapy this AM    Bed Mobility   Rolling R 2  Maximal assistance   Additional items Assist x 1;Bedrails; Increased time required;LE management;Verbal cues   Rolling L 2  Maximal assistance   Additional items Assist x 1;Bedrails; Increased time required;Verbal cues;LE management   Activity Tolerance   Activity Tolerance Patient limited by fatigue; Other (Comment)  (anxiety)   Nurse Made Aware Yes   Exercises   THR Supine;10 reps;AAROM; Bilateral   Assessment   Prognosis Fair   Problem List Decreased range of motion;Decreased strength;Decreased endurance; Impaired balance;Decreased mobility; Decreased safety awareness; Obesity;Pain;Decreased skin integrity   Assessment Pt  supine in bed upon my arrival  Performance of HEP supine in bed with A of therapist required for completion  Attempted OOB mobility, however noted pt  became very emotional with fear of WBing on LLE with increased pain reported  Required increased emotional support from therapist with repositioning supine in bed at end of treatment session  PT will continue to recommend STR upon d/c for continued improvement of noted impairments above  Barriers to Discharge Inaccessible home environment;Decreased caregiver support   Goals   Patient Goals To get better     STG Expiration Date 12/13/17   Treatment Day 4   Plan   Treatment/Interventions Functional transfer training;LE strengthening/ROM; Therapeutic exercise; Endurance training;Bed mobility;Spoke to nursing;Spoke to case management   Progress Slow progress, decreased activity tolerance   PT Frequency 5x/wk; Weekend  (1x on wknd)   Recommendation   Recommendation Short-term skilled PT   PT - OK to Discharge Yes  (if d/c to STR when medically stable )     Shira Rick, PTA

## 2017-12-04 NOTE — PROGRESS NOTES
Progress Note - Steve Garcia 36 y o  female MRN: [id number]    Unit/Bed#: [address] 2 -01 Encounter: [id number]        * Anasarca   Assessment & Plan    · Secondary to liver disease  · Improved  · 2D Echo revealed normal systolic and diastolic function  · Nephrotic syndrome workup negative  · Diuretics changed to Po, and holding parameters changed to hold for SBP less than 100 as patient has not been receiving her Lasix with holding parameters before for at SBP less than 110  · Patient has been in negative balance  · Will request to weigh patient  · Patient nearing discharge, awaiting placement at rehab          Alcoholic liver disease ([facility] )   Assessment & Plan    · Patient was counseled to abstinence from alcohol  · Imaging suggestive of liver steatosis rather than cirrhosis  · Continue to monitor liver function - AST to ALT ratio consistent with alcoholic disease  · GI input appreciated        Noncompliance with medication regimen   Assessment & Plan    · Encourage medical compliance and  on potential risks of noncompliance          Other bipolar disorder ([facility] )   Assessment & Plan    · Patient started on Seroquel, sleep improved  · Added amitriptyline as may help with fibromyalgia   · Psychiatry on board        Hypokalemia   Assessment & Plan    · Resolved  · Monitor potassium level        Morbid obesity due to excess calories ([place] [id number] )   Assessment & Plan    · Patient would clearly benefit from weight loss  · Ongoing counseling/nutrition consult        Chronic pain syndrome   Assessment & Plan    · With daily complaints of generalized pain  · Continue Gabapentin  · Limited with pain medications - will avoid NSAIDs and Tylenol due to liver disease, avoid narcotics due to hx of drug-seeking behaviors  · Patient would benefit from utilizing non-pharmacologic modalities, out of bed and increased activity with support   · Added lidocaine patch and amitriptyline along with Aqua K pad        Recurrent falls Assessment & Plan    · Secondary to ambulatory dysfunction likely multifactorial - obesity, deconditioning  · Patient will need bedside commode and wheelchair at home per family discussion         Ambulatory dysfunction   Assessment & Plan    · Multifactorial  · PT/OT  · Discussed with family regarding need for bedside commode and wheel chair at home  · Plan for rehab placement            Pharmacologic: Enoxaparin (Lovenox)  Mechanical VTE Prophylaxis in Place: No    Patient Centered Rounds: I have performed bedside rounds with nursing staff today  Discussions with Specialists or Other Care Team Provider: Gi, Cardiology, Case management    Education and Discussions with Family / Patient: Yes    Time Spent for Care: 30 minutes  More than 50% of total time spent on counseling and coordination of care as described above  Current Length of Stay: 8 day(s)    Current Patient Status: Inpatient   Certification Statement: The patient will continue to require additional inpatient hospital stay due to need for placement    Discharge Plan / Estimated Discharge Date: to be determined      Code Status: Level 1 - Full Code      Subjective:   Patient seen and examined  She is complaining of intermittent abdominal pain different areas  She denies nausea vomiting this time  She does continue to take Zofran as needed  She has had good oral intake  She denies chest pain, shortness breath palpitations  Objective:     Vitals:   Temp (24hrs), Av 6 °F (37 °C), Min:97 3 °F (36 3 °C), Max:99 6 °F (37 6 °C)    HR:  [90-94] 94  Resp:  [19-20] 20  BP: (101-109)/(52-55) 102/52  SpO2:  [92 %-96 %] 94 %  Body mass index is 50 99 kg/m²  Input and Output Summary (last 24 hours):     No intake or output data in the 24 hours ending 17    Physical Exam:     Physical Exam   Constitutional: She is oriented to person, place, and time  No distress  Obese    HENT:   Head: Normocephalic and atraumatic     Mouth/Throat: Oropharynx is clear and moist    Eyes: Conjunctivae are normal    Neck:   Unable to assess for JVD   Cardiovascular: Normal rate and regular rhythm  Exam reveals no gallop and no friction rub  No murmur heard  Pulmonary/Chest: Effort normal  No stridor  No respiratory distress  She has no wheezes  She has no rales  Abdominal: Bowel sounds are normal  She exhibits distension  There is tenderness  There is no guarding  Musculoskeletal: She exhibits edema  Neurological: She is alert and oriented to person, place, and time  Skin: Skin is warm and dry  Additional Data:     Labs:      Results from last 7 days  Lab Units 12/03/17  0526   WBC Thousand/uL 6 12   HEMOGLOBIN g/dL 8 9*   HEMATOCRIT % 27 9*   PLATELETS Thousands/uL 189   NEUTROS PCT % 43   LYMPHS PCT % 38   MONOS PCT % 16*   EOS PCT % 3       Results from last 7 days  Lab Units 12/03/17  0527   SODIUM mmol/L 136   POTASSIUM mmol/L 4 6   CHLORIDE mmol/L 103   CO2 mmol/L 28   BUN mg/dL 4*   CREATININE mg/dL 0 83   CALCIUM mg/dL 7 9*   TOTAL PROTEIN g/dL 6 6   BILIRUBIN TOTAL mg/dL 2 84*   ALK PHOS U/L 128*   ALT U/L 20   AST U/L 85*   GLUCOSE RANDOM mg/dL 77       Results from last 7 days  Lab Units 11/30/17  0615   INR  1 62*         Recent Cultures (last 7 days):       Results from last 7 days  Lab Units 11/27/17  0246   C DIFF TOXIN B  NEGATIVE for C difficle toxin by PCR          Last 24 Hours Medication List:     amitriptyline 25 mg Oral HS   dicyclomine 10 mg Oral 4x Daily (AC & HS)   enoxaparin 40 mg Subcutaneous Daily   [START ON 12/4/2017] furosemide 80 mg Oral BID (diuretic)   gabapentin 300 mg Oral TID   lactulose 20 g Oral Daily   lidocaine 1 patch Transdermal Daily   [START ON 12/4/2017] metolazone 5 mg Oral Once per day on Mon Thu   nystatin  Topical 4x Daily   pantoprazole 40 mg Oral Early Morning   potassium chloride 40 mEq Oral 4x Daily   QUEtiapine 50 mg Oral HS   spironolactone 50 mg Oral BID        Today, Patient Was Seen By: James Tadeo MD

## 2017-12-04 NOTE — SOCIAL WORK
Spoke with Caroline Kumari RN from [employer] of [address]  Pt was optioned in September and pt can admit to SNF on same approval letter until March 15, 2018

## 2017-12-04 NOTE — PLAN OF CARE
Problem: PHYSICAL THERAPY ADULT  Goal: Performs mobility at highest level of function for planned discharge setting  See evaluation for individualized goals  Treatment/Interventions: LE strengthening/ROM, Therapeutic exercise, Endurance training, Patient/family training, Bed mobility, Compensatory technique education, Continued evaluation  Equipment Recommended: Other (Comment) (pt needs dependent means for out of bed mobilization )       See flowsheet documentation for full assessment, interventions and recommendations  Outcome: Progressing  Prognosis: Fair  Problem List: Decreased range of motion, Decreased strength, Decreased endurance, Impaired balance, Decreased mobility, Decreased safety awareness, Obesity, Pain, Decreased skin integrity  Assessment: Pt  supine in bed upon my arrival  Performance of HEP supine in bed with A of therapist required for completion  Attempted OOB mobility, however noted pt  became very emotional with fear of WBing on LLE with increased pain reported  Required increased emotional support from therapist with repositioning supine in bed at end of treatment session  PT will continue to recommend STR upon d/c for continued improvement of noted impairments above  Barriers to Discharge: Inaccessible home environment, Decreased caregiver support     Recommendation: Short-term skilled PT     PT - OK to Discharge: Yes (if d/c to STR when medically stable )    See flowsheet documentation for full assessment

## 2017-12-04 NOTE — SOCIAL WORK
Spoke with pt; she is aware that she is not a candidate for acute rehab  She is willing to go to SNF; she prefers [facility] if possible but willing to go to [facility] if need be  At this time, [facility] unable to accommodate pt due to bariatric needs  Pt not medically cleared at this time  CM following

## 2017-12-05 PROBLEM — R50.9 FEBRILE ILLNESS: Status: ACTIVE | Noted: 2017-12-05

## 2017-12-05 LAB
ALBUMIN SERPL BCP-MCNC: 2.3 G/DL (ref 3.5–5)
ALP SERPL-CCNC: 119 U/L (ref 46–116)
ALT SERPL W P-5'-P-CCNC: 22 U/L (ref 12–78)
ANION GAP SERPL CALCULATED.3IONS-SCNC: 7 MMOL/L (ref 4–13)
AST SERPL W P-5'-P-CCNC: 70 U/L (ref 5–45)
BILIRUB SERPL-MCNC: 3.7 MG/DL (ref 0.2–1)
BUN SERPL-MCNC: 5 MG/DL (ref 5–25)
CALCIUM SERPL-MCNC: 8.9 MG/DL (ref 8.3–10.1)
CHLORIDE SERPL-SCNC: 101 MMOL/L (ref 100–108)
CO2 SERPL-SCNC: 26 MMOL/L (ref 21–32)
CREAT SERPL-MCNC: 0.74 MG/DL (ref 0.6–1.3)
ERYTHROCYTE [DISTWIDTH] IN BLOOD BY AUTOMATED COUNT: 19 % (ref 11.6–15.1)
GFR SERPL CREATININE-BSD FRML MDRD: 100 ML/MIN/1.73SQ M
GLUCOSE SERPL-MCNC: 100 MG/DL (ref 65–140)
HCT VFR BLD AUTO: 28.9 % (ref 34.8–46.1)
HGB BLD-MCNC: 9.3 G/DL (ref 11.5–15.4)
INR PPP: 1.46 (ref 0.86–1.16)
MCH RBC QN AUTO: 36.6 PG (ref 26.8–34.3)
MCHC RBC AUTO-ENTMCNC: 32.2 G/DL (ref 31.4–37.4)
MCV RBC AUTO: 114 FL (ref 82–98)
PLATELET # BLD AUTO: 218 THOUSANDS/UL (ref 149–390)
PMV BLD AUTO: 9.7 FL (ref 8.9–12.7)
POTASSIUM SERPL-SCNC: 3.9 MMOL/L (ref 3.5–5.3)
PROT SERPL-MCNC: 7.6 G/DL (ref 6.4–8.2)
PROTHROMBIN TIME: 17.8 SECONDS (ref 12.1–14.4)
RBC # BLD AUTO: 2.54 MILLION/UL (ref 3.81–5.12)
SODIUM SERPL-SCNC: 134 MMOL/L (ref 136–145)
WBC # BLD AUTO: 5.66 THOUSAND/UL (ref 4.31–10.16)

## 2017-12-05 PROCEDURE — 85610 PROTHROMBIN TIME: CPT | Performed by: INTERNAL MEDICINE

## 2017-12-05 PROCEDURE — 97116 GAIT TRAINING THERAPY: CPT

## 2017-12-05 PROCEDURE — 85027 COMPLETE CBC AUTOMATED: CPT | Performed by: INTERNAL MEDICINE

## 2017-12-05 PROCEDURE — 97110 THERAPEUTIC EXERCISES: CPT

## 2017-12-05 PROCEDURE — 80053 COMPREHEN METABOLIC PANEL: CPT | Performed by: INTERNAL MEDICINE

## 2017-12-05 PROCEDURE — 97530 THERAPEUTIC ACTIVITIES: CPT

## 2017-12-05 RX ORDER — LORAZEPAM 2 MG/ML
0.5 INJECTION INTRAMUSCULAR EVERY 4 HOURS PRN
Status: DISCONTINUED | OUTPATIENT
Start: 2017-12-05 | End: 2017-12-10

## 2017-12-05 RX ORDER — POTASSIUM CHLORIDE 20 MEQ/1
20 TABLET, EXTENDED RELEASE ORAL 2 TIMES DAILY
Status: DISCONTINUED | OUTPATIENT
Start: 2017-12-05 | End: 2017-12-09

## 2017-12-05 RX ADMIN — GABAPENTIN 300 MG: 300 CAPSULE ORAL at 20:52

## 2017-12-05 RX ADMIN — ALBUMIN HUMAN 25 G: 0.25 SOLUTION INTRAVENOUS at 15:16

## 2017-12-05 RX ADMIN — SPIRONOLACTONE 50 MG: 50 TABLET ORAL at 09:09

## 2017-12-05 RX ADMIN — LORAZEPAM 0.5 MG: 2 INJECTION INTRAMUSCULAR; INTRAVENOUS at 21:02

## 2017-12-05 RX ADMIN — ALBUMIN HUMAN 25 G: 0.25 SOLUTION INTRAVENOUS at 09:20

## 2017-12-05 RX ADMIN — LIDOCAINE 1 PATCH: 50 PATCH CUTANEOUS at 20:51

## 2017-12-05 RX ADMIN — NYSTATIN: 100000 POWDER TOPICAL at 18:19

## 2017-12-05 RX ADMIN — DICYCLOMINE HYDROCHLORIDE 10 MG: 10 CAPSULE ORAL at 06:01

## 2017-12-05 RX ADMIN — FUROSEMIDE 80 MG: 40 TABLET ORAL at 09:09

## 2017-12-05 RX ADMIN — NYSTATIN: 100000 POWDER TOPICAL at 12:14

## 2017-12-05 RX ADMIN — LORAZEPAM 0.5 MG: 2 INJECTION INTRAMUSCULAR; INTRAVENOUS at 12:11

## 2017-12-05 RX ADMIN — POTASSIUM CHLORIDE 20 MEQ: 1500 TABLET, EXTENDED RELEASE ORAL at 18:14

## 2017-12-05 RX ADMIN — FUROSEMIDE 80 MG: 40 TABLET ORAL at 15:38

## 2017-12-05 RX ADMIN — GABAPENTIN 300 MG: 300 CAPSULE ORAL at 09:09

## 2017-12-05 RX ADMIN — NYSTATIN: 100000 POWDER TOPICAL at 21:10

## 2017-12-05 RX ADMIN — GABAPENTIN 300 MG: 300 CAPSULE ORAL at 15:18

## 2017-12-05 RX ADMIN — ACETAMINOPHEN 650 MG: 325 TABLET, FILM COATED ORAL at 09:13

## 2017-12-05 RX ADMIN — ENOXAPARIN SODIUM 40 MG: 40 INJECTION SUBCUTANEOUS at 09:12

## 2017-12-05 RX ADMIN — AMITRIPTYLINE HYDROCHLORIDE 25 MG: 25 TABLET, FILM COATED ORAL at 21:00

## 2017-12-05 RX ADMIN — ALBUMIN HUMAN 25 G: 0.25 SOLUTION INTRAVENOUS at 20:52

## 2017-12-05 RX ADMIN — DICYCLOMINE HYDROCHLORIDE 10 MG: 10 CAPSULE ORAL at 15:19

## 2017-12-05 RX ADMIN — QUETIAPINE FUMARATE 50 MG: 25 TABLET ORAL at 21:00

## 2017-12-05 RX ADMIN — ONDANSETRON 4 MG: 2 INJECTION INTRAMUSCULAR; INTRAVENOUS at 13:11

## 2017-12-05 RX ADMIN — PANTOPRAZOLE SODIUM 40 MG: 40 TABLET, DELAYED RELEASE ORAL at 06:01

## 2017-12-05 RX ADMIN — ACETAMINOPHEN 650 MG: 325 TABLET, FILM COATED ORAL at 20:53

## 2017-12-05 RX ADMIN — DICYCLOMINE HYDROCHLORIDE 10 MG: 10 CAPSULE ORAL at 21:00

## 2017-12-05 NOTE — PLAN OF CARE
Problem: PHYSICAL THERAPY ADULT  Goal: Performs mobility at highest level of function for planned discharge setting  See evaluation for individualized goals  Treatment/Interventions: LE strengthening/ROM, Therapeutic exercise, Endurance training, Patient/family training, Bed mobility, Compensatory technique education, Continued evaluation  Equipment Recommended: Other (Comment) (pt needs dependent means for out of bed mobilization )       See flowsheet documentation for full assessment, interventions and recommendations  Outcome: Progressing  Prognosis: Fair  Problem List: Decreased strength, Decreased range of motion, Decreased endurance, Impaired balance, Decreased mobility, Decreased cognition, Impaired judgement, Decreased safety awareness, Obesity, Pain  Assessment: Pt  supine in bed upon my arrival  Performance of HEP supine in bed with A required from therapist for completion  Continues to require A of 2 with all transfers with cueing provided for proper technique  Progressed with OOB mobility with use of RW and A of 2, remains limited by increased pain,fatigue, anxiety  Requires constant cueing for improvement of LE sequencing  Repositioned supine in bed with A of 2  Remained supine in bed at end of treatment session  PT will continue to recommend STR upon d/c for continued improvement of noted impairments above  Barriers to Discharge: Inaccessible home environment, Decreased caregiver support     Recommendation: Short-term skilled PT     PT - OK to Discharge: Yes (if d/c to STR when medically stable )    See flowsheet documentation for full assessment

## 2017-12-05 NOTE — PHYSICAL THERAPY NOTE
Physical Therapy Progress Note     12/05/17 1450   Pain Assessment   Pain Assessment 0-10   Pain Score 4   Pain Type Chronic pain   Pain Location Abdomen   Pain Orientation Right;Upper   Hospital Pain Intervention(s) Ambulation/increased activity;Repositioned   Response to Interventions Tolerated  Restrictions/Precautions   Other Precautions Fall Risk;Pain   General   Chart Reviewed Yes   Response to Previous Treatment Patient reporting fatigue but able to participate  Family/Caregiver Present No   Subjective   Subjective Willing to participate in therapy this PM    Bed Mobility   Supine to Sit 2  Maximal assistance   Additional items Assist x 1;HOB elevated; Bedrails; Increased time required;Verbal cues;LE management   Sit to Supine 2  Maximal assistance   Additional items Assist x 2;Bedrails; Increased time required;Verbal cues;LE management;Leg    Transfers   Sit to Stand 2  Maximal assistance   Additional items Assist x 2;Bedrails; Increased time required;Verbal cues  (elevated bed height, multiple attempts for completion)   Stand to Sit 3  Moderate assistance   Additional items Assist x 2;Armrests; Increased time required;Verbal cues   Ambulation/Elevation   Gait pattern Decreased foot clearance; Antalgic; Short stride; Wide LEONIE   Gait Assistance 3  Moderate assist   Additional items Assist x 2;Verbal cues; Tactile cues   Assistive Device Bariatric Rolling walker   Distance 5'   Balance   Static Sitting Fair   Dynamic Sitting Fair   Static Standing Poor -   Dynamic Standing Poor -   Ambulatory Poor -   Endurance Deficit   Endurance Deficit Yes   Endurance Deficit Description fatigue/pain   Activity Tolerance   Activity Tolerance Patient limited by fatigue;Patient limited by pain  (anxiety)   Nurse Made Aware Yes   Exercises   THR Supine;10 reps;AAROM; Bilateral   Assessment   Prognosis Fair   Problem List Decreased strength;Decreased range of motion;Decreased endurance; Impaired balance;Decreased mobility; Decreased cognition; Impaired judgement;Decreased safety awareness; Obesity;Pain   Assessment Pt  supine in bed upon my arrival  Performance of HEP supine in bed with A required from therapist for completion  Continues to require A of 2 with all transfers with cueing provided for proper technique  Progressed with OOB mobility with use of RW and A of 2, remains limited by increased pain,fatigue, anxiety  Requires constant cueing for improvement of LE sequencing  Repositioned supine in bed with A of 2  Remained supine in bed at end of treatment session  PT will continue to recommend STR upon d/c for continued improvement of noted impairments above  Barriers to Discharge Inaccessible home environment;Decreased caregiver support   Goals   Patient Goals To go to rehab  STG Expiration Date 12/13/17   Treatment Day 5   Plan   Treatment/Interventions LE strengthening/ROM; Functional transfer training; Therapeutic exercise; Endurance training;Bed mobility;Gait training;Spoke to nursing;Spoke to case management   Progress Slow progress, decreased activity tolerance   PT Frequency 5x/wk; Weekend  (1x on wknd)   Recommendation   Recommendation Short-term skilled PT   Equipment Recommended Walker  (RW)   PT - OK to Discharge Yes  (if d/c to STR when medically stable )     Arnaldo Rey PTA

## 2017-12-05 NOTE — CASE MANAGEMENT
Continued Stay Review    Date:    12/5/2017    Vital Signs: /60   Pulse 101   Temp (!) 101 1 °F (38 4 °C) (Tympanic)   Resp 19   Wt (!) 161 kg (354 lb 8 oz)   SpO2 96%   BMI 50 87 kg/m²     Medications:   Scheduled Meds:   albumin human 25 g Intravenous TID   amitriptyline 25 mg Oral HS   dicyclomine 10 mg Oral 4x Daily (AC & HS)   enoxaparin 40 mg Subcutaneous Daily   furosemide 80 mg Oral BID (diuretic)   gabapentin 300 mg Oral TID   lactulose 20 g Oral Daily   lidocaine 1 patch Transdermal Daily   metolazone 5 mg Oral Once per day on Mon Thu   nystatin  Topical 4x Daily   pantoprazole 40 mg Oral Early Morning   QUEtiapine 50 mg Oral HS   spironolactone 50 mg Oral BID     Continuous Infusions:    PRN Meds:   acetaminophen    aluminum-magnesium hydroxide-simethicone    LORazepam    ondansetron    Abnormal Labs/Diagnostic Results:    H/H   9 3/28 9  NA  134  AST   70  Alk phos    119  Albumin   2 3  Total bili   3 70    Age/Sex: 36 y o  female     1  Assessment/Plan:    Anasarca  Secondary to alcoholic liver disease/cirrhosis  Furosemide has been held the last several doses due to hypotension  Will give IV albumin to see if this helps with hypotension  2  Alcoholic cirrhosis  3  Bipolar depression  Stable on quetiapine and amitriptyline  4  Morbid obesity  Needs to lose more weight  5  Hypokalemia  Will discontinue doses of potassium as furosemide has been held last several doses  6  Noncompliance with medical regimen  Patient was still consuming alcohol present to arrival  Ambulatory dysfunction  Discharge when patient:  The patient will continue to require additional inpatient hospital stay due to anasarca    Discharge Plan:    [facility]      [address] [place] [address] in the [facility] by Orlando Gomez for 2017  Network Utilization Review Department  Phone: [phone];  Fax [phone]  ATTENTION: The Network Utilization Review Department is now centralized for our 7 Facilities  Make a note that we have a new phone and fax numbers for our Department  Please call with any questions or concerns to [phone] and carefully follow the prompts so that you are directed to the right person  All voicemails are confidential  Fax any determinations, approvals, denials, and requests for initial or continue stay review clinical to [phone]  Due to HIGH CALL volume, it would be easier if you could please send faxed requests to expedite your requests and in part, help us provide discharge notifications faster

## 2017-12-05 NOTE — PROGRESS NOTES
[facility] Internal [facility] Progress Note  Patient: Tonia Lund 36 y o  female   MRN: [id number]  PCP: Sita Vogel MD  Unit/Bed#: [facility] 87 226-01 Encounter: [id number]  Date Of Visit: 12/05/17    Assessment  Principal Problem:    Anasarca  Active Problems:    Ambulatory dysfunction    Recurrent falls    Chronic pain syndrome    Morbid obesity due to excess calories ([facility] [address] )    Peripheral edema    Hypokalemia    Other bipolar disorder ([facility] [address] )    Noncompliance with medication regimen    Alcoholic liver disease ([facility] [address] )    Febrile illness  Resolved Problems:    * No resolved hospital problems  *        Plan  1  Anasarca  Secondary to alcoholic liver disease  Blood pressure improved with the addition of IV albumin, continue furosemide metolazone and spironolactone  2  Alcoholic liver disease  Patient continues to consume alcohol  Now having withdrawal symptoms  Will order Ativan  3  Hypokalemia  Will restart potassium now that diuretics have been administered with adequate blood pressures  4  Bipolar depression  Mood remains stable on amitriptyline and quetiapine  5  Morbid obesity  Patient needs to be compliant with her dietary and medication regimens and lose weight  6  Ambulatory dysfunction  Discharge to rehab when available    VTE Pharmacologic Prophylaxis: Enoxaparin (Lovenox)    Patient Centered Rounds: I have performed bedside rounds with nursing staff today  Discussions with Specialists or Other Care Team Provider:     Education and Discussions with Family / Patient:     Time Spent for Care: 30 mins  More than 50% of total time spent on counseling and coordination of care as described above      Current Length of Stay: 10 day(s)    Current Patient Status: Inpatient   Certification Statement: The patient will continue to require additional inpatient hospital stay due to anasarca, awaiting placement    Discharge Plan / Estimated Discharge Date:     Code Status: Level 1 - Full Code  ______________________________________________________________________________    Subjective:   Patient seen and examined  Complains of alcohol withdrawal symptoms and wanted to go home to drink more alcohol  Also febrile this morning  Objective:   Vitals: Blood pressure 122/60, pulse 101, temperature (!) 101 1 °F (38 4 °C), temperature source Tympanic, resp  rate 19, weight (!) 161 kg (354 lb 8 oz), SpO2 96 %  Physical Exam:   General appearance: alert, appears older than stated age, cooperative, no distress and morbidly obese  Head: Normocephalic, without obvious abnormality, atraumatic  Lungs: diminished breath sounds bibasilar  Heart: regular rate and rhythm  Abdomen: Soft obese nontender positive bowel sounds  Back: negative, range of motion normal  Extremities: edema +2-3 lower extremities bilaterally  Neurologic: Grossly normal    Additional Data:   Labs:    Results from last 7 days  Lab Units 12/05/17  0633 12/03/17  0526 11/30/17  0615 11/29/17  0505   WBC Thousand/uL 5 66 6 12 6 37 8 83   HEMOGLOBIN g/dL 9 3* 8 9* 8 6* 8 8*   HEMATOCRIT % 28 9* 27 9* 26 2* 26 8*   MCV fL 114* 114* 112* 111*   PLATELETS Thousands/uL 218 189 177 213   INR  1 46*  --  1 62* 1 57*       Results from last 7 days  Lab Units 12/05/17  0633 12/04/17  0618 12/03/17 0527 11/30/17  0615 11/29/17  0505   SODIUM mmol/L 134* 136 136 135* 135*   POTASSIUM mmol/L 3 9 4 6 4 6 3 6 3 7   CHLORIDE mmol/L 101 105 103 102 102   CO2 mmol/L 26 26 28 30 27   ANION GAP mmol/L 7 5 5 3* 6   BUN mg/dL 5 4* 4* 2* 2*   CREATININE mg/dL 0 74 0 75 0 83 0 83 0 79   CALCIUM mg/dL 8 9 8 2* 7 9* 7 9* 8 0*   ALBUMIN g/dL 2 3* 1 2* 1 2* 1 2* 1 2*   BILIRUBIN TOTAL mg/dL 3 70* 2 68* 2 84* 3 22* 3 56*   ALK PHOS U/L 119* 125* 128* 134* 136*   ALT U/L 22 21 20 24 25   AST U/L 70* 75* 85* 90* 90*   EGFR ml/min/1 73sq m 100 98 87 87 92   GLUCOSE RANDOM mg/dL 100 103 77 87 78     * I Have Reviewed All Lab Data Listed Above      Cultures: Imaging:  Imaging Reports Reviewed Today Include:       Scheduled Meds:  albumin human 25 g Intravenous TID   amitriptyline 25 mg Oral HS   dicyclomine 10 mg Oral 4x Daily (AC & HS)   enoxaparin 40 mg Subcutaneous Daily   furosemide 80 mg Oral BID (diuretic)   gabapentin 300 mg Oral TID   lactulose 20 g Oral Daily   lidocaine 1 patch Transdermal Daily   metolazone 5 mg Oral Once per day on Mon Thu   nystatin  Topical 4x Daily   pantoprazole 40 mg Oral Early Morning   QUEtiapine 50 mg Oral HS   spironolactone 50 mg Oral BID     Continuous Infusions:   PRN Meds:    acetaminophen    aluminum-magnesium hydroxide-simethicone    ondansetron     Kp Campbell DO

## 2017-12-05 NOTE — PROGRESS NOTES
Pt temp is 101 0F at current time  Pt declines Tylenol d/t liver function  Pt also declines ice packs stating "they will only make me worse"  Education done regarding ways to decrease core body temp  Pt states "maybe we can try that later"

## 2017-12-06 ENCOUNTER — APPOINTMENT (INPATIENT)
Dept: RADIOLOGY | Facility: HOSPITAL | Age: 42
DRG: 280 | End: 2017-12-06
Payer: COMMERCIAL

## 2017-12-06 LAB
ALBUMIN SERPL BCP-MCNC: 2.4 G/DL (ref 3.5–5)
ALP SERPL-CCNC: 91 U/L (ref 46–116)
ALT SERPL W P-5'-P-CCNC: 18 U/L (ref 12–78)
ANION GAP SERPL CALCULATED.3IONS-SCNC: 7 MMOL/L (ref 4–13)
AST SERPL W P-5'-P-CCNC: 68 U/L (ref 5–45)
BILIRUB SERPL-MCNC: 3.12 MG/DL (ref 0.2–1)
BILIRUB UR QL STRIP: NEGATIVE
BUN SERPL-MCNC: 4 MG/DL (ref 5–25)
CALCIUM SERPL-MCNC: 8.6 MG/DL (ref 8.3–10.1)
CHLORIDE SERPL-SCNC: 101 MMOL/L (ref 100–108)
CLARITY UR: ABNORMAL
CO2 SERPL-SCNC: 26 MMOL/L (ref 21–32)
COLOR UR: YELLOW
CREAT SERPL-MCNC: 0.67 MG/DL (ref 0.6–1.3)
GFR SERPL CREATININE-BSD FRML MDRD: 108 ML/MIN/1.73SQ M
GLUCOSE SERPL-MCNC: 59 MG/DL (ref 65–140)
GLUCOSE UR STRIP-MCNC: NEGATIVE MG/DL
HGB UR QL STRIP.AUTO: NEGATIVE
KETONES UR STRIP-MCNC: NEGATIVE MG/DL
LEUKOCYTE ESTERASE UR QL STRIP: NEGATIVE
NITRITE UR QL STRIP: NEGATIVE
PH UR STRIP.AUTO: 6 [PH] (ref 4.5–8)
POTASSIUM SERPL-SCNC: 4 MMOL/L (ref 3.5–5.3)
PROT SERPL-MCNC: 7 G/DL (ref 6.4–8.2)
PROT UR STRIP-MCNC: NEGATIVE MG/DL
SODIUM SERPL-SCNC: 134 MMOL/L (ref 136–145)
SP GR UR STRIP.AUTO: 1.01 (ref 1–1.03)
UROBILINOGEN UR QL STRIP.AUTO: 4 E.U./DL

## 2017-12-06 PROCEDURE — 71020 HB CHEST X-RAY 2VW FRONTAL&LATL: CPT

## 2017-12-06 PROCEDURE — 97530 THERAPEUTIC ACTIVITIES: CPT

## 2017-12-06 PROCEDURE — 87077 CULTURE AEROBIC IDENTIFY: CPT | Performed by: INTERNAL MEDICINE

## 2017-12-06 PROCEDURE — 81003 URINALYSIS AUTO W/O SCOPE: CPT | Performed by: INTERNAL MEDICINE

## 2017-12-06 PROCEDURE — 87040 BLOOD CULTURE FOR BACTERIA: CPT | Performed by: INTERNAL MEDICINE

## 2017-12-06 PROCEDURE — 97110 THERAPEUTIC EXERCISES: CPT

## 2017-12-06 PROCEDURE — 80053 COMPREHEN METABOLIC PANEL: CPT | Performed by: INTERNAL MEDICINE

## 2017-12-06 PROCEDURE — 87147 CULTURE TYPE IMMUNOLOGIC: CPT | Performed by: INTERNAL MEDICINE

## 2017-12-06 RX ORDER — FUROSEMIDE 10 MG/ML
80 INJECTION INTRAMUSCULAR; INTRAVENOUS
Status: DISCONTINUED | OUTPATIENT
Start: 2017-12-07 | End: 2017-12-13

## 2017-12-06 RX ORDER — ALBUMIN (HUMAN) 12.5 G/50ML
25 SOLUTION INTRAVENOUS 2 TIMES DAILY
Status: DISCONTINUED | OUTPATIENT
Start: 2017-12-07 | End: 2017-12-08

## 2017-12-06 RX ADMIN — GABAPENTIN 300 MG: 300 CAPSULE ORAL at 15:44

## 2017-12-06 RX ADMIN — GABAPENTIN 300 MG: 300 CAPSULE ORAL at 08:50

## 2017-12-06 RX ADMIN — POTASSIUM CHLORIDE 20 MEQ: 1500 TABLET, EXTENDED RELEASE ORAL at 08:50

## 2017-12-06 RX ADMIN — POTASSIUM CHLORIDE 20 MEQ: 1500 TABLET, EXTENDED RELEASE ORAL at 17:13

## 2017-12-06 RX ADMIN — SPIRONOLACTONE 50 MG: 50 TABLET ORAL at 17:13

## 2017-12-06 RX ADMIN — SPIRONOLACTONE 50 MG: 50 TABLET ORAL at 08:50

## 2017-12-06 RX ADMIN — FUROSEMIDE 80 MG: 40 TABLET ORAL at 15:44

## 2017-12-06 RX ADMIN — QUETIAPINE FUMARATE 50 MG: 25 TABLET ORAL at 21:00

## 2017-12-06 RX ADMIN — NYSTATIN: 100000 POWDER TOPICAL at 12:05

## 2017-12-06 RX ADMIN — GABAPENTIN 300 MG: 300 CAPSULE ORAL at 21:00

## 2017-12-06 RX ADMIN — LACTULOSE 20 G: 20 SOLUTION ORAL at 08:49

## 2017-12-06 RX ADMIN — ACETAMINOPHEN 650 MG: 325 TABLET, FILM COATED ORAL at 08:49

## 2017-12-06 RX ADMIN — DICYCLOMINE HYDROCHLORIDE 10 MG: 10 CAPSULE ORAL at 06:21

## 2017-12-06 RX ADMIN — LORAZEPAM 0.5 MG: 2 INJECTION INTRAMUSCULAR; INTRAVENOUS at 06:21

## 2017-12-06 RX ADMIN — NYSTATIN: 100000 POWDER TOPICAL at 08:51

## 2017-12-06 RX ADMIN — LORAZEPAM 0.5 MG: 2 INJECTION INTRAMUSCULAR; INTRAVENOUS at 19:03

## 2017-12-06 RX ADMIN — FUROSEMIDE 80 MG: 40 TABLET ORAL at 08:50

## 2017-12-06 RX ADMIN — NYSTATIN: 100000 POWDER TOPICAL at 17:09

## 2017-12-06 RX ADMIN — ENOXAPARIN SODIUM 40 MG: 40 INJECTION SUBCUTANEOUS at 08:50

## 2017-12-06 RX ADMIN — NYSTATIN: 100000 POWDER TOPICAL at 21:03

## 2017-12-06 RX ADMIN — DICYCLOMINE HYDROCHLORIDE 10 MG: 10 CAPSULE ORAL at 12:04

## 2017-12-06 RX ADMIN — ONDANSETRON 4 MG: 2 INJECTION INTRAMUSCULAR; INTRAVENOUS at 08:52

## 2017-12-06 RX ADMIN — LIDOCAINE 1 PATCH: 50 PATCH CUTANEOUS at 20:59

## 2017-12-06 RX ADMIN — ALBUMIN HUMAN 25 G: 0.25 SOLUTION INTRAVENOUS at 08:50

## 2017-12-06 RX ADMIN — PANTOPRAZOLE SODIUM 40 MG: 40 TABLET, DELAYED RELEASE ORAL at 06:21

## 2017-12-06 RX ADMIN — ALBUMIN HUMAN 25 G: 0.25 SOLUTION INTRAVENOUS at 15:44

## 2017-12-06 RX ADMIN — LORAZEPAM 0.5 MG: 2 INJECTION INTRAMUSCULAR; INTRAVENOUS at 12:11

## 2017-12-06 RX ADMIN — AMITRIPTYLINE HYDROCHLORIDE 25 MG: 25 TABLET, FILM COATED ORAL at 21:00

## 2017-12-06 NOTE — PLAN OF CARE
DISCHARGE PLANNING - CARE MANAGEMENT     Discharge to post-acute care or home with appropriate resources Progressing        GASTROINTESTINAL - ADULT     Maintains or returns to baseline bowel function Progressing     Maintains adequate nutritional intake Progressing        METABOLIC, FLUID AND ELECTROLYTES - ADULT     Electrolytes maintained within normal limits Progressing     Fluid balance maintained Progressing     Glucose maintained within target range Progressing        Nutrition/Hydration-ADULT     Nutrient/Hydration intake appropriate for improving, restoring or maintaining nutritional needs Progressing        Potential for Falls     Patient will remain free of falls Progressing        Prexisting or High Potential for Compromised Skin Integrity     Skin integrity is maintained or improved Progressing        SKIN/TISSUE INTEGRITY - ADULT     Skin integrity remains intact Progressing

## 2017-12-06 NOTE — PROGRESS NOTES
[facility] Internal Medicine Progress Note  Patient: Ml Shore 36 y o  female   MRN: [id number]  PCP: Florence Dior MD  Unit/Bed#: Shira Man mary jo [facility] 87 226-01 Encounter: [id number]  Date Of Visit: 12/06/17    Assessment  Principal Problem:    Anasarca  Active Problems:    Ambulatory dysfunction    Recurrent falls    Chronic pain syndrome    Morbid obesity due to excess calories ([facility] [address] )    Peripheral edema    Hypokalemia    Other bipolar disorder ([facility] [address] )    Noncompliance with medication regimen    Alcoholic liver disease ([facility] [address] )    Febrile illness  Resolved Problems:    * No resolved hospital problems  *        Plan  1  Anasarca  Secondary to hypoalbuminemia, alcoholic liver disease  Patient tolerating current dose of furosemide, will change to IV Lasix 80 mg b i d   Continue albumin  2  Alcoholic liver disease  Patient continues to consume alcohol  Lorazepam p r n  3  Febrile illness  Review chest x-ray, appears to have volume overload/pulmonary edema  No focal signs of infiltrate  Infectious Disease consulted follow up on blood cultures  4  Hypokalemia  Continue to replete with diuretics  5  Bipolar depression  Mood stable on quetiapine amitriptyline  6  Ambulatory dysfunction  Agreeable to discharge to rehab when stable  7  Morbid obesity  Needs to lose more weight  VTE Pharmacologic Prophylaxis: Enoxaparin (Lovenox)    Patient Centered Rounds: I have performed bedside rounds with nursing staff today  Discussions with Specialists or Other Care Team Provider:  Infectious Diseases    Education and Discussions with Family / Patient:     Time Spent for Care: 30 mins  More than 50% of total time spent on counseling and coordination of care as described above      Current Length of Stay: 11 day(s)    Current Patient Status: Inpatient   Certification Statement: The patient will continue to require additional inpatient hospital stay due to fever volume overload    Discharge Plan / Estimated Discharge Date:     Code Status: Level 1 - Full Code  ______________________________________________________________________________    Subjective:   Patient seen and examined  Still febrile at times  Less anxious with the addition of lorazepam   Does have productive cough  Objective:   Vitals: Blood pressure 119/56, pulse 94, temperature 98 2 °F (36 8 °C), temperature source Tympanic, resp  rate 20, weight (!) 159 kg (350 lb 5 oz), SpO2 92 %  Physical Exam:   General appearance: alert, appears older than stated age, cooperative, no distress and morbidly obese  Head: Normocephalic, without obvious abnormality, atraumatic  Lungs: diminished breath sounds bilaterally  Heart: regular rate and rhythm  Abdomen: Soft, massively obese  Back: negative, range of motion normal  Extremities: edema +3 lower extremities bilaterally  Neurologic: Grossly normal    Additional Data:   Labs:    Results from last 7 days  Lab Units 12/05/17  0633 12/03/17  0526 11/30/17  0615   WBC Thousand/uL 5 66 6 12 6 37   HEMOGLOBIN g/dL 9 3* 8 9* 8 6*   HEMATOCRIT % 28 9* 27 9* 26 2*   MCV fL 114* 114* 112*   PLATELETS Thousands/uL 218 189 177   INR  1 46*  --  1 62*       Results from last 7 days  Lab Units 12/06/17  0508 12/05/17  0633 12/04/17  0618 12/03/17  0527 11/30/17  0615   SODIUM mmol/L 134* 134* 136 136 135*   POTASSIUM mmol/L 4 0 3 9 4 6 4 6 3 6   CHLORIDE mmol/L 101 101 105 103 102   CO2 mmol/L 26 26 26 28 30   ANION GAP mmol/L 7 7 5 5 3*   BUN mg/dL 4* 5 4* 4* 2*   CREATININE mg/dL 0 67 0 74 0 75 0 83 0 83   CALCIUM mg/dL 8 6 8 9 8 2* 7 9* 7 9*   ALBUMIN g/dL 2 4* 2 3* 1 2* 1 2* 1 2*   BILIRUBIN TOTAL mg/dL 3 12* 3 70* 2 68* 2 84* 3 22*   ALK PHOS U/L 91 119* 125* 128* 134*   ALT U/L 18 22 21 20 24   AST U/L 68* 70* 75* 85* 90*   EGFR ml/min/1 73sq m 108 100 98 87 87   GLUCOSE RANDOM mg/dL 59* 100 103 77 87                                  * I Have Reviewed All Lab Data Listed Above      Cultures:           Imaging:  Imaging Reports Reviewed Today Include:       Scheduled Meds:  albumin human 25 g Intravenous TID   amitriptyline 25 mg Oral HS   dicyclomine 10 mg Oral 4x Daily (AC & HS)   enoxaparin 40 mg Subcutaneous Daily   furosemide 80 mg Oral BID (diuretic)   gabapentin 300 mg Oral TID   lactulose 20 g Oral Daily   lidocaine 1 patch Transdermal Daily   metolazone 5 mg Oral Once per day on Mon Thu   nystatin  Topical 4x Daily   pantoprazole 40 mg Oral Early Morning   potassium chloride 20 mEq Oral BID   QUEtiapine 50 mg Oral HS   spironolactone 50 mg Oral BID     Continuous Infusions:   PRN Meds:    acetaminophen    aluminum-magnesium hydroxide-simethicone    LORazepam    ondansetron     Ml Flattery, DO

## 2017-12-06 NOTE — PLAN OF CARE
Problem: PHYSICAL THERAPY ADULT  Goal: Performs mobility at highest level of function for planned discharge setting  See evaluation for individualized goals  Treatment/Interventions: LE strengthening/ROM, Therapeutic exercise, Endurance training, Patient/family training, Bed mobility, Compensatory technique education, Continued evaluation  Equipment Recommended: Other (Comment) (pt needs dependent means for out of bed mobilization )       See flowsheet documentation for full assessment, interventions and recommendations  Outcome: Progressing  Prognosis: Fair  Problem List: Decreased strength, Decreased range of motion, Decreased endurance, Impaired balance, Decreased mobility, Obesity, Pain, Decreased cognition, Impaired judgement, Decreased safety awareness  Assessment: Pt  supine in bed upon my arrival  Report from nursing staff pt  running a fever today with more lethargic state  Pt  willing to perform bed level therapy session with therapist  Performance of HEP supine with A of therapist for completion  Repositioned supine in bed at end of treatment session  PT will continue to recommend STR upon d/c for continued improvement of noted impairments above  Barriers to Discharge: Inaccessible home environment, Decreased caregiver support     Recommendation: Short-term skilled PT     PT - OK to Discharge: Yes (if d/c to STR when medically stable )    See flowsheet documentation for full assessment

## 2017-12-06 NOTE — PHYSICAL THERAPY NOTE
Physical Therapy Progress Note     12/06/17 1333   Pain Assessment   Pain Assessment 0-10   Pain Score 8   Pain Type Chronic pain   Pain Location Abdomen   Hospital Pain Intervention(s) Ambulation/increased activity;Repositioned   Response to Interventions Tolerated  Restrictions/Precautions   Other Precautions Fall Risk;Pain;Cognitive; Bed Alarm   General   Chart Reviewed Yes   Response to Previous Treatment Patient reporting fatigue but able to participate  Family/Caregiver Present No   Subjective   Subjective Willing to participate in therapy this PM    Endurance Deficit   Endurance Deficit Yes   Endurance Deficit Description fatigue/pain   Activity Tolerance   Activity Tolerance Patient limited by fatigue;Patient limited by pain   Nurse Made Aware Yes   Exercises   THR Supine;10 reps;AAROM; Bilateral   Assessment   Prognosis Fair   Problem List Decreased strength;Decreased range of motion;Decreased endurance; Impaired balance;Decreased mobility;Obesity;Pain;Decreased cognition; Impaired judgement;Decreased safety awareness   Assessment Pt  supine in bed upon my arrival  Report from nursing staff pt  running a fever today with more lethargic state  Pt  willing to perform bed level therapy session with therapist  Performance of HEP supine with A of therapist for completion  Repositioned supine in bed at end of treatment session  PT will continue to recommend STR upon d/c for continued improvement of noted impairments above  Barriers to Discharge Inaccessible home environment;Decreased caregiver support   Goals   Patient Goals To go to rehab  STG Expiration Date 12/13/17   Treatment Day 6   Plan   Treatment/Interventions Functional transfer training;LE strengthening/ROM; Therapeutic exercise; Endurance training;Bed mobility;Spoke to nursing;Spoke to case management   Progress Slow progress, decreased activity tolerance   PT Frequency 5x/wk   Recommendation   Recommendation Short-term skilled PT   PT - OK to Discharge Yes  (if d/c to STR when medically stable )     Angeline Thomas, PTA

## 2017-12-06 NOTE — CONSULTS
Consultation - Infectious Disease   Naya Valdez 36 y o  female MRN: [id number]  Unit/Bed#: [place] [id number] [id number] Encounter: [id number]      IMPRESSION & RECOMMENDATIONS:   Impression/Recommendations:  1  Fever  Consider pneumonia given chest pain, cough, immobility  Consider UTI given dysuria  Consider bacteremia given duration of hospitalization  Consider noninfectious (medication, atelectasis)  Dopplerr LLE 11/30 negative for DVT  Clinically stable and non-toxic  Rec:   · Continue to follow closely off antibiotics  · Agree with checking CXR  · Check UA with reflex to culture  · Check PA/Lat CXR  · Follow temperatures and white blood cell count closely  · Supportive care as per the primary service  Further workup and ultimate treatment will be determined by the above-mentioned studies and the patient's overall clinical course    2  Anasarca  Due to chronic liver disease  Rec:   · Continue diuresis per primary team    3  Alcohol abuse  Seems too late from last drink to have EtOH withdrawal  Rec:   · Supportive care as per the primary service    4  MO with ambulatory dysfunction    Discussed in detail with Dr Barry Curran    Antibiotics:  None    Thank you for this consultation  We will follow along with you  HISTORY OF PRESENT ILLNESS:  Reason for Consult: Fever    HPI: Naya Valdez is a 36 y o  female  With a history of alcohol abuse, chronic fatty liver disease, and morbid obesity who initially presented to the hospital on 11/25 after a fall at home  She was found to have anasarca and lower extremity edema on exam and was started on diuretics  She was also found to have acute encephalopathy thought possibly due to hyperammonemia  Her hospitalization has been largely notable for diuresis and GI evaluation  Over the past 72 hours the patient has started to have intermittent fevers  Her biggest complaint is that she complains of chest pain and cough which are new over the past several days    She also complains of dysuria  She has diarrhea but is noted to be on lactulose  Given her fevers we are asked to comment on further evaluation and management  REVIEW OF SYSTEMS:  As per HPI  A complete system-based review of systems is otherwise negative  PAST MEDICAL HISTORY:  Past Medical History:   Diagnosis Date    Arthritis     Chronic pain     Fibromyalgia     Herniated cervical disc     Hypertension     Obesities, morbid ([address] )     Psychiatric disorder     Sarcoidosis     Scoliosis      Past Surgical History:   Procedure Laterality Date    GASTRIC BYPASS      HERNIA REPAIR      TOTAL ABDOMINAL HYSTERECTOMY W/ BILATERAL SALPINGOOPHORECTOMY      TUBAL LIGATION         FAMILY HISTORY:  Non-contributory    SOCIAL HISTORY:  History   Alcohol Use    Yes     Comment: occassional     History   Drug Use No     History   Smoking Status    Never Smoker   Smokeless Tobacco    Never Used       ALLERGIES:  Allergies   Allergen Reactions    Pregabalin Swelling    Clonazepam Anxiety       MEDICATIONS:  All current active medications have been reviewed      PHYSICAL EXAM:  Vitals:  HR:  [] 104  Resp:  [18-20] 20  BP: (112-134)/(56-58) 119/56  SpO2:  [91 %-95 %] 91 %  Temp (24hrs), Av 6 °F (38 1 °C), Min:99 4 °F (37 4 °C), Max:101 5 °F (38 6 °C)  Current: Temperature: 100 4 °F (38 °C)     Physical Exam:  General:  Well-nourished, well-developed, in no acute distress  Eyes:  Conjunctive clear with no hemorrhages or effusions  Oropharynx:  No ulcers, no lesions  Neck:  Supple, no lymphadenopathy  Lungs:  Clear to auscultation bilaterally anteriorly although limited exam as patient refuses to sit forward, no accessory muscle use  Cardiac:  Regular rate and rhythm, no murmurs  Abdomen:  Soft, non-tender, non-distended  Extremities:  No peripheral cyanosis, clubbing, or edema  Skin:  No rashes, no ulcers  Neurological:  Moves all four extremities spontaneously, sensation grossly intact    LABS, IMAGING, & OTHER STUDIES:  Lab Results:  I have personally reviewed pertinent labs  Results from last 7 days  Lab Units 12/06/17  0508 12/05/17  0633 12/04/17  0618   SODIUM mmol/L 134* 134* 136   POTASSIUM mmol/L 4 0 3 9 4 6   CHLORIDE mmol/L 101 101 105   CO2 mmol/L 26 26 26   ANION GAP mmol/L 7 7 5   BUN mg/dL 4* 5 4*   CREATININE mg/dL 0 67 0 74 0 75   EGFR ml/min/1 73sq m 108 100 98   GLUCOSE RANDOM mg/dL 59* 100 103   CALCIUM mg/dL 8 6 8 9 8 2*   AST U/L 68* 70* 75*   ALT U/L 18 22 21   ALK PHOS U/L 91 119* 125*   TOTAL PROTEIN g/dL 7 0 7 6 6 6   ALBUMIN g/dL 2 4* 2 3* 1 2*   BILIRUBIN TOTAL mg/dL 3 12* 3 70* 2 68*       Results from last 7 days  Lab Units 12/05/17  0633 12/03/17  0526 11/30/17  0615   WBC Thousand/uL 5 66 6 12 6 37   HEMOGLOBIN g/dL 9 3* 8 9* 8 6*   PLATELETS Thousands/uL 218 189 177           Imaging Studies:   I have personally reviewed pertinent imaging study reports and images in PACS  CT C/A/P 12/1 improved upper lobe infiltrates, small pelvic ascites, hepatomegaly and steatosis    EKG, Pathology, and Other Studies:   I have personally reviewed pertinent reports

## 2017-12-06 NOTE — OCCUPATIONAL THERAPY NOTE
Occupational Therapy  OT tx session cancelled  Pt refused due to increased fatigue, pain and fever  Will continue to follow as available   SANTA Alejandro

## 2017-12-07 PROBLEM — D64.9 ANEMIA: Status: ACTIVE | Noted: 2017-12-07

## 2017-12-07 PROBLEM — R78.81 POSITIVE BLOOD CULTURE: Status: ACTIVE | Noted: 2017-12-07

## 2017-12-07 LAB
ALBUMIN SERPL BCP-MCNC: 2.3 G/DL (ref 3.5–5)
ALP SERPL-CCNC: 79 U/L (ref 46–116)
ALT SERPL W P-5'-P-CCNC: 16 U/L (ref 12–78)
ANION GAP SERPL CALCULATED.3IONS-SCNC: 6 MMOL/L (ref 4–13)
AST SERPL W P-5'-P-CCNC: 67 U/L (ref 5–45)
BILIRUB SERPL-MCNC: 3.3 MG/DL (ref 0.2–1)
BUN SERPL-MCNC: 7 MG/DL (ref 5–25)
CALCIUM SERPL-MCNC: 8.4 MG/DL (ref 8.3–10.1)
CHLORIDE SERPL-SCNC: 102 MMOL/L (ref 100–108)
CO2 SERPL-SCNC: 28 MMOL/L (ref 21–32)
CREAT SERPL-MCNC: 0.69 MG/DL (ref 0.6–1.3)
ERYTHROCYTE [DISTWIDTH] IN BLOOD BY AUTOMATED COUNT: 19.1 % (ref 11.6–15.1)
GFR SERPL CREATININE-BSD FRML MDRD: 107 ML/MIN/1.73SQ M
GLUCOSE SERPL-MCNC: 76 MG/DL (ref 65–140)
HCT VFR BLD AUTO: 24.6 % (ref 34.8–46.1)
HGB BLD-MCNC: 7.9 G/DL (ref 11.5–15.4)
MCH RBC QN AUTO: 36.2 PG (ref 26.8–34.3)
MCHC RBC AUTO-ENTMCNC: 32.1 G/DL (ref 31.4–37.4)
MCV RBC AUTO: 113 FL (ref 82–98)
PLATELET # BLD AUTO: 206 THOUSANDS/UL (ref 149–390)
PMV BLD AUTO: 9.3 FL (ref 8.9–12.7)
POTASSIUM SERPL-SCNC: 4 MMOL/L (ref 3.5–5.3)
PROT SERPL-MCNC: 6.5 G/DL (ref 6.4–8.2)
RBC # BLD AUTO: 2.18 MILLION/UL (ref 3.81–5.12)
SODIUM SERPL-SCNC: 136 MMOL/L (ref 136–145)
WBC # BLD AUTO: 7.71 THOUSAND/UL (ref 4.31–10.16)

## 2017-12-07 PROCEDURE — 80053 COMPREHEN METABOLIC PANEL: CPT | Performed by: INTERNAL MEDICINE

## 2017-12-07 PROCEDURE — 85027 COMPLETE CBC AUTOMATED: CPT | Performed by: INTERNAL MEDICINE

## 2017-12-07 RX ORDER — METRONIDAZOLE 500 MG/1
500 TABLET ORAL EVERY 8 HOURS SCHEDULED
Status: DISCONTINUED | OUTPATIENT
Start: 2017-12-07 | End: 2017-12-09

## 2017-12-07 RX ADMIN — NYSTATIN: 100000 POWDER TOPICAL at 09:09

## 2017-12-07 RX ADMIN — DICYCLOMINE HYDROCHLORIDE 10 MG: 10 CAPSULE ORAL at 22:11

## 2017-12-07 RX ADMIN — LIDOCAINE 1 PATCH: 50 PATCH CUTANEOUS at 19:52

## 2017-12-07 RX ADMIN — CEFTRIAXONE 1000 MG: 1 INJECTION, SOLUTION INTRAVENOUS at 10:19

## 2017-12-07 RX ADMIN — GABAPENTIN 300 MG: 300 CAPSULE ORAL at 16:34

## 2017-12-07 RX ADMIN — GABAPENTIN 300 MG: 300 CAPSULE ORAL at 09:03

## 2017-12-07 RX ADMIN — SPIRONOLACTONE 50 MG: 50 TABLET ORAL at 18:04

## 2017-12-07 RX ADMIN — METOLAZONE 5 MG: 5 TABLET ORAL at 09:03

## 2017-12-07 RX ADMIN — METRONIDAZOLE 500 MG: 500 TABLET ORAL at 22:11

## 2017-12-07 RX ADMIN — FUROSEMIDE 80 MG: 10 INJECTION, SOLUTION INTRAMUSCULAR; INTRAVENOUS at 08:56

## 2017-12-07 RX ADMIN — POTASSIUM CHLORIDE 20 MEQ: 1500 TABLET, EXTENDED RELEASE ORAL at 18:04

## 2017-12-07 RX ADMIN — LORAZEPAM 0.5 MG: 2 INJECTION INTRAMUSCULAR; INTRAVENOUS at 16:59

## 2017-12-07 RX ADMIN — DICYCLOMINE HYDROCHLORIDE 10 MG: 10 CAPSULE ORAL at 12:27

## 2017-12-07 RX ADMIN — ENOXAPARIN SODIUM 40 MG: 40 INJECTION SUBCUTANEOUS at 09:03

## 2017-12-07 RX ADMIN — ALBUMIN HUMAN 25 G: 0.25 SOLUTION INTRAVENOUS at 18:06

## 2017-12-07 RX ADMIN — FUROSEMIDE 80 MG: 10 INJECTION, SOLUTION INTRAMUSCULAR; INTRAVENOUS at 16:42

## 2017-12-07 RX ADMIN — AMITRIPTYLINE HYDROCHLORIDE 25 MG: 25 TABLET, FILM COATED ORAL at 22:11

## 2017-12-07 RX ADMIN — ACETAMINOPHEN 650 MG: 325 TABLET, FILM COATED ORAL at 00:16

## 2017-12-07 RX ADMIN — METRONIDAZOLE 500 MG: 500 TABLET ORAL at 14:35

## 2017-12-07 RX ADMIN — SPIRONOLACTONE 50 MG: 50 TABLET ORAL at 09:03

## 2017-12-07 RX ADMIN — ALBUMIN HUMAN 25 G: 0.25 SOLUTION INTRAVENOUS at 09:03

## 2017-12-07 RX ADMIN — LORAZEPAM 0.5 MG: 2 INJECTION INTRAMUSCULAR; INTRAVENOUS at 12:27

## 2017-12-07 RX ADMIN — DICYCLOMINE HYDROCHLORIDE 10 MG: 10 CAPSULE ORAL at 16:34

## 2017-12-07 RX ADMIN — QUETIAPINE FUMARATE 50 MG: 25 TABLET ORAL at 22:12

## 2017-12-07 RX ADMIN — GABAPENTIN 300 MG: 300 CAPSULE ORAL at 22:11

## 2017-12-07 RX ADMIN — NYSTATIN: 100000 POWDER TOPICAL at 22:09

## 2017-12-07 RX ADMIN — NYSTATIN: 100000 POWDER TOPICAL at 12:27

## 2017-12-07 RX ADMIN — METRONIDAZOLE 500 MG: 500 TABLET ORAL at 11:05

## 2017-12-07 RX ADMIN — NYSTATIN: 100000 POWDER TOPICAL at 18:07

## 2017-12-07 RX ADMIN — POTASSIUM CHLORIDE 20 MEQ: 1500 TABLET, EXTENDED RELEASE ORAL at 09:03

## 2017-12-07 RX ADMIN — LACTULOSE 20 G: 20 SOLUTION ORAL at 09:03

## 2017-12-07 NOTE — PROGRESS NOTES
[facility] Internal Medicine Progress Note  Patient: Kael Chavarria 36 y o  female   MRN: [id number]  PCP: Carlos Bryant MD  Unit/Bed#: [facility] 87 226-01 Encounter: [id number]  Date Of Visit: 12/07/17    Assessment  Principal Problem:    Anasarca  Active Problems:    Ambulatory dysfunction    Recurrent falls    Chronic pain syndrome    Morbid obesity due to excess calories ([facility] )    Peripheral edema    Hypokalemia    Other bipolar disorder ([facility] )    Noncompliance with medication regimen    Alcoholic liver disease (HCC)    Febrile illness    Positive blood culture    Anemia  Resolved Problems:    * No resolved hospital problems  *        Plan  1  Anasarca  Secondary to hypoalbuminemia from underlying alcoholic liver disease  Swelling improved on IV albumin  Lasix has been changed to IV today  Monitor output  2  Alcoholic liver disease  Unfortunately still consumes to consume alcohol, lorazepam as needed  3  Febrile illness  Positive blood culture ? Contaminant vs aspiration pneumonia? Ceftriaxone and metronidazole per Infectious Diseases  4  Bipolar depression  Mood remains stable quetiapine amitriptyline  5  Morbid obesity  Needs lose weight  6  Ambulatory dysfunction  Rehab when more stable  7  Anemia  Iron studies last week demonstrates chronic disease  No evidence of acute bleeding  8  Hypokalemia  Replete with diuretics    VTE Pharmacologic Prophylaxis: Enoxaparin (Lovenox)    Patient Centered Rounds: I have performed bedside rounds with nursing staff today  Discussions with Specialists or Other Care Team Provider:     Education and Discussions with Family / Patient:     Time Spent for Care: 30 mins  More than 50% of total time spent on counseling and coordination of care as described above      Current Length of Stay: 12 day(s)    Current Patient Status: Inpatient   Certification Statement: The patient will continue to require additional inpatient hospital stay due to fever anasarca    Discharge Plan / Estimated Discharge Date: To be determined    Code Status: Level 1 - Full Code  ______________________________________________________________________________    Subjective:   Patient seen and examined  Still feeling lousy  Cough and shortness of breath at times  Legs are still swollen  Objective:   Vitals: Blood pressure 116/57, pulse (!) 112, temperature 99 5 °F (37 5 °C), temperature source Tympanic, resp  rate 22, weight (!) 157 kg (346 lb 2 oz), SpO2 95 %  Physical Exam:   General appearance: alert, cooperative and no distress  Head: Normocephalic, without obvious abnormality, atraumatic  Lungs: diminished breath sounds bilaterally  Heart: regular rate and rhythm  Abdomen: Soft obese nontender positive bowel sounds  Back: negative, range of motion normal  Extremities: edema +2-3 lower extremities bilaterally  Neurologic: Grossly normal    Additional Data:   Labs:    Results from last 7 days  Lab Units 12/07/17  0609 12/05/17  0633 12/03/17  0526   WBC Thousand/uL 7 71 5 66 6 12   HEMOGLOBIN g/dL 7 9* 9 3* 8 9*   HEMATOCRIT % 24 6* 28 9* 27 9*   MCV fL 113* 114* 114*   PLATELETS Thousands/uL 206 218 189   INR   --  1 46*  --        Results from last 7 days  Lab Units 12/07/17  0533 12/06/17  0508 12/05/17  0633 12/04/17  0618 12/03/17  0527   SODIUM mmol/L 136 134* 134* 136 136   POTASSIUM mmol/L 4 0 4 0 3 9 4 6 4 6   CHLORIDE mmol/L 102 101 101 105 103   CO2 mmol/L 28 26 26 26 28   ANION GAP mmol/L 6 7 7 5 5   BUN mg/dL 7 4* 5 4* 4*   CREATININE mg/dL 0 69 0 67 0 74 0 75 0 83   CALCIUM mg/dL 8 4 8 6 8 9 8 2* 7 9*   ALBUMIN g/dL 2 3* 2 4* 2 3* 1 2* 1 2*   BILIRUBIN TOTAL mg/dL 3 30* 3 12* 3 70* 2 68* 2 84*   ALK PHOS U/L 79 91 119* 125* 128*   ALT U/L 16 18 22 21 20   AST U/L 67* 68* 70* 75* 85*   EGFR ml/min/1 73sq m 107 108 100 98 87   GLUCOSE RANDOM mg/dL 76 59* 100 103 77                                  * I Have Reviewed All Lab Data Listed Above      Cultures:     Results from last 7 days  Lab Units 12/06/17  1116   GRAM STAIN RESULT  Gram positive cocci in clusters         Imaging:  Imaging Reports Reviewed Today Include:       Scheduled Meds:  albumin human 25 g Intravenous BID   amitriptyline 25 mg Oral HS   cefTRIAXone 1,000 mg Intravenous Q24H   dicyclomine 10 mg Oral 4x Daily (AC & HS)   enoxaparin 40 mg Subcutaneous Daily   furosemide 80 mg Intravenous BID (diuretic)   gabapentin 300 mg Oral TID   lactulose 20 g Oral Daily   lidocaine 1 patch Transdermal Daily   metolazone 5 mg Oral Once per day on Mon Thu   metroNIDAZOLE 500 mg Oral Q8H [facility]   nystatin  Topical 4x Daily   pantoprazole 40 mg Oral Early Morning   potassium chloride 20 mEq Oral BID   QUEtiapine 50 mg Oral HS   spironolactone 50 mg Oral BID     Continuous Infusions:   PRN Meds:    acetaminophen    aluminum-magnesium hydroxide-simethicone    LORazepam    ondansetron     Ricky Overall, DO

## 2017-12-07 NOTE — PROGRESS NOTES
Progress Note - Infectious Disease   Kj Verma 36 y o  female MRN: [id number]  Unit/Bed#: [facility] 2 -01 Encounter: [id number]      Impression/Recommendations:  1  Probable aspiration pneumonia  New  Possibly incubation on admission due to #5  Consider GNR/anaerobes  Low risk Pseudomonas/MRSA    Rec:  · Start ceftriaxone/Flagyl  · Follow respiratory status closely  · Follow temperatures and white blood cell count closely  · Supportive care as per the primary service    2  Sepsis  Nosocomial:  Fever and tachycardia  Likely due to #1  UA and blood cultures negative  Consider noninfectious component (medication, atelectasis)  Dopplerr LLE  negative for DVT  Clinically stable and non-toxic  Rec:       · Start antibiotics as above  · Follow up blood cultures  · Follow temperatures and white blood cell count closely  · Supportive care as per the primary service     3  Acute hypoxic respiratory insufficiency  Likely due to #1  Consider atelectasis, edema  Stable  Rec:  · Start antibiotics as above  · Encourage OOB  · Diuersis per SLIM    4  Anasarca  Due to chronic liver disease  Rec:       · Continue diuresis per primary team     5  Alcohol abuse  Seems too late from last drink to have EtOH withdrawal  Rec:       · Supportive care as per the primary service     6  MO with ambulatory dysfunction     Antibiotics:  None    Subjective:  Patient seen on AM rounds  Denies fevers, chills, or sweats  Denies nausea, vomiting, or diarrhea        Objective:  Vitals:  HR:  [] 112  Resp:  [20-22] 22  BP: (110-119)/(56-59) 116/57  SpO2:  [88 %-95 %] 95 %  Temp (24hrs), Av 7 °F (37 6 °C), Min:98 2 °F (36 8 °C), Max:100 9 °F (38 3 °C)  Current: Temperature: 99 5 °F (37 5 °C)    Physical Exam:   General:  No acute distress  Eyes:  Normal lids and conjunctivae  ENT:  Normal external ears and nose  Neck:  Neck symmetric with midline trachea  Pulmonary:  Decreased respiratory effort, scattered rhonchi anteriorly  Cardiovascular:  Regular rate and rhythm; no peripheral edema  Gastrointestinal:  No tenderness or distention  Musculoskeletal:  No digital clubbing or cyanosis  Skin:  No visible rashes; No palpable nodules  Neurologic:  Sensation grossly intact to light touch  Psychiatric:  Alert and oriented; Normal mood    Lab Results:  I have personally reviewed pertinent labs  Results from last 7 days  Lab Units 12/07/17  0533 12/06/17  0508 12/05/17  0633   SODIUM mmol/L 136 134* 134*   POTASSIUM mmol/L 4 0 4 0 3 9   CHLORIDE mmol/L 102 101 101   CO2 mmol/L 28 26 26   ANION GAP mmol/L 6 7 7   BUN mg/dL 7 4* 5   CREATININE mg/dL 0 69 0 67 0 74   EGFR ml/min/1 73sq m 107 108 100   GLUCOSE RANDOM mg/dL 76 59* 100   CALCIUM mg/dL 8 4 8 6 8 9   AST U/L 67* 68* 70*   ALT U/L 16 18 22   ALK PHOS U/L 79 91 119*   TOTAL PROTEIN g/dL 6 5 7 0 7 6   ALBUMIN g/dL 2 3* 2 4* 2 3*   BILIRUBIN TOTAL mg/dL 3 30* 3 12* 3 70*       Results from last 7 days  Lab Units 12/07/17  0609 12/05/17  0633 12/03/17  0526   WBC Thousand/uL 7 71 5 66 6 12   HEMOGLOBIN g/dL 7 9* 9 3* 8 9*   PLATELETS Thousands/uL 206 218 189       Results from last 7 days  Lab Units 12/06/17  1116   GRAM STAIN RESULT  Gram positive cocci in clusters       Imaging Studies:   I have personally reviewed pertinent imaging study reports and images in PACS  CXR 12/6 bilateral infiltrates    EKG, Pathology, and Other Studies:   I have personally reviewed pertinent reports

## 2017-12-08 PROBLEM — B95.7 COAG NEGATIVE STAPHYLOCOCCUS BACTEREMIA: Status: ACTIVE | Noted: 2017-12-07

## 2017-12-08 LAB
ALBUMIN SERPL BCP-MCNC: 3 G/DL (ref 3.5–5)
ALP SERPL-CCNC: 89 U/L (ref 46–116)
ALT SERPL W P-5'-P-CCNC: 17 U/L (ref 12–78)
ANION GAP SERPL CALCULATED.3IONS-SCNC: 6 MMOL/L (ref 4–13)
AST SERPL W P-5'-P-CCNC: 57 U/L (ref 5–45)
BILIRUB SERPL-MCNC: 3.56 MG/DL (ref 0.2–1)
BUN SERPL-MCNC: 7 MG/DL (ref 5–25)
CALCIUM SERPL-MCNC: 9.1 MG/DL (ref 8.3–10.1)
CHLORIDE SERPL-SCNC: 98 MMOL/L (ref 100–108)
CO2 SERPL-SCNC: 33 MMOL/L (ref 21–32)
CREAT SERPL-MCNC: 0.73 MG/DL (ref 0.6–1.3)
ERYTHROCYTE [DISTWIDTH] IN BLOOD BY AUTOMATED COUNT: 18.9 % (ref 11.6–15.1)
GFR SERPL CREATININE-BSD FRML MDRD: 101 ML/MIN/1.73SQ M
GLUCOSE SERPL-MCNC: 64 MG/DL (ref 65–140)
HCT VFR BLD AUTO: 25.6 % (ref 34.8–46.1)
HGB BLD-MCNC: 8.2 G/DL (ref 11.5–15.4)
MCH RBC QN AUTO: 36.4 PG (ref 26.8–34.3)
MCHC RBC AUTO-ENTMCNC: 32 G/DL (ref 31.4–37.4)
MCV RBC AUTO: 114 FL (ref 82–98)
PLATELET # BLD AUTO: 226 THOUSANDS/UL (ref 149–390)
PMV BLD AUTO: 9.6 FL (ref 8.9–12.7)
POTASSIUM SERPL-SCNC: 3.7 MMOL/L (ref 3.5–5.3)
PROT SERPL-MCNC: 7.3 G/DL (ref 6.4–8.2)
RBC # BLD AUTO: 2.25 MILLION/UL (ref 3.81–5.12)
SODIUM SERPL-SCNC: 137 MMOL/L (ref 136–145)
WBC # BLD AUTO: 8.16 THOUSAND/UL (ref 4.31–10.16)

## 2017-12-08 PROCEDURE — 97530 THERAPEUTIC ACTIVITIES: CPT

## 2017-12-08 PROCEDURE — 97116 GAIT TRAINING THERAPY: CPT

## 2017-12-08 PROCEDURE — 85027 COMPLETE CBC AUTOMATED: CPT | Performed by: INTERNAL MEDICINE

## 2017-12-08 PROCEDURE — 87040 BLOOD CULTURE FOR BACTERIA: CPT | Performed by: INTERNAL MEDICINE

## 2017-12-08 PROCEDURE — 97110 THERAPEUTIC EXERCISES: CPT

## 2017-12-08 PROCEDURE — 80053 COMPREHEN METABOLIC PANEL: CPT | Performed by: INTERNAL MEDICINE

## 2017-12-08 RX ADMIN — LIDOCAINE 1 PATCH: 50 PATCH CUTANEOUS at 20:13

## 2017-12-08 RX ADMIN — NYSTATIN: 100000 POWDER TOPICAL at 17:20

## 2017-12-08 RX ADMIN — DICYCLOMINE HYDROCHLORIDE 10 MG: 10 CAPSULE ORAL at 15:27

## 2017-12-08 RX ADMIN — DICYCLOMINE HYDROCHLORIDE 10 MG: 10 CAPSULE ORAL at 06:04

## 2017-12-08 RX ADMIN — CEFTRIAXONE 1000 MG: 1 INJECTION, SOLUTION INTRAVENOUS at 09:36

## 2017-12-08 RX ADMIN — METRONIDAZOLE 500 MG: 500 TABLET ORAL at 14:56

## 2017-12-08 RX ADMIN — FUROSEMIDE 80 MG: 10 INJECTION, SOLUTION INTRAMUSCULAR; INTRAVENOUS at 15:26

## 2017-12-08 RX ADMIN — ONDANSETRON 4 MG: 2 INJECTION INTRAMUSCULAR; INTRAVENOUS at 20:13

## 2017-12-08 RX ADMIN — GABAPENTIN 300 MG: 300 CAPSULE ORAL at 20:13

## 2017-12-08 RX ADMIN — NYSTATIN: 100000 POWDER TOPICAL at 23:13

## 2017-12-08 RX ADMIN — POTASSIUM CHLORIDE 20 MEQ: 1500 TABLET, EXTENDED RELEASE ORAL at 08:19

## 2017-12-08 RX ADMIN — GABAPENTIN 300 MG: 300 CAPSULE ORAL at 15:27

## 2017-12-08 RX ADMIN — ALBUMIN HUMAN 25 G: 0.25 SOLUTION INTRAVENOUS at 08:16

## 2017-12-08 RX ADMIN — GABAPENTIN 300 MG: 300 CAPSULE ORAL at 08:19

## 2017-12-08 RX ADMIN — NYSTATIN: 100000 POWDER TOPICAL at 11:04

## 2017-12-08 RX ADMIN — PANTOPRAZOLE SODIUM 40 MG: 40 TABLET, DELAYED RELEASE ORAL at 06:04

## 2017-12-08 RX ADMIN — ENOXAPARIN SODIUM 40 MG: 40 INJECTION SUBCUTANEOUS at 08:19

## 2017-12-08 RX ADMIN — SPIRONOLACTONE 50 MG: 50 TABLET ORAL at 17:19

## 2017-12-08 RX ADMIN — DICYCLOMINE HYDROCHLORIDE 10 MG: 10 CAPSULE ORAL at 11:01

## 2017-12-08 RX ADMIN — NYSTATIN: 100000 POWDER TOPICAL at 08:21

## 2017-12-08 RX ADMIN — SPIRONOLACTONE 50 MG: 50 TABLET ORAL at 08:19

## 2017-12-08 RX ADMIN — ACETAMINOPHEN 650 MG: 325 TABLET, FILM COATED ORAL at 06:04

## 2017-12-08 RX ADMIN — QUETIAPINE FUMARATE 50 MG: 25 TABLET ORAL at 23:13

## 2017-12-08 RX ADMIN — LORAZEPAM 0.5 MG: 2 INJECTION INTRAMUSCULAR; INTRAVENOUS at 06:24

## 2017-12-08 RX ADMIN — METRONIDAZOLE 500 MG: 500 TABLET ORAL at 23:13

## 2017-12-08 RX ADMIN — VANCOMYCIN HYDROCHLORIDE 2000 MG: 1 INJECTION, POWDER, LYOPHILIZED, FOR SOLUTION INTRAVENOUS at 13:53

## 2017-12-08 RX ADMIN — DICYCLOMINE HYDROCHLORIDE 10 MG: 10 CAPSULE ORAL at 23:13

## 2017-12-08 RX ADMIN — FUROSEMIDE 80 MG: 10 INJECTION, SOLUTION INTRAMUSCULAR; INTRAVENOUS at 08:20

## 2017-12-08 RX ADMIN — METRONIDAZOLE 500 MG: 500 TABLET ORAL at 06:04

## 2017-12-08 RX ADMIN — AMITRIPTYLINE HYDROCHLORIDE 25 MG: 25 TABLET, FILM COATED ORAL at 23:12

## 2017-12-08 NOTE — PHYSICAL THERAPY NOTE
Physical Therapy Progress Note     12/08/17 1065   Pain Assessment   Pain Assessment 0-10   Pain Score 4   Pain Type Chronic pain   Pain Location Back   Pain Orientation Lower   Hospital Pain Intervention(s) Ambulation/increased activity;Repositioned   Response to Interventions Tolerated  Restrictions/Precautions   Other Precautions Fall Risk;Pain;O2;Multiple lines   General   Chart Reviewed Yes   Response to Previous Treatment Patient reporting fatigue but able to participate  Family/Caregiver Present Yes   Subjective   Subjective Willing to participate in therapy this PM    Bed Mobility   Supine to Sit 2  Maximal assistance   Additional items Assist x 1;HOB elevated; Bedrails; Increased time required;LE management;Verbal cues   Sit to Supine 2  Maximal assistance   Additional items Assist x 2;Bedrails; Increased time required;Verbal cues;LE management   Transfers   Sit to Stand 3  Moderate assistance   Additional items Assist x 2;Bedrails; Increased time required;Verbal cues; Other  (elevated bed height)   Stand to Sit 3  Moderate assistance   Additional items Assist x 2;Bedrails; Increased time required;Verbal cues   Ambulation/Elevation   Gait pattern Decreased foot clearance; Wide LEONIE; Antalgic; Improper Weight shift; Poor UE support; Short stride; Excessively slow   Gait Assistance 3  Moderate assist   Additional items Assist x 2;Verbal cues; Tactile cues   Assistive Device Bariatric Rolling walker   Distance 8'   Balance   Static Sitting Fair   Dynamic Sitting Fair   Static Standing Poor -   Dynamic Standing Poor -   Ambulatory Poor -   Endurance Deficit   Endurance Deficit Yes   Endurance Deficit Description fatigue/pain   Activity Tolerance   Activity Tolerance Patient limited by fatigue;Patient limited by pain   Nurse Made Aware Yes   Exercises   THR Supine;10 reps;AAROM; Bilateral   Assessment   Prognosis Fair   Problem List Decreased strength;Decreased range of motion;Decreased endurance; Impaired balance;Decreased mobility; Decreased cognition; Impaired judgement;Decreased safety awareness; Obesity; Decreased skin integrity;Pain   Assessment Pt  supine in bed upon my arrival  Performance of HEP supine in bed with A of therapist for completion  Continues to require increased A with all transfers with cueing provided for proper technique  Progressed with OOB mobility with A of 2 with support of RW, noted improvement in transfers  However, limiting of ability due to fear of falling/anxiety  Required numerous cues for LE sequencing/ RW placement  Remains limited by increased fatigue requiring repositioning supine in bed at end of treatment session  O2 saturation measured at 96% on 2L at end of treatment session supine  PT will continue to recommend STR upon d/c for continued improvement of noted impairments above  Barriers to Discharge Decreased caregiver support; Inaccessible home environment   Goals   Patient Goals To go to rehab  STG Expiration Date 12/13/17   Treatment Day 7   Plan   Treatment/Interventions Functional transfer training;LE strengthening/ROM; Therapeutic exercise; Endurance training;Bed mobility;Gait training;Spoke to nursing;Spoke to case management   Progress Slow progress, decreased activity tolerance   PT Frequency 5x/wk   Recommendation   Recommendation Short-term skilled PT   Equipment Recommended Walker  (RW)   PT - OK to Discharge Yes  (if d/c to STR when medically stable )     Sonny Salgado, DORCAS

## 2017-12-08 NOTE — PROGRESS NOTES
[facility] Internal Medicine Progress Note  Patient: Anali Keller 36 y o  female   MRN: [id number]  PCP: Marcy Wright MD  Unit/Bed#: [facility] 87 226-01 Encounter: [id number]  Date Of Visit: 12/08/17    Assessment  Principal Problem:    Anasarca  Active Problems:    Ambulatory dysfunction    Recurrent falls    Chronic pain syndrome    Morbid obesity due to excess calories (HCC)    Peripheral edema    Hypokalemia    Other bipolar disorder ([facility] [address] )    Noncompliance with medication regimen    Al[facility] )    Coag negative Staphylococcus bacteremia    Anemia  Resolved Problems:    * No resolved hospital problems  *        Plan  1  Anasarca  Secondary to underlying alcoholic liver disease/hypoalbuminemia  Albumin level has improved  Will stop IV albumin and continue IV furosemide if blood pressure allows  2  Alcoholic liver disease  Continue lorazepam for withdrawal symptoms  3  Coag-negative staphylococcus bacteremia  Antibiotics to be tailored per Infectious Diseases  4  Aspiration pneumonia  In setting of alcoholic patient  Ceftriaxone metronidazole per Infectious Diseases  5  Anemia thrombocytopenia  Stable in the setting of liver disease  6  Morbid obesity  7  Bipolar depression  Stable quetiapine amitriptyline  8  Hypokalemia  Continue to replete with IV Lasix    VTE Pharmacologic Prophylaxis: Enoxaparin (Lovenox)    Patient Centered Rounds: I have performed bedside rounds with nursing staff today  Discussions with Specialists or Other Care Team Provider:     Education and Discussions with Family / Patient:     Time Spent for Care: 30 mins  More than 50% of total time spent on counseling and coordination of care as described above      Current Length of Stay: 13 day(s)    Current Patient Status: Inpatient   Certification Statement: The patient will continue to require additional inpatient hospital stay due to coag-negative staphylococcus bacteremia and pneumonia    Discharge Plan / Estimated Discharge Date:     Code Status: Level 1 - Full Code  ______________________________________________________________________________    Subjective:   Patient seen and examined  Feeling lousy  Still short of breath  Objective:   Vitals: Blood pressure 121/65, pulse 93, temperature (!) 100 6 °F (38 1 °C), temperature source Temporal, resp  rate 18, weight (!) 150 kg (330 lb 11 oz), SpO2 (!) 87 %  Physical Exam:   General appearance: alert, appears older than stated age, cooperative and no distress  Head: Normocephalic, without obvious abnormality, atraumatic  Lungs: diminished breath sounds bilaterally L>R  Heart: regular rate and rhythm  Abdomen:  Morbidly obese soft right upper quadrant tenderness to palpation  Back: negative, range of motion normal  Extremities: edema +2-3 lower extremities bilaterally  Neurologic: Grossly normal    Additional Data:   Labs:    Results from last 7 days  Lab Units 12/08/17  0501 12/07/17  0609 12/05/17  0633 12/03/17  0526   WBC Thousand/uL 8 16 7 71 5 66 6 12   HEMOGLOBIN g/dL 8 2* 7 9* 9 3* 8 9*   HEMATOCRIT % 25 6* 24 6* 28 9* 27 9*   MCV fL 114* 113* 114* 114*   PLATELETS Thousands/uL 226 206 218 189   INR   --   --  1 46*  --        Results from last 7 days  Lab Units 12/08/17  0501 12/07/17  0533 12/06/17  0508 12/05/17  0633 12/04/17  0618 12/03/17  0527   SODIUM mmol/L 137 136 134* 134* 136 136   POTASSIUM mmol/L 3 7 4 0 4 0 3 9 4 6 4 6   CHLORIDE mmol/L 98* 102 101 101 105 103   CO2 mmol/L 33* 28 26 26 26 28   ANION GAP mmol/L 6 6 7 7 5 5   BUN mg/dL 7 7 4* 5 4* 4*   CREATININE mg/dL 0 73 0 69 0 67 0 74 0 75 0 83   CALCIUM mg/dL 9 1 8 4 8 6 8 9 8 2* 7 9*   ALBUMIN g/dL 3 0* 2 3* 2 4* 2 3* 1 2* 1 2*   BILIRUBIN TOTAL mg/dL 3 56* 3 30* 3 12* 3 70* 2 68* 2 84*   ALK PHOS U/L 89 79 91 119* 125* 128*   ALT U/L 17 16 18 22 21 20   AST U/L 57* 67* 68* 70* 75* 85*   EGFR ml/min/1 73sq m 101 107 108 100 98 87   GLUCOSE RANDOM mg/dL 64* 76 59* 100 103 77 * I Have Reviewed All Lab Data Listed Above      Cultures:     Results from last 7 days  Lab Units 12/06/17  1116 12/06/17  1115   BLOOD CULTURE    Staphylococcus coagulase negative*   Staphylococcus coagulase negative*   GRAM STAIN RESULT  Gram positive cocci in clusters Gram positive cocci in clusters         Imaging:  Imaging Reports Reviewed Today Include:       Scheduled Meds:  albumin human 25 g Intravenous BID   amitriptyline 25 mg Oral HS   cefTRIAXone 1,000 mg Intravenous Q24H   dicyclomine 10 mg Oral 4x Daily (AC & HS)   enoxaparin 40 mg Subcutaneous Daily   furosemide 80 mg Intravenous BID (diuretic)   gabapentin 300 mg Oral TID   lactulose 20 g Oral Daily   lidocaine 1 patch Transdermal Daily   metolazone 5 mg Oral Once per day on Mon Thu   metroNIDAZOLE 500 mg Oral Q8H [facility]   nystatin  Topical 4x Daily   pantoprazole 40 mg Oral Early Morning   potassium chloride 20 mEq Oral BID   QUEtiapine 50 mg Oral HS   spironolactone 50 mg Oral BID     Continuous Infusions:   PRN Meds:    acetaminophen    aluminum-magnesium hydroxide-simethicone    LORazepam    ondansetron     Yeimi Ramires DO

## 2017-12-08 NOTE — PROGRESS NOTES
Pt had episode of hypoxia with SaO2 of 73%  Place on 2L o2 via NC  Saturation came up to 86% and then 94%  Pt was mildly confused during event  Not initially oriented to place or time, but reoriented quickly  Neuro assessment performed and no abnormalities  Alert and oriented x4

## 2017-12-08 NOTE — PROGRESS NOTES
Progress Note - Infectious Disease   Emilee Neely 36 y o  female MRN: [id number]  Unit/Bed#: [facility] 2 -01 Encounter: [id number]      Impression/Recommendations:  1  Possible aspiration pneumonia  Possibly incubation on admission due to #5  Consider GNR/anaerobes  Low risk Pseudomonas/MRSA    Rec:  · Continue ceftriaxone/Flagyl for now  · Follow respiratory status closely  · Follow temperatures and white blood cell count closely  · Supportive care as per the primary service     2  Coagulase negative staphylococcus bacteremia  New  Consider due to PIV in setting of purulent AC fossa drainage  Consider contaminant (less likely)  Rec:  · Recheck blood cultures x2 sets  · Start vancomycin  · Follow up the identification of the coagulase-negative Staph and tailor antibiotics as indicated    3  Sepsis  Nosocomial:  Fever and tachycardia  Due to #1 versus 2  UA negative  Clinically stable and non-toxic  Rec:       · Continue antibiotics as above  · Follow up blood cultures as above  · Follow temperatures and white blood cell count closely  · Supportive care as per the primary service     3  Acute hypoxic respiratory insufficiency  Likely due to #1  Consider atelectasis, edema  Stable  Rec:  · Continue antibiotics as above  · Encourage OOB  · Diuersis per SLIM     4  Anasarca  Due to chronic liver disease  Rec:       · Continue diuresis per primary team     5   Alcohol abuse     6  MO with ambulatory dysfunction     Antibiotics:  Ceftriaxone/Flagyl #2    Subjective:  Patient seen on AM rounds  Still feel sick with fevers  Continues to be on oxygen  Denies nausea, vomiting, or diarrhea  Is noted to have purulent drainage from wired antecubital fossa with  A palpable cord along the vein is tract proximal to this      Objective:  Vitals:  HR:  [] 93  Resp:  [18-20] 18  BP: (102-121)/(51-65) 121/65  SpO2:  [87 %-94 %] 87 %  Temp (24hrs), Av 5 °F (38 1 °C), Min:99 9 °F (37 7 °C), Max:100 9 °F (38 3 °C)  Current: Temperature: (!) 100 6 °F (38 1 °C)    Physical Exam:   General:  No acute distress  Eyes:  Normal lids and conjunctivae  ENT:  Normal external ears and nose  Neck:  Neck symmetric with midline trachea  Pulmonary:  Normal respiratory effort without accessory muscle use  Cardiovascular:  Regular rate and rhythm; no peripheral edema  Gastrointestinal:  No tenderness or distention  Musculoskeletal:  No digital clubbing or cyanosis  Skin:  No visible rashes; No palpable nodules; punctate area of purulent D/C from right AC fossa  Neurologic:  Sensation grossly intact to light touch  Psychiatric:  Alert and oriented; Normal mood    Lab Results:  I have personally reviewed pertinent labs  Results from last 7 days  Lab Units 12/08/17  0501 12/07/17  0533 12/06/17  0508   SODIUM mmol/L 137 136 134*   POTASSIUM mmol/L 3 7 4 0 4 0   CHLORIDE mmol/L 98* 102 101   CO2 mmol/L 33* 28 26   ANION GAP mmol/L 6 6 7   BUN mg/dL 7 7 4*   CREATININE mg/dL 0 73 0 69 0 67   EGFR ml/min/1 73sq m 101 107 108   GLUCOSE RANDOM mg/dL 64* 76 59*   CALCIUM mg/dL 9 1 8 4 8 6   AST U/L 57* 67* 68*   ALT U/L 17 16 18   ALK PHOS U/L 89 79 91   TOTAL PROTEIN g/dL 7 3 6 5 7 0   ALBUMIN g/dL 3 0* 2 3* 2 4*   BILIRUBIN TOTAL mg/dL 3 56* 3 30* 3 12*       Results from last 7 days  Lab Units 12/08/17  0501 12/07/17  0609 12/05/17  0633   WBC Thousand/uL 8 16 7 71 5 66   HEMOGLOBIN g/dL 8 2* 7 9* 9 3*   PLATELETS Thousands/uL 226 206 218       Results from last 7 days  Lab Units 12/06/17  1116 12/06/17  1115   BLOOD CULTURE    Staphylococcus coagulase negative*   Staphylococcus coagulase negative*   GRAM STAIN RESULT  Gram positive cocci in clusters Gram positive cocci in clusters       Imaging Studies:   I have personally reviewed pertinent imaging study reports and images in PACS  EKG, Pathology, and Other Studies:   I have personally reviewed pertinent reports

## 2017-12-08 NOTE — PLAN OF CARE
Problem: PHYSICAL THERAPY ADULT  Goal: Performs mobility at highest level of function for planned discharge setting  See evaluation for individualized goals  Treatment/Interventions: LE strengthening/ROM, Therapeutic exercise, Endurance training, Patient/family training, Bed mobility, Compensatory technique education, Continued evaluation  Equipment Recommended: Other (Comment) (pt needs dependent means for out of bed mobilization )       See flowsheet documentation for full assessment, interventions and recommendations  Outcome: Progressing  Prognosis: Fair  Problem List: Decreased strength, Decreased range of motion, Decreased endurance, Impaired balance, Decreased mobility, Decreased cognition, Impaired judgement, Decreased safety awareness, Obesity, Decreased skin integrity, Pain  Assessment: Pt  supine in bed upon my arrival  Performance of HEP supine in bed with A of therapist for completion  Continues to require increased A with all transfers with cueing provided for proper technique  Progressed with OOB mobility with A of 2 with support of RW, noted improvement in transfers  However, limiting of ability due to fear of falling/anxiety  Required numerous cues for LE sequencing/ RW placement  Remains limited by increased fatigue requiring repositioning supine in bed at end of treatment session  O2 saturation measured at 96% on 2L at end of treatment session supine  PT will continue to recommend STR upon d/c for continued improvement of noted impairments above  Barriers to Discharge: Decreased caregiver support, Inaccessible home environment     Recommendation: Short-term skilled PT     PT - OK to Discharge: Yes (if d/c to STR when medically stable )    See flowsheet documentation for full assessment

## 2017-12-09 LAB
ALBUMIN SERPL BCP-MCNC: 3 G/DL (ref 3.5–5)
ALP SERPL-CCNC: 79 U/L (ref 46–116)
ALT SERPL W P-5'-P-CCNC: 17 U/L (ref 12–78)
ANION GAP SERPL CALCULATED.3IONS-SCNC: 2 MMOL/L (ref 4–13)
AST SERPL W P-5'-P-CCNC: 53 U/L (ref 5–45)
BILIRUB SERPL-MCNC: 3.36 MG/DL (ref 0.2–1)
BUN SERPL-MCNC: 4 MG/DL (ref 5–25)
CALCIUM SERPL-MCNC: 9.3 MG/DL (ref 8.3–10.1)
CHLORIDE SERPL-SCNC: 98 MMOL/L (ref 100–108)
CO2 SERPL-SCNC: 37 MMOL/L (ref 21–32)
CREAT SERPL-MCNC: 0.79 MG/DL (ref 0.6–1.3)
ERYTHROCYTE [DISTWIDTH] IN BLOOD BY AUTOMATED COUNT: 18.1 % (ref 11.6–15.1)
GFR SERPL CREATININE-BSD FRML MDRD: 92 ML/MIN/1.73SQ M
GLUCOSE SERPL-MCNC: 88 MG/DL (ref 65–140)
HCT VFR BLD AUTO: 24.9 % (ref 34.8–46.1)
HGB BLD-MCNC: 8.2 G/DL (ref 11.5–15.4)
MCH RBC QN AUTO: 37.1 PG (ref 26.8–34.3)
MCHC RBC AUTO-ENTMCNC: 32.9 G/DL (ref 31.4–37.4)
MCV RBC AUTO: 113 FL (ref 82–98)
PLATELET # BLD AUTO: 211 THOUSANDS/UL (ref 149–390)
PMV BLD AUTO: 9.2 FL (ref 8.9–12.7)
POTASSIUM SERPL-SCNC: 2.9 MMOL/L (ref 3.5–5.3)
PROT SERPL-MCNC: 7.3 G/DL (ref 6.4–8.2)
RBC # BLD AUTO: 2.21 MILLION/UL (ref 3.81–5.12)
SODIUM SERPL-SCNC: 137 MMOL/L (ref 136–145)
WBC # BLD AUTO: 5.6 THOUSAND/UL (ref 4.31–10.16)

## 2017-12-09 PROCEDURE — 80053 COMPREHEN METABOLIC PANEL: CPT | Performed by: INTERNAL MEDICINE

## 2017-12-09 PROCEDURE — 85027 COMPLETE CBC AUTOMATED: CPT | Performed by: INTERNAL MEDICINE

## 2017-12-09 RX ORDER — POTASSIUM CHLORIDE 20 MEQ/1
40 TABLET, EXTENDED RELEASE ORAL 2 TIMES DAILY
Status: DISCONTINUED | OUTPATIENT
Start: 2017-12-09 | End: 2017-12-14 | Stop reason: HOSPADM

## 2017-12-09 RX ADMIN — VANCOMYCIN HYDROCHLORIDE 2000 MG: 1 INJECTION, POWDER, LYOPHILIZED, FOR SOLUTION INTRAVENOUS at 12:25

## 2017-12-09 RX ADMIN — GABAPENTIN 300 MG: 300 CAPSULE ORAL at 08:06

## 2017-12-09 RX ADMIN — NYSTATIN 1 APPLICATION: 100000 POWDER TOPICAL at 21:31

## 2017-12-09 RX ADMIN — SPIRONOLACTONE 50 MG: 50 TABLET ORAL at 17:01

## 2017-12-09 RX ADMIN — FUROSEMIDE 80 MG: 10 INJECTION, SOLUTION INTRAMUSCULAR; INTRAVENOUS at 08:07

## 2017-12-09 RX ADMIN — DICYCLOMINE HYDROCHLORIDE 10 MG: 10 CAPSULE ORAL at 15:55

## 2017-12-09 RX ADMIN — ACETAMINOPHEN 650 MG: 325 TABLET, FILM COATED ORAL at 15:54

## 2017-12-09 RX ADMIN — DICYCLOMINE HYDROCHLORIDE 10 MG: 10 CAPSULE ORAL at 11:11

## 2017-12-09 RX ADMIN — GABAPENTIN 300 MG: 300 CAPSULE ORAL at 21:32

## 2017-12-09 RX ADMIN — DICYCLOMINE HYDROCHLORIDE 10 MG: 10 CAPSULE ORAL at 21:32

## 2017-12-09 RX ADMIN — LIDOCAINE 1 PATCH: 50 PATCH CUTANEOUS at 19:46

## 2017-12-09 RX ADMIN — QUETIAPINE FUMARATE 50 MG: 25 TABLET ORAL at 21:32

## 2017-12-09 RX ADMIN — NYSTATIN: 100000 POWDER TOPICAL at 08:08

## 2017-12-09 RX ADMIN — FUROSEMIDE 80 MG: 10 INJECTION, SOLUTION INTRAMUSCULAR; INTRAVENOUS at 16:59

## 2017-12-09 RX ADMIN — POTASSIUM CHLORIDE 40 MEQ: 1500 TABLET, EXTENDED RELEASE ORAL at 17:00

## 2017-12-09 RX ADMIN — GABAPENTIN 300 MG: 300 CAPSULE ORAL at 15:55

## 2017-12-09 RX ADMIN — LORAZEPAM 0.5 MG: 2 INJECTION INTRAMUSCULAR; INTRAVENOUS at 18:35

## 2017-12-09 RX ADMIN — LORAZEPAM 0.5 MG: 2 INJECTION INTRAMUSCULAR; INTRAVENOUS at 05:24

## 2017-12-09 RX ADMIN — PANTOPRAZOLE SODIUM 40 MG: 40 TABLET, DELAYED RELEASE ORAL at 05:24

## 2017-12-09 RX ADMIN — VANCOMYCIN HYDROCHLORIDE 2000 MG: 1 INJECTION, POWDER, LYOPHILIZED, FOR SOLUTION INTRAVENOUS at 01:24

## 2017-12-09 RX ADMIN — POTASSIUM CHLORIDE 20 MEQ: 1500 TABLET, EXTENDED RELEASE ORAL at 08:06

## 2017-12-09 RX ADMIN — ONDANSETRON 4 MG: 2 INJECTION INTRAMUSCULAR; INTRAVENOUS at 18:35

## 2017-12-09 RX ADMIN — METRONIDAZOLE 500 MG: 500 TABLET ORAL at 05:24

## 2017-12-09 RX ADMIN — ENOXAPARIN SODIUM 40 MG: 40 INJECTION SUBCUTANEOUS at 08:07

## 2017-12-09 RX ADMIN — AMITRIPTYLINE HYDROCHLORIDE 25 MG: 25 TABLET, FILM COATED ORAL at 21:32

## 2017-12-09 RX ADMIN — DICYCLOMINE HYDROCHLORIDE 10 MG: 10 CAPSULE ORAL at 06:11

## 2017-12-09 RX ADMIN — SPIRONOLACTONE 50 MG: 50 TABLET ORAL at 08:07

## 2017-12-09 RX ADMIN — NYSTATIN: 100000 POWDER TOPICAL at 11:39

## 2017-12-09 RX ADMIN — NYSTATIN: 100000 POWDER TOPICAL at 17:07

## 2017-12-09 NOTE — PROGRESS NOTES
Progress Note - Infectious Disease   Fredricka Olszewski 36 y o  female MRN: [id number]  Unit/Bed#: [facility] 2 -01 Encounter: [id number]      Impression/Recommendations:  1   Coagulase negative staphylococcus bacteremia  Likely due to PIV in setting of purulent AC fossa drainage  Repeat blood cultures pending  Rec:  · Continue vancomycin for now  Check trough with tomorrow AM's dose  · Follow up the identification of the coagulase-negative Staph and tailor antibiotics as indicated  · Follow up repeat blood cultures     2  Sepsis  Nosocomial:  Fever and tachycardia  Likely due to #1  Clinically stable and non-toxic; Improving  Rec:       · Continue antibiotics as above  · Follow up final blood cultures as above  · Follow temperatures and white blood cell count closely  · Supportive care as per the primary service     3   Acute hypoxic respiratory insufficiency  Likely due to atelectasis, edema  Less likely pneumonia given alternative explanation for fevers (#1)  Stable  Rec:  · D/C ceftriaxone/Flagyl  · Continue diuresis per SLIM  · Continue OOB     4   Alcohol abuse     5  MO with ambulatory dysfunction     Antibiotics:  Ceftriaxone/Flagyl #3  Vancomycin #2    Subjective:  Patient seen on AM rounds  Feeling better  Remains hypoxic on O2  Denies fevers, chills, or sweats  Denies nausea, vomiting, or diarrhea  Getting ready to get OOB to chair      Objective:  Vitals:  HR:  [88-90] 89  Resp:  [18-20] 18  BP: ()/(48-61) 121/61  SpO2:  [93 %-96 %] 96 %  Temp (24hrs), Av °F (36 7 °C), Min:97 2 °F (36 2 °C), Max:99 4 °F (37 4 °C)  Current: Temperature: (!) 97 4 °F (36 3 °C)    Physical Exam:   General:  No acute distress  Eyes:  Normal lids and conjunctivae  ENT:  Normal external ears and nose  Neck:  Neck symmetric with midline trachea  Pulmonary:  Decreased respiratory effort without accessory muscle use  Cardiovascular:  Regular rate and rhythm; nonpitting edema  Gastrointestinal:  No tenderness or distention  Musculoskeletal:  No digital clubbing or cyanosis  Skin:  No visible rashes; No palpable nodules; improved erythema and drainage right SC fossa  Neurologic:  Sensation grossly intact to light touch  Psychiatric:  Alert and oriented; Normal mood    Lab Results:  I have personally reviewed pertinent labs  Results from last 7 days  Lab Units 12/09/17  0648 12/08/17  0501 12/07/17  0533   SODIUM mmol/L 137 137 136   POTASSIUM mmol/L 2 9* 3 7 4 0   CHLORIDE mmol/L 98* 98* 102   CO2 mmol/L 37* 33* 28   ANION GAP mmol/L 2* 6 6   BUN mg/dL 4* 7 7   CREATININE mg/dL 0 79 0 73 0 69   EGFR ml/min/1 73sq m 92 101 107   GLUCOSE RANDOM mg/dL 88 64* 76   CALCIUM mg/dL 9 3 9 1 8 4   AST U/L 53* 57* 67*   ALT U/L 17 17 16   ALK PHOS U/L 79 89 79   TOTAL PROTEIN g/dL 7 3 7 3 6 5   ALBUMIN g/dL 3 0* 3 0* 2 3*   BILIRUBIN TOTAL mg/dL 3 36* 3 56* 3 30*       Results from last 7 days  Lab Units 12/09/17  0648 12/08/17  0501 12/07/17  0609   WBC Thousand/uL 5 60 8 16 7 71   HEMOGLOBIN g/dL 8 2* 8 2* 7 9*   PLATELETS Thousands/uL 211 226 206       Results from last 7 days  Lab Units 12/06/17  1116 12/06/17  1115   BLOOD CULTURE    Staphylococcus coagulase negative*   Staphylococcus coagulase negative*   GRAM STAIN RESULT  Gram positive cocci in clusters Gram positive cocci in clusters       Imaging Studies:   I have personally reviewed pertinent imaging study reports and images in PACS  EKG, Pathology, and Other Studies:   I have personally reviewed pertinent reports

## 2017-12-09 NOTE — PROGRESS NOTES
[facility] Progress Note  Patient: Ml Shore 36 y o  female   MRN: [id number]  PCP: Florence Dior MD  Unit/Bed#: [facility] 226-01 Encounter: [id number]  Date Of Visit: 12/09/17    Assessment  Principal Problem:    Anasarca  Active Problems:    Ambulatory dysfunction    Recurrent falls    Chronic pain syndrome    Morbid obesity due to excess calories ([facility] )    Peripheral edema    Hypokalemia    Other bipolar disorder ([facility] )    Noncompliance with medication regimen    Alcoholic liver disease ([facility] )    Coag negative Staphylococcus bacteremia    Anemia  Resolved Problems:    * No resolved hospital problems  *        Plan  1  Anasarca  Secondary to alcoholic liver disease  Continue IV Lasix  Stable off albumin  2  Healthcare/aspiration pneumonia  Afebrile now and improving  Ceftriaxone and metronidazole per Infectious Diseases  3  Coag-negative Staphylococcus bacteremia  Vancomycin for now per Infectious Diseases  4  Alcoholic liver disease  Lorazepam for withdrawal symptoms  5  Bipolar depression  Mood stable quetiapine amitriptyline  6  Hypokalemia  Secondary to Lasix, continue to replete but increase K-Dur to 40 mil equivalents b i d   7  Anemia thrombocytopenia  Secondary to liver disease  8  Morbid obesity  VTE Pharmacologic Prophylaxis: Enoxaparin (Lovenox)    Patient Centered Rounds: I have performed bedside rounds with nursing staff today  Discussions with Specialists or Other Care Team Provider:     Education and Discussions with Family / Patient:   at bedside    Time Spent for Care: 30 mins  More than 50% of total time spent on counseling and coordination of care as described above      Current Length of Stay: 14 day(s)    Current Patient Status: Inpatient   Certification Statement: The patient will continue to require additional inpatient hospital stay due to pneumonia bacteremia anasarca and will need placement    Discharge Plan / Estimated Discharge Date:     Code Status: Level 1 - Full Code  ______________________________________________________________________________    Subjective:   Patient seen and examined  Was lowered to the ground after near falling  Shortness of breath improved  Still weak  Objective:   Vitals: Blood pressure 121/61, pulse 89, temperature 100 2 °F (37 9 °C), temperature source Temporal, resp  rate 18, weight (!) 150 kg (330 lb 11 oz), SpO2 96 %      Physical Exam:   General appearance: alert, appears older than stated age, cooperative and no distress  Head: atraumatic, scleral icterus  Lungs: diminished breath sounds bilaterally  Heart: regular rate and rhythm  Abdomen: soft obese nontender  Back: negative, range of motion normal  Extremities: edema +2 lower extremities bilaterally  Neurologic: Grossly normal    Additional Data:   Labs:    Results from last 7 days  Lab Units 12/09/17  0648 12/08/17  0501 12/07/17  0609 12/05/17  0633 12/03/17  0526   WBC Thousand/uL 5 60 8 16 7 71 5 66 6 12   HEMOGLOBIN g/dL 8 2* 8 2* 7 9* 9 3* 8 9*   HEMATOCRIT % 24 9* 25 6* 24 6* 28 9* 27 9*   MCV fL 113* 114* 113* 114* 114*   PLATELETS Thousands/uL 211 226 206 218 189   INR   --   --   --  1 46*  --        Results from last 7 days  Lab Units 12/09/17  0648 12/08/17  0501 12/07/17  0533 12/06/17  0508 12/05/17  0633 12/04/17  0618 12/03/17  0527   SODIUM mmol/L 137 137 136 134* 134* 136 136   POTASSIUM mmol/L 2 9* 3 7 4 0 4 0 3 9 4 6 4 6   CHLORIDE mmol/L 98* 98* 102 101 101 105 103   CO2 mmol/L 37* 33* 28 26 26 26 28   ANION GAP mmol/L 2* 6 6 7 7 5 5   BUN mg/dL 4* 7 7 4* 5 4* 4*   CREATININE mg/dL 0 79 0 73 0 69 0 67 0 74 0 75 0 83   CALCIUM mg/dL 9 3 9 1 8 4 8 6 8 9 8 2* 7 9*   ALBUMIN g/dL 3 0* 3 0* 2 3* 2 4* 2 3* 1 2* 1 2*   BILIRUBIN TOTAL mg/dL 3 36* 3 56* 3 30* 3 12* 3 70* 2 68* 2 84*   ALK PHOS U/L 79 89 79 91 119* 125* 128*   ALT U/L 17 17 16 18 22 21 20   AST U/L 53* 57* 67* 68* 70* 75* 85*   EGFR ml/min/1 73sq m 92 101 107 108 100 98 87   GLUCOSE RANDOM mg/dL 88 64* 76 59* 100 103 77                                  * I Have Reviewed All Lab Data Listed Above      Cultures:     Results from last 7 days  Lab Units 12/06/17  1116 12/06/17  1115   BLOOD CULTURE    Staphylococcus coagulase negative*   Staphylococcus coagulase negative*   GRAM STAIN RESULT  Gram positive cocci in clusters Gram positive cocci in clusters         Imaging:  Imaging Reports Reviewed Today Include:       Scheduled Meds:  amitriptyline 25 mg Oral HS   dicyclomine 10 mg Oral 4x Daily (AC & HS)   enoxaparin 40 mg Subcutaneous Daily   furosemide 80 mg Intravenous BID (diuretic)   gabapentin 300 mg Oral TID   lactulose 20 g Oral Daily   lidocaine 1 patch Transdermal Daily   metolazone 5 mg Oral Once per day on Mon Thu   nystatin  Topical 4x Daily   pantoprazole 40 mg Oral Early Morning   potassium chloride 40 mEq Oral BID   QUEtiapine 50 mg Oral HS   spironolactone 50 mg Oral BID   vancomycin 12 5 mg/kg Intravenous Q12H     Continuous Infusions:   PRN Meds:    acetaminophen    aluminum-magnesium hydroxide-simethicone    LORazepam    ondansetron     Hans Wilkins DO

## 2017-12-10 LAB
ALBUMIN SERPL BCP-MCNC: 2.9 G/DL (ref 3.5–5)
ALP SERPL-CCNC: 88 U/L (ref 46–116)
ALT SERPL W P-5'-P-CCNC: 17 U/L (ref 12–78)
AMMONIA PLAS-SCNC: 56 UMOL/L (ref 11–35)
ANION GAP SERPL CALCULATED.3IONS-SCNC: 2 MMOL/L (ref 4–13)
AST SERPL W P-5'-P-CCNC: 58 U/L (ref 5–45)
BACTERIA BLD CULT: ABNORMAL
BACTERIA BLD CULT: ABNORMAL
BILIRUB SERPL-MCNC: 2.9 MG/DL (ref 0.2–1)
BUN SERPL-MCNC: 7 MG/DL (ref 5–25)
CALCIUM SERPL-MCNC: 9.2 MG/DL (ref 8.3–10.1)
CHLORIDE SERPL-SCNC: 99 MMOL/L (ref 100–108)
CO2 SERPL-SCNC: 35 MMOL/L (ref 21–32)
CREAT SERPL-MCNC: 0.86 MG/DL (ref 0.6–1.3)
ERYTHROCYTE [DISTWIDTH] IN BLOOD BY AUTOMATED COUNT: 18.3 % (ref 11.6–15.1)
GFR SERPL CREATININE-BSD FRML MDRD: 83 ML/MIN/1.73SQ M
GLUCOSE SERPL-MCNC: 82 MG/DL (ref 65–140)
GRAM STN SPEC: ABNORMAL
GRAM STN SPEC: ABNORMAL
HCT VFR BLD AUTO: 25.5 % (ref 34.8–46.1)
HGB BLD-MCNC: 8.3 G/DL (ref 11.5–15.4)
MCH RBC QN AUTO: 36.7 PG (ref 26.8–34.3)
MCHC RBC AUTO-ENTMCNC: 32.5 G/DL (ref 31.4–37.4)
MCV RBC AUTO: 113 FL (ref 82–98)
PLATELET # BLD AUTO: 246 THOUSANDS/UL (ref 149–390)
PMV BLD AUTO: 9.5 FL (ref 8.9–12.7)
POTASSIUM SERPL-SCNC: 3.5 MMOL/L (ref 3.5–5.3)
PROT SERPL-MCNC: 7.5 G/DL (ref 6.4–8.2)
RBC # BLD AUTO: 2.26 MILLION/UL (ref 3.81–5.12)
SODIUM SERPL-SCNC: 136 MMOL/L (ref 136–145)
VANCOMYCIN TROUGH SERPL-MCNC: 26.7 UG/ML (ref 10–20)
WBC # BLD AUTO: 7.26 THOUSAND/UL (ref 4.31–10.16)

## 2017-12-10 PROCEDURE — 85027 COMPLETE CBC AUTOMATED: CPT | Performed by: INTERNAL MEDICINE

## 2017-12-10 PROCEDURE — 82140 ASSAY OF AMMONIA: CPT | Performed by: INTERNAL MEDICINE

## 2017-12-10 PROCEDURE — 80202 ASSAY OF VANCOMYCIN: CPT | Performed by: INTERNAL MEDICINE

## 2017-12-10 PROCEDURE — 80053 COMPREHEN METABOLIC PANEL: CPT | Performed by: INTERNAL MEDICINE

## 2017-12-10 RX ORDER — LORAZEPAM 2 MG/ML
0.5 INJECTION INTRAMUSCULAR EVERY 6 HOURS PRN
Status: DISCONTINUED | OUTPATIENT
Start: 2017-12-10 | End: 2017-12-13

## 2017-12-10 RX ADMIN — ONDANSETRON 4 MG: 2 INJECTION INTRAMUSCULAR; INTRAVENOUS at 11:58

## 2017-12-10 RX ADMIN — ENOXAPARIN SODIUM 40 MG: 40 INJECTION SUBCUTANEOUS at 08:53

## 2017-12-10 RX ADMIN — POTASSIUM CHLORIDE 40 MEQ: 1500 TABLET, EXTENDED RELEASE ORAL at 17:07

## 2017-12-10 RX ADMIN — NYSTATIN: 100000 POWDER TOPICAL at 17:10

## 2017-12-10 RX ADMIN — LORAZEPAM 0.5 MG: 2 INJECTION INTRAMUSCULAR; INTRAVENOUS at 07:09

## 2017-12-10 RX ADMIN — FUROSEMIDE 80 MG: 10 INJECTION, SOLUTION INTRAMUSCULAR; INTRAVENOUS at 15:25

## 2017-12-10 RX ADMIN — VANCOMYCIN HYDROCHLORIDE 1500 MG: 1 INJECTION, POWDER, LYOPHILIZED, FOR SOLUTION INTRAVENOUS at 21:33

## 2017-12-10 RX ADMIN — GABAPENTIN 300 MG: 300 CAPSULE ORAL at 15:25

## 2017-12-10 RX ADMIN — DICYCLOMINE HYDROCHLORIDE 10 MG: 10 CAPSULE ORAL at 21:38

## 2017-12-10 RX ADMIN — DICYCLOMINE HYDROCHLORIDE 10 MG: 10 CAPSULE ORAL at 11:58

## 2017-12-10 RX ADMIN — PANTOPRAZOLE SODIUM 40 MG: 40 TABLET, DELAYED RELEASE ORAL at 06:00

## 2017-12-10 RX ADMIN — SPIRONOLACTONE 50 MG: 50 TABLET ORAL at 17:07

## 2017-12-10 RX ADMIN — POTASSIUM CHLORIDE 40 MEQ: 1500 TABLET, EXTENDED RELEASE ORAL at 08:53

## 2017-12-10 RX ADMIN — LIDOCAINE 1 PATCH: 50 PATCH CUTANEOUS at 19:02

## 2017-12-10 RX ADMIN — QUETIAPINE FUMARATE 50 MG: 25 TABLET ORAL at 21:38

## 2017-12-10 RX ADMIN — DICYCLOMINE HYDROCHLORIDE 10 MG: 10 CAPSULE ORAL at 15:25

## 2017-12-10 RX ADMIN — NYSTATIN: 100000 POWDER TOPICAL at 21:32

## 2017-12-10 RX ADMIN — AMITRIPTYLINE HYDROCHLORIDE 25 MG: 25 TABLET, FILM COATED ORAL at 21:38

## 2017-12-10 RX ADMIN — DICYCLOMINE HYDROCHLORIDE 10 MG: 10 CAPSULE ORAL at 06:00

## 2017-12-10 RX ADMIN — GABAPENTIN 300 MG: 300 CAPSULE ORAL at 08:53

## 2017-12-10 RX ADMIN — LORAZEPAM 0.5 MG: 2 INJECTION INTRAMUSCULAR; INTRAVENOUS at 11:58

## 2017-12-10 RX ADMIN — LACTULOSE 20 G: 20 SOLUTION ORAL at 08:53

## 2017-12-10 RX ADMIN — GABAPENTIN 300 MG: 300 CAPSULE ORAL at 21:38

## 2017-12-10 RX ADMIN — NYSTATIN: 100000 POWDER TOPICAL at 12:00

## 2017-12-10 RX ADMIN — NYSTATIN: 100000 POWDER TOPICAL at 09:00

## 2017-12-10 NOTE — PROGRESS NOTES
Progress Note - Infectious Disease   Theo Fuentes 36 y o  female MRN: [id number]  Unit/Bed#: [facility] 2 -01 Encounter: [id number]      Impression/Recommendations:  1   Coagulase negative staphylococcus bacteremia  Likely due to PIV in setting of purulent AC fossa drainage  Repeat blood cultures pending  Rec:  · Continue vancomycin for now  Hold this AM's dose given high trough and restart 1500 mg IV q 12 this evening  · Follow up the identification of the coagulase-negative Staph and tailor antibiotics as indicated  · Follow up repeat blood cultures     2   Sepsis  Nosocomial:  Fever and tachycardia  Likely due to #1  Clinically stable and non-toxic; Improving  Persistent low-grade fever may be due to atelectasis  Rec:       · Continue antibiotics as above  · Follow up final blood cultures as above  · Follow temperatures and white blood cell count closely  · Supportive care as per the primary service     3   Acute hypoxic respiratory insufficiency  Likely due to atelectasis, edema  Less likely pneumonia given alternative explanation for fevers (#1)  Stable  Rec:  · Continue diuresis per SLIM  · Continue OOB     4   Alcohol abuse     5  MO with ambulatory dysfunction     Antibiotics:  Vancomycin #3    Subjective:  Patient seen on AM rounds  Sleeping and then lethargic when awakened  Offers limited ROS  Had low grade fevers overnight  No documented chills, sweats, nausea, vomiting, or diarrhea      Objective:  Vitals:  HR:  [] 92  Resp:  [18-20] 20  BP: ()/(57-69) 103/57  SpO2:  [91 %-93 %] 91 %  Temp (24hrs), Av 9 °F (37 7 °C), Min:99 2 °F (37 3 °C), Max:100 3 °F (37 9 °C)  Current: Temperature: 100 °F (37 8 °C)    Physical Exam:   General:  No acute distress  Psychiatric:  Awake and alert  Pulmonary:  Normal respiratory excursion without accessory muscle use  Abdomen:  Soft, nontender  Extremities:  No edema  Skin:  No rashes    Lab Results:  I have personally reviewed pertinent labs     Results from last 7 days  Lab Units 12/10/17  0451 12/09/17  0648 12/08/17  0501   SODIUM mmol/L 136 137 137   POTASSIUM mmol/L 3 5 2 9* 3 7   CHLORIDE mmol/L 99* 98* 98*   CO2 mmol/L 35* 37* 33*   ANION GAP mmol/L 2* 2* 6   BUN mg/dL 7 4* 7   CREATININE mg/dL 0 86 0 79 0 73   EGFR ml/min/1 73sq m 83 92 101   GLUCOSE RANDOM mg/dL 82 88 64*   CALCIUM mg/dL 9 2 9 3 9 1   AST U/L 58* 53* 57*   ALT U/L 17 17 17   ALK PHOS U/L 88 79 89   TOTAL PROTEIN g/dL 7 5 7 3 7 3   ALBUMIN g/dL 2 9* 3 0* 3 0*   BILIRUBIN TOTAL mg/dL 2 90* 3 36* 3 56*       Results from last 7 days  Lab Units 12/10/17  0451 12/09/17  0648 12/08/17  0501   WBC Thousand/uL 7 26 5 60 8 16   HEMOGLOBIN g/dL 8 3* 8 2* 8 2*   PLATELETS Thousands/uL 246 211 226       Results from last 7 days  Lab Units 12/08/17  1303 12/06/17  1116 12/06/17  1115   BLOOD CULTURE  No Growth at 24 hrs  No Growth at 24 hrs  Staphylococcus coagulase negative*   Staphylococcus coagulase negative*   GRAM STAIN RESULT   --  Gram positive cocci in clusters Gram positive cocci in clusters     Vanco trough = 26 7    Imaging Studies:   I have personally reviewed pertinent imaging study reports and images in PACS  EKG, Pathology, and Other Studies:   I have personally reviewed pertinent reports

## 2017-12-10 NOTE — PROGRESS NOTES
[facility] Progress Note  Patient: Anali Keller 36 y o  female   MRN: [id number]  PCP: Marcy Wright MD  Unit/Bed#: [facility] 2 [facility] 226-01 Encounter: [id number]  Date Of Visit: 12/10/17    Assessment  Principal Problem:    Anasarca  Active Problems:    Ambulatory dysfunction    Recurrent falls    Chronic pain syndrome    Morbid obesity due to excess calories (HCC)    Peripheral edema    Hypokalemia    Other bipolar disorder ([facility] )    Noncompliance with medication regimen    Alcoholic liver disease ([facility] )    Coag negative Staphylococcus bacteremia    Anemia  Resolved Problems:    * No resolved hospital problems  *        Plan  1  Anasarca  Secondary to alcoholic liver disease  Blood pressure stable off IV albumin; continue IV Lasix  2  Alcoholic liver disease  Lorazepam as needed for withdrawal symptoms  3  Coag-negative Staphlococcus bacteremia  Vancomycin  Respiratory symptoms unlikely pneumonia as symptoms improved with IV Lasix and likely pulmonary edema  4  Bipolar depression  Mood remains stable on amitriptyline and quetiapine  5  Hypokalemia  Secondary to Lasix  Continue potassium  6  Anemia thrombocytopenia  Remained stable  Secondary to alcoholic liver disease  7  Morbid obesity  Need to lose more weight  VTE Pharmacologic Prophylaxis: Enoxaparin (Lovenox)    Patient Centered Rounds: I have performed bedside rounds with nursing staff today  Discussions with Specialists or Other Care Team Provider:     Education and Discussions with Family / Patient:  Discussed with  yesterday    Time Spent for Care: 30 mins  More than 50% of total time spent on counseling and coordination of care as described above      Current Length of Stay: 15 day(s)    Current Patient Status: Inpatient   Certification Statement: The patient will continue to require additional inpatient hospital stay due to infection and need for placement    Discharge Plan / Estimated Discharge Date: TBD    Code Status: Level 1 - Full Code  ______________________________________________________________________________    Subjective:   Patient seen and examined  Lethargic at times  Afebrile  Complains of anxiety because she cannot go shopping    Objective:   Vitals: Blood pressure 121/65, pulse 89, temperature 98 7 °F (37 1 °C), temperature source Temporal, resp  rate 20, height 5' 7" (1 702 m), weight (!) 151 kg (332 lb 14 3 oz), SpO2 95 %      Physical Exam:   General appearance: alert, appears older than stated age, cooperative, icteric and no distress  Head: atraumatic, scleral icterus  Lungs: diminished breath sounds bilaterally  Heart: regular rate and rhythm  Abdomen: Massively obese  Back: negative, range of motion normal  Extremities: edema +2-3 lower extremities bilaterally  Neurologic: Grossly normal    Additional Data:   Labs:    Results from last 7 days  Lab Units 12/10/17  0451 12/09/17  0648 12/08/17  0501 12/07/17  0609 12/05/17  0633   WBC Thousand/uL 7 26 5 60 8 16 7 71 5 66   HEMOGLOBIN g/dL 8 3* 8 2* 8 2* 7 9* 9 3*   HEMATOCRIT % 25 5* 24 9* 25 6* 24 6* 28 9*   MCV fL 113* 113* 114* 113* 114*   PLATELETS Thousands/uL 246 211 226 206 218   INR   --   --   --   --  1 46*       Results from last 7 days  Lab Units 12/10/17  0451 12/09/17  1112 12/08/17  0501 12/07/17  0533 12/06/17  0508 12/05/17  0633 12/04/17  0618   SODIUM mmol/L 136 137 137 136 134* 134* 136   POTASSIUM mmol/L 3 5 2 9* 3 7 4 0 4 0 3 9 4 6   CHLORIDE mmol/L 99* 98* 98* 102 101 101 105   CO2 mmol/L 35* 37* 33* 28 26 26 26   ANION GAP mmol/L 2* 2* 6 6 7 7 5   BUN mg/dL 7 4* 7 7 4* 5 4*   CREATININE mg/dL 0 86 0 79 0 73 0 69 0 67 0 74 0 75   CALCIUM mg/dL 9 2 9 3 9 1 8 4 8 6 8 9 8 2*   ALBUMIN g/dL 2 9* 3 0* 3 0* 2 3* 2 4* 2 3* 1 2*   BILIRUBIN TOTAL mg/dL 2 90* 3 36* 3 56* 3 30* 3 12* 3 70* 2 68*   ALK PHOS U/L 88 79 89 79 91 119* 125*   ALT U/L 17 17 17 16 18 22 21   AST U/L 58* 53* 57* 67* 68* 70* 75*   EGFR ml/min/1 73sq m 83 92 101 107 108 100 98   GLUCOSE RANDOM mg/dL 82 88 64* 76 59* 100 103                                  * I Have Reviewed All Lab Data Listed Above  Cultures:     Results from last 7 days  Lab Units 12/08/17  1303 12/06/17  1116 12/06/17  1115   BLOOD CULTURE  No Growth at 24 hrs  No Growth at 24 hrs     Staphylococcus epidermidis*   Staphylococcus epidermidis*   GRAM STAIN RESULT   --  Gram positive cocci in clusters Gram positive cocci in clusters         Imaging:  Imaging Reports Reviewed Today Include:       Scheduled Meds:  amitriptyline 25 mg Oral HS   dicyclomine 10 mg Oral 4x Daily (AC & HS)   enoxaparin 40 mg Subcutaneous Daily   furosemide 80 mg Intravenous BID (diuretic)   gabapentin 300 mg Oral TID   lactulose 20 g Oral Daily   lidocaine 1 patch Transdermal Daily   metolazone 5 mg Oral Once per day on Mon Thu   nystatin  Topical 4x Daily   pantoprazole 40 mg Oral Early Morning   potassium chloride 40 mEq Oral BID   QUEtiapine 50 mg Oral HS   spironolactone 50 mg Oral BID   vancomycin 1,500 mg Intravenous Q12H     Continuous Infusions:   PRN Meds:    acetaminophen    aluminum-magnesium hydroxide-simethicone    LORazepam    ondansetron     Josy Ca DO

## 2017-12-11 LAB
ALBUMIN SERPL BCP-MCNC: 2.8 G/DL (ref 3.5–5)
ALP SERPL-CCNC: 83 U/L (ref 46–116)
ALT SERPL W P-5'-P-CCNC: 18 U/L (ref 12–78)
AMMONIA PLAS-SCNC: 41 UMOL/L (ref 11–35)
ANION GAP SERPL CALCULATED.3IONS-SCNC: 5 MMOL/L (ref 4–13)
AST SERPL W P-5'-P-CCNC: 58 U/L (ref 5–45)
BILIRUB SERPL-MCNC: 2.75 MG/DL (ref 0.2–1)
BUN SERPL-MCNC: 7 MG/DL (ref 5–25)
CALCIUM SERPL-MCNC: 9.1 MG/DL (ref 8.3–10.1)
CHLORIDE SERPL-SCNC: 98 MMOL/L (ref 100–108)
CO2 SERPL-SCNC: 34 MMOL/L (ref 21–32)
CREAT SERPL-MCNC: 0.89 MG/DL (ref 0.6–1.3)
ERYTHROCYTE [DISTWIDTH] IN BLOOD BY AUTOMATED COUNT: 18.4 % (ref 11.6–15.1)
GFR SERPL CREATININE-BSD FRML MDRD: 80 ML/MIN/1.73SQ M
GLUCOSE SERPL-MCNC: 86 MG/DL (ref 65–140)
HCT VFR BLD AUTO: 26.5 % (ref 34.8–46.1)
HGB BLD-MCNC: 8.6 G/DL (ref 11.5–15.4)
MCH RBC QN AUTO: 37.1 PG (ref 26.8–34.3)
MCHC RBC AUTO-ENTMCNC: 32.5 G/DL (ref 31.4–37.4)
MCV RBC AUTO: 114 FL (ref 82–98)
PLATELET # BLD AUTO: 251 THOUSANDS/UL (ref 149–390)
PMV BLD AUTO: 9.2 FL (ref 8.9–12.7)
POTASSIUM SERPL-SCNC: 3.6 MMOL/L (ref 3.5–5.3)
PROT SERPL-MCNC: 7.4 G/DL (ref 6.4–8.2)
RBC # BLD AUTO: 2.32 MILLION/UL (ref 3.81–5.12)
SODIUM SERPL-SCNC: 137 MMOL/L (ref 136–145)
WBC # BLD AUTO: 7.92 THOUSAND/UL (ref 4.31–10.16)

## 2017-12-11 PROCEDURE — 97110 THERAPEUTIC EXERCISES: CPT

## 2017-12-11 PROCEDURE — 80053 COMPREHEN METABOLIC PANEL: CPT | Performed by: INTERNAL MEDICINE

## 2017-12-11 PROCEDURE — 82140 ASSAY OF AMMONIA: CPT | Performed by: INTERNAL MEDICINE

## 2017-12-11 PROCEDURE — 85027 COMPLETE CBC AUTOMATED: CPT | Performed by: INTERNAL MEDICINE

## 2017-12-11 PROCEDURE — 97530 THERAPEUTIC ACTIVITIES: CPT

## 2017-12-11 RX ORDER — AMITRIPTYLINE HYDROCHLORIDE 10 MG/1
10 TABLET, FILM COATED ORAL
Status: DISCONTINUED | OUTPATIENT
Start: 2017-12-11 | End: 2017-12-14 | Stop reason: HOSPADM

## 2017-12-11 RX ADMIN — GABAPENTIN 300 MG: 300 CAPSULE ORAL at 08:43

## 2017-12-11 RX ADMIN — DICYCLOMINE HYDROCHLORIDE 10 MG: 10 CAPSULE ORAL at 06:06

## 2017-12-11 RX ADMIN — LACTULOSE 20 G: 20 SOLUTION ORAL at 08:43

## 2017-12-11 RX ADMIN — VANCOMYCIN HYDROCHLORIDE 1500 MG: 1 INJECTION, POWDER, LYOPHILIZED, FOR SOLUTION INTRAVENOUS at 09:47

## 2017-12-11 RX ADMIN — NYSTATIN: 100000 POWDER TOPICAL at 21:49

## 2017-12-11 RX ADMIN — SPIRONOLACTONE 50 MG: 50 TABLET ORAL at 17:48

## 2017-12-11 RX ADMIN — FUROSEMIDE 80 MG: 10 INJECTION, SOLUTION INTRAMUSCULAR; INTRAVENOUS at 16:04

## 2017-12-11 RX ADMIN — POTASSIUM CHLORIDE 40 MEQ: 1500 TABLET, EXTENDED RELEASE ORAL at 17:48

## 2017-12-11 RX ADMIN — PANTOPRAZOLE SODIUM 40 MG: 40 TABLET, DELAYED RELEASE ORAL at 06:06

## 2017-12-11 RX ADMIN — AMITRIPTYLINE HYDROCHLORIDE 10 MG: 10 TABLET, FILM COATED ORAL at 21:54

## 2017-12-11 RX ADMIN — NYSTATIN: 100000 POWDER TOPICAL at 11:49

## 2017-12-11 RX ADMIN — GABAPENTIN 300 MG: 300 CAPSULE ORAL at 16:04

## 2017-12-11 RX ADMIN — GABAPENTIN 300 MG: 300 CAPSULE ORAL at 20:08

## 2017-12-11 RX ADMIN — QUETIAPINE FUMARATE 50 MG: 25 TABLET ORAL at 21:54

## 2017-12-11 RX ADMIN — VANCOMYCIN HYDROCHLORIDE 1500 MG: 1 INJECTION, POWDER, LYOPHILIZED, FOR SOLUTION INTRAVENOUS at 21:46

## 2017-12-11 RX ADMIN — FUROSEMIDE 80 MG: 10 INJECTION, SOLUTION INTRAMUSCULAR; INTRAVENOUS at 08:48

## 2017-12-11 RX ADMIN — ENOXAPARIN SODIUM 40 MG: 40 INJECTION SUBCUTANEOUS at 08:43

## 2017-12-11 RX ADMIN — LORAZEPAM 0.5 MG: 2 INJECTION INTRAMUSCULAR; INTRAVENOUS at 14:30

## 2017-12-11 RX ADMIN — NYSTATIN: 100000 POWDER TOPICAL at 17:48

## 2017-12-11 RX ADMIN — ONDANSETRON 4 MG: 2 INJECTION INTRAMUSCULAR; INTRAVENOUS at 09:03

## 2017-12-11 RX ADMIN — LIDOCAINE 1 PATCH: 50 PATCH CUTANEOUS at 20:08

## 2017-12-11 RX ADMIN — POTASSIUM CHLORIDE 40 MEQ: 1500 TABLET, EXTENDED RELEASE ORAL at 08:43

## 2017-12-11 RX ADMIN — NYSTATIN: 100000 POWDER TOPICAL at 08:44

## 2017-12-11 NOTE — PROGRESS NOTES
Progress Note - Italia Pro 36 y o  female MRN: [id number]    Unit/Bed#: [facility] 2 -01 Encounter: [id number]      Subjective: The patient feels reasonably well at the moment  She denies chest pain, shortness of breath, abdominal pain, nausea, or vomiting  She did not sleep well  She is eating pretty well  Physical Exam:   Temp:  [97 4 °F (36 3 °C)-98 7 °F (37 1 °C)] 97 4 °F (36 3 °C)  HR:  [84-89] 84  Resp:  [18-20] 18  BP: (101-121)/(51-65) 101/54    Gen:  Well-developed, severely obese, in no distress  Neck:  Supple  No lymphadenopathy, goiter, or bruit  Heart:  Regular rhythm  No murmur, gallop, or rub  Lungs:  Clear to auscultation and percussion  Diminished breath sounds at the bases  No wheezing, rales, or rhonchi   Abd:  Soft with active bowel sounds  No mass, tenderness, or organomegaly  Extremities:  +2 edema  Neuro:  Alert and oriented    No focal sign  Skin:  Warm and dry      LABS:   CBC:   Lab Results   Component Value Date    WBC 7 92 12/11/2017    HGB 8 6 (L) 12/11/2017    HCT 26 5 (L) 12/11/2017     (H) 12/11/2017     12/11/2017    MCH 37 1 (H) 12/11/2017    MCHC 32 5 12/11/2017    RDW 18 4 (H) 12/11/2017    MPV 9 2 12/11/2017   , CMP:   Lab Results   Component Value Date     12/11/2017    K 3 6 12/11/2017    CL 98 (L) 12/11/2017    CO2 34 (H) 12/11/2017    ANIONGAP 5 12/11/2017    BUN 7 12/11/2017    CREATININE 0 89 12/11/2017    GLUCOSE 86 12/11/2017    CALCIUM 9 1 12/11/2017    AST 58 (H) 12/11/2017    ALT 18 12/11/2017    ALKPHOS 83 12/11/2017    PROT 7 4 12/11/2017    ALBUMIN 2 8 (L) 12/11/2017    BILITOT 2 75 (H) 12/11/2017    EGFR 80 12/11/2017           Patient Active Problem List   Diagnosis    Ambulatory dysfunction    Recurrent falls    Chronic pain syndrome    Morbid obesity due to excess calories (HCC)    Peripheral edema    Hypokalemia    Anasarca    Chest pain    Other bipolar disorder ([facility] )    Noncompliance with medication regimen    Alcoholic liver disease (HCC)    RUQ pain    Febrile illness    Coag negative Staphylococcus bacteremia    Anemia       Assessment/Plan:  1  Anasarca  2  Alcoholic liver disease  3  Coag-negative staph bacteremia, on Vanco  4  Bipolar affective disorder  5  Anemia and thrombocytopenia secondary to 2   6   Morbid obesity  7  Hypokalemia, improved    The patient remains on vancomycin for coag-negative staph bacteremia  She has lost 42 lb since admission but remained somewhat edematous  Diuretics will be continued  Physical therapy will be continued  Dicyclomine will be eliminated  Amitriptyline will be tapered      VTE Pharmacologic Prophylaxis: Enoxaparin (Lovenox)  VTE Mechanical Prophylaxis: reason for no mechanical VTE prophylaxis Leg pain

## 2017-12-11 NOTE — PROGRESS NOTES
Progress Note - Infectious Disease   Anali Keller 36 y o  female MRN: [id number]  Unit/Bed#: [place] 2 -01 Encounter: [id number]      Impression/Recommendations:  1   Coagulase negative staphylococcus bacteremia  Likely due to PIV in setting of purulent AC fossa drainage  Repeat blood cultures negative  Rec:  · Continue vancomycin for now      · Follow up the identification of the coagulase-negative Staph and tailor antibiotics as indicated  · Follow up repeat blood cultures  If these are negative can receive 7 days total of IV antibiotics     2   Sepsis  Nosocomial:  Fever and tachycardia  Likely due to #1  Clinically stable and non-toxic; Improving  Rec:       · Continue antibiotics as above  · Follow temperatures and white blood cell count closely  · Supportive care as per the primary service     3   Acute hypoxic respiratory insufficiency  Likely due to atelectasis, edema  Less likely pneumonia given alternative explanation for fevers (#1)  Stable  Rec:  · Continue diuresis per SLIM  · Continue OOB     4   Alcohol abuse     5  MO with ambulatory dysfunction     Antibiotics:  Vancomycin #4    Subjective:  Patient sleeping but arousable  Then states she feels awful today  Has pain all over     Denies fevers, chills, or sweats  Denies nausea, vomiting, or diarrhea  Objective:  Vitals:  HR:  [84-90] 90  Resp:  [18] 18  BP: (101-120)/(51-59) 120/59  SpO2:  [95 %-97 %] 97 %  Temp (24hrs), Av 2 °F (36 8 °C), Min:97 4 °F (36 3 °C), Max:98 7 °F (37 1 °C)  Current: Temperature: 98 6 °F (37 °C)    Physical Exam:   General:  No acute distress  Psychiatric:  Sleeping but arousable, but then lethargic  Pulmonary:  Normal respiratory excursion without accessory muscle use  Abdomen:  Soft, nontender  Extremities:  No edema  Skin:  No rashes    Lab Results:  I have personally reviewed pertinent labs      Results from last 7 days  Lab Units 17  0504 12/10/17  0451 17  0648   SODIUM mmol/L 137 136 137 POTASSIUM mmol/L 3 6 3 5 2 9*   CHLORIDE mmol/L 98* 99* 98*   CO2 mmol/L 34* 35* 37*   ANION GAP mmol/L 5 2* 2*   BUN mg/dL 7 7 4*   CREATININE mg/dL 0 89 0 86 0 79   EGFR ml/min/1 73sq m 80 83 92   GLUCOSE RANDOM mg/dL 86 82 88   CALCIUM mg/dL 9 1 9 2 9 3   AST U/L 58* 58* 53*   ALT U/L 18 17 17   ALK PHOS U/L 83 88 79   TOTAL PROTEIN g/dL 7 4 7 5 7 3   ALBUMIN g/dL 2 8* 2 9* 3 0*   BILIRUBIN TOTAL mg/dL 2 75* 2 90* 3 36*       Results from last 7 days  Lab Units 12/11/17  0504 12/10/17  0451 12/09/17  0648   WBC Thousand/uL 7 92 7 26 5 60   HEMOGLOBIN g/dL 8 6* 8 3* 8 2*   PLATELETS Thousands/uL 251 246 211       Results from last 7 days  Lab Units 12/08/17  1303 12/06/17  1116 12/06/17  1115   BLOOD CULTURE  No Growth at 48 hrs  No Growth at 48 hrs  Staphylococcus epidermidis*   Staphylococcus epidermidis*   GRAM STAIN RESULT   --  Gram positive cocci in clusters Gram positive cocci in clusters       Imaging Studies:   I have personally reviewed pertinent imaging study reports and images in PACS  EKG, Pathology, and Other Studies:   I have personally reviewed pertinent reports

## 2017-12-11 NOTE — PLAN OF CARE
Problem: PHYSICAL THERAPY ADULT  Goal: Performs mobility at highest level of function for planned discharge setting  See evaluation for individualized goals  Treatment/Interventions: LE strengthening/ROM, Therapeutic exercise, Endurance training, Patient/family training, Bed mobility, Compensatory technique education, Continued evaluation  Equipment Recommended: Other (Comment) (pt needs dependent means for out of bed mobilization )       See flowsheet documentation for full assessment, interventions and recommendations  Outcome: Progressing  Prognosis: Fair  Problem List: Decreased strength, Decreased endurance, Decreased range of motion, Impaired balance, Decreased mobility, Decreased cognition, Impaired judgement, Decreased safety awareness, Obesity, Decreased skin integrity, Pain  Assessment: Pt  supine in bed upon my arrival  BP measured at 100/51 supine with reports of pt  feeling "groggy"  Performance of HEP supine in bed with A required from therapist for completion  Discussed OOB mobility with pt , remains resistive due to fear of falling  Emotional support provided however unsuccessful in attempts for OOB this session  Requested to use bedpan, assisted nursing staff with transferring and positioning of bedpan  Remained supine in bed at end of treatment session with nursing staff present  Continue to recommend STR upn d/c for continued improvement of noted impairments above  Barriers to Discharge: Inaccessible home environment, Decreased caregiver support     Recommendation: Short-term skilled PT     PT - OK to Discharge: Yes (if d/c to STR when medically stable )    See flowsheet documentation for full assessment

## 2017-12-11 NOTE — PHYSICAL THERAPY NOTE
Physical Therapy Progress Note     12/11/17 1043   Pain Assessment   Pain Assessment 0-10   Pain Score 5   Pain Type Chronic pain   Pain Location Generalized   Hospital Pain Intervention(s) Ambulation/increased activity;Repositioned   Response to Interventions Poorly Tolerated  Restrictions/Precautions   Other Precautions Fall Risk;Pain;O2;Multiple lines   General   Chart Reviewed Yes   Response to Previous Treatment Patient reporting fatigue but able to participate  Family/Caregiver Present No   Subjective   Subjective Willing to participate in therapy this AM    Bed Mobility   Rolling R 2  Maximal assistance   Additional items Assist x 1;Bedrails; Increased time required;Verbal cues;LE management   Rolling L 2  Maximal assistance   Additional items Assist x 1;Bedrails; Increased time required;Verbal cues;LE management   Endurance Deficit   Endurance Deficit Yes   Endurance Deficit Description fatigue/pain/anxiety   Activity Tolerance   Activity Tolerance Patient limited by fatigue;Patient limited by pain   Nurse Made Aware Yes   Exercises   THR Supine;10 reps;AAROM; Bilateral   Assessment   Prognosis Fair   Problem List Decreased strength;Decreased endurance;Decreased range of motion; Impaired balance;Decreased mobility; Decreased cognition; Impaired judgement;Decreased safety awareness; Obesity; Decreased skin integrity;Pain   Assessment Pt  supine in bed upon my arrival  BP measured at 100/51 supine with reports of pt  feeling "groggy"  Performance of HEP supine in bed with A required from therapist for completion  Discussed OOB mobility with pt , remains resistive due to fear of falling  Emotional support provided however unsuccessful in attempts for OOB this session  Requested to use bedpan, assisted nursing staff with transferring and positioning of bedpan  Remained supine in bed at end of treatment session with nursing staff present   Continue to recommend STR upn d/c for continued improvement of noted impairments above  Barriers to Discharge Inaccessible home environment;Decreased caregiver support   Goals   Patient Goals To go to rehab  STG Expiration Date 12/13/17   Treatment Day 8   Plan   Treatment/Interventions Functional transfer training;LE strengthening/ROM; Therapeutic exercise; Endurance training;Gait training;Spoke to nursing;Spoke to case management   Progress Slow progress, decreased activity tolerance   PT Frequency 5x/wk   Recommendation   Recommendation Short-term skilled PT   PT - OK to Discharge Yes  (if d/c to STR when medically stable )     Madelin High, PTA

## 2017-12-11 NOTE — PROGRESS NOTES
Pt  Observed by nursing going through purse  Pt  Pulled out two pill bottles, one empty Gabapentin and one partial filled Savella 50mg  Pt  Denied taken any medications from bottles  Bottle of Gabapentin filled 12/11/16 and Lennice Alec filled 1/13/16  Attending paged  Both pill bottles sent to pharmacy

## 2017-12-12 PROCEDURE — 97116 GAIT TRAINING THERAPY: CPT

## 2017-12-12 PROCEDURE — 97530 THERAPEUTIC ACTIVITIES: CPT

## 2017-12-12 PROCEDURE — 97110 THERAPEUTIC EXERCISES: CPT

## 2017-12-12 RX ADMIN — VANCOMYCIN HYDROCHLORIDE 1500 MG: 1 INJECTION, POWDER, LYOPHILIZED, FOR SOLUTION INTRAVENOUS at 21:17

## 2017-12-12 RX ADMIN — NYSTATIN: 100000 POWDER TOPICAL at 11:56

## 2017-12-12 RX ADMIN — GABAPENTIN 300 MG: 300 CAPSULE ORAL at 21:17

## 2017-12-12 RX ADMIN — LIDOCAINE 1 PATCH: 50 PATCH CUTANEOUS at 21:17

## 2017-12-12 RX ADMIN — NYSTATIN: 100000 POWDER TOPICAL at 21:17

## 2017-12-12 RX ADMIN — VANCOMYCIN HYDROCHLORIDE 1500 MG: 1 INJECTION, POWDER, LYOPHILIZED, FOR SOLUTION INTRAVENOUS at 09:17

## 2017-12-12 RX ADMIN — LACTULOSE 20 G: 20 SOLUTION ORAL at 08:29

## 2017-12-12 RX ADMIN — SPIRONOLACTONE 50 MG: 50 TABLET ORAL at 08:30

## 2017-12-12 RX ADMIN — FUROSEMIDE 80 MG: 10 INJECTION, SOLUTION INTRAMUSCULAR; INTRAVENOUS at 08:30

## 2017-12-12 RX ADMIN — SPIRONOLACTONE 50 MG: 50 TABLET ORAL at 17:13

## 2017-12-12 RX ADMIN — NYSTATIN: 100000 POWDER TOPICAL at 08:31

## 2017-12-12 RX ADMIN — GABAPENTIN 300 MG: 300 CAPSULE ORAL at 08:29

## 2017-12-12 RX ADMIN — POTASSIUM CHLORIDE 40 MEQ: 1500 TABLET, EXTENDED RELEASE ORAL at 17:13

## 2017-12-12 RX ADMIN — POTASSIUM CHLORIDE 40 MEQ: 1500 TABLET, EXTENDED RELEASE ORAL at 08:29

## 2017-12-12 RX ADMIN — ONDANSETRON 4 MG: 2 INJECTION INTRAMUSCULAR; INTRAVENOUS at 17:05

## 2017-12-12 RX ADMIN — AMITRIPTYLINE HYDROCHLORIDE 10 MG: 10 TABLET, FILM COATED ORAL at 21:17

## 2017-12-12 RX ADMIN — QUETIAPINE FUMARATE 50 MG: 25 TABLET ORAL at 21:17

## 2017-12-12 RX ADMIN — NYSTATIN: 100000 POWDER TOPICAL at 17:15

## 2017-12-12 RX ADMIN — PANTOPRAZOLE SODIUM 40 MG: 40 TABLET, DELAYED RELEASE ORAL at 05:55

## 2017-12-12 RX ADMIN — LORAZEPAM 0.5 MG: 2 INJECTION INTRAMUSCULAR; INTRAVENOUS at 16:57

## 2017-12-12 RX ADMIN — ENOXAPARIN SODIUM 40 MG: 40 INJECTION SUBCUTANEOUS at 08:30

## 2017-12-12 RX ADMIN — GABAPENTIN 300 MG: 300 CAPSULE ORAL at 16:53

## 2017-12-12 NOTE — PROGRESS NOTES
Progress Note - Fabiene Essex 36 y o  female MRN: [id number]    Unit/Bed#: [place] 68 2 -01 Encounter: [id number]      Subjective:  Overall, the patient is feeling pretty well  She does complain of leg pain when she is out of bed  She denies chest pain, shortness of breath, nausea, or vomiting  Physical Exam:   Temp:  [97 7 °F (36 5 °C)-99 1 °F (37 3 °C)] 98 7 °F (37 1 °C)  HR:  [88-91] 91  Resp:  [18-20] 20  BP: (111-130)/(59-65) 120/59    Gen:  Well-developed, severely obese, in no distress  Neck:  Supple  No lymphadenopathy, goiter, or bruit  Heart:  Regular rhythm  No murmur, gallop, or rub  Lungs:  Clear to auscultation and percussion  No wheezing, rales, or rhonchi    Abd:  Soft with active bowel sounds  No mass, tenderness, or organomegaly  Extremities:  +1 edema  No calf tenderness  No clubbing or cyanosis  Neuro:  Alert and oriented  No focal sign  Skin:  Warm and dry      LABS:   No new labs        Patient Active Problem List   Diagnosis    Ambulatory dysfunction    Recurrent falls    Chronic pain syndrome    Morbid obesity due to excess calories (HCC)    Peripheral edema    Hypokalemia    Anasarca    Chest pain    Other bipolar disorder ([address]    Noncompliance with medication regimen    [facility]olic [address] disease ([facility] [address]    RUQ pain    Febrile illness    Coag negative Staphylococcus bacteremia    Anemia       Assessment/Plan:  1  Anasarca, improving steadily  Two  Alcoholic liver disease  3  Coag-negative staph bacteremia, on vancomycin for 2 more days  4  Bipolar affective disorder  5  Anemia and thrombocytopenia secondary to 2   6   Morbid obesity  7  Hypokalemia, resolved    The patient continues to lose weight  She will be continued on her current diuretics  I suspect that she has about 10 more lb to lose  We will continue to monitor her electrolytes and kidney function  A rehab arrangements are being made  The situation was reviewed with case management      VTE Pharmacologic Prophylaxis: Enoxaparin (Lovenox)  VTE Mechanical Prophylaxis: sequential compression device

## 2017-12-12 NOTE — PHYSICAL THERAPY NOTE
Physical Therapy Progress Note     12/12/17 1151   Pain Assessment   Pain Assessment 0-10   Pain Score 5   Pain Type Chronic pain   Pain Location Generalized   Hospital Pain Intervention(s) Ambulation/increased activity;Repositioned   Response to Interventions Poorly Tolerated  Restrictions/Precautions   Other Precautions Fall Risk;Pain; Impulsive;O2;Multiple lines   General   Chart Reviewed Yes   Response to Previous Treatment Patient reporting fatigue but able to participate  Family/Caregiver Present No   Subjective   Subjective Willing to participate in therapy this AM    Bed Mobility   Supine to Sit 2  Maximal assistance   Additional items Assist x 1;HOB elevated; Bedrails; Increased time required;LE management;Verbal cues   Sit to Supine 2  Maximal assistance   Additional items Assist x 2;Bedrails; Increased time required;Verbal cues;LE management;Leg    Transfers   Sit to Stand 3  Moderate assistance   Additional items Assist x 2;Bedrails; Increased time required;Verbal cues   Stand to Sit 3  Moderate assistance   Additional items Assist x 2;Bedrails; Increased time required;Verbal cues   Ambulation/Elevation   Gait pattern Decreased foot clearance; Wide LEONIE; Short stride; Step to;Excessively slow; Improper Weight shift   Gait Assistance 3  Moderate assist   Additional items Assist x 2;Verbal cues; Tactile cues   Assistive Device Bariatric Rolling walker   Distance 5'   Balance   Static Sitting Fair   Dynamic Sitting Fair   Static Standing Poor -   Dynamic Standing Poor -   Ambulatory Poor -   Endurance Deficit   Endurance Deficit Yes   Endurance Deficit Description fatigue/pain   Activity Tolerance   Activity Tolerance Patient limited by pain; Patient limited by fatigue   Nurse Made Aware Yes   Exercises   THR Sitting;10 reps;AAROM; Bilateral   Assessment   Prognosis Fair   Problem List Decreased strength;Decreased range of motion; Impaired balance;Decreased endurance;Decreased mobility; Impaired judgement;Decreased safety awareness; Obesity;Pain   Assessment Pt  supine in bed upon my arrival  Continues to require increased A with all transfers with cueing provided for proper technique  Remained seated at EOB for additional time with performance of HEP  Progressed with OOB mobility requiring A of 2 with cueing of LE, and support of RW  Pt  remains impulsive as she impulsively attempts to sit down without chair/bed behind her  Instruction of importance to overt falls  Repositioned supine in bed at end of treatment session  PT will continue to recommend STR upon d/c for continued improvement of noted impairments above  Barriers to Discharge Inaccessible home environment;Decreased caregiver support   Goals   Patient Goals To go to rehab  STG Expiration Date 12/13/17   Treatment Day 9   Plan   Treatment/Interventions Functional transfer training;LE strengthening/ROM; Therapeutic exercise; Endurance training;Bed mobility;Gait training;Spoke to nursing;Spoke to case management   Progress Slow progress, decreased activity tolerance   PT Frequency 5x/wk   Recommendation   Recommendation Short-term skilled PT   PT - OK to Discharge Yes  (if d/c to STR when medically stable )     Arnaldo Rey PTA

## 2017-12-12 NOTE — PROGRESS NOTES
Pt refused IV lasix dose this evening - teaching done concerning importance of medication  Reassured pt we do not mind helping her to urinate- continues to decline

## 2017-12-12 NOTE — PROGRESS NOTES
Progress Note - Infectious Disease   Italia Pro 36 y o  female MRN: [id number]  Unit/Bed#: [place] [address] -01 Encounter: [id number]      Impression/Recommendations:  1   Coagulase negative staphylococcus bacteremia  Likely due to PIV in setting of purulent AC fossa drainage  Repeat blood cultures negative  Rec:  · Continue vancomycin for 2 more days  · Follow temperatures and white blood cell count closely     2   Sepsis  Nosocomial:  Fever and tachycardia  Likely due to #1  Clinically stable and non-toxic; Improving  Rec:       · Continue antibiotics as above  · Follow temperatures and white blood cell count closely  · Supportive care as per the primary service     3   Acute hypoxic respiratory insufficiency  Likely due to atelectasis, edema  Less likely pneumonia given alternative explanation for fevers (#1)  Rec:  · Continue diuresis per SLIM  · Continue OOB     4   Alcohol abuse     5  MO with ambulatory dysfunction     The patient is stable from an ID standpoint    Antibiotics:  Vancomycin #5    Subjective:  Patient seen on AM rounds  Laying in bed, groggy  States she hasn't been OOB due to her left ankle hurting  PT notes suggest she is hesitant due to fear of falling  Denies fevers, chills, or sweats  Denies nausea, vomiting, or diarrhea  Objective:  Vitals:  HR:  [88-90] 88  Resp:  [18] 18  BP: (111-130)/(59-65) 130/60  SpO2:  [96 %-97 %] 96 %  Temp (24hrs), Av 5 °F (36 9 °C), Min:97 7 °F (36 5 °C), Max:99 1 °F (37 3 °C)  Current: Temperature: 97 7 °F (36 5 °C)    Physical Exam:   General:  No acute distress  Psychiatric:  Awake and alert  Pulmonary:  Normal respiratory excursion without accessory muscle use  Abdomen:  Soft, nontender  Extremities:  Non pitting edema  Skin:  No rashes    Lab Results:  I have personally reviewed pertinent labs      Results from last 7 days  Lab Units 17  0504 12/10/17  0451 17  0648   SODIUM mmol/L 137 136 137   POTASSIUM mmol/L 3 6 3 5 2 9*   CHLORIDE mmol/L 98* 99* 98*   CO2 mmol/L 34* 35* 37*   ANION GAP mmol/L 5 2* 2*   BUN mg/dL 7 7 4*   CREATININE mg/dL 0 89 0 86 0 79   EGFR ml/min/1 73sq m 80 83 92   GLUCOSE RANDOM mg/dL 86 82 88   CALCIUM mg/dL 9 1 9 2 9 3   AST U/L 58* 58* 53*   ALT U/L 18 17 17   ALK PHOS U/L 83 88 79   TOTAL PROTEIN g/dL 7 4 7 5 7 3   ALBUMIN g/dL 2 8* 2 9* 3 0*   BILIRUBIN TOTAL mg/dL 2 75* 2 90* 3 36*       Results from last 7 days  Lab Units 12/11/17  0504 12/10/17  0451 12/09/17  0648   WBC Thousand/uL 7 92 7 26 5 60   HEMOGLOBIN g/dL 8 6* 8 3* 8 2*   PLATELETS Thousands/uL 251 246 211       Results from last 7 days  Lab Units 12/08/17  1303 12/06/17  1116 12/06/17  1115   BLOOD CULTURE  No Growth at 72 hrs  No Growth at 72 hrs  Staphylococcus epidermidis*   Staphylococcus epidermidis*   GRAM STAIN RESULT   --  Gram positive cocci in clusters Gram positive cocci in clusters       Imaging Studies:   I have personally reviewed pertinent imaging study reports and images in PACS  EKG, Pathology, and Other Studies:   I have personally reviewed pertinent reports

## 2017-12-12 NOTE — PLAN OF CARE
Problem: PHYSICAL THERAPY ADULT  Goal: Performs mobility at highest level of function for planned discharge setting  See evaluation for individualized goals  Treatment/Interventions: LE strengthening/ROM, Therapeutic exercise, Endurance training, Patient/family training, Bed mobility, Compensatory technique education, Continued evaluation  Equipment Recommended: Other (Comment) (pt needs dependent means for out of bed mobilization )       See flowsheet documentation for full assessment, interventions and recommendations  Outcome: Progressing  Prognosis: Fair  Problem List: Decreased strength, Decreased range of motion, Impaired balance, Decreased endurance, Decreased mobility, Impaired judgement, Decreased safety awareness, Obesity, Pain  Assessment: Pt  supine in bed upon my arrival  Continues to require increased A with all transfers with cueing provided for proper technique  Remained seated at EOB for additional time with performance of HEP  Progressed with OOB mobility requiring A of 2 with cueing of LE, and support of RW  Pt  remains impulsive as she impulsively attempts to sit down without chair/bed behind her  Instruction of importance to overt falls  Repositioned supine in bed at end of treatment session  PT will continue to recommend STR upon d/c for continued improvement of noted impairments above  Barriers to Discharge: Inaccessible home environment, Decreased caregiver support     Recommendation: Short-term skilled PT     PT - OK to Discharge: Yes (if d/c to STR when medically stable )    See flowsheet documentation for full assessment

## 2017-12-13 LAB
ANION GAP SERPL CALCULATED.3IONS-SCNC: 8 MMOL/L (ref 4–13)
ANISOCYTOSIS BLD QL SMEAR: PRESENT
BACTERIA BLD CULT: NORMAL
BACTERIA BLD CULT: NORMAL
BASOPHILS # BLD MANUAL: 0.07 THOUSAND/UL (ref 0–0.1)
BASOPHILS NFR MAR MANUAL: 1 % (ref 0–1)
BUN SERPL-MCNC: 10 MG/DL (ref 5–25)
CALCIUM SERPL-MCNC: 9.2 MG/DL (ref 8.3–10.1)
CHLORIDE SERPL-SCNC: 99 MMOL/L (ref 100–108)
CO2 SERPL-SCNC: 30 MMOL/L (ref 21–32)
CREAT SERPL-MCNC: 0.95 MG/DL (ref 0.6–1.3)
EOSINOPHIL # BLD MANUAL: 0.27 THOUSAND/UL (ref 0–0.4)
EOSINOPHIL NFR BLD MANUAL: 4 % (ref 0–6)
ERYTHROCYTE [DISTWIDTH] IN BLOOD BY AUTOMATED COUNT: 17.4 % (ref 11.6–15.1)
GFR SERPL CREATININE-BSD FRML MDRD: 74 ML/MIN/1.73SQ M
GLUCOSE SERPL-MCNC: 87 MG/DL (ref 65–140)
HCT VFR BLD AUTO: 26.8 % (ref 34.8–46.1)
HGB BLD-MCNC: 8.5 G/DL (ref 11.5–15.4)
LYMPHOCYTES # BLD AUTO: 2.45 THOUSAND/UL (ref 0.6–4.47)
LYMPHOCYTES # BLD AUTO: 36 % (ref 14–44)
MACROCYTES BLD QL AUTO: PRESENT
MCH RBC QN AUTO: 36.6 PG (ref 26.8–34.3)
MCHC RBC AUTO-ENTMCNC: 31.7 G/DL (ref 31.4–37.4)
MCV RBC AUTO: 116 FL (ref 82–98)
MONOCYTES # BLD AUTO: 0.68 THOUSAND/UL (ref 0–1.22)
MONOCYTES NFR BLD: 10 % (ref 4–12)
NEUTROPHILS # BLD MANUAL: 3.13 THOUSAND/UL (ref 1.85–7.62)
NEUTS SEG NFR BLD AUTO: 46 % (ref 43–75)
PLATELET # BLD AUTO: 297 THOUSANDS/UL (ref 149–390)
PLATELET BLD QL SMEAR: ADEQUATE
PMV BLD AUTO: 8.7 FL (ref 8.9–12.7)
POTASSIUM SERPL-SCNC: 3.9 MMOL/L (ref 3.5–5.3)
RBC # BLD AUTO: 2.32 MILLION/UL (ref 3.81–5.12)
SODIUM SERPL-SCNC: 137 MMOL/L (ref 136–145)
TOTAL CELLS COUNTED SPEC: 100
VARIANT LYMPHS # BLD AUTO: 3 %
WBC # BLD AUTO: 6.81 THOUSAND/UL (ref 4.31–10.16)

## 2017-12-13 PROCEDURE — 97110 THERAPEUTIC EXERCISES: CPT

## 2017-12-13 PROCEDURE — 97530 THERAPEUTIC ACTIVITIES: CPT

## 2017-12-13 PROCEDURE — 80048 BASIC METABOLIC PNL TOTAL CA: CPT | Performed by: INTERNAL MEDICINE

## 2017-12-13 PROCEDURE — 85007 BL SMEAR W/DIFF WBC COUNT: CPT | Performed by: INTERNAL MEDICINE

## 2017-12-13 PROCEDURE — 85027 COMPLETE CBC AUTOMATED: CPT | Performed by: INTERNAL MEDICINE

## 2017-12-13 RX ORDER — FUROSEMIDE 40 MG/1
80 TABLET ORAL
Status: DISCONTINUED | OUTPATIENT
Start: 2017-12-13 | End: 2017-12-14 | Stop reason: HOSPADM

## 2017-12-13 RX ADMIN — NYSTATIN: 100000 POWDER TOPICAL at 11:50

## 2017-12-13 RX ADMIN — GABAPENTIN 300 MG: 300 CAPSULE ORAL at 20:05

## 2017-12-13 RX ADMIN — AMITRIPTYLINE HYDROCHLORIDE 10 MG: 10 TABLET, FILM COATED ORAL at 21:09

## 2017-12-13 RX ADMIN — PANTOPRAZOLE SODIUM 40 MG: 40 TABLET, DELAYED RELEASE ORAL at 05:46

## 2017-12-13 RX ADMIN — LIDOCAINE 1 PATCH: 50 PATCH CUTANEOUS at 20:13

## 2017-12-13 RX ADMIN — VANCOMYCIN HYDROCHLORIDE 1500 MG: 1 INJECTION, POWDER, LYOPHILIZED, FOR SOLUTION INTRAVENOUS at 21:09

## 2017-12-13 RX ADMIN — LACTULOSE 20 G: 20 SOLUTION ORAL at 08:54

## 2017-12-13 RX ADMIN — GABAPENTIN 300 MG: 300 CAPSULE ORAL at 08:55

## 2017-12-13 RX ADMIN — QUETIAPINE FUMARATE 50 MG: 25 TABLET ORAL at 21:09

## 2017-12-13 RX ADMIN — ENOXAPARIN SODIUM 40 MG: 40 INJECTION SUBCUTANEOUS at 08:55

## 2017-12-13 RX ADMIN — FUROSEMIDE 80 MG: 10 INJECTION, SOLUTION INTRAMUSCULAR; INTRAVENOUS at 08:54

## 2017-12-13 RX ADMIN — SPIRONOLACTONE 50 MG: 50 TABLET ORAL at 08:55

## 2017-12-13 RX ADMIN — NYSTATIN: 100000 POWDER TOPICAL at 08:54

## 2017-12-13 RX ADMIN — POTASSIUM CHLORIDE 40 MEQ: 1500 TABLET, EXTENDED RELEASE ORAL at 08:55

## 2017-12-13 RX ADMIN — NYSTATIN: 100000 POWDER TOPICAL at 21:19

## 2017-12-13 RX ADMIN — VANCOMYCIN HYDROCHLORIDE 1500 MG: 1 INJECTION, POWDER, LYOPHILIZED, FOR SOLUTION INTRAVENOUS at 09:47

## 2017-12-13 NOTE — PROGRESS NOTES
Progress Note - Infectious Disease   Steve Garcia 36 y o  female MRN: [id number]  Unit/Bed#: [place] [address] -01 Encounter: [id number]      Impression/Recommendations:  1   Coagulase negative staphylococcus bacteremia  Likely due to PIV in setting of purulent AC fossa drainage  Repeat blood cultures negative  Rec:  · Continue vancomycin for 1 more day  · Follow temperatures and white blood cell count closely     2   Sepsis  Nosocomial:  Fever and tachycardia  Likely due to #1  Clinically stable and non-toxic; Improving  Rec:       · Continue antibiotics as above  · Follow temperatures and white blood cell count closely  · Supportive care as per the primary service     3   Acute hypoxic respiratory insufficiency  Likely due to atelectasis, edema  Less likely pneumonia given alternative explanation for fevers (#1)  Rec:  · Continue diuresis per SLIM  · Continue OOB     4   Alcohol abuse     5  MO with ambulatory dysfunction     The patient is stable from an ID standpoint for D/C tomorrow after 9AM dose of antibiotics  Discussed in detail with Dr dalton     Antibiotics:  Vancomycin #6    Subjective:  Patient seen on AM rounds  Continues to be diuresed with weight loss  Denies fevers, chills, or sweats  Denies nausea, vomiting, or diarrhea  Objective:  Vitals:  HR:  [85-93] 93  Resp:  [16-18] 18  BP: ()/(54-62) 112/56  SpO2:  [91 %-100 %] 100 %  Temp (24hrs), Av °F (37 2 °C), Min:98 5 °F (36 9 °C), Max:99 3 °F (37 4 °C)  Current: Temperature: 98 5 °F (36 9 °C)    Physical Exam:   General:  No acute distress  Eyes:  Normal lids and conjunctivae  ENT:  Normal external ears and nose  Neck:  Neck symmetric with midline trachea  Pulmonary:  Normal respiratory effort without accessory muscle use  Cardiovascular:  Regular rate and rhythm; non pitting leg edema  Gastrointestinal:  No tenderness or distention  Musculoskeletal:  No digital clubbing or cyanosis  Skin:  No visible rashes;  No palpable nodules  Neurologic:  Sensation grossly intact to light touch  Psychiatric:  Alert and oriented; Normal mood    Lab Results:  I have personally reviewed pertinent labs  Results from last 7 days  Lab Units 12/13/17  0523 12/11/17  0504 12/10/17  0451 12/09/17  0648   SODIUM mmol/L 137 137 136 137   POTASSIUM mmol/L 3 9 3 6 3 5 2 9*   CHLORIDE mmol/L 99* 98* 99* 98*   CO2 mmol/L 30 34* 35* 37*   ANION GAP mmol/L 8 5 2* 2*   BUN mg/dL 10 7 7 4*   CREATININE mg/dL 0 95 0 89 0 86 0 79   EGFR ml/min/1 73sq m 74 80 83 92   GLUCOSE RANDOM mg/dL 87 86 82 88   CALCIUM mg/dL 9 2 9 1 9 2 9 3   AST U/L  --  58* 58* 53*   ALT U/L  --  18 17 17   ALK PHOS U/L  --  83 88 79   TOTAL PROTEIN g/dL  --  7 4 7 5 7 3   ALBUMIN g/dL  --  2 8* 2 9* 3 0*   BILIRUBIN TOTAL mg/dL  --  2 75* 2 90* 3 36*       Results from last 7 days  Lab Units 12/13/17  0523 12/11/17  0504 12/10/17  0451   WBC Thousand/uL 6 81 7 92 7 26   HEMOGLOBIN g/dL 8 5* 8 6* 8 3*   PLATELETS Thousands/uL 297 251 246       Results from last 7 days  Lab Units 12/08/17  1303   BLOOD CULTURE  No Growth After 4 Days  No Growth After 4 Days  Imaging Studies:   I have personally reviewed pertinent imaging study reports and images in PACS  EKG, Pathology, and Other Studies:   I have personally reviewed pertinent reports

## 2017-12-13 NOTE — SOCIAL WORK
Patient will accept bed at [facility] for tomorrow; transport set up with [facility] for 12:30 pm; Dr Dm Suggs notified of transfer time  Call to insurance about auth for transport; VM left

## 2017-12-13 NOTE — PROGRESS NOTES
Progress Note - Fredricka Olszewski 36 y o  female MRN: [id number]    [facility]01 Encounter: [id number]      Subjective: The patient feels generally lousy  She is weak  She is not eating well  She denies abdominal pain, nausea, or vomiting  Her bowels are moving  She has no chest pain or shortness of breath  Physical Exam:   Temp:  [98 7 °F (37 1 °C)-99 3 °F (37 4 °C)] 99 2 °F (37 3 °C)  HR:  [85-91] 90  Resp:  [16-20] 16  BP: ()/(54-62) 130/62    Gen:  Well-developed, severely obese, in no distress  Neck:  Supple  No lymphadenopathy, goiter, or bruit  Heart:  Regular rhythm  No murmur, gallop, or rub  Lungs:  Clear to auscultation and percussion  No wheezing, rales, or rhonchi   Abd:  Soft with active bowel sounds  No mass, tenderness, or organomegaly  Extremities:  No clubbing or cyanosis  Slight edema  No calf tenderness  Neuro:  Alert and oriented    No focal sign   Skin:  Warm and dry      LABS:   CBC:   Lab Results   Component Value Date    WBC 6 81 12/13/2017    HGB 8 5 (L) 12/13/2017    HCT 26 8 (L) 12/13/2017     (H) 12/13/2017     12/13/2017    MCH 36 6 (H) 12/13/2017    MCHC 31 7 12/13/2017    RDW 17 4 (H) 12/13/2017    MPV 8 7 (L) 12/13/2017   , CMP:   Lab Results   Component Value Date     12/13/2017    K 3 9 12/13/2017    CL 99 (L) 12/13/2017    CO2 30 12/13/2017    ANIONGAP 8 12/13/2017    BUN 10 12/13/2017    CREATININE 0 95 12/13/2017    GLUCOSE 87 12/13/2017    CALCIUM 9 2 12/13/2017    EGFR 74 12/13/2017           Patient Active Problem List   Diagnosis    Ambulatory dysfunction    Recurrent falls    Chronic pain syndrome    Morbid obesity due to excess calories ([address] )    Peripheral edema    Hypokalemia    Anasarca    Chest pain    Other bipolar disorder ([address] )    Noncompliance with medication regimen    Alcoholic [facility] disease ([facility] [address]    RUQ pain    Febrile illness    Coag negative Staphylococcus bacteremia    Anemia Assessment/Plan:  1  Anasarca secondary to cirrhosis, improving  2  Alcoholic liver disease  3  Coag-negative staph bacteremia, on vancomycin  4  Bipolar affective disorder  5  Anemia and thrombocytopenia secondary to 2   6   Morbid obesity  7  Fibromyalgia with chronic pain syndrome    Overall, the patient is much improved  She has lost a significant amount of weight  Her furosemide will be changed to the oral preparation  She will be finished vancomycin tomorrow  I anticipate discharge to rehab tomorrow  The patient appears better objectively then she feels subjectively  She does have a history of fibromyalgia which may be playing a role  Her generalized weakness is also related to her liver disease and morbid obesity      VTE Pharmacologic Prophylaxis: Enoxaparin (Lovenox)  VTE Mechanical Prophylaxis: sequential compression device

## 2017-12-13 NOTE — PLAN OF CARE
GASTROINTESTINAL - ADULT     Maintains or returns to baseline bowel function Progressing     Maintains adequate nutritional intake Progressing        METABOLIC, FLUID AND ELECTROLYTES - ADULT     Electrolytes maintained within normal limits Progressing     Fluid balance maintained Progressing     Glucose maintained within target range Progressing        Nutrition/Hydration-ADULT     Nutrient/Hydration intake appropriate for improving, restoring or maintaining nutritional needs Progressing        Potential for Falls     Patient will remain free of falls Progressing        Prexisting or High Potential for Compromised Skin Integrity     Skin integrity is maintained or improved Progressing        SKIN/TISSUE INTEGRITY - ADULT     Skin integrity remains intact Progressing

## 2017-12-13 NOTE — PLAN OF CARE
Problem: DISCHARGE PLANNING - CARE MANAGEMENT  Goal: Discharge to post-acute care or home with appropriate resources  INTERVENTIONS:  - Conduct assessment to determine patient/family and health care team treatment goals, and need for post-acute services based on payer coverage, community resources, and patient preferences, and barriers to discharge  - Address psychosocial, clinical, and financial barriers to discharge as identified in assessment in conjunction with the patient/family and health care team  - Arrange appropriate level of post-acute services according to patients   needs and preference and payer coverage in collaboration with the physician and health care team  - Communicate with and update the patient/family, physician, and health care team regarding progress on the discharge plan  - Arrange appropriate transportation to post-acute venues   Outcome: Adequate for Discharge  Transport set up with [facility] for 12/14/17 at 12:30 to transfer to [facility]

## 2017-12-13 NOTE — PLAN OF CARE
Problem: PHYSICAL THERAPY ADULT  Goal: Performs mobility at highest level of function for planned discharge setting  See evaluation for individualized goals  Treatment/Interventions: LE strengthening/ROM, Therapeutic exercise, Endurance training, Patient/family training, Bed mobility, Compensatory technique education, Continued evaluation  Equipment Recommended: Other (Comment) (pt needs dependent means for out of bed mobilization )       See flowsheet documentation for full assessment, interventions and recommendations  Outcome: Progressing  Prognosis: Fair  Problem List: Decreased strength, Decreased range of motion, Decreased endurance, Impaired balance, Decreased mobility, Obesity, Decreased cognition, Impaired judgement, Decreased safety awareness, Pain  Assessment: Pt  supine in bed upon my arrival  Continues to report increased fatigue requesting to remain in bed this PM  Attempted to peform OOB mobility unsuccesful  Performance of HEP supine with A of therapist for completion  Repositioned supine in bed at end of treatment session with alarm active  PT will continue to recommend STR upon d/c for continued improvement of noted impairments above  Barriers to Discharge: Inaccessible home environment, Decreased caregiver support     Recommendation: Short-term skilled PT     PT - OK to Discharge: Yes (if d/c to STR when medically stable )    See flowsheet documentation for full assessment

## 2017-12-13 NOTE — PHYSICAL THERAPY NOTE
Physical Therapy Progress Note     12/13/17 1533   Pain Assessment   Pain Assessment 0-10   Pain Score 3   Pain Type Chronic pain   Pain Location Generalized   Hospital Pain Intervention(s) Ambulation/increased activity;Repositioned   Response to Interventions Tolerated  Restrictions/Precautions   Other Precautions Fall Risk;Pain;Cognitive; Impulsive; Bed Alarm   General   Chart Reviewed Yes   Response to Previous Treatment Patient reporting fatigue but able to participate  Family/Caregiver Present No   Subjective   Subjective Willing to participate in therapy this PM    Bed Mobility   Rolling R 2  Maximal assistance   Additional items Assist x 1;Bedrails; Increased time required;Verbal cues;LE management   Rolling L 2  Maximal assistance   Additional items Assist x 1;Bedrails; Increased time required;Verbal cues;LE management   Endurance Deficit   Endurance Deficit Yes   Endurance Deficit Description fatigue/pain   Activity Tolerance   Activity Tolerance Patient limited by fatigue;Patient limited by pain   Nurse Made Aware Yes   Exercises   THR Supine;10 reps;AAROM; Bilateral   Assessment   Prognosis Fair   Problem List Decreased strength;Decreased range of motion;Decreased endurance; Impaired balance;Decreased mobility;Obesity; Decreased cognition; Impaired judgement;Decreased safety awareness;Pain   Assessment Pt  supine in bed upon my arrival  Continues to report increased fatigue requesting to remain in bed this PM  Attempted to peform OOB mobility unsuccesful  Performance of HEP supine with A of therapist for completion  Repositioned supine in bed at end of treatment session with alarm active  PT will continue to recommend STR upon d/c for continued improvement of noted impairments above  Barriers to Discharge Inaccessible home environment;Decreased caregiver support   Goals   Patient Goals To go to rehab     STG Expiration Date 12/13/17   Treatment Day 10   Plan   Treatment/Interventions LE strengthening/ROM; Functional transfer training; Therapeutic exercise; Endurance training;Bed mobility;Spoke to nursing;Spoke to case management   Progress Slow progress, decreased activity tolerance   PT Frequency 5x/wk   Recommendation   Recommendation Short-term skilled PT   PT - OK to Discharge Yes  (if d/c to STR when medically stable )     Carisa Mann, PTA

## 2017-12-14 VITALS
WEIGHT: 293 LBS | TEMPERATURE: 98.1 F | OXYGEN SATURATION: 97 % | DIASTOLIC BLOOD PRESSURE: 54 MMHG | BODY MASS INDEX: 45.99 KG/M2 | HEIGHT: 67 IN | SYSTOLIC BLOOD PRESSURE: 109 MMHG | HEART RATE: 85 BPM | RESPIRATION RATE: 18 BRPM

## 2017-12-14 PROBLEM — R60.9 PERIPHERAL EDEMA: Status: RESOLVED | Noted: 2017-09-09 | Resolved: 2017-12-14

## 2017-12-14 PROCEDURE — 90686 IIV4 VACC NO PRSV 0.5 ML IM: CPT | Performed by: INTERNAL MEDICINE

## 2017-12-14 RX ORDER — AMITRIPTYLINE HYDROCHLORIDE 10 MG/1
10 TABLET, FILM COATED ORAL
Refills: 0
Start: 2017-12-14 | End: 2018-01-01

## 2017-12-14 RX ORDER — ACETAMINOPHEN 325 MG/1
650 TABLET ORAL EVERY 6 HOURS PRN
Qty: 30 TABLET | Refills: 0 | Status: ON HOLD
Start: 2017-12-14 | End: 2019-01-01 | Stop reason: CLARIF

## 2017-12-14 RX ORDER — LACTULOSE 20 G/30ML
20 SOLUTION ORAL DAILY
Refills: 0
Start: 2017-12-15

## 2017-12-14 RX ORDER — QUETIAPINE FUMARATE 50 MG/1
50 TABLET, FILM COATED ORAL
Refills: 0 | Status: ON HOLD
Start: 2017-12-14 | End: 2018-01-01

## 2017-12-14 RX ORDER — METOLAZONE 5 MG/1
5 TABLET ORAL 2 TIMES WEEKLY
Refills: 0
Start: 2017-12-18 | End: 2018-01-01 | Stop reason: ALTCHOICE

## 2017-12-14 RX ADMIN — ENOXAPARIN SODIUM 40 MG: 40 INJECTION SUBCUTANEOUS at 08:59

## 2017-12-14 RX ADMIN — GABAPENTIN 300 MG: 300 CAPSULE ORAL at 09:00

## 2017-12-14 RX ADMIN — FUROSEMIDE 80 MG: 40 TABLET ORAL at 08:58

## 2017-12-14 RX ADMIN — POTASSIUM CHLORIDE 40 MEQ: 1500 TABLET, EXTENDED RELEASE ORAL at 08:59

## 2017-12-14 RX ADMIN — SPIRONOLACTONE 50 MG: 50 TABLET ORAL at 09:01

## 2017-12-14 RX ADMIN — NYSTATIN: 100000 POWDER TOPICAL at 12:16

## 2017-12-14 RX ADMIN — NYSTATIN: 100000 POWDER TOPICAL at 09:01

## 2017-12-14 RX ADMIN — LACTULOSE 20 G: 20 SOLUTION ORAL at 08:59

## 2017-12-14 RX ADMIN — METOLAZONE 5 MG: 5 TABLET ORAL at 08:59

## 2017-12-14 RX ADMIN — VANCOMYCIN HYDROCHLORIDE 1500 MG: 1 INJECTION, POWDER, LYOPHILIZED, FOR SOLUTION INTRAVENOUS at 09:14

## 2017-12-14 RX ADMIN — PANTOPRAZOLE SODIUM 40 MG: 40 TABLET, DELAYED RELEASE ORAL at 06:11

## 2017-12-14 RX ADMIN — INFLUENZA VIRUS VACCINE 0.5 ML: 15; 15; 15; 15 SUSPENSION INTRAMUSCULAR at 12:16

## 2017-12-14 NOTE — SOCIAL WORK
Patient ready for transfer to [facility]; no auth needed for transport; MD, RN, patient, and facility aware of transport

## 2017-12-14 NOTE — NURSING NOTE
Pt  D/C to [facility]  Report called into [facility]  Pt  Sent with all personal items  Pt aslo sent with empty bottle of Gabapentin and partial filled bottle of Savella  Pt  Transported on stretcher by [facility]

## 2017-12-14 NOTE — DISCHARGE SUMMARY
Discharge Summary - [place] Esc[place], 1975, [id number]        Admission Date: 11/25/2017  Discharge Date:     Admitting Diagnosis: Morbid obesity ([facility] [address] ) [E66 01]  Hypokalemia [E87 6]  Anasarca [R60 1]  Chronic pain syndrome [G89 4]  Peripheral edema [R60 9]  Toe injury [S99 929A]  Ankle pain, left [M25 572]  Recurrent falls [R29 6]  Ambulatory dysfunction [R26 2]    Discharge Diagnosis:   1  Anasarca  2  Alcoholic liver disease with presumed cirrhosis  3  Coag-negative staph bacteremia  4  Bipolar affective disorder  5  Anemia and thrombocytopenia secondary to 2   6   Morbid obesity  7  Fibromyalgia and chronic pain syndrome  8  Deconditioning and gait dysfunction    Consulting Physicians:  1  Dr Stone Davila, cardiology  2  Dr Rosy Duckworth, gastroenterology  3  Dr Lien Gonzalez, psychiatry  4  Dr Rosa Keyes, infectious Disease      Procedures Performed:   None    HPI:  The patient is a 23-year-old woman with a history of liver disease and alcohol abuse  She came to the emergency room on the day of admission after a fall on the evening before  She has had several falls over the past week  In the emergency room she was noted to be severely edematous  She was admitted for further evaluation and care  Hospital Course: The patient was admitted to the hospital and was started on intravenous diuretics  She was evaluated by Dr Stone Davila of Cardiology  There was no evidence for congestive heart failure  Her edema was thought to be on the basis of chronic liver disease  She was seen by Gastroenterology  She was thought to have alcoholic liver disease  The possibility of cirrhosis was considered though there was no definite objective evidence to support this  During the patient's stay she lost 61 lb  Her edema lessened  She remained hemodynamically stable  Her renal function remained stable  The patient is quite weak  She is morbidly obese  These factors contributed to her multiple falls    She was treated with physical therapy during her stay  She remained quite weak and inpatient rehab was recommended  During the patient's stay she developed fever  She was found to have coag-negative staph bacteremia  This was thought to be related to an IV site infection  She was seen by Dr Aleksandr Cortes  She was treated with a full course of vancomycin  The patient has chronic pain  This is apparently related to previously diagnosed fibromyalgia and also cervical disc disease  This situation remained stable during her hospitalization  The patient is a poor candidate for narcotic therapy in light of her liver disease  On the day of discharge the patient was feeling weak  Vital signs were stable  Lungs were clear  Cardiac exam was normal   The abdomen was soft with mild diffuse tenderness  There was +1 edema  Her weight was 313 lb  Disposition:  The patient was transferred to [facility] on December 14th  She was on a 2 g sodium diet  Her activity will be as tolerated  She will be under the care of the physicians at [facility] during her stay there  Discharge instructions/Information to patient and family:   See after visit summary for information provided to patient and family  Provisions for Follow-Up Care:  See after visit summary for information related to follow-up care and any pertinent home health orders  Planned Readmission: No    Discharge Statement   I spent 40 minutes discharging the patient  This time was spent on the day of discharge  I had direct contact with the patient on the day of discharge  Discharge Medications:  See after visit summary for reconciled discharge medications provided to patient and family

## 2017-12-18 NOTE — CASE MANAGEMENT
Notification of Discharge  This is a Notification of Discharge from our facility [address]  Please be advised that this patient has been discharge from our facility  Below you will find the admission and discharge date and time including the patients disposition  PRESENTATION DATE: 11/25/2017  7:32 PM  IP ADMISSION DATE: 11/25/17 2340  DISCHARGE DATE: 12/14/2017 12:53 PM  DISPOSITION: Released to SNF/TCU/SNU [address] [place] [address] in the [employer] by Orlando Gomez for 2017  Network Utilization Review Department  Phone: [phone]; Fax [phone]  ATTENTION: The Network Utilization Review Department is now centralized for our 7 Facilities  Make a note that we have a new phone and fax numbers for our Department  Please call with any questions or concerns to [phone] and carefully follow the prompts so that you are directed to the right person  All voicemails are confidential  Fax any determinations, approvals, denials, and requests for initial or continue stay review clinical to [phone]  Due to HIGH CALL volume, it would be easier if you could please send faxed requests to expedite your requests and in part, help us provide discharge notifications faster

## 2018-01-01 ENCOUNTER — HOSPITAL ENCOUNTER (INPATIENT)
Facility: HOSPITAL | Age: 43
LOS: 15 days | Discharge: NURSING, REHAB OR LONG-TERM CARE | DRG: 058 | End: 2018-06-13
Attending: EMERGENCY MEDICINE | Admitting: INTERNAL MEDICINE
Payer: COMMERCIAL

## 2018-01-01 ENCOUNTER — HOSPITAL ENCOUNTER (EMERGENCY)
Facility: HOSPITAL | Age: 43
Discharge: HOME | End: 2018-11-26
Attending: EMERGENCY MEDICINE | Admitting: EMERGENCY MEDICINE
Payer: COMMERCIAL

## 2018-01-01 ENCOUNTER — APPOINTMENT (EMERGENCY)
Dept: CT IMAGING | Facility: HOSPITAL | Age: 43
DRG: 058 | End: 2018-01-01
Payer: COMMERCIAL

## 2018-01-01 ENCOUNTER — HOSPITAL ENCOUNTER (EMERGENCY)
Facility: HOSPITAL | Age: 43
Discharge: HOME | End: 2018-10-23
Attending: EMERGENCY MEDICINE | Admitting: EMERGENCY MEDICINE
Payer: COMMERCIAL

## 2018-01-01 ENCOUNTER — TELEPHONE (OUTPATIENT)
Dept: FAMILY MEDICINE CLINIC | Facility: CLINIC | Age: 43
End: 2018-01-01

## 2018-01-01 ENCOUNTER — HOSPITAL ENCOUNTER (EMERGENCY)
Facility: HOSPITAL | Age: 43
Discharge: LEFT BEFORE DISCHARGE | DRG: 058 | End: 2018-05-29
Attending: EMERGENCY MEDICINE
Payer: COMMERCIAL

## 2018-01-01 ENCOUNTER — APPOINTMENT (INPATIENT)
Dept: RADIOLOGY | Facility: HOSPITAL | Age: 43
DRG: 058 | End: 2018-01-01
Payer: COMMERCIAL

## 2018-01-01 ENCOUNTER — APPOINTMENT (EMERGENCY)
Dept: RADIOLOGY | Facility: HOSPITAL | Age: 43
DRG: 058 | End: 2018-01-01
Payer: COMMERCIAL

## 2018-01-01 VITALS
HEART RATE: 89 BPM | BODY MASS INDEX: 45.99 KG/M2 | RESPIRATION RATE: 18 BRPM | WEIGHT: 293 LBS | TEMPERATURE: 99 F | SYSTOLIC BLOOD PRESSURE: 103 MMHG | DIASTOLIC BLOOD PRESSURE: 61 MMHG | HEIGHT: 67 IN | OXYGEN SATURATION: 94 %

## 2018-01-01 VITALS
HEART RATE: 80 BPM | SYSTOLIC BLOOD PRESSURE: 151 MMHG | BODY MASS INDEX: 50.07 KG/M2 | TEMPERATURE: 98.3 F | OXYGEN SATURATION: 95 % | WEIGHT: 293 LBS | DIASTOLIC BLOOD PRESSURE: 86 MMHG | RESPIRATION RATE: 16 BRPM

## 2018-01-01 VITALS
SYSTOLIC BLOOD PRESSURE: 106 MMHG | RESPIRATION RATE: 18 BRPM | DIASTOLIC BLOOD PRESSURE: 57 MMHG | OXYGEN SATURATION: 95 % | TEMPERATURE: 98.3 F | WEIGHT: 293 LBS | HEART RATE: 84 BPM | BODY MASS INDEX: 49.1 KG/M2

## 2018-01-01 VITALS
SYSTOLIC BLOOD PRESSURE: 120 MMHG | WEIGHT: 293 LBS | OXYGEN SATURATION: 94 % | RESPIRATION RATE: 20 BRPM | DIASTOLIC BLOOD PRESSURE: 70 MMHG | BODY MASS INDEX: 50.07 KG/M2 | TEMPERATURE: 97.6 F | HEART RATE: 84 BPM

## 2018-01-01 DIAGNOSIS — F31.89 OTHER BIPOLAR DISORDER (HCC): ICD-10-CM

## 2018-01-01 DIAGNOSIS — E83.42 HYPOMAGNESEMIA: ICD-10-CM

## 2018-01-01 DIAGNOSIS — F31.70 BIPOLAR AFFECTIVE DISORDER IN REMISSION (HCC): ICD-10-CM

## 2018-01-01 DIAGNOSIS — K56.41 FECAL IMPACTION (HCC): Primary | ICD-10-CM

## 2018-01-01 DIAGNOSIS — G89.29 ACUTE EXACERBATION OF CHRONIC LOW BACK PAIN: Primary | ICD-10-CM

## 2018-01-01 DIAGNOSIS — R26.2 AMBULATORY DYSFUNCTION: Primary | ICD-10-CM

## 2018-01-01 DIAGNOSIS — E87.6 HYPOKALEMIA: ICD-10-CM

## 2018-01-01 DIAGNOSIS — I10 ESSENTIAL HYPERTENSION: Primary | ICD-10-CM

## 2018-01-01 DIAGNOSIS — R29.6 RECURRENT FALLS WHILE WALKING: ICD-10-CM

## 2018-01-01 DIAGNOSIS — M54.50 ACUTE EXACERBATION OF CHRONIC LOW BACK PAIN: Primary | ICD-10-CM

## 2018-01-01 DIAGNOSIS — R60.1 ANASARCA: ICD-10-CM

## 2018-01-01 DIAGNOSIS — Z76.5 DRUG-SEEKING BEHAVIOR: ICD-10-CM

## 2018-01-01 DIAGNOSIS — M62.82 RHABDOMYOLYSIS: Primary | ICD-10-CM

## 2018-01-01 LAB
ALBUMIN SERPL BCP-MCNC: 1.6 G/DL (ref 3.5–5)
ALBUMIN SERPL BCP-MCNC: 1.7 G/DL (ref 3.5–5)
ALBUMIN SERPL BCP-MCNC: 1.8 G/DL (ref 3.5–5)
ALBUMIN SERPL BCP-MCNC: 2.5 G/DL (ref 3.5–5)
ALBUMIN SERPL BCP-MCNC: 2.6 G/DL (ref 3.5–5)
ALP SERPL-CCNC: 68 U/L (ref 46–116)
ALP SERPL-CCNC: 75 U/L (ref 46–116)
ALP SERPL-CCNC: 78 U/L (ref 46–116)
ALP SERPL-CCNC: 90 U/L (ref 46–116)
ALP SERPL-CCNC: 97 U/L (ref 46–116)
ALT SERPL W P-5'-P-CCNC: 17 U/L (ref 12–78)
ALT SERPL W P-5'-P-CCNC: 19 U/L (ref 12–78)
ALT SERPL W P-5'-P-CCNC: 22 U/L (ref 12–78)
ALT SERPL W P-5'-P-CCNC: 24 U/L (ref 12–78)
ALT SERPL W P-5'-P-CCNC: 35 U/L (ref 12–78)
AMMONIA PLAS-SCNC: 27 UMOL/L (ref 11–35)
AMMONIA PLAS-SCNC: 47 UMOL/L (ref 11–35)
ANION GAP SERPL CALCULATED.3IONS-SCNC: 5 MMOL/L (ref 4–13)
ANION GAP SERPL CALCULATED.3IONS-SCNC: 6 MMOL/L (ref 4–13)
ANION GAP SERPL CALCULATED.3IONS-SCNC: 7 MMOL/L (ref 4–13)
ANION GAP SERPL CALCULATED.3IONS-SCNC: 8 MMOL/L (ref 4–13)
ANION GAP SERPL CALCULATED.3IONS-SCNC: 8 MMOL/L (ref 4–13)
ANION GAP SERPL CALCULATED.3IONS-SCNC: 9 MMOL/L (ref 4–13)
ANISOCYTOSIS BLD QL SMEAR: PRESENT
AST SERPL W P-5'-P-CCNC: 179 U/L (ref 5–45)
AST SERPL W P-5'-P-CCNC: 31 U/L (ref 5–45)
AST SERPL W P-5'-P-CCNC: 32 U/L (ref 5–45)
AST SERPL W P-5'-P-CCNC: 42 U/L (ref 5–45)
AST SERPL W P-5'-P-CCNC: 48 U/L (ref 5–45)
ATRIAL RATE: 79 BPM
BASOPHILS # BLD AUTO: 0.02 THOUSANDS/ΜL (ref 0–0.1)
BASOPHILS # BLD AUTO: 0.03 THOUSANDS/ΜL (ref 0–0.1)
BASOPHILS # BLD AUTO: 0.04 THOUSANDS/ΜL (ref 0–0.1)
BASOPHILS # BLD MANUAL: 0 THOUSAND/UL (ref 0–0.1)
BASOPHILS NFR BLD AUTO: 0 % (ref 0–1)
BASOPHILS NFR BLD AUTO: 1 % (ref 0–1)
BASOPHILS NFR BLD AUTO: 1 % (ref 0–1)
BASOPHILS NFR MAR MANUAL: 0 % (ref 0–1)
BILIRUB SERPL-MCNC: 0.73 MG/DL (ref 0.2–1)
BILIRUB SERPL-MCNC: 0.77 MG/DL (ref 0.2–1)
BILIRUB SERPL-MCNC: 0.84 MG/DL (ref 0.2–1)
BILIRUB SERPL-MCNC: 0.87 MG/DL (ref 0.2–1)
BILIRUB SERPL-MCNC: 1.47 MG/DL (ref 0.2–1)
BILIRUB UR QL STRIP: ABNORMAL
BUN SERPL-MCNC: 10 MG/DL (ref 5–25)
BUN SERPL-MCNC: 5 MG/DL (ref 5–25)
BUN SERPL-MCNC: 5 MG/DL (ref 5–25)
BUN SERPL-MCNC: 6 MG/DL (ref 5–25)
BUN SERPL-MCNC: 7 MG/DL (ref 5–25)
BUN SERPL-MCNC: 8 MG/DL (ref 5–25)
CALCIUM SERPL-MCNC: 7.8 MG/DL (ref 8.3–10.1)
CALCIUM SERPL-MCNC: 8.2 MG/DL (ref 8.3–10.1)
CALCIUM SERPL-MCNC: 8.4 MG/DL (ref 8.3–10.1)
CALCIUM SERPL-MCNC: 8.4 MG/DL (ref 8.3–10.1)
CALCIUM SERPL-MCNC: 8.5 MG/DL (ref 8.3–10.1)
CALCIUM SERPL-MCNC: 8.6 MG/DL (ref 8.3–10.1)
CALCIUM SERPL-MCNC: 9.1 MG/DL (ref 8.3–10.1)
CALCIUM SERPL-MCNC: 9.1 MG/DL (ref 8.3–10.1)
CHLORIDE SERPL-SCNC: 100 MMOL/L (ref 100–108)
CHLORIDE SERPL-SCNC: 101 MMOL/L (ref 100–108)
CHLORIDE SERPL-SCNC: 102 MMOL/L (ref 100–108)
CHLORIDE SERPL-SCNC: 102 MMOL/L (ref 100–108)
CHLORIDE SERPL-SCNC: 104 MMOL/L (ref 100–108)
CHLORIDE SERPL-SCNC: 104 MMOL/L (ref 100–108)
CHLORIDE SERPL-SCNC: 105 MMOL/L (ref 100–108)
CHLORIDE SERPL-SCNC: 105 MMOL/L (ref 100–108)
CHLORIDE SERPL-SCNC: 107 MMOL/L (ref 100–108)
CHLORIDE SERPL-SCNC: 110 MMOL/L (ref 100–108)
CK MB SERPL-MCNC: 0.6 NG/ML (ref 0–5)
CK MB SERPL-MCNC: 0.7 NG/ML (ref 0–5)
CK MB SERPL-MCNC: 0.8 NG/ML (ref 0–5)
CK MB SERPL-MCNC: 1.1 NG/ML (ref 0–5)
CK MB SERPL-MCNC: <1 % (ref 0–2.5)
CK SERPL-CCNC: 237 U/L (ref 26–192)
CK SERPL-CCNC: 2769 U/L (ref 26–192)
CK SERPL-CCNC: 3388 U/L (ref 26–192)
CK SERPL-CCNC: 449 U/L (ref 26–192)
CLARITY UR: CLEAR
CO2 SERPL-SCNC: 23 MMOL/L (ref 21–32)
CO2 SERPL-SCNC: 23 MMOL/L (ref 21–32)
CO2 SERPL-SCNC: 28 MMOL/L (ref 21–32)
CO2 SERPL-SCNC: 28 MMOL/L (ref 21–32)
CO2 SERPL-SCNC: 29 MMOL/L (ref 21–32)
CO2 SERPL-SCNC: 29 MMOL/L (ref 21–32)
CO2 SERPL-SCNC: 30 MMOL/L (ref 21–32)
CO2 SERPL-SCNC: 31 MMOL/L (ref 21–32)
CO2 SERPL-SCNC: 31 MMOL/L (ref 21–32)
CO2 SERPL-SCNC: 32 MMOL/L (ref 21–32)
COLOR UR: YELLOW
CREAT SERPL-MCNC: 0.74 MG/DL (ref 0.6–1.3)
CREAT SERPL-MCNC: 0.77 MG/DL (ref 0.6–1.3)
CREAT SERPL-MCNC: 0.78 MG/DL (ref 0.6–1.3)
CREAT SERPL-MCNC: 0.81 MG/DL (ref 0.6–1.3)
CREAT SERPL-MCNC: 0.82 MG/DL (ref 0.6–1.3)
CREAT SERPL-MCNC: 0.86 MG/DL (ref 0.6–1.3)
CREAT SERPL-MCNC: 0.88 MG/DL (ref 0.6–1.3)
CREAT SERPL-MCNC: 0.97 MG/DL (ref 0.6–1.3)
CREAT SERPL-MCNC: 0.98 MG/DL (ref 0.6–1.3)
CREAT SERPL-MCNC: 1.05 MG/DL (ref 0.6–1.3)
EOSINOPHIL # BLD AUTO: 0.17 THOUSAND/ΜL (ref 0–0.61)
EOSINOPHIL # BLD AUTO: 0.17 THOUSAND/ΜL (ref 0–0.61)
EOSINOPHIL # BLD AUTO: 0.25 THOUSAND/ΜL (ref 0–0.61)
EOSINOPHIL # BLD MANUAL: 0.06 THOUSAND/UL (ref 0–0.4)
EOSINOPHIL NFR BLD AUTO: 3 % (ref 0–6)
EOSINOPHIL NFR BLD AUTO: 4 % (ref 0–6)
EOSINOPHIL NFR BLD AUTO: 5 % (ref 0–6)
EOSINOPHIL NFR BLD MANUAL: 1 % (ref 0–6)
ERYTHROCYTE [DISTWIDTH] IN BLOOD BY AUTOMATED COUNT: 14.2 % (ref 11.6–15.1)
ERYTHROCYTE [DISTWIDTH] IN BLOOD BY AUTOMATED COUNT: 14.2 % (ref 11.6–15.1)
ERYTHROCYTE [DISTWIDTH] IN BLOOD BY AUTOMATED COUNT: 14.4 % (ref 11.6–15.1)
ERYTHROCYTE [DISTWIDTH] IN BLOOD BY AUTOMATED COUNT: 14.9 % (ref 11.6–15.1)
ERYTHROCYTE [DISTWIDTH] IN BLOOD BY AUTOMATED COUNT: 15 % (ref 11.6–15.1)
ERYTHROCYTE [DISTWIDTH] IN BLOOD BY AUTOMATED COUNT: 15.1 % (ref 11.6–15.1)
ERYTHROCYTE [DISTWIDTH] IN BLOOD BY AUTOMATED COUNT: 15.1 % (ref 11.6–15.1)
EXT PREG TEST URINE: NEGATIVE
GFR SERPL CREATININE-BSD FRML MDRD: 65 ML/MIN/1.73SQ M
GFR SERPL CREATININE-BSD FRML MDRD: 70 ML/MIN/1.73SQ M
GFR SERPL CREATININE-BSD FRML MDRD: 71 ML/MIN/1.73SQ M
GFR SERPL CREATININE-BSD FRML MDRD: 80 ML/MIN/1.73SQ M
GFR SERPL CREATININE-BSD FRML MDRD: 82 ML/MIN/1.73SQ M
GFR SERPL CREATININE-BSD FRML MDRD: 87 ML/MIN/1.73SQ M
GFR SERPL CREATININE-BSD FRML MDRD: 89 ML/MIN/1.73SQ M
GFR SERPL CREATININE-BSD FRML MDRD: 93 ML/MIN/1.73SQ M
GFR SERPL CREATININE-BSD FRML MDRD: 94 ML/MIN/1.73SQ M
GFR SERPL CREATININE-BSD FRML MDRD: 99 ML/MIN/1.73SQ M
GLUCOSE SERPL-MCNC: 107 MG/DL (ref 65–140)
GLUCOSE SERPL-MCNC: 110 MG/DL (ref 65–140)
GLUCOSE SERPL-MCNC: 65 MG/DL (ref 65–140)
GLUCOSE SERPL-MCNC: 79 MG/DL (ref 65–140)
GLUCOSE SERPL-MCNC: 81 MG/DL (ref 65–140)
GLUCOSE SERPL-MCNC: 85 MG/DL (ref 65–140)
GLUCOSE SERPL-MCNC: 89 MG/DL (ref 65–140)
GLUCOSE SERPL-MCNC: 95 MG/DL (ref 65–140)
GLUCOSE UR STRIP-MCNC: NEGATIVE MG/DL
HCT VFR BLD AUTO: 27.3 % (ref 34.8–46.1)
HCT VFR BLD AUTO: 28.5 % (ref 34.8–46.1)
HCT VFR BLD AUTO: 28.7 % (ref 34.8–46.1)
HCT VFR BLD AUTO: 28.7 % (ref 34.8–46.1)
HCT VFR BLD AUTO: 28.8 % (ref 34.8–46.1)
HCT VFR BLD AUTO: 29.2 % (ref 34.8–46.1)
HCT VFR BLD AUTO: 29.8 % (ref 34.8–46.1)
HGB BLD-MCNC: 9.1 G/DL (ref 11.5–15.4)
HGB BLD-MCNC: 9.4 G/DL (ref 11.5–15.4)
HGB BLD-MCNC: 9.4 G/DL (ref 11.5–15.4)
HGB BLD-MCNC: 9.6 G/DL (ref 11.5–15.4)
HGB BLD-MCNC: 9.6 G/DL (ref 11.5–15.4)
HGB BLD-MCNC: 9.8 G/DL (ref 11.5–15.4)
HGB BLD-MCNC: 9.8 G/DL (ref 11.5–15.4)
HGB UR QL STRIP.AUTO: NEGATIVE
INR PPP: 1.19 (ref 0.86–1.17)
KETONES UR STRIP-MCNC: NEGATIVE MG/DL
LEUKOCYTE ESTERASE UR QL STRIP: NEGATIVE
LYMPHOCYTES # BLD AUTO: 1.28 THOUSANDS/ΜL (ref 0.6–4.47)
LYMPHOCYTES # BLD AUTO: 1.34 THOUSAND/UL (ref 0.6–4.47)
LYMPHOCYTES # BLD AUTO: 1.48 THOUSANDS/ΜL (ref 0.6–4.47)
LYMPHOCYTES # BLD AUTO: 2.08 THOUSANDS/ΜL (ref 0.6–4.47)
LYMPHOCYTES # BLD AUTO: 21 % (ref 14–44)
LYMPHOCYTES NFR BLD AUTO: 26 % (ref 14–44)
LYMPHOCYTES NFR BLD AUTO: 30 % (ref 14–44)
LYMPHOCYTES NFR BLD AUTO: 32 % (ref 14–44)
MACROCYTES BLD QL AUTO: PRESENT
MAGNESIUM SERPL-MCNC: 1.4 MG/DL (ref 1.6–2.6)
MAGNESIUM SERPL-MCNC: 1.5 MG/DL (ref 1.6–2.6)
MAGNESIUM SERPL-MCNC: 2.3 MG/DL (ref 1.6–2.6)
MCH RBC QN AUTO: 34.3 PG (ref 26.8–34.3)
MCH RBC QN AUTO: 34.6 PG (ref 26.8–34.3)
MCH RBC QN AUTO: 34.7 PG (ref 26.8–34.3)
MCH RBC QN AUTO: 34.7 PG (ref 26.8–34.3)
MCH RBC QN AUTO: 35 PG (ref 26.8–34.3)
MCH RBC QN AUTO: 35 PG (ref 26.8–34.3)
MCH RBC QN AUTO: 35.6 PG (ref 26.8–34.3)
MCHC RBC AUTO-ENTMCNC: 32.8 G/DL (ref 31.4–37.4)
MCHC RBC AUTO-ENTMCNC: 32.9 G/DL (ref 31.4–37.4)
MCHC RBC AUTO-ENTMCNC: 32.9 G/DL (ref 31.4–37.4)
MCHC RBC AUTO-ENTMCNC: 33 G/DL (ref 31.4–37.4)
MCHC RBC AUTO-ENTMCNC: 33.3 G/DL (ref 31.4–37.4)
MCHC RBC AUTO-ENTMCNC: 33.4 G/DL (ref 31.4–37.4)
MCHC RBC AUTO-ENTMCNC: 34 G/DL (ref 31.4–37.4)
MCV RBC AUTO: 103 FL (ref 82–98)
MCV RBC AUTO: 103 FL (ref 82–98)
MCV RBC AUTO: 105 FL (ref 82–98)
MCV RBC AUTO: 105 FL (ref 82–98)
MCV RBC AUTO: 106 FL (ref 82–98)
MONOCYTES # BLD AUTO: 0.38 THOUSAND/UL (ref 0–1.22)
MONOCYTES # BLD AUTO: 0.56 THOUSAND/ΜL (ref 0.17–1.22)
MONOCYTES # BLD AUTO: 0.57 THOUSAND/ΜL (ref 0.17–1.22)
MONOCYTES # BLD AUTO: 0.95 THOUSAND/ΜL (ref 0.17–1.22)
MONOCYTES NFR BLD AUTO: 12 % (ref 4–12)
MONOCYTES NFR BLD AUTO: 12 % (ref 4–12)
MONOCYTES NFR BLD AUTO: 15 % (ref 4–12)
MONOCYTES NFR BLD: 6 % (ref 4–12)
NEUTROPHILS # BLD AUTO: 2.62 THOUSANDS/ΜL (ref 1.85–7.62)
NEUTROPHILS # BLD AUTO: 2.76 THOUSANDS/ΜL (ref 1.85–7.62)
NEUTROPHILS # BLD AUTO: 3.25 THOUSANDS/ΜL (ref 1.85–7.62)
NEUTROPHILS # BLD MANUAL: 4.59 THOUSAND/UL (ref 1.85–7.62)
NEUTS SEG NFR BLD AUTO: 50 % (ref 43–75)
NEUTS SEG NFR BLD AUTO: 54 % (ref 43–75)
NEUTS SEG NFR BLD AUTO: 57 % (ref 43–75)
NEUTS SEG NFR BLD AUTO: 72 % (ref 43–75)
NITRITE UR QL STRIP: NEGATIVE
NRBC BLD AUTO-RTO: 0 /100 WBCS
P AXIS: 42 DEGREES
PH UR STRIP.AUTO: 6 [PH] (ref 4.5–8)
PHOSPHATE SERPL-MCNC: 3.4 MG/DL (ref 2.7–4.5)
PLATELET # BLD AUTO: 182 THOUSANDS/UL (ref 149–390)
PLATELET # BLD AUTO: 210 THOUSANDS/UL (ref 149–390)
PLATELET # BLD AUTO: 224 THOUSANDS/UL (ref 149–390)
PLATELET # BLD AUTO: 229 THOUSANDS/UL (ref 149–390)
PLATELET # BLD AUTO: 232 THOUSANDS/UL (ref 149–390)
PLATELET # BLD AUTO: 232 THOUSANDS/UL (ref 149–390)
PLATELET # BLD AUTO: 233 THOUSANDS/UL (ref 149–390)
PLATELET BLD QL SMEAR: ADEQUATE
PMV BLD AUTO: 8.9 FL (ref 8.9–12.7)
PMV BLD AUTO: 9 FL (ref 8.9–12.7)
PMV BLD AUTO: 9.1 FL (ref 8.9–12.7)
PMV BLD AUTO: 9.2 FL (ref 8.9–12.7)
PMV BLD AUTO: 9.2 FL (ref 8.9–12.7)
PMV BLD AUTO: 9.4 FL (ref 8.9–12.7)
PMV BLD AUTO: 9.4 FL (ref 8.9–12.7)
POTASSIUM SERPL-SCNC: 2.2 MMOL/L (ref 3.5–5.3)
POTASSIUM SERPL-SCNC: 2.4 MMOL/L (ref 3.5–5.3)
POTASSIUM SERPL-SCNC: 2.5 MMOL/L (ref 3.5–5.3)
POTASSIUM SERPL-SCNC: 2.8 MMOL/L (ref 3.5–5.3)
POTASSIUM SERPL-SCNC: 3.4 MMOL/L (ref 3.5–5.3)
POTASSIUM SERPL-SCNC: 3.4 MMOL/L (ref 3.5–5.3)
POTASSIUM SERPL-SCNC: 3.6 MMOL/L (ref 3.5–5.3)
POTASSIUM SERPL-SCNC: 3.7 MMOL/L (ref 3.5–5.3)
POTASSIUM SERPL-SCNC: 4.1 MMOL/L (ref 3.5–5.3)
POTASSIUM SERPL-SCNC: 5 MMOL/L (ref 3.5–5.3)
PR INTERVAL: 148 MS
PROT SERPL-MCNC: 6.1 G/DL (ref 6.4–8.2)
PROT SERPL-MCNC: 6.4 G/DL (ref 6.4–8.2)
PROT SERPL-MCNC: 6.5 G/DL (ref 6.4–8.2)
PROT SERPL-MCNC: 6.6 G/DL (ref 6.4–8.2)
PROT SERPL-MCNC: 7 G/DL (ref 6.4–8.2)
PROT UR STRIP-MCNC: NEGATIVE MG/DL
PROTHROMBIN TIME: 15.2 SECONDS (ref 11.8–14.2)
QRS AXIS: 53 DEGREES
QRSD INTERVAL: 94 MS
QT INTERVAL: 454 MS
QTC INTERVAL: 520 MS
RBC # BLD AUTO: 2.65 MILLION/UL (ref 3.81–5.12)
RBC # BLD AUTO: 2.7 MILLION/UL (ref 3.81–5.12)
RBC # BLD AUTO: 2.71 MILLION/UL (ref 3.81–5.12)
RBC # BLD AUTO: 2.72 MILLION/UL (ref 3.81–5.12)
RBC # BLD AUTO: 2.77 MILLION/UL (ref 3.81–5.12)
RBC # BLD AUTO: 2.8 MILLION/UL (ref 3.81–5.12)
RBC # BLD AUTO: 2.8 MILLION/UL (ref 3.81–5.12)
SODIUM SERPL-SCNC: 138 MMOL/L (ref 136–145)
SODIUM SERPL-SCNC: 139 MMOL/L (ref 136–145)
SODIUM SERPL-SCNC: 140 MMOL/L (ref 136–145)
SODIUM SERPL-SCNC: 141 MMOL/L (ref 136–145)
SODIUM SERPL-SCNC: 141 MMOL/L (ref 136–145)
SODIUM SERPL-SCNC: 142 MMOL/L (ref 136–145)
SP GR UR STRIP.AUTO: 1.01 (ref 1–1.03)
T WAVE AXIS: 38 DEGREES
TOTAL CELLS COUNTED SPEC: 100
TROPONIN I SERPL-MCNC: 0.05 NG/ML
UROBILINOGEN UR QL STRIP.AUTO: 4 E.U./DL
VENTRICULAR RATE: 79 BPM
WBC # BLD AUTO: 3.63 THOUSAND/UL (ref 4.31–10.16)
WBC # BLD AUTO: 4.87 THOUSAND/UL (ref 4.31–10.16)
WBC # BLD AUTO: 4.88 THOUSAND/UL (ref 4.31–10.16)
WBC # BLD AUTO: 5.08 THOUSAND/UL (ref 4.31–10.16)
WBC # BLD AUTO: 5.98 THOUSAND/UL (ref 4.31–10.16)
WBC # BLD AUTO: 6.37 THOUSAND/UL (ref 4.31–10.16)
WBC # BLD AUTO: 6.48 THOUSAND/UL (ref 4.31–10.16)

## 2018-01-01 PROCEDURE — 97116 GAIT TRAINING THERAPY: CPT

## 2018-01-01 PROCEDURE — 97530 THERAPEUTIC ACTIVITIES: CPT

## 2018-01-01 PROCEDURE — G8987 SELF CARE CURRENT STATUS: HCPCS

## 2018-01-01 PROCEDURE — 80048 BASIC METABOLIC PNL TOTAL CA: CPT | Performed by: PHYSICIAN ASSISTANT

## 2018-01-01 PROCEDURE — 97110 THERAPEUTIC EXERCISES: CPT

## 2018-01-01 PROCEDURE — 99239 HOSP IP/OBS DSCHRG MGMT >30: CPT | Performed by: INTERNAL MEDICINE

## 2018-01-01 PROCEDURE — 96366 THER/PROPH/DIAG IV INF ADDON: CPT

## 2018-01-01 PROCEDURE — 97535 SELF CARE MNGMENT TRAINING: CPT

## 2018-01-01 PROCEDURE — 71260 CT THORAX DX C+: CPT

## 2018-01-01 PROCEDURE — 93005 ELECTROCARDIOGRAM TRACING: CPT

## 2018-01-01 PROCEDURE — 82550 ASSAY OF CK (CPK): CPT | Performed by: INTERNAL MEDICINE

## 2018-01-01 PROCEDURE — 99232 SBSQ HOSP IP/OBS MODERATE 35: CPT | Performed by: INTERNAL MEDICINE

## 2018-01-01 PROCEDURE — 93010 ELECTROCARDIOGRAM REPORT: CPT | Performed by: INTERNAL MEDICINE

## 2018-01-01 PROCEDURE — G8979 MOBILITY GOAL STATUS: HCPCS

## 2018-01-01 PROCEDURE — 83735 ASSAY OF MAGNESIUM: CPT | Performed by: PHYSICIAN ASSISTANT

## 2018-01-01 PROCEDURE — 36415 COLL VENOUS BLD VENIPUNCTURE: CPT

## 2018-01-01 PROCEDURE — 80053 COMPREHEN METABOLIC PANEL: CPT | Performed by: INTERNAL MEDICINE

## 2018-01-01 PROCEDURE — 80048 BASIC METABOLIC PNL TOTAL CA: CPT | Performed by: INTERNAL MEDICINE

## 2018-01-01 PROCEDURE — 83735 ASSAY OF MAGNESIUM: CPT | Performed by: INTERNAL MEDICINE

## 2018-01-01 PROCEDURE — 96368 THER/DIAG CONCURRENT INF: CPT

## 2018-01-01 PROCEDURE — 99283 EMERGENCY DEPT VISIT LOW MDM: CPT

## 2018-01-01 PROCEDURE — G8988 SELF CARE GOAL STATUS: HCPCS

## 2018-01-01 PROCEDURE — 99285 EMERGENCY DEPT VISIT HI MDM: CPT

## 2018-01-01 PROCEDURE — 74177 CT ABD & PELVIS W/CONTRAST: CPT

## 2018-01-01 PROCEDURE — 85027 COMPLETE CBC AUTOMATED: CPT | Performed by: INTERNAL MEDICINE

## 2018-01-01 PROCEDURE — 85025 COMPLETE CBC W/AUTO DIFF WBC: CPT | Performed by: EMERGENCY MEDICINE

## 2018-01-01 PROCEDURE — 81025 URINE PREGNANCY TEST: CPT | Performed by: PHYSICIAN ASSISTANT

## 2018-01-01 PROCEDURE — 85025 COMPLETE CBC W/AUTO DIFF WBC: CPT | Performed by: INTERNAL MEDICINE

## 2018-01-01 PROCEDURE — 81003 URINALYSIS AUTO W/O SCOPE: CPT

## 2018-01-01 PROCEDURE — 82553 CREATINE MB FRACTION: CPT | Performed by: INTERNAL MEDICINE

## 2018-01-01 PROCEDURE — 99233 SBSQ HOSP IP/OBS HIGH 50: CPT | Performed by: INTERNAL MEDICINE

## 2018-01-01 PROCEDURE — 80053 COMPREHEN METABOLIC PANEL: CPT | Performed by: EMERGENCY MEDICINE

## 2018-01-01 PROCEDURE — 82553 CREATINE MB FRACTION: CPT | Performed by: PHYSICIAN ASSISTANT

## 2018-01-01 PROCEDURE — 73610 X-RAY EXAM OF ANKLE: CPT

## 2018-01-01 PROCEDURE — 82550 ASSAY OF CK (CPK): CPT | Performed by: PHYSICIAN ASSISTANT

## 2018-01-01 PROCEDURE — 84100 ASSAY OF PHOSPHORUS: CPT | Performed by: INTERNAL MEDICINE

## 2018-01-01 PROCEDURE — 70450 CT HEAD/BRAIN W/O DYE: CPT

## 2018-01-01 PROCEDURE — 82140 ASSAY OF AMMONIA: CPT | Performed by: INTERNAL MEDICINE

## 2018-01-01 PROCEDURE — 96365 THER/PROPH/DIAG IV INF INIT: CPT

## 2018-01-01 PROCEDURE — 97163 PT EVAL HIGH COMPLEX 45 MIN: CPT

## 2018-01-01 PROCEDURE — 99223 1ST HOSP IP/OBS HIGH 75: CPT | Performed by: INTERNAL MEDICINE

## 2018-01-01 PROCEDURE — 99253 IP/OBS CNSLTJ NEW/EST LOW 45: CPT | Performed by: PSYCHIATRY & NEUROLOGY

## 2018-01-01 PROCEDURE — 73502 X-RAY EXAM HIP UNI 2-3 VIEWS: CPT

## 2018-01-01 PROCEDURE — 96361 HYDRATE IV INFUSION ADD-ON: CPT

## 2018-01-01 PROCEDURE — 73564 X-RAY EXAM KNEE 4 OR MORE: CPT

## 2018-01-01 PROCEDURE — 82140 ASSAY OF AMMONIA: CPT | Performed by: PHYSICIAN ASSISTANT

## 2018-01-01 PROCEDURE — 97112 NEUROMUSCULAR REEDUCATION: CPT

## 2018-01-01 PROCEDURE — 72125 CT NECK SPINE W/O DYE: CPT

## 2018-01-01 PROCEDURE — 96372 THER/PROPH/DIAG INJ SC/IM: CPT

## 2018-01-01 PROCEDURE — 97167 OT EVAL HIGH COMPLEX 60 MIN: CPT

## 2018-01-01 PROCEDURE — 84484 ASSAY OF TROPONIN QUANT: CPT | Performed by: PHYSICIAN ASSISTANT

## 2018-01-01 PROCEDURE — 85007 BL SMEAR W/DIFF WBC COUNT: CPT | Performed by: INTERNAL MEDICINE

## 2018-01-01 PROCEDURE — G8978 MOBILITY CURRENT STATUS: HCPCS

## 2018-01-01 PROCEDURE — 85610 PROTHROMBIN TIME: CPT | Performed by: INTERNAL MEDICINE

## 2018-01-01 PROCEDURE — 99232 SBSQ HOSP IP/OBS MODERATE 35: CPT | Performed by: PHYSICIAN ASSISTANT

## 2018-01-01 RX ORDER — LIDOCAINE 50 MG/G
1 PATCH TOPICAL DAILY
Qty: 30 PATCH | Refills: 0 | Status: ON HOLD | OUTPATIENT
Start: 2018-01-01 | End: 2019-01-01 | Stop reason: CLARIF

## 2018-01-01 RX ORDER — KETOROLAC TROMETHAMINE 30 MG/ML
30 INJECTION, SOLUTION INTRAMUSCULAR; INTRAVENOUS ONCE
Status: COMPLETED | OUTPATIENT
Start: 2018-01-01 | End: 2018-01-01

## 2018-01-01 RX ORDER — FUROSEMIDE 40 MG/1
40 TABLET ORAL DAILY
Status: DISCONTINUED | OUTPATIENT
Start: 2018-01-01 | End: 2018-01-01

## 2018-01-01 RX ORDER — LORAZEPAM 1 MG/1
1 TABLET ORAL EVERY 8 HOURS PRN
Qty: 5 TABLET | Refills: 0 | Status: SHIPPED | OUTPATIENT
Start: 2018-01-01 | End: 2018-01-01 | Stop reason: ALTCHOICE

## 2018-01-01 RX ORDER — LIDOCAINE 50 MG/G
1 PATCH TOPICAL ONCE
Status: DISCONTINUED | OUTPATIENT
Start: 2018-01-01 | End: 2018-01-01 | Stop reason: HOSPADM

## 2018-01-01 RX ORDER — AMLODIPINE BESYLATE 10 MG/1
10 TABLET ORAL DAILY
Qty: 30 TABLET | Refills: 0 | Status: SHIPPED | OUTPATIENT
Start: 2018-01-01

## 2018-01-01 RX ORDER — GABAPENTIN 300 MG/1
300 CAPSULE ORAL 3 TIMES DAILY
Status: DISCONTINUED | OUTPATIENT
Start: 2018-01-01 | End: 2018-01-01 | Stop reason: HOSPADM

## 2018-01-01 RX ORDER — POTASSIUM CHLORIDE 14.9 MG/ML
20 INJECTION INTRAVENOUS ONCE
Status: COMPLETED | OUTPATIENT
Start: 2018-01-01 | End: 2018-01-01

## 2018-01-01 RX ORDER — CHOLECALCIFEROL (VITAMIN D3) 125 MCG
1000 CAPSULE ORAL DAILY
Status: DISCONTINUED | OUTPATIENT
Start: 2018-01-01 | End: 2018-01-01 | Stop reason: HOSPADM

## 2018-01-01 RX ORDER — NYSTATIN 100000 [USP'U]/G
POWDER TOPICAL 2 TIMES DAILY
Status: DISCONTINUED | OUTPATIENT
Start: 2018-01-01 | End: 2018-01-01 | Stop reason: HOSPADM

## 2018-01-01 RX ORDER — OMEPRAZOLE 20 MG/1
20 CAPSULE, DELAYED RELEASE ORAL DAILY
COMMUNITY
End: 2019-01-01

## 2018-01-01 RX ORDER — ALBUMIN (HUMAN) 12.5 G/50ML
25 SOLUTION INTRAVENOUS 2 TIMES DAILY
Status: DISCONTINUED | OUTPATIENT
Start: 2018-01-01 | End: 2018-01-01 | Stop reason: HOSPADM

## 2018-01-01 RX ORDER — POTASSIUM CHLORIDE 20 MEQ/1
40 TABLET, EXTENDED RELEASE ORAL DAILY
Status: DISCONTINUED | OUTPATIENT
Start: 2018-01-01 | End: 2018-01-01 | Stop reason: HOSPADM

## 2018-01-01 RX ORDER — POTASSIUM CHLORIDE 20 MEQ/1
40 TABLET, EXTENDED RELEASE ORAL 3 TIMES DAILY
Status: DISCONTINUED | OUTPATIENT
Start: 2018-01-01 | End: 2018-01-01

## 2018-01-01 RX ORDER — DOCUSATE SODIUM 100 MG/1
100 CAPSULE, LIQUID FILLED ORAL 2 TIMES DAILY
Status: DISCONTINUED | OUTPATIENT
Start: 2018-01-01 | End: 2018-01-01 | Stop reason: HOSPADM

## 2018-01-01 RX ORDER — AMITRIPTYLINE HYDROCHLORIDE 10 MG/1
10 TABLET, FILM COATED ORAL
COMMUNITY

## 2018-01-01 RX ORDER — MUSCLE RUB CREAM 100; 150 MG/G; MG/G
CREAM TOPICAL 4 TIMES DAILY PRN
Status: DISCONTINUED | OUTPATIENT
Start: 2018-01-01 | End: 2018-01-01 | Stop reason: HOSPADM

## 2018-01-01 RX ORDER — KETOROLAC TROMETHAMINE 30 MG/ML
15 INJECTION, SOLUTION INTRAMUSCULAR; INTRAVENOUS ONCE
Status: COMPLETED | OUTPATIENT
Start: 2018-01-01 | End: 2018-01-01

## 2018-01-01 RX ORDER — METOCLOPRAMIDE HYDROCHLORIDE 5 MG/ML
10 INJECTION INTRAMUSCULAR; INTRAVENOUS ONCE
Status: DISCONTINUED | OUTPATIENT
Start: 2018-01-01 | End: 2018-01-01

## 2018-01-01 RX ORDER — LORAZEPAM 1 MG/1
1 TABLET ORAL 3 TIMES DAILY
COMMUNITY
End: 2018-01-01 | Stop reason: HOSPADM

## 2018-01-01 RX ORDER — POTASSIUM CHLORIDE 20 MEQ/1
40 TABLET, EXTENDED RELEASE ORAL ONCE
Status: COMPLETED | OUTPATIENT
Start: 2018-01-01 | End: 2018-01-01

## 2018-01-01 RX ORDER — ALBUTEROL SULFATE 90 UG/1
2 AEROSOL, METERED RESPIRATORY (INHALATION) EVERY 6 HOURS
COMMUNITY
Start: 2017-10-16 | End: 2018-01-01

## 2018-01-01 RX ORDER — FUROSEMIDE 40 MG/1
80 TABLET ORAL
Status: DISCONTINUED | OUTPATIENT
Start: 2018-01-01 | End: 2018-01-01

## 2018-01-01 RX ORDER — HYDROMORPHONE HCL/PF 1 MG/ML
1 SYRINGE (ML) INJECTION ONCE
Status: COMPLETED | OUTPATIENT
Start: 2018-01-01 | End: 2018-01-01

## 2018-01-01 RX ORDER — OXYCODONE HYDROCHLORIDE 5 MG/1
10 TABLET ORAL EVERY 6 HOURS PRN
Qty: 20 TABLET | Refills: 0 | Status: SHIPPED | OUTPATIENT
Start: 2018-01-01 | End: 2018-01-01

## 2018-01-01 RX ORDER — FOLIC ACID 1 MG/1
1 TABLET ORAL DAILY
Status: DISCONTINUED | OUTPATIENT
Start: 2018-01-01 | End: 2018-01-01 | Stop reason: HOSPADM

## 2018-01-01 RX ORDER — FUROSEMIDE 40 MG/1
40 TABLET ORAL
Status: DISCONTINUED | OUTPATIENT
Start: 2018-01-01 | End: 2018-01-01

## 2018-01-01 RX ORDER — SODIUM CHLORIDE 9 MG/ML
125 INJECTION, SOLUTION INTRAVENOUS CONTINUOUS
Status: DISCONTINUED | OUTPATIENT
Start: 2018-01-01 | End: 2018-01-01

## 2018-01-01 RX ORDER — MAGNESIUM SULFATE HEPTAHYDRATE 40 MG/ML
2 INJECTION, SOLUTION INTRAVENOUS ONCE
Status: COMPLETED | OUTPATIENT
Start: 2018-01-01 | End: 2018-01-01

## 2018-01-01 RX ORDER — DOCUSATE SODIUM 100 MG/1
100 CAPSULE, LIQUID FILLED ORAL 2 TIMES DAILY
Qty: 10 CAPSULE | Refills: 0 | Status: SHIPPED | OUTPATIENT
Start: 2018-01-01 | End: 2019-01-01

## 2018-01-01 RX ORDER — POTASSIUM CHLORIDE 20MEQ/15ML
20 LIQUID (ML) ORAL ONCE
Status: DISCONTINUED | OUTPATIENT
Start: 2018-01-01 | End: 2018-01-01

## 2018-01-01 RX ORDER — SPIRONOLACTONE 25 MG/1
25 TABLET ORAL DAILY
Status: DISCONTINUED | OUTPATIENT
Start: 2018-01-01 | End: 2018-01-01 | Stop reason: HOSPADM

## 2018-01-01 RX ORDER — LACTULOSE 20 G/30ML
20 SOLUTION ORAL DAILY
Status: DISCONTINUED | OUTPATIENT
Start: 2018-01-01 | End: 2018-01-01 | Stop reason: HOSPADM

## 2018-01-01 RX ORDER — SPIRONOLACTONE 25 MG/1
25 TABLET ORAL DAILY
Qty: 30 TABLET | Refills: 0 | Status: SHIPPED | OUTPATIENT
Start: 2018-01-01 | End: 2019-01-01 | Stop reason: HOSPADM

## 2018-01-01 RX ORDER — LIDOCAINE 50 MG/G
1 PATCH TOPICAL DAILY
Qty: 30 PATCH | Refills: 0 | Status: SHIPPED | OUTPATIENT
Start: 2018-01-01 | End: 2018-01-01 | Stop reason: ALTCHOICE

## 2018-01-01 RX ORDER — NYSTATIN 100000 [USP'U]/G
POWDER TOPICAL 2 TIMES DAILY
Qty: 15 G | Refills: 0 | Status: SHIPPED | OUTPATIENT
Start: 2018-01-01 | End: 2019-01-01

## 2018-01-01 RX ORDER — CLONAZEPAM 0.5 MG/1
0.5 TABLET ORAL 2 TIMES DAILY
Qty: 6 TABLET | Refills: 0 | Status: SHIPPED | OUTPATIENT
Start: 2018-01-01 | End: 2018-01-01 | Stop reason: ALTCHOICE

## 2018-01-01 RX ORDER — SPIRONOLACTONE 25 MG/1
50 TABLET ORAL DAILY
Status: DISCONTINUED | OUTPATIENT
Start: 2018-01-01 | End: 2018-01-01

## 2018-01-01 RX ORDER — LORAZEPAM 1 MG/1
1 TABLET ORAL EVERY 8 HOURS PRN
Status: DISCONTINUED | OUTPATIENT
Start: 2018-01-01 | End: 2018-01-01 | Stop reason: HOSPADM

## 2018-01-01 RX ORDER — QUETIAPINE FUMARATE 200 MG/1
200 TABLET, FILM COATED ORAL
Status: DISCONTINUED | OUTPATIENT
Start: 2018-01-01 | End: 2018-01-01

## 2018-01-01 RX ORDER — OLANZAPINE 10 MG/1
10 INJECTION, POWDER, LYOPHILIZED, FOR SOLUTION INTRAMUSCULAR ONCE
Status: COMPLETED | OUTPATIENT
Start: 2018-01-01 | End: 2018-01-01

## 2018-01-01 RX ORDER — HALOPERIDOL 5 MG/ML
5 INJECTION INTRAMUSCULAR ONCE
Status: COMPLETED | OUTPATIENT
Start: 2018-01-01 | End: 2018-01-01

## 2018-01-01 RX ORDER — ALBUMIN (HUMAN) 12.5 G/50ML
12.5 SOLUTION INTRAVENOUS 2 TIMES DAILY
Status: DISCONTINUED | OUTPATIENT
Start: 2018-01-01 | End: 2018-01-01

## 2018-01-01 RX ORDER — GABAPENTIN 300 MG/1
300 CAPSULE ORAL 3 TIMES DAILY
Qty: 9 CAPSULE | Refills: 0 | Status: SHIPPED | OUTPATIENT
Start: 2018-01-01 | End: 2019-01-01

## 2018-01-01 RX ORDER — FERROUS SULFATE 325(65) MG
325 TABLET ORAL DAILY
Status: DISCONTINUED | OUTPATIENT
Start: 2018-01-01 | End: 2018-01-01 | Stop reason: HOSPADM

## 2018-01-01 RX ORDER — CLONAZEPAM 0.5 MG/1
0.5 TABLET ORAL 2 TIMES DAILY
Status: DISCONTINUED | OUTPATIENT
Start: 2018-01-01 | End: 2018-01-01 | Stop reason: HOSPADM

## 2018-01-01 RX ORDER — QUETIAPINE FUMARATE 100 MG/1
150 TABLET, FILM COATED ORAL
Qty: 10 TABLET | Status: ON HOLD
Start: 2018-01-01 | End: 2019-01-01 | Stop reason: SDUPTHER

## 2018-01-01 RX ORDER — TORSEMIDE 20 MG/1
40 TABLET ORAL
Status: DISCONTINUED | OUTPATIENT
Start: 2018-01-01 | End: 2018-01-01 | Stop reason: HOSPADM

## 2018-01-01 RX ORDER — LIDOCAINE 50 MG/G
1 PATCH TOPICAL DAILY
Status: DISCONTINUED | OUTPATIENT
Start: 2018-01-01 | End: 2018-01-01 | Stop reason: HOSPADM

## 2018-01-01 RX ORDER — TORSEMIDE 20 MG/1
40 TABLET ORAL
Qty: 60 TABLET | Refills: 0 | Status: SHIPPED | OUTPATIENT
Start: 2018-01-01

## 2018-01-01 RX ORDER — MUSCLE RUB CREAM 100; 150 MG/G; MG/G
CREAM TOPICAL 4 TIMES DAILY PRN
Qty: 1 TUBE | Refills: 0 | Status: SHIPPED | OUTPATIENT
Start: 2018-01-01 | End: 2018-01-01 | Stop reason: ALTCHOICE

## 2018-01-01 RX ORDER — OXYCODONE HYDROCHLORIDE AND ACETAMINOPHEN 5; 325 MG/1; MG/1
1 TABLET ORAL EVERY 4 HOURS PRN
Status: DISCONTINUED | OUTPATIENT
Start: 2018-01-01 | End: 2018-01-01

## 2018-01-01 RX ORDER — AMITRIPTYLINE HYDROCHLORIDE 10 MG/1
10 TABLET, FILM COATED ORAL
Status: DISCONTINUED | OUTPATIENT
Start: 2018-01-01 | End: 2018-01-01 | Stop reason: HOSPADM

## 2018-01-01 RX ORDER — OXYCODONE HYDROCHLORIDE 5 MG/1
5 TABLET ORAL EVERY 6 HOURS PRN
Status: DISCONTINUED | OUTPATIENT
Start: 2018-01-01 | End: 2018-01-01 | Stop reason: HOSPADM

## 2018-01-01 RX ORDER — QUETIAPINE FUMARATE 25 MG/1
50 TABLET, FILM COATED ORAL
Status: DISCONTINUED | OUTPATIENT
Start: 2018-01-01 | End: 2018-01-01

## 2018-01-01 RX ORDER — GABAPENTIN 300 MG/1
300 CAPSULE ORAL ONCE
Status: DISCONTINUED | OUTPATIENT
Start: 2018-01-01 | End: 2018-01-01 | Stop reason: HOSPADM

## 2018-01-01 RX ORDER — TIZANIDINE 4 MG/1
2 TABLET ORAL 3 TIMES DAILY
Status: COMPLETED | OUTPATIENT
Start: 2018-01-01 | End: 2018-01-01

## 2018-01-01 RX ORDER — FUROSEMIDE 40 MG/1
80 TABLET ORAL DAILY
Status: DISCONTINUED | OUTPATIENT
Start: 2018-01-01 | End: 2018-01-01

## 2018-01-01 RX ORDER — ALBUTEROL SULFATE 90 UG/1
2 AEROSOL, METERED RESPIRATORY (INHALATION)
Status: DISCONTINUED | OUTPATIENT
Start: 2018-01-01 | End: 2018-01-01 | Stop reason: HOSPADM

## 2018-01-01 RX ORDER — LORAZEPAM 1 MG/1
1 TABLET ORAL 3 TIMES DAILY
Status: DISCONTINUED | OUTPATIENT
Start: 2018-01-01 | End: 2018-01-01

## 2018-01-01 RX ORDER — PANTOPRAZOLE SODIUM 40 MG/1
40 TABLET, DELAYED RELEASE ORAL
Status: DISCONTINUED | OUTPATIENT
Start: 2018-01-01 | End: 2018-01-01 | Stop reason: HOSPADM

## 2018-01-01 RX ORDER — LIDOCAINE 40 MG/G
CREAM TOPICAL ONCE
Status: COMPLETED | OUTPATIENT
Start: 2018-01-01 | End: 2018-01-01

## 2018-01-01 RX ORDER — METOLAZONE 2.5 MG/1
5 TABLET ORAL 2 TIMES WEEKLY
Status: DISCONTINUED | OUTPATIENT
Start: 2018-01-01 | End: 2018-01-01 | Stop reason: HOSPADM

## 2018-01-01 RX ORDER — ACETAMINOPHEN 325 MG/1
650 TABLET ORAL EVERY 6 HOURS PRN
Status: DISCONTINUED | OUTPATIENT
Start: 2018-01-01 | End: 2018-01-01 | Stop reason: HOSPADM

## 2018-01-01 RX ORDER — ONDANSETRON 2 MG/ML
4 INJECTION INTRAMUSCULAR; INTRAVENOUS EVERY 6 HOURS PRN
Status: DISCONTINUED | OUTPATIENT
Start: 2018-01-01 | End: 2018-01-01 | Stop reason: HOSPADM

## 2018-01-01 RX ORDER — POTASSIUM CHLORIDE 20MEQ/15ML
40 LIQUID (ML) ORAL ONCE
Status: COMPLETED | OUTPATIENT
Start: 2018-01-01 | End: 2018-01-01

## 2018-01-01 RX ADMIN — ALBUMIN HUMAN 12.5 G: 0.25 SOLUTION INTRAVENOUS at 08:59

## 2018-01-01 RX ADMIN — ALBUTEROL SULFATE 2 PUFF: 90 AEROSOL, METERED RESPIRATORY (INHALATION) at 02:50

## 2018-01-01 RX ADMIN — SPIRONOLACTONE 25 MG: 25 TABLET, FILM COATED ORAL at 09:26

## 2018-01-01 RX ADMIN — OXYCODONE HYDROCHLORIDE 5 MG: 5 TABLET ORAL at 06:52

## 2018-01-01 RX ADMIN — QUETIAPINE FUMARATE 50 MG: 25 TABLET ORAL at 21:13

## 2018-01-01 RX ADMIN — OXYCODONE HYDROCHLORIDE AND ACETAMINOPHEN 1 TABLET: 5; 325 TABLET ORAL at 16:51

## 2018-01-01 RX ADMIN — SODIUM CHLORIDE 125 ML/HR: 0.9 INJECTION, SOLUTION INTRAVENOUS at 13:24

## 2018-01-01 RX ADMIN — ALBUTEROL SULFATE 2 PUFF: 90 AEROSOL, METERED RESPIRATORY (INHALATION) at 20:57

## 2018-01-01 RX ADMIN — TIZANIDINE 2 MG: 4 TABLET ORAL at 17:26

## 2018-01-01 RX ADMIN — GABAPENTIN 300 MG: 300 CAPSULE ORAL at 16:14

## 2018-01-01 RX ADMIN — QUETIAPINE FUMARATE 150 MG: 100 TABLET ORAL at 21:06

## 2018-01-01 RX ADMIN — ALBUTEROL SULFATE 2 PUFF: 90 AEROSOL, METERED RESPIRATORY (INHALATION) at 20:02

## 2018-01-01 RX ADMIN — KETOROLAC TROMETHAMINE 15 MG: 30 INJECTION, SOLUTION INTRAMUSCULAR at 17:08

## 2018-01-01 RX ADMIN — POTASSIUM CHLORIDE 40 MEQ: 1500 TABLET, EXTENDED RELEASE ORAL at 08:57

## 2018-01-01 RX ADMIN — SODIUM CHLORIDE 125 ML/HR: 0.9 INJECTION, SOLUTION INTRAVENOUS at 04:41

## 2018-01-01 RX ADMIN — TIZANIDINE 2 MG: 4 TABLET ORAL at 08:58

## 2018-01-01 RX ADMIN — NYSTATIN: 100000 POWDER TOPICAL at 17:34

## 2018-01-01 RX ADMIN — OXYCODONE HYDROCHLORIDE AND ACETAMINOPHEN 1 TABLET: 5; 325 TABLET ORAL at 20:58

## 2018-01-01 RX ADMIN — OXYCODONE HYDROCHLORIDE 5 MG: 5 TABLET ORAL at 08:58

## 2018-01-01 RX ADMIN — FERROUS SULFATE TAB 325 MG (65 MG ELEMENTAL FE) 325 MG: 325 (65 FE) TAB at 08:54

## 2018-01-01 RX ADMIN — TORSEMIDE 40 MG: 20 TABLET ORAL at 17:26

## 2018-01-01 RX ADMIN — NYSTATIN: 100000 POWDER TOPICAL at 09:25

## 2018-01-01 RX ADMIN — CYANOCOBALAMIN TAB 500 MCG 1000 MCG: 500 TAB at 09:22

## 2018-01-01 RX ADMIN — FOLIC ACID 1 MG: 1 TABLET ORAL at 09:19

## 2018-01-01 RX ADMIN — LORAZEPAM 1 MG: 1 TABLET ORAL at 20:58

## 2018-01-01 RX ADMIN — ALBUTEROL SULFATE 2 PUFF: 90 AEROSOL, METERED RESPIRATORY (INHALATION) at 01:30

## 2018-01-01 RX ADMIN — FERROUS SULFATE TAB 325 MG (65 MG ELEMENTAL FE) 325 MG: 325 (65 FE) TAB at 09:17

## 2018-01-01 RX ADMIN — ALBUTEROL SULFATE 2 PUFF: 90 AEROSOL, METERED RESPIRATORY (INHALATION) at 21:06

## 2018-01-01 RX ADMIN — ENOXAPARIN SODIUM 40 MG: 100 INJECTION SUBCUTANEOUS at 09:12

## 2018-01-01 RX ADMIN — GABAPENTIN 300 MG: 300 CAPSULE ORAL at 17:33

## 2018-01-01 RX ADMIN — OXYCODONE HYDROCHLORIDE AND ACETAMINOPHEN 1 TABLET: 5; 325 TABLET ORAL at 08:16

## 2018-01-01 RX ADMIN — ALBUTEROL SULFATE 2 PUFF: 90 AEROSOL, METERED RESPIRATORY (INHALATION) at 08:53

## 2018-01-01 RX ADMIN — POTASSIUM CHLORIDE 40 MEQ: 1500 TABLET, EXTENDED RELEASE ORAL at 20:58

## 2018-01-01 RX ADMIN — ALBUMIN HUMAN 12.5 G: 0.25 SOLUTION INTRAVENOUS at 09:17

## 2018-01-01 RX ADMIN — CLONAZEPAM 0.5 MG: 0.5 TABLET ORAL at 16:43

## 2018-01-01 RX ADMIN — ENOXAPARIN SODIUM 40 MG: 100 INJECTION SUBCUTANEOUS at 09:54

## 2018-01-01 RX ADMIN — ALBUMIN HUMAN 25 G: 0.25 SOLUTION INTRAVENOUS at 17:34

## 2018-01-01 RX ADMIN — MENTHOL, METHYL SALICYLATE: 10; 15 CREAM TOPICAL at 10:48

## 2018-01-01 RX ADMIN — PANTOPRAZOLE SODIUM 40 MG: 40 TABLET, DELAYED RELEASE ORAL at 05:34

## 2018-01-01 RX ADMIN — SODIUM CHLORIDE 1000 ML: 0.9 INJECTION, SOLUTION INTRAVENOUS at 16:22

## 2018-01-01 RX ADMIN — Medication 400 MG: at 10:17

## 2018-01-01 RX ADMIN — AMITRIPTYLINE HYDROCHLORIDE 10 MG: 10 TABLET, FILM COATED ORAL at 21:04

## 2018-01-01 RX ADMIN — GABAPENTIN 300 MG: 300 CAPSULE ORAL at 09:27

## 2018-01-01 RX ADMIN — OXYCODONE HYDROCHLORIDE 5 MG: 5 TABLET ORAL at 07:35

## 2018-01-01 RX ADMIN — AMITRIPTYLINE HYDROCHLORIDE 10 MG: 10 TABLET, FILM COATED ORAL at 21:16

## 2018-01-01 RX ADMIN — GABAPENTIN 300 MG: 300 CAPSULE ORAL at 09:19

## 2018-01-01 RX ADMIN — NYSTATIN: 100000 POWDER TOPICAL at 10:35

## 2018-01-01 RX ADMIN — TORSEMIDE 40 MG: 20 TABLET ORAL at 09:23

## 2018-01-01 RX ADMIN — PANTOPRAZOLE SODIUM 40 MG: 40 TABLET, DELAYED RELEASE ORAL at 05:20

## 2018-01-01 RX ADMIN — GABAPENTIN 300 MG: 300 CAPSULE ORAL at 08:34

## 2018-01-01 RX ADMIN — OXYCODONE HYDROCHLORIDE 5 MG: 5 TABLET ORAL at 00:02

## 2018-01-01 RX ADMIN — CLONAZEPAM 0.5 MG: 0.5 TABLET ORAL at 17:33

## 2018-01-01 RX ADMIN — LORAZEPAM 1 MG: 1 TABLET ORAL at 20:17

## 2018-01-01 RX ADMIN — ALBUTEROL SULFATE 2 PUFF: 90 AEROSOL, METERED RESPIRATORY (INHALATION) at 20:18

## 2018-01-01 RX ADMIN — OXYCODONE HYDROCHLORIDE 5 MG: 5 TABLET ORAL at 23:18

## 2018-01-01 RX ADMIN — ALBUTEROL SULFATE 2 PUFF: 90 AEROSOL, METERED RESPIRATORY (INHALATION) at 20:55

## 2018-01-01 RX ADMIN — ACETAMINOPHEN 650 MG: 325 TABLET ORAL at 20:22

## 2018-01-01 RX ADMIN — FERROUS SULFATE TAB 325 MG (65 MG ELEMENTAL FE) 325 MG: 325 (65 FE) TAB at 10:46

## 2018-01-01 RX ADMIN — GABAPENTIN 300 MG: 300 CAPSULE ORAL at 09:26

## 2018-01-01 RX ADMIN — Medication 400 MG: at 16:43

## 2018-01-01 RX ADMIN — NYSTATIN: 100000 POWDER TOPICAL at 18:40

## 2018-01-01 RX ADMIN — Medication 400 MG: at 17:29

## 2018-01-01 RX ADMIN — HALOPERIDOL LACTATE 5 MG: 5 INJECTION, SOLUTION INTRAMUSCULAR at 13:43

## 2018-01-01 RX ADMIN — DOCUSATE SODIUM 100 MG: 100 CAPSULE, LIQUID FILLED ORAL at 08:36

## 2018-01-01 RX ADMIN — Medication 400 MG: at 17:33

## 2018-01-01 RX ADMIN — AMITRIPTYLINE HYDROCHLORIDE 10 MG: 10 TABLET, FILM COATED ORAL at 21:43

## 2018-01-01 RX ADMIN — Medication 400 MG: at 18:40

## 2018-01-01 RX ADMIN — LIDOCAINE 4% 1 APPLICATION: 4 CREAM TOPICAL at 13:44

## 2018-01-01 RX ADMIN — TIZANIDINE 2 MG: 4 TABLET ORAL at 16:19

## 2018-01-01 RX ADMIN — PANTOPRAZOLE SODIUM 40 MG: 40 TABLET, DELAYED RELEASE ORAL at 05:19

## 2018-01-01 RX ADMIN — DOCUSATE SODIUM 100 MG: 100 CAPSULE, LIQUID FILLED ORAL at 09:29

## 2018-01-01 RX ADMIN — AMITRIPTYLINE HYDROCHLORIDE 10 MG: 10 TABLET, FILM COATED ORAL at 22:25

## 2018-01-01 RX ADMIN — GABAPENTIN 300 MG: 300 CAPSULE ORAL at 20:55

## 2018-01-01 RX ADMIN — PANTOPRAZOLE SODIUM 40 MG: 40 TABLET, DELAYED RELEASE ORAL at 05:08

## 2018-01-01 RX ADMIN — GABAPENTIN 300 MG: 300 CAPSULE ORAL at 15:32

## 2018-01-01 RX ADMIN — POTASSIUM CHLORIDE 40 MEQ: 1500 TABLET, EXTENDED RELEASE ORAL at 09:18

## 2018-01-01 RX ADMIN — NYSTATIN: 100000 POWDER TOPICAL at 18:31

## 2018-01-01 RX ADMIN — NYSTATIN: 100000 POWDER TOPICAL at 10:47

## 2018-01-01 RX ADMIN — LORAZEPAM 1 MG: 1 TABLET ORAL at 08:59

## 2018-01-01 RX ADMIN — AMITRIPTYLINE HYDROCHLORIDE 10 MG: 10 TABLET, FILM COATED ORAL at 21:07

## 2018-01-01 RX ADMIN — AMITRIPTYLINE HYDROCHLORIDE 10 MG: 10 TABLET, FILM COATED ORAL at 21:06

## 2018-01-01 RX ADMIN — OXYCODONE HYDROCHLORIDE 5 MG: 5 TABLET ORAL at 21:10

## 2018-01-01 RX ADMIN — CLONAZEPAM 0.5 MG: 0.5 TABLET ORAL at 17:58

## 2018-01-01 RX ADMIN — ALBUTEROL SULFATE 2 PUFF: 90 AEROSOL, METERED RESPIRATORY (INHALATION) at 09:02

## 2018-01-01 RX ADMIN — NYSTATIN: 100000 POWDER TOPICAL at 17:51

## 2018-01-01 RX ADMIN — Medication 400 MG: at 09:54

## 2018-01-01 RX ADMIN — SPIRONOLACTONE 50 MG: 25 TABLET ORAL at 08:35

## 2018-01-01 RX ADMIN — ALBUTEROL SULFATE 2 PUFF: 90 AEROSOL, METERED RESPIRATORY (INHALATION) at 08:34

## 2018-01-01 RX ADMIN — OXYCODONE HYDROCHLORIDE AND ACETAMINOPHEN 1 TABLET: 5; 325 TABLET ORAL at 14:55

## 2018-01-01 RX ADMIN — Medication 400 MG: at 08:37

## 2018-01-01 RX ADMIN — OXYCODONE HYDROCHLORIDE 5 MG: 5 TABLET ORAL at 04:30

## 2018-01-01 RX ADMIN — Medication 400 MG: at 17:26

## 2018-01-01 RX ADMIN — FOLIC ACID 1 MG: 1 TABLET ORAL at 10:15

## 2018-01-01 RX ADMIN — AMITRIPTYLINE HYDROCHLORIDE 10 MG: 10 TABLET, FILM COATED ORAL at 22:29

## 2018-01-01 RX ADMIN — LORAZEPAM 1 MG: 1 TABLET ORAL at 20:19

## 2018-01-01 RX ADMIN — POTASSIUM CHLORIDE 40 MEQ: 1500 TABLET, EXTENDED RELEASE ORAL at 17:27

## 2018-01-01 RX ADMIN — FERROUS SULFATE TAB 325 MG (65 MG ELEMENTAL FE) 325 MG: 325 (65 FE) TAB at 09:23

## 2018-01-01 RX ADMIN — POTASSIUM CHLORIDE 40 MEQ: 1500 TABLET, EXTENDED RELEASE ORAL at 09:12

## 2018-01-01 RX ADMIN — FERROUS SULFATE TAB 325 MG (65 MG ELEMENTAL FE) 325 MG: 325 (65 FE) TAB at 09:26

## 2018-01-01 RX ADMIN — ENOXAPARIN SODIUM 40 MG: 100 INJECTION SUBCUTANEOUS at 09:46

## 2018-01-01 RX ADMIN — ALBUTEROL SULFATE 2 PUFF: 90 AEROSOL, METERED RESPIRATORY (INHALATION) at 09:10

## 2018-01-01 RX ADMIN — GABAPENTIN 300 MG: 300 CAPSULE ORAL at 17:34

## 2018-01-01 RX ADMIN — SPIRONOLACTONE 25 MG: 25 TABLET, FILM COATED ORAL at 10:01

## 2018-01-01 RX ADMIN — ALBUTEROL SULFATE 2 PUFF: 90 AEROSOL, METERED RESPIRATORY (INHALATION) at 14:50

## 2018-01-01 RX ADMIN — GABAPENTIN 300 MG: 300 CAPSULE ORAL at 17:27

## 2018-01-01 RX ADMIN — POTASSIUM CHLORIDE 40 MEQ: 1500 TABLET, EXTENDED RELEASE ORAL at 16:22

## 2018-01-01 RX ADMIN — GABAPENTIN 300 MG: 300 CAPSULE ORAL at 21:06

## 2018-01-01 RX ADMIN — Medication 400 MG: at 18:07

## 2018-01-01 RX ADMIN — AMITRIPTYLINE HYDROCHLORIDE 10 MG: 10 TABLET, FILM COATED ORAL at 00:44

## 2018-01-01 RX ADMIN — FOLIC ACID 1 MG: 1 TABLET ORAL at 08:59

## 2018-01-01 RX ADMIN — PANTOPRAZOLE SODIUM 40 MG: 40 TABLET, DELAYED RELEASE ORAL at 06:13

## 2018-01-01 RX ADMIN — FOLIC ACID 1 MG: 1 TABLET ORAL at 09:14

## 2018-01-01 RX ADMIN — PANTOPRAZOLE SODIUM 40 MG: 40 TABLET, DELAYED RELEASE ORAL at 06:25

## 2018-01-01 RX ADMIN — SODIUM CHLORIDE 125 ML/HR: 0.9 INJECTION, SOLUTION INTRAVENOUS at 18:07

## 2018-01-01 RX ADMIN — ALBUTEROL SULFATE 2 PUFF: 90 AEROSOL, METERED RESPIRATORY (INHALATION) at 08:36

## 2018-01-01 RX ADMIN — OXYCODONE HYDROCHLORIDE 5 MG: 5 TABLET ORAL at 20:05

## 2018-01-01 RX ADMIN — LORAZEPAM 1 MG: 1 TABLET ORAL at 08:37

## 2018-01-01 RX ADMIN — ALBUMIN HUMAN 25 G: 0.25 SOLUTION INTRAVENOUS at 09:17

## 2018-01-01 RX ADMIN — CYANOCOBALAMIN TAB 500 MCG 1000 MCG: 500 TAB at 10:45

## 2018-01-01 RX ADMIN — ALBUTEROL SULFATE 2 PUFF: 90 AEROSOL, METERED RESPIRATORY (INHALATION) at 09:26

## 2018-01-01 RX ADMIN — NYSTATIN: 100000 POWDER TOPICAL at 09:55

## 2018-01-01 RX ADMIN — Medication 400 MG: at 17:07

## 2018-01-01 RX ADMIN — Medication 400 MG: at 17:34

## 2018-01-01 RX ADMIN — QUETIAPINE FUMARATE 50 MG: 25 TABLET ORAL at 21:07

## 2018-01-01 RX ADMIN — NYSTATIN: 100000 POWDER TOPICAL at 09:03

## 2018-01-01 RX ADMIN — QUETIAPINE FUMARATE 50 MG: 25 TABLET ORAL at 21:42

## 2018-01-01 RX ADMIN — DOCUSATE SODIUM 100 MG: 100 CAPSULE, LIQUID FILLED ORAL at 18:07

## 2018-01-01 RX ADMIN — ALBUTEROL SULFATE 2 PUFF: 90 AEROSOL, METERED RESPIRATORY (INHALATION) at 09:17

## 2018-01-01 RX ADMIN — OLANZAPINE 10 MG: 10 INJECTION, POWDER, FOR SOLUTION INTRAMUSCULAR at 14:24

## 2018-01-01 RX ADMIN — POTASSIUM CHLORIDE 40 MEQ: 1500 TABLET, EXTENDED RELEASE ORAL at 20:19

## 2018-01-01 RX ADMIN — MAGNESIUM SULFATE HEPTAHYDRATE 2 G: 40 INJECTION, SOLUTION INTRAVENOUS at 22:56

## 2018-01-01 RX ADMIN — Medication 400 MG: at 09:12

## 2018-01-01 RX ADMIN — POTASSIUM CHLORIDE 40 MEQ: 1500 TABLET, EXTENDED RELEASE ORAL at 09:54

## 2018-01-01 RX ADMIN — LORAZEPAM 1 MG: 1 TABLET ORAL at 19:54

## 2018-01-01 RX ADMIN — LORAZEPAM 1 MG: 1 TABLET ORAL at 20:01

## 2018-01-01 RX ADMIN — FOLIC ACID 1 MG: 1 TABLET ORAL at 09:46

## 2018-01-01 RX ADMIN — SODIUM CHLORIDE 125 ML/HR: 0.9 INJECTION, SOLUTION INTRAVENOUS at 02:14

## 2018-01-01 RX ADMIN — AMITRIPTYLINE HYDROCHLORIDE 10 MG: 10 TABLET, FILM COATED ORAL at 21:44

## 2018-01-01 RX ADMIN — ALBUTEROL SULFATE 2 PUFF: 90 AEROSOL, METERED RESPIRATORY (INHALATION) at 14:32

## 2018-01-01 RX ADMIN — GABAPENTIN 300 MG: 300 CAPSULE ORAL at 09:12

## 2018-01-01 RX ADMIN — POTASSIUM CHLORIDE 40 MEQ: 1500 TABLET, EXTENDED RELEASE ORAL at 09:22

## 2018-01-01 RX ADMIN — SODIUM CHLORIDE 125 ML/HR: 0.9 INJECTION, SOLUTION INTRAVENOUS at 12:35

## 2018-01-01 RX ADMIN — ALBUTEROL SULFATE 2 PUFF: 90 AEROSOL, METERED RESPIRATORY (INHALATION) at 02:09

## 2018-01-01 RX ADMIN — GABAPENTIN 300 MG: 300 CAPSULE ORAL at 21:12

## 2018-01-01 RX ADMIN — OXYCODONE HYDROCHLORIDE 5 MG: 5 TABLET ORAL at 05:34

## 2018-01-01 RX ADMIN — CYANOCOBALAMIN TAB 500 MCG 1000 MCG: 500 TAB at 08:54

## 2018-01-01 RX ADMIN — ALBUTEROL SULFATE 2 PUFF: 90 AEROSOL, METERED RESPIRATORY (INHALATION) at 20:40

## 2018-01-01 RX ADMIN — QUETIAPINE FUMARATE 150 MG: 100 TABLET ORAL at 21:04

## 2018-01-01 RX ADMIN — ALBUTEROL SULFATE 2 PUFF: 90 AEROSOL, METERED RESPIRATORY (INHALATION) at 02:24

## 2018-01-01 RX ADMIN — GABAPENTIN 300 MG: 300 CAPSULE ORAL at 16:24

## 2018-01-01 RX ADMIN — CYANOCOBALAMIN TAB 500 MCG 1000 MCG: 500 TAB at 09:29

## 2018-01-01 RX ADMIN — Medication 400 MG: at 09:18

## 2018-01-01 RX ADMIN — ENOXAPARIN SODIUM 40 MG: 100 INJECTION SUBCUTANEOUS at 09:01

## 2018-01-01 RX ADMIN — OXYCODONE HYDROCHLORIDE 5 MG: 5 TABLET ORAL at 22:26

## 2018-01-01 RX ADMIN — ALBUTEROL SULFATE 2 PUFF: 90 AEROSOL, METERED RESPIRATORY (INHALATION) at 13:58

## 2018-01-01 RX ADMIN — TORSEMIDE 40 MG: 20 TABLET ORAL at 10:00

## 2018-01-01 RX ADMIN — Medication 400 MG: at 17:02

## 2018-01-01 RX ADMIN — GABAPENTIN 300 MG: 300 CAPSULE ORAL at 08:59

## 2018-01-01 RX ADMIN — AMITRIPTYLINE HYDROCHLORIDE 10 MG: 10 TABLET, FILM COATED ORAL at 22:43

## 2018-01-01 RX ADMIN — ALBUTEROL SULFATE 2 PUFF: 90 AEROSOL, METERED RESPIRATORY (INHALATION) at 14:13

## 2018-01-01 RX ADMIN — SODIUM CHLORIDE 125 ML/HR: 0.9 INJECTION, SOLUTION INTRAVENOUS at 19:19

## 2018-01-01 RX ADMIN — GABAPENTIN 300 MG: 300 CAPSULE ORAL at 08:37

## 2018-01-01 RX ADMIN — CLONAZEPAM 0.5 MG: 0.5 TABLET ORAL at 09:54

## 2018-01-01 RX ADMIN — FOLIC ACID 1 MG: 1 TABLET ORAL at 09:26

## 2018-01-01 RX ADMIN — ONDANSETRON 4 MG: 2 INJECTION INTRAMUSCULAR; INTRAVENOUS at 15:54

## 2018-01-01 RX ADMIN — CYANOCOBALAMIN TAB 500 MCG 1000 MCG: 500 TAB at 09:47

## 2018-01-01 RX ADMIN — ALBUTEROL SULFATE 2 PUFF: 90 AEROSOL, METERED RESPIRATORY (INHALATION) at 09:53

## 2018-01-01 RX ADMIN — ENOXAPARIN SODIUM 40 MG: 100 INJECTION SUBCUTANEOUS at 08:37

## 2018-01-01 RX ADMIN — ALBUMIN HUMAN 25 G: 0.25 SOLUTION INTRAVENOUS at 17:00

## 2018-01-01 RX ADMIN — ALBUTEROL SULFATE 2 PUFF: 90 AEROSOL, METERED RESPIRATORY (INHALATION) at 03:06

## 2018-01-01 RX ADMIN — OXYCODONE HYDROCHLORIDE AND ACETAMINOPHEN 1 TABLET: 5; 325 TABLET ORAL at 06:29

## 2018-01-01 RX ADMIN — GABAPENTIN 300 MG: 300 CAPSULE ORAL at 16:01

## 2018-01-01 RX ADMIN — GABAPENTIN 300 MG: 300 CAPSULE ORAL at 20:56

## 2018-01-01 RX ADMIN — GABAPENTIN 300 MG: 300 CAPSULE ORAL at 00:44

## 2018-01-01 RX ADMIN — ALBUMIN HUMAN 12.5 G: 0.25 SOLUTION INTRAVENOUS at 17:26

## 2018-01-01 RX ADMIN — FERROUS SULFATE TAB 325 MG (65 MG ELEMENTAL FE) 325 MG: 325 (65 FE) TAB at 08:59

## 2018-01-01 RX ADMIN — CYANOCOBALAMIN TAB 500 MCG 1000 MCG: 500 TAB at 09:01

## 2018-01-01 RX ADMIN — GABAPENTIN 300 MG: 300 CAPSULE ORAL at 20:41

## 2018-01-01 RX ADMIN — Medication 400 MG: at 09:01

## 2018-01-01 RX ADMIN — ALBUTEROL SULFATE 2 PUFF: 90 AEROSOL, METERED RESPIRATORY (INHALATION) at 10:30

## 2018-01-01 RX ADMIN — LORAZEPAM 1 MG: 1 TABLET ORAL at 08:35

## 2018-01-01 RX ADMIN — OXYCODONE HYDROCHLORIDE 5 MG: 5 TABLET ORAL at 00:36

## 2018-01-01 RX ADMIN — GABAPENTIN 300 MG: 300 CAPSULE ORAL at 08:54

## 2018-01-01 RX ADMIN — PANTOPRAZOLE SODIUM 40 MG: 40 TABLET, DELAYED RELEASE ORAL at 05:04

## 2018-01-01 RX ADMIN — OXYCODONE HYDROCHLORIDE AND ACETAMINOPHEN 1 TABLET: 5; 325 TABLET ORAL at 21:04

## 2018-01-01 RX ADMIN — FUROSEMIDE 40 MG: 40 TABLET ORAL at 09:00

## 2018-01-01 RX ADMIN — GABAPENTIN 300 MG: 300 CAPSULE ORAL at 15:54

## 2018-01-01 RX ADMIN — CYANOCOBALAMIN TAB 500 MCG 1000 MCG: 500 TAB at 09:54

## 2018-01-01 RX ADMIN — POTASSIUM CHLORIDE 40 MEQ: 1500 TABLET, EXTENDED RELEASE ORAL at 16:14

## 2018-01-01 RX ADMIN — OXYCODONE HYDROCHLORIDE AND ACETAMINOPHEN 1 TABLET: 5; 325 TABLET ORAL at 20:30

## 2018-01-01 RX ADMIN — Medication 400 MG: at 08:53

## 2018-01-01 RX ADMIN — ALBUMIN HUMAN 25 G: 0.25 SOLUTION INTRAVENOUS at 09:53

## 2018-01-01 RX ADMIN — Medication 400 MG: at 09:27

## 2018-01-01 RX ADMIN — ALBUTEROL SULFATE 2 PUFF: 90 AEROSOL, METERED RESPIRATORY (INHALATION) at 13:59

## 2018-01-01 RX ADMIN — TIZANIDINE 2 MG: 4 TABLET ORAL at 20:00

## 2018-01-01 RX ADMIN — LORAZEPAM 1 MG: 1 TABLET ORAL at 09:12

## 2018-01-01 RX ADMIN — OXYCODONE HYDROCHLORIDE 5 MG: 5 TABLET ORAL at 16:02

## 2018-01-01 RX ADMIN — ONDANSETRON 4 MG: 2 INJECTION INTRAMUSCULAR; INTRAVENOUS at 16:39

## 2018-01-01 RX ADMIN — PANTOPRAZOLE SODIUM 40 MG: 40 TABLET, DELAYED RELEASE ORAL at 06:45

## 2018-01-01 RX ADMIN — OXYCODONE HYDROCHLORIDE 5 MG: 5 TABLET ORAL at 13:57

## 2018-01-01 RX ADMIN — GABAPENTIN 300 MG: 300 CAPSULE ORAL at 09:18

## 2018-01-01 RX ADMIN — GABAPENTIN 300 MG: 300 CAPSULE ORAL at 20:05

## 2018-01-01 RX ADMIN — GABAPENTIN 300 MG: 300 CAPSULE ORAL at 21:07

## 2018-01-01 RX ADMIN — PANTOPRAZOLE SODIUM 40 MG: 40 TABLET, DELAYED RELEASE ORAL at 06:07

## 2018-01-01 RX ADMIN — OXYCODONE HYDROCHLORIDE AND ACETAMINOPHEN 1 TABLET: 5; 325 TABLET ORAL at 12:35

## 2018-01-01 RX ADMIN — LORAZEPAM 1 MG: 1 TABLET ORAL at 09:26

## 2018-01-01 RX ADMIN — LORAZEPAM 1 MG: 1 TABLET ORAL at 21:42

## 2018-01-01 RX ADMIN — GABAPENTIN 300 MG: 300 CAPSULE ORAL at 16:56

## 2018-01-01 RX ADMIN — NYSTATIN: 100000 POWDER TOPICAL at 09:05

## 2018-01-01 RX ADMIN — CYANOCOBALAMIN TAB 500 MCG 1000 MCG: 500 TAB at 08:57

## 2018-01-01 RX ADMIN — Medication 400 MG: at 08:57

## 2018-01-01 RX ADMIN — NYSTATIN: 100000 POWDER TOPICAL at 16:44

## 2018-01-01 RX ADMIN — OXYCODONE HYDROCHLORIDE 5 MG: 5 TABLET ORAL at 23:00

## 2018-01-01 RX ADMIN — ENOXAPARIN SODIUM 40 MG: 100 INJECTION SUBCUTANEOUS at 09:18

## 2018-01-01 RX ADMIN — FOLIC ACID 1 MG: 1 TABLET ORAL at 08:35

## 2018-01-01 RX ADMIN — ALBUTEROL SULFATE 2 PUFF: 90 AEROSOL, METERED RESPIRATORY (INHALATION) at 09:44

## 2018-01-01 RX ADMIN — POTASSIUM CHLORIDE 40 MEQ: 1500 TABLET, EXTENDED RELEASE ORAL at 10:46

## 2018-01-01 RX ADMIN — ENOXAPARIN SODIUM 40 MG: 100 INJECTION SUBCUTANEOUS at 09:16

## 2018-01-01 RX ADMIN — ONDANSETRON 4 MG: 2 INJECTION INTRAMUSCULAR; INTRAVENOUS at 16:01

## 2018-01-01 RX ADMIN — Medication 400 MG: at 09:23

## 2018-01-01 RX ADMIN — ALBUTEROL SULFATE 2 PUFF: 90 AEROSOL, METERED RESPIRATORY (INHALATION) at 15:32

## 2018-01-01 RX ADMIN — SPIRONOLACTONE 25 MG: 25 TABLET, FILM COATED ORAL at 09:00

## 2018-01-01 RX ADMIN — FERROUS SULFATE TAB 325 MG (65 MG ELEMENTAL FE) 325 MG: 325 (65 FE) TAB at 09:29

## 2018-01-01 RX ADMIN — ALBUTEROL SULFATE 2 PUFF: 90 AEROSOL, METERED RESPIRATORY (INHALATION) at 02:53

## 2018-01-01 RX ADMIN — ONDANSETRON 4 MG: 2 INJECTION INTRAMUSCULAR; INTRAVENOUS at 20:55

## 2018-01-01 RX ADMIN — GABAPENTIN 300 MG: 300 CAPSULE ORAL at 16:15

## 2018-01-01 RX ADMIN — LORAZEPAM 1 MG: 1 TABLET ORAL at 12:39

## 2018-01-01 RX ADMIN — GABAPENTIN 300 MG: 300 CAPSULE ORAL at 10:45

## 2018-01-01 RX ADMIN — PANTOPRAZOLE SODIUM 40 MG: 40 TABLET, DELAYED RELEASE ORAL at 05:41

## 2018-01-01 RX ADMIN — CYANOCOBALAMIN TAB 500 MCG 1000 MCG: 500 TAB at 08:36

## 2018-01-01 RX ADMIN — QUETIAPINE FUMARATE 50 MG: 25 TABLET ORAL at 22:29

## 2018-01-01 RX ADMIN — LORAZEPAM 1 MG: 1 TABLET ORAL at 21:06

## 2018-01-01 RX ADMIN — GABAPENTIN 300 MG: 300 CAPSULE ORAL at 09:22

## 2018-01-01 RX ADMIN — POTASSIUM CHLORIDE 40 MEQ: 1500 TABLET, EXTENDED RELEASE ORAL at 08:35

## 2018-01-01 RX ADMIN — FOLIC ACID 1 MG: 1 TABLET ORAL at 09:22

## 2018-01-01 RX ADMIN — CYANOCOBALAMIN TAB 500 MCG 1000 MCG: 500 TAB at 09:13

## 2018-01-01 RX ADMIN — QUETIAPINE FUMARATE 50 MG: 25 TABLET ORAL at 22:25

## 2018-01-01 RX ADMIN — GABAPENTIN 300 MG: 300 CAPSULE ORAL at 21:00

## 2018-01-01 RX ADMIN — Medication 400 MG: at 16:15

## 2018-01-01 RX ADMIN — TIZANIDINE 2 MG: 4 TABLET ORAL at 21:43

## 2018-01-01 RX ADMIN — ALBUTEROL SULFATE 2 PUFF: 90 AEROSOL, METERED RESPIRATORY (INHALATION) at 20:07

## 2018-01-01 RX ADMIN — SODIUM CHLORIDE 125 ML/HR: 0.9 INJECTION, SOLUTION INTRAVENOUS at 23:45

## 2018-01-01 RX ADMIN — LORAZEPAM 1 MG: 1 TABLET ORAL at 11:05

## 2018-01-01 RX ADMIN — AMITRIPTYLINE HYDROCHLORIDE 10 MG: 10 TABLET, FILM COATED ORAL at 21:21

## 2018-01-01 RX ADMIN — POTASSIUM CHLORIDE 40 MEQ: 1500 TABLET, EXTENDED RELEASE ORAL at 20:55

## 2018-01-01 RX ADMIN — FOLIC ACID 1 MG: 1 TABLET ORAL at 09:28

## 2018-01-01 RX ADMIN — FOLIC ACID 1 MG: 1 TABLET ORAL at 10:46

## 2018-01-01 RX ADMIN — GABAPENTIN 300 MG: 300 CAPSULE ORAL at 20:00

## 2018-01-01 RX ADMIN — OXYCODONE HYDROCHLORIDE 5 MG: 5 TABLET ORAL at 11:05

## 2018-01-01 RX ADMIN — QUETIAPINE FUMARATE 50 MG: 25 TABLET ORAL at 21:21

## 2018-01-01 RX ADMIN — SPIRONOLACTONE 25 MG: 25 TABLET, FILM COATED ORAL at 09:23

## 2018-01-01 RX ADMIN — NYSTATIN: 100000 POWDER TOPICAL at 09:32

## 2018-01-01 RX ADMIN — FOLIC ACID 1 MG: 1 TABLET ORAL at 08:37

## 2018-01-01 RX ADMIN — GABAPENTIN 300 MG: 300 CAPSULE ORAL at 21:42

## 2018-01-01 RX ADMIN — TIZANIDINE 2 MG: 4 TABLET ORAL at 09:26

## 2018-01-01 RX ADMIN — AMITRIPTYLINE HYDROCHLORIDE 10 MG: 10 TABLET, FILM COATED ORAL at 21:03

## 2018-01-01 RX ADMIN — LACTULOSE 20 G: 20 SOLUTION ORAL at 08:36

## 2018-01-01 RX ADMIN — OXYCODONE HYDROCHLORIDE AND ACETAMINOPHEN 1 TABLET: 5; 325 TABLET ORAL at 08:34

## 2018-01-01 RX ADMIN — FERROUS SULFATE TAB 325 MG (65 MG ELEMENTAL FE) 325 MG: 325 (65 FE) TAB at 10:16

## 2018-01-01 RX ADMIN — DOCUSATE SODIUM 100 MG: 100 CAPSULE, LIQUID FILLED ORAL at 09:54

## 2018-01-01 RX ADMIN — ALBUMIN HUMAN 12.5 G: 0.25 SOLUTION INTRAVENOUS at 10:47

## 2018-01-01 RX ADMIN — SODIUM CHLORIDE 125 ML/HR: 0.9 INJECTION, SOLUTION INTRAVENOUS at 10:58

## 2018-01-01 RX ADMIN — ALBUTEROL SULFATE 2 PUFF: 90 AEROSOL, METERED RESPIRATORY (INHALATION) at 09:25

## 2018-01-01 RX ADMIN — Medication 400 MG: at 17:50

## 2018-01-01 RX ADMIN — GABAPENTIN 300 MG: 300 CAPSULE ORAL at 21:04

## 2018-01-01 RX ADMIN — POTASSIUM CHLORIDE 40 MEQ: 1500 TABLET, EXTENDED RELEASE ORAL at 08:54

## 2018-01-01 RX ADMIN — CLONAZEPAM 0.5 MG: 0.5 TABLET ORAL at 09:23

## 2018-01-01 RX ADMIN — OXYCODONE HYDROCHLORIDE 5 MG: 5 TABLET ORAL at 17:15

## 2018-01-01 RX ADMIN — OXYCODONE HYDROCHLORIDE AND ACETAMINOPHEN 1 TABLET: 5; 325 TABLET ORAL at 05:19

## 2018-01-01 RX ADMIN — Medication 400 MG: at 10:46

## 2018-01-01 RX ADMIN — OXYCODONE HYDROCHLORIDE 5 MG: 5 TABLET ORAL at 12:03

## 2018-01-01 RX ADMIN — FERROUS SULFATE TAB 325 MG (65 MG ELEMENTAL FE) 325 MG: 325 (65 FE) TAB at 09:54

## 2018-01-01 RX ADMIN — LIDOCAINE 1 PATCH: 50 PATCH CUTANEOUS at 09:55

## 2018-01-01 RX ADMIN — Medication 400 MG: at 17:27

## 2018-01-01 RX ADMIN — QUETIAPINE FUMARATE 50 MG: 25 TABLET ORAL at 21:01

## 2018-01-01 RX ADMIN — FOLIC ACID 1 MG: 1 TABLET ORAL at 09:54

## 2018-01-01 RX ADMIN — PANTOPRAZOLE SODIUM 40 MG: 40 TABLET, DELAYED RELEASE ORAL at 05:55

## 2018-01-01 RX ADMIN — ALBUMIN HUMAN 12.5 G: 0.25 SOLUTION INTRAVENOUS at 09:31

## 2018-01-01 RX ADMIN — ALBUTEROL SULFATE 2 PUFF: 90 AEROSOL, METERED RESPIRATORY (INHALATION) at 20:08

## 2018-01-01 RX ADMIN — OXYCODONE HYDROCHLORIDE AND ACETAMINOPHEN 1 TABLET: 5; 325 TABLET ORAL at 14:13

## 2018-01-01 RX ADMIN — OXYCODONE HYDROCHLORIDE AND ACETAMINOPHEN 1 TABLET: 5; 325 TABLET ORAL at 12:32

## 2018-01-01 RX ADMIN — ENOXAPARIN SODIUM 40 MG: 100 INJECTION SUBCUTANEOUS at 09:23

## 2018-01-01 RX ADMIN — FOLIC ACID 1 MG: 1 TABLET ORAL at 09:18

## 2018-01-01 RX ADMIN — OXYCODONE HYDROCHLORIDE 5 MG: 5 TABLET ORAL at 17:51

## 2018-01-01 RX ADMIN — TORSEMIDE 40 MG: 20 TABLET ORAL at 09:26

## 2018-01-01 RX ADMIN — LIDOCAINE 1 PATCH: 50 PATCH CUTANEOUS at 16:01

## 2018-01-01 RX ADMIN — ALBUTEROL SULFATE 2 PUFF: 90 AEROSOL, METERED RESPIRATORY (INHALATION) at 16:28

## 2018-01-01 RX ADMIN — GABAPENTIN 300 MG: 300 CAPSULE ORAL at 08:58

## 2018-01-01 RX ADMIN — ALBUTEROL SULFATE 2 PUFF: 90 AEROSOL, METERED RESPIRATORY (INHALATION) at 20:17

## 2018-01-01 RX ADMIN — SODIUM CHLORIDE 125 ML/HR: 0.9 INJECTION, SOLUTION INTRAVENOUS at 21:46

## 2018-01-01 RX ADMIN — Medication 400 MG: at 09:28

## 2018-01-01 RX ADMIN — GABAPENTIN 300 MG: 300 CAPSULE ORAL at 09:45

## 2018-01-01 RX ADMIN — CLONAZEPAM 0.5 MG: 0.5 TABLET ORAL at 09:18

## 2018-01-01 RX ADMIN — GABAPENTIN 300 MG: 300 CAPSULE ORAL at 16:43

## 2018-01-01 RX ADMIN — NYSTATIN: 100000 POWDER TOPICAL at 17:25

## 2018-01-01 RX ADMIN — ALBUTEROL SULFATE 2 PUFF: 90 AEROSOL, METERED RESPIRATORY (INHALATION) at 03:09

## 2018-01-01 RX ADMIN — NYSTATIN: 100000 POWDER TOPICAL at 18:02

## 2018-01-01 RX ADMIN — LORAZEPAM 1 MG: 1 TABLET ORAL at 02:50

## 2018-01-01 RX ADMIN — SODIUM CHLORIDE 125 ML/HR: 0.9 INJECTION, SOLUTION INTRAVENOUS at 08:34

## 2018-01-01 RX ADMIN — CLONAZEPAM 0.5 MG: 0.5 TABLET ORAL at 16:15

## 2018-01-01 RX ADMIN — GABAPENTIN 300 MG: 300 CAPSULE ORAL at 16:08

## 2018-01-01 RX ADMIN — ALBUTEROL SULFATE 2 PUFF: 90 AEROSOL, METERED RESPIRATORY (INHALATION) at 09:31

## 2018-01-01 RX ADMIN — NYSTATIN: 100000 POWDER TOPICAL at 09:37

## 2018-01-01 RX ADMIN — ENOXAPARIN SODIUM 40 MG: 100 INJECTION SUBCUTANEOUS at 10:15

## 2018-01-01 RX ADMIN — KETOROLAC TROMETHAMINE 30 MG: 30 INJECTION, SOLUTION INTRAMUSCULAR at 17:07

## 2018-01-01 RX ADMIN — FERROUS SULFATE TAB 325 MG (65 MG ELEMENTAL FE) 325 MG: 325 (65 FE) TAB at 09:13

## 2018-01-01 RX ADMIN — CYANOCOBALAMIN TAB 500 MCG 1000 MCG: 500 TAB at 08:35

## 2018-01-01 RX ADMIN — POTASSIUM CHLORIDE 40 MEQ: 1500 TABLET, EXTENDED RELEASE ORAL at 08:37

## 2018-01-01 RX ADMIN — QUETIAPINE FUMARATE 150 MG: 100 TABLET ORAL at 21:09

## 2018-01-01 RX ADMIN — OXYCODONE HYDROCHLORIDE 5 MG: 5 TABLET ORAL at 18:56

## 2018-01-01 RX ADMIN — SODIUM CHLORIDE 125 ML/HR: 0.9 INJECTION, SOLUTION INTRAVENOUS at 03:49

## 2018-01-01 RX ADMIN — GABAPENTIN 300 MG: 300 CAPSULE ORAL at 20:19

## 2018-01-01 RX ADMIN — OXYCODONE HYDROCHLORIDE 5 MG: 5 TABLET ORAL at 04:45

## 2018-01-01 RX ADMIN — POTASSIUM CHLORIDE 40 MEQ: 20 SOLUTION ORAL at 15:57

## 2018-01-01 RX ADMIN — QUETIAPINE FUMARATE 50 MG: 25 TABLET ORAL at 22:43

## 2018-01-01 RX ADMIN — NYSTATIN: 100000 POWDER TOPICAL at 09:00

## 2018-01-01 RX ADMIN — OXYCODONE HYDROCHLORIDE 5 MG: 5 TABLET ORAL at 03:13

## 2018-01-01 RX ADMIN — LORAZEPAM 1 MG: 1 TABLET ORAL at 18:36

## 2018-01-01 RX ADMIN — DOCUSATE SODIUM 100 MG: 100 CAPSULE, LIQUID FILLED ORAL at 08:54

## 2018-01-01 RX ADMIN — ENOXAPARIN SODIUM 40 MG: 100 INJECTION SUBCUTANEOUS at 08:53

## 2018-01-01 RX ADMIN — FERROUS SULFATE TAB 325 MG (65 MG ELEMENTAL FE) 325 MG: 325 (65 FE) TAB at 09:47

## 2018-01-01 RX ADMIN — GABAPENTIN 300 MG: 300 CAPSULE ORAL at 16:22

## 2018-01-01 RX ADMIN — ALBUMIN HUMAN 12.5 G: 0.25 SOLUTION INTRAVENOUS at 17:54

## 2018-01-01 RX ADMIN — OXYCODONE HYDROCHLORIDE AND ACETAMINOPHEN 1 TABLET: 5; 325 TABLET ORAL at 13:09

## 2018-01-01 RX ADMIN — ENOXAPARIN SODIUM 40 MG: 100 INJECTION SUBCUTANEOUS at 10:46

## 2018-01-01 RX ADMIN — OXYCODONE HYDROCHLORIDE 5 MG: 5 TABLET ORAL at 16:14

## 2018-01-01 RX ADMIN — ALBUMIN HUMAN 25 G: 0.25 SOLUTION INTRAVENOUS at 09:23

## 2018-01-01 RX ADMIN — OXYCODONE HYDROCHLORIDE 5 MG: 5 TABLET ORAL at 21:06

## 2018-01-01 RX ADMIN — NYSTATIN: 100000 POWDER TOPICAL at 17:27

## 2018-01-01 RX ADMIN — Medication 400 MG: at 09:47

## 2018-01-01 RX ADMIN — LORAZEPAM 1 MG: 1 TABLET ORAL at 16:22

## 2018-01-01 RX ADMIN — MAGNESIUM SULFATE HEPTAHYDRATE 2 G: 40 INJECTION, SOLUTION INTRAVENOUS at 15:59

## 2018-01-01 RX ADMIN — GABAPENTIN 300 MG: 300 CAPSULE ORAL at 20:22

## 2018-01-01 RX ADMIN — OXYCODONE HYDROCHLORIDE 5 MG: 5 TABLET ORAL at 13:59

## 2018-01-01 RX ADMIN — GABAPENTIN 300 MG: 300 CAPSULE ORAL at 20:58

## 2018-01-01 RX ADMIN — FOLIC ACID 1 MG: 1 TABLET ORAL at 08:57

## 2018-01-01 RX ADMIN — CLONAZEPAM 0.5 MG: 0.5 TABLET ORAL at 10:16

## 2018-01-01 RX ADMIN — LORAZEPAM 1 MG: 1 TABLET ORAL at 15:32

## 2018-01-01 RX ADMIN — ALBUMIN HUMAN 12.5 G: 0.25 SOLUTION INTRAVENOUS at 18:30

## 2018-01-01 RX ADMIN — Medication 400 MG: at 08:35

## 2018-01-01 RX ADMIN — LORAZEPAM 1 MG: 1 TABLET ORAL at 16:56

## 2018-01-01 RX ADMIN — OXYCODONE HYDROCHLORIDE 5 MG: 5 TABLET ORAL at 09:26

## 2018-01-01 RX ADMIN — LORAZEPAM 1 MG: 1 TABLET ORAL at 16:24

## 2018-01-01 RX ADMIN — LORAZEPAM 1 MG: 1 TABLET ORAL at 22:35

## 2018-01-01 RX ADMIN — ALBUTEROL SULFATE 2 PUFF: 90 AEROSOL, METERED RESPIRATORY (INHALATION) at 07:41

## 2018-01-01 RX ADMIN — OXYCODONE HYDROCHLORIDE 5 MG: 5 TABLET ORAL at 05:16

## 2018-01-01 RX ADMIN — ALBUMIN HUMAN 12.5 G: 0.25 SOLUTION INTRAVENOUS at 17:03

## 2018-01-01 RX ADMIN — CYANOCOBALAMIN TAB 500 MCG 1000 MCG: 500 TAB at 09:27

## 2018-01-01 RX ADMIN — OXYCODONE HYDROCHLORIDE 5 MG: 5 TABLET ORAL at 21:43

## 2018-01-01 RX ADMIN — QUETIAPINE FUMARATE 50 MG: 25 TABLET ORAL at 21:06

## 2018-01-01 RX ADMIN — AMITRIPTYLINE HYDROCHLORIDE 10 MG: 10 TABLET, FILM COATED ORAL at 21:14

## 2018-01-01 RX ADMIN — DOCUSATE SODIUM 100 MG: 100 CAPSULE, LIQUID FILLED ORAL at 17:50

## 2018-01-01 RX ADMIN — ONDANSETRON 4 MG: 2 INJECTION INTRAMUSCULAR; INTRAVENOUS at 09:49

## 2018-01-01 RX ADMIN — CYANOCOBALAMIN TAB 500 MCG 1000 MCG: 500 TAB at 09:18

## 2018-01-01 RX ADMIN — FERROUS SULFATE TAB 325 MG (65 MG ELEMENTAL FE) 325 MG: 325 (65 FE) TAB at 08:57

## 2018-01-01 RX ADMIN — ALBUTEROL SULFATE 2 PUFF: 90 AEROSOL, METERED RESPIRATORY (INHALATION) at 09:04

## 2018-01-01 RX ADMIN — OXYCODONE HYDROCHLORIDE 5 MG: 5 TABLET ORAL at 12:16

## 2018-01-01 RX ADMIN — ALBUMIN HUMAN 25 G: 0.25 SOLUTION INTRAVENOUS at 10:29

## 2018-01-01 RX ADMIN — IOHEXOL 100 ML: 350 INJECTION, SOLUTION INTRAVENOUS at 15:47

## 2018-01-01 RX ADMIN — OXYCODONE HYDROCHLORIDE 5 MG: 5 TABLET ORAL at 05:41

## 2018-01-01 RX ADMIN — LORAZEPAM 1 MG: 1 TABLET ORAL at 01:56

## 2018-01-01 RX ADMIN — TORSEMIDE 40 MG: 20 TABLET ORAL at 17:07

## 2018-01-01 RX ADMIN — POTASSIUM CHLORIDE 40 MEQ: 1500 TABLET, EXTENDED RELEASE ORAL at 23:56

## 2018-01-01 RX ADMIN — LORAZEPAM 1 MG: 1 TABLET ORAL at 05:04

## 2018-01-01 RX ADMIN — TORSEMIDE 40 MG: 20 TABLET ORAL at 16:08

## 2018-01-01 RX ADMIN — FOLIC ACID 1 MG: 1 TABLET ORAL at 08:54

## 2018-01-01 RX ADMIN — GABAPENTIN 300 MG: 300 CAPSULE ORAL at 10:16

## 2018-01-01 RX ADMIN — GABAPENTIN 300 MG: 300 CAPSULE ORAL at 09:55

## 2018-01-01 RX ADMIN — ALBUTEROL SULFATE 2 PUFF: 90 AEROSOL, METERED RESPIRATORY (INHALATION) at 13:52

## 2018-01-01 RX ADMIN — ALBUMIN HUMAN 12.5 G: 0.25 SOLUTION INTRAVENOUS at 09:45

## 2018-01-01 RX ADMIN — POTASSIUM CHLORIDE 40 MEQ: 1500 TABLET, EXTENDED RELEASE ORAL at 15:32

## 2018-01-01 RX ADMIN — CLONAZEPAM 0.5 MG: 0.5 TABLET ORAL at 17:34

## 2018-01-01 RX ADMIN — ONDANSETRON 4 MG: 2 INJECTION INTRAMUSCULAR; INTRAVENOUS at 22:06

## 2018-01-01 RX ADMIN — POTASSIUM CHLORIDE 40 MEQ: 1500 TABLET, EXTENDED RELEASE ORAL at 16:24

## 2018-01-01 RX ADMIN — LACTULOSE 20 G: 20 SOLUTION ORAL at 08:54

## 2018-01-01 RX ADMIN — POTASSIUM CHLORIDE 40 MEQ: 1500 TABLET, EXTENDED RELEASE ORAL at 09:28

## 2018-01-01 RX ADMIN — FERROUS SULFATE TAB 325 MG (65 MG ELEMENTAL FE) 325 MG: 325 (65 FE) TAB at 08:35

## 2018-01-01 RX ADMIN — LORAZEPAM 1 MG: 1 TABLET ORAL at 20:22

## 2018-01-01 RX ADMIN — AMITRIPTYLINE HYDROCHLORIDE 10 MG: 10 TABLET, FILM COATED ORAL at 21:09

## 2018-01-01 RX ADMIN — ALBUTEROL SULFATE 2 PUFF: 90 AEROSOL, METERED RESPIRATORY (INHALATION) at 02:14

## 2018-01-01 RX ADMIN — FERROUS SULFATE TAB 325 MG (65 MG ELEMENTAL FE) 325 MG: 325 (65 FE) TAB at 09:19

## 2018-01-01 RX ADMIN — ALBUTEROL SULFATE 2 PUFF: 90 AEROSOL, METERED RESPIRATORY (INHALATION) at 19:01

## 2018-01-01 RX ADMIN — DOCUSATE SODIUM 100 MG: 100 CAPSULE, LIQUID FILLED ORAL at 09:23

## 2018-01-01 RX ADMIN — NYSTATIN: 100000 POWDER TOPICAL at 16:15

## 2018-01-01 RX ADMIN — LORAZEPAM 1 MG: 1 TABLET ORAL at 08:53

## 2018-01-01 RX ADMIN — ALBUTEROL SULFATE 2 PUFF: 90 AEROSOL, METERED RESPIRATORY (INHALATION) at 01:53

## 2018-01-01 RX ADMIN — OXYCODONE HYDROCHLORIDE AND ACETAMINOPHEN 1 TABLET: 5; 325 TABLET ORAL at 09:20

## 2018-01-01 RX ADMIN — OXYCODONE HYDROCHLORIDE 5 MG: 5 TABLET ORAL at 11:36

## 2018-01-01 RX ADMIN — ENOXAPARIN SODIUM 40 MG: 100 INJECTION SUBCUTANEOUS at 09:45

## 2018-01-01 RX ADMIN — QUETIAPINE FUMARATE 50 MG: 25 TABLET ORAL at 00:44

## 2018-01-01 RX ADMIN — KETOROLAC TROMETHAMINE 15 MG: 30 INJECTION, SOLUTION INTRAMUSCULAR at 21:59

## 2018-01-01 RX ADMIN — NYSTATIN: 100000 POWDER TOPICAL at 07:42

## 2018-01-01 RX ADMIN — TIZANIDINE 2 MG: 4 TABLET ORAL at 22:00

## 2018-01-01 RX ADMIN — FUROSEMIDE 80 MG: 40 TABLET ORAL at 08:34

## 2018-01-01 RX ADMIN — GABAPENTIN 300 MG: 300 CAPSULE ORAL at 17:26

## 2018-01-01 RX ADMIN — OXYCODONE HYDROCHLORIDE 5 MG: 5 TABLET ORAL at 12:39

## 2018-01-01 RX ADMIN — OXYCODONE HYDROCHLORIDE AND ACETAMINOPHEN 1 TABLET: 5; 325 TABLET ORAL at 21:44

## 2018-01-01 RX ADMIN — QUETIAPINE FUMARATE 50 MG: 25 TABLET ORAL at 21:44

## 2018-01-01 RX ADMIN — OXYCODONE HYDROCHLORIDE 5 MG: 5 TABLET ORAL at 02:45

## 2018-01-01 RX ADMIN — OXYCODONE HYDROCHLORIDE 5 MG: 5 TABLET ORAL at 16:23

## 2018-01-01 RX ADMIN — PANTOPRAZOLE SODIUM 40 MG: 40 TABLET, DELAYED RELEASE ORAL at 05:02

## 2018-01-01 RX ADMIN — FERROUS SULFATE TAB 325 MG (65 MG ELEMENTAL FE) 325 MG: 325 (65 FE) TAB at 08:37

## 2018-01-01 RX ADMIN — GABAPENTIN 300 MG: 300 CAPSULE ORAL at 17:07

## 2018-01-01 RX ADMIN — ALBUTEROL SULFATE 2 PUFF: 90 AEROSOL, METERED RESPIRATORY (INHALATION) at 03:07

## 2018-01-01 RX ADMIN — QUETIAPINE FUMARATE 150 MG: 100 TABLET ORAL at 21:14

## 2018-01-01 RX ADMIN — CYANOCOBALAMIN TAB 500 MCG 1000 MCG: 500 TAB at 10:17

## 2018-01-01 RX ADMIN — ACETAMINOPHEN 650 MG: 325 TABLET ORAL at 23:18

## 2018-01-01 RX ADMIN — POTASSIUM CHLORIDE 40 MEQ: 1500 TABLET, EXTENDED RELEASE ORAL at 20:41

## 2018-01-01 RX ADMIN — POTASSIUM CHLORIDE 40 MEQ: 1500 TABLET, EXTENDED RELEASE ORAL at 10:16

## 2018-01-01 RX ADMIN — ALBUMIN HUMAN 12.5 G: 0.25 SOLUTION INTRAVENOUS at 12:12

## 2018-01-01 RX ADMIN — TIZANIDINE 2 MG: 4 TABLET ORAL at 17:07

## 2018-01-01 RX ADMIN — ALBUMIN HUMAN 25 G: 0.25 SOLUTION INTRAVENOUS at 17:22

## 2018-01-01 RX ADMIN — OXYCODONE HYDROCHLORIDE 5 MG: 5 TABLET ORAL at 10:14

## 2018-01-01 RX ADMIN — ALBUTEROL SULFATE 2 PUFF: 90 AEROSOL, METERED RESPIRATORY (INHALATION) at 14:00

## 2018-01-01 RX ADMIN — Medication 400 MG: at 17:58

## 2018-01-01 RX ADMIN — ALBUMIN HUMAN 25 G: 0.25 SOLUTION INTRAVENOUS at 17:58

## 2018-01-01 RX ADMIN — PANTOPRAZOLE SODIUM 40 MG: 40 TABLET, DELAYED RELEASE ORAL at 05:35

## 2018-01-01 RX ADMIN — LORAZEPAM 1 MG: 1 TABLET ORAL at 20:55

## 2018-01-01 RX ADMIN — SODIUM CHLORIDE 125 ML/HR: 0.9 INJECTION, SOLUTION INTRAVENOUS at 20:46

## 2018-01-01 RX ADMIN — HYDROMORPHONE HYDROCHLORIDE 1 MG: 1 INJECTION, SOLUTION INTRAMUSCULAR; INTRAVENOUS; SUBCUTANEOUS at 14:23

## 2018-01-01 RX ADMIN — TIZANIDINE 2 MG: 4 TABLET ORAL at 09:29

## 2018-01-01 RX ADMIN — KETOROLAC TROMETHAMINE 30 MG: 30 INJECTION, SOLUTION INTRAMUSCULAR at 16:45

## 2018-01-01 RX ADMIN — Medication 400 MG: at 18:30

## 2018-01-01 RX ADMIN — OXYCODONE HYDROCHLORIDE 5 MG: 5 TABLET ORAL at 18:20

## 2018-01-01 RX ADMIN — POTASSIUM CHLORIDE 20 MEQ: 200 INJECTION, SOLUTION INTRAVENOUS at 14:20

## 2018-01-01 RX ADMIN — ENOXAPARIN SODIUM 40 MG: 100 INJECTION SUBCUTANEOUS at 08:35

## 2018-04-27 ENCOUNTER — APPOINTMENT (OUTPATIENT)
Dept: WOUND CARE | Facility: HOSPITAL | Age: 43
End: 2018-04-27
Payer: COMMERCIAL

## 2018-04-27 PROCEDURE — 99203 OFFICE O/P NEW LOW 30 MIN: CPT | Performed by: PREVENTIVE MEDICINE

## 2018-04-27 PROCEDURE — 97597 DBRDMT OPN WND 1ST 20 CM/<: CPT | Performed by: PREVENTIVE MEDICINE

## 2018-05-29 PROBLEM — R74.8 ELEVATED CPK: Status: ACTIVE | Noted: 2018-01-01

## 2018-05-29 PROBLEM — E83.42 HYPOMAGNESEMIA: Status: ACTIVE | Noted: 2018-01-01

## 2018-05-29 PROBLEM — R29.6 RECURRENT FALLS WHILE WALKING: Status: ACTIVE | Noted: 2018-01-01

## 2018-05-29 PROBLEM — E87.6 ACUTE HYPOKALEMIA: Status: ACTIVE | Noted: 2018-01-01

## 2018-05-29 PROBLEM — E72.20 HYPERAMMONEMIA (HCC): Status: ACTIVE | Noted: 2018-01-01

## 2018-05-29 NOTE — ED NOTES
Pt stating again that she refuses to stay  MD Tori Mclean and MICHAEL York in room to speak with Pt  Pt still adamant about not staying  Stating "I need my special medication at home"  Refused to share what "special medication" she takes  Only states it is for pain        Han Samuels, RN  05/29/18 6152
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
## 2018-05-29 NOTE — ED NOTES
Pt's significant other at bedside  Pt previously stated we could share information with him, not retracting statement and stated "I don't want him to know no more"  Significant other is upset and asks why  Pt stating over and over "I wanna go home  I won't die if I go home   God is on my side "    Pt informed of consequences of signing VIDA Cavazos, SCARLET  05/29/18 1800

## 2018-05-29 NOTE — ED NOTES
Pt stating over and over "I don't want to stay here  I have a boyfriend  I want to be with him   I have to feed my dog and my dog doesn't like my boyfriend "     Socorro Donis RN  05/29/18 9634

## 2018-05-29 NOTE — ED NOTES
Pt now stating she is diagnosed with bipolar disorder with schizophrenia   Is supposed to be taking her medications but is unsure if what the names are       Derrell Self RN  05/29/18 8372

## 2018-05-29 NOTE — ED NOTES
Pt stood up with assistance from 3 staff members  Tolerated poorly into bariatric wheelchair   Wheeled out to car with 3 techs and significant other     Fior Smith RN  05/29/18 0034

## 2018-05-29 NOTE — ED NOTES
PA Rombouts at bedside with Pt in regards to scan results        Akil Greenfield, SCARLET  05/29/18 5123

## 2018-05-29 NOTE — ED NOTES
Pt crying, stating she doesn't want to stay  Pt's  "don't Joseph Gibson let me come home  He wants me to stay"  Pt is crying in the room  Pt crying and stating "call him for me"  Informed Pt that I will not be speaking to her   Pt crying and attempted to throw phone  Informed her that she cannot act this way and she needs to calm down  Allowed time for Pt to calm down before I taught her how to dial on the hospital phone again        Maribel Mcmanus RN  05/29/18 9554

## 2018-05-29 NOTE — DISCHARGE INSTRUCTIONS
Hypokalemia   WHAT YOU NEED TO KNOW: WHEN YOU GET HOME, TAKE TWO EXTRA POTASSIUM PILLS IN ADDITION TO YOUR REGULAR DOSES  Hypokalemia is a low level of potassium in your blood  Potassium helps control how your muscles, heart, and digestive system work  Hypokalemia occurs when your body loses too much potassium or does not absorb enough from food  DISCHARGE INSTRUCTIONS:   Seek care immediately if:   · You cannot move your arm or leg  · You have a fast or irregular heartbeat  · You are too tired or weak to stand up  Contact your healthcare provider if:   · You are vomiting, or you have diarrhea  · You have numbness or tingling in your arms or legs  · Your symptoms do not go away or they get worse  · You have questions or concerns about your condition or care  Medicines:   · Potassium  will be given to bring your potassium levels back to normal     · Take your medicine as directed  Contact your healthcare provider if you think your medicine is not helping or if you have side effects  Tell him of her if you are allergic to any medicine  Keep a list of the medicines, vitamins, and herbs you take  Include the amounts, and when and why you take them  Bring the list or the pill bottles to follow-up visits  Carry your medicine list with you in case of an emergency  Eat foods that are high in potassium:  Foods that are high in potassium include bananas, oranges, tomatoes, potatoes, and avocado  Powell beans, turkey, salmon, lean beef, yogurt, and milk are also high in potassium  Ask your healthcare provider or dietitian for more information about foods that are high in potassium  Follow up with your healthcare provider as directed:  Write down your questions so you remember to ask them during your visits  © 2017 [employer] Information is for End User's use only and may not be sold, redistributed or otherwise used for commercial purposes   All illustrations and images included in CareNotes® are the copyrighted property of A D A M , Inc  or Orlando Gomez  The above information is an  only  It is not intended as medical advice for individual conditions or treatments  Talk to your doctor, nurse or pharmacist before following any medical regimen to see if it is safe and effective for you

## 2018-05-29 NOTE — ED NOTES
Pt given phone and phone number for her significant other  Pt states she will call him when he is off work at [employer]  Pt now stating she will stay until scans are back  "If something is broken, I'll stay cause I can't walk   But if it's not, I'm going home "     Jose Maria Daley, RN  05/29/18 9438

## 2018-05-29 NOTE — ED ATTENDING ATTESTATION
Kelsey Nicholas MD, saw and evaluated the patient  I have discussed the patient with the resident/non-physician practitioner and agree with the resident's/non-physician practitioner's findings, Plan of Care, and MDM as documented in the resident's/non-physician practitioner's note, except where noted  All available labs and Radiology studies were reviewed  At this point I agree with the current assessment done in the Emergency Department  I have conducted an independent evaluation of this patient a history and physical is as follows:      Critical Care Time  CritCare Time    Procedures     Pt  Tara Andres yest  , 911 was called and they came and put her back in bed  Same thing happened again  Pt  Doesn't remember if she hit her head  No LOC, +headaches, no n/v, no change in behavior  Pt  Lives at [address] her boyfriend -she has to use a walker and sometimes doesn't    c/o R hip, R ankle pain  She states that she has pain "everywhere"      Pt  Has frequent falls  Pt  Is obese, PERRL, no facial tenderness, no cspine tenderness, no chest tenderness, mild RLQ abd  Tenderness, R hip tenderness, decreased ROM secondary to pain   +R ankle tenderness, FROM, +n/v intact      Pt  Refused to stay in the hospital for admission  We spoke to her /boyfriend and tried to convince him to convince her to stay for admission  He tried talking her into it but pt  Refused  She understands risks of leaving AMA, including MI and death  She is medically competent to make her own decisions    She is not suicidal/homicidal

## 2018-05-29 NOTE — ED NOTES
Pt inconsistent with whether she is compliant with home meds  First stating she took all her medications today, then said it was yesterday  Then said some meds weren't taken the past week  After informing Pt of her low potassium and whether she is taking her K tablets, Pt states "I don't like taking them because its so big "    States she has not taking her lactulose because "it's liquid and I don't like it"        [place], SCARLET  05/29/18 4760

## 2018-05-30 NOTE — OCCUPATIONAL THERAPY NOTE
[address]  Evaluation     Patient Name: Stiven Head  OCLBL'Q Date: 5/30/2018  Problem List  Patient Active Problem List   Diagnosis    Ambulatory dysfunction    Recurrent falls    Chronic pain syndrome    Morbid obesity due to excess calories ([address]    Hypokalemia    Anasarca    Chest pain    Other bipolar disorder ([address]    Noncompliance with medication regimen    Alcoholic liver disease ([address] )    RUQ pain    Febrile illness    Coag negative Staphylococcus bacteremia    Anemia    Recurrent falls while walking    Hypomagnesemia    Acute hypokalemia    Elevated CPK    Hyperammonemia (HCC)     Past Medical History  Past Medical History:   Diagnosis Date    Arthritis     Fibromyalgia     Herniated cervical disc     Hypertension     Psychiatric disorder     Sarcoidosis     Scoliosis      Past Surgical History  Past Surgical History:   Procedure Laterality Date    GASTRIC BYPASS      HERNIA REPAIR      TOTAL ABDOMINAL HYSTERECTOMY W/ BILATERAL SALPINGOOPHORECTOMY      TUBAL LIGATION           05/30/18 0228   Note Type   Note type Eval only   Restrictions/Precautions   Weight Bearing Precautions Per Order No   Other Precautions Chair Alarm; Bed Alarm; Fall Risk;Pain; Impulsive   Pain Assessment   Pain Assessment FLACC   Pain Score Worst Possible Pain   Pain Type Acute pain   Pain Location Back;Leg;Generalized   Pain Orientation Bilateral   Hospital Pain Intervention(s) Repositioned; Ambulation/increased activity; Emotional support   Response to Interventions Tolerated   Pain Rating: FLACC (Rest) - Face 1   Pain Rating: FLACC (Rest) - Legs 1   Pain Rating: FLACC (Rest) - Activity 1   Pain Rating: FLACC (Rest) - Cry 1   Pain Rating: FLACC (Rest) - Consolability 1   Score: FLACC (Rest) 5   Pain Rating: FLACC (Activity) - Face 1   Pain Rating: FLACC (Activity) - Legs 1   Pain Rating: FLACC (Activity) - Activity 1   Pain Rating: FLACC (Activity) - Cry 1   Pain Rating: FLACC (Activity) - Consolability 1   Score: FLACC (Activity) 5   Home Living   Type of Home House   Home Layout Multi-level;Bed/bath upstairs;Stairs to enter with rails  (5 DUTCH; FOS to bed/bath located on 2nd floor or bath in basem)   Bathroom Shower/Tub Tub/shower unit   [address]   Additional Comments Pt lives with significant other in a multi-level home w/ 5 DUTCH and FOS to bed/bath located on 2nd floor  Pt reports significant other works 6AM-5PM daily, therefore pt (+) home alone  In addition, pt reports HHA 3 days/wk for 2 hrs/visit to assist w/ household tasks  Prior Function   Level of [place] Independent with ADLs and functional mobility; Needs assistance with IADLs   Lives With Significant other   Receives Help From Family; Other (Comment)  (Significant other)   ADL Assistance Independent   IADLs Needs assistance   Falls in the last 6 months 1 to 4  (2 per pt report)   Vocational On disability   Comments At baseline, pt was I w/ ADLs and functional mobility/transfers w/ use of RW, required assist w/ IADLs, and reports 2 falls PTA  Lifestyle   Autonomy At baseline, pt was I w/ ADLs and functional mobility/transfers w/ use of RW, required assist w/ IADLs, and reports 2 falls PTA  Reciprocal Relationships Lives w/ significant other   Service to Others On disability   Intrinsic Gratification Watching TV   Subjective   Subjective "I just want to go home"   ADL   Eating Assistance 7  Independent   Grooming Assistance 5  Supervision/Setup   UB Bathing Assistance 5  Supervision/Setup   LB Bathing Assistance 2  Maximal Assistance   UB Dressing Assistance 5  Supervision/Setup   LB Dressing Assistance 2  Maximal [address]  3  Moderate Assistance   Bed Mobility   Supine to Sit 2  Maximal assistance   Additional items Bedrails; Increased time required;Verbal cues;LE management;Assist x 2 Sit to Supine 2  Maximal assistance   Additional items Bedrails; Increased time required;Verbal cues;LE management;Assist x 2   Additional Comments pt lying supine at end of session w/ bed alarm activated  Call bell and phone within reach  All needs met and pt reports no further questions for OT at this time   Transfers   Sit to Stand 2  Maximal assistance   Additional items Assist x 2;Bedrails; Increased time required;Verbal cues   Stand to Sit 2  Maximal assistance   Additional items Assist x 2; Increased time required;Verbal cues   Stand pivot 2  Maximal assistance   Additional items Assist x 2;Armrests; Increased time required;Verbal cues   Toilet transfer 2  Maximal assistance   Additional items Assist x 2; Increased time required;Verbal cues; Commode   Additional Comments Cues for safety, technique, and hand placement during transfers 2* decreased safety awareness demosntrated and increased impulsivity   Functional Mobility   Functional Mobility 2  Maximal assistance   Additional Comments Assist x2   Additional items Rolling walker   Balance   Static Sitting Fair +   Dynamic Sitting Fair -   Static Standing Poor +   Dynamic Standing Poor   Ambulatory Poor -   Activity Tolerance   Activity Tolerance Patient limited by fatigue;Patient limited by pain   Nurse Made Aware Pt able to be seen per SCARLET Morales   RUE Assessment   RUE Assessment WFL  (3+/5)   LUE Assessment   LUE Assessment WFL  (3+/5)   Hand Function   Gross Motor Coordination Functional   Fine Motor Coordination Functional   Sensation   Light Touch No apparent deficits   Sharp/Dull No apparent deficits   Proprioception   Proprioception No apparent deficits   Vision - Complex Assessment   Ocular Range of Motion WFL   Acuity Able to read clock/calendar on wall without difficulty   Perception   Inattention/Neglect Appears intact   Cognition   Overall Cognitive Status [facility]   Arousal/Participation Alert   Attention Attends with cues to redirect   Orientation Level Oriented X4   Memory Within functional limits   Following Commands Follows one step commands with increased time or repetition   Comments Redirection to task required throughout session   Assessment   Limitation Decreased ADL status; Decreased endurance;Decreased Safe judgement during ADL;Decreased self-care trans;Decreased high-level ADLs   Prognosis Fair   Assessment Pt is a 39 y o  female seen for OT evaluation s/p adm to [facility] on 5/29/2018 w/ Recurrent falls while walking   CT of head, C-spine, T/L spines, chest/abd/pelvis without acute abnormality  Also had xrays of the R hip and R ankle which were without fracture  Comorbidities affecting pts functional performance include a significant PMH of arthritis, fibromyalgia, herniated cervical disc, HTN, psychiatric disorder, sarcoidosis, and scoliosis  Pt with active OT orders and activity orders for Up with assistance  Pt lives with significant other in a multi-level home w/ 5 DUTCH and FOS to bed/bath located on 2nd floor  Pt reports significant other works 6AM-5PM daily, therefore pt (+) home alone  In addition, pt reports HHA 3 days/wk for 2 hrs/visit to assist w/ household tasks  At baseline, pt was I w/ ADLs and functional mobility/transfers w/ use of RW, required assist w/ IADLs, and reports 2 falls PTA  Upon evaluation, pt currently requires Max A for LB ADLs, Supervision for UB ADLs, Mod A for toileting, Max A of 2 for bed mobility, and Max A of 2 for functional mobility/transfers 2* the following deficits impacting occupational performance: weakness, decreased strength, decreased balance, decreased tolerance, decreased safety awareness and increased pain   These impairments, as well at pts steps to enter environment, limited home support, difficulty performing ADLS, difficulty performing IADLS  and limited insight into deficits  limit pts ability to safely engage in all baseline areas of occupation, including grooming, bathing, dressing, toileting, functional mobility/transfers, community mobility, house maintenance, social participation  and leisure activities   Pt scored overall 35/100 on the Barthel Index  Based on the aforementioned OT evaluation, functional performance deficits, and assessments, pt has been identified as a high complexity evaluation  Pt to continue to benefit from continued acute OT services during hospital stay to address defined deficits and to maximize level of functional independence in the following Occupational Performance areas: grooming, bathing/shower, toilet hygiene, dressing, socialization, health maintenance, functional mobility, community mobility, clothing management, household maintenance, social participation and transfers to common household surfaces  From OT standpoint, recommend STR upon D/C  OT will continue to follow pt 3-5x/wk to address the following goals to  w/in 10-14 days:   Goals   Patient Goals to go home   LTG Time Frame 10-   Long Term Goal Please refer to LTGs listed below   Plan   Treatment Interventions ADL retraining;Functional transfer training;UE strengthening/ROM; Endurance training;Patient/family training;Equipment evaluation/education; Compensatory technique education;Continued evaluation; Energy conservation; Activityengagement   Goal Expiration Date 18   Treatment Day 0   OT Frequency 3-5x/wk   Recommendation   OT Discharge Recommendation Short Term Rehab   OT - OK to Discharge (yes to STR, no to home 2* increased assist req   at this time)   Barthel Index   Feeding 10   Bathing 0   Grooming Score 0   Dressing Score 5   Bladder Score 0   Bowels Score 10   Toilet Use Score 5   Transfers (Bed/Chair) Score 5   Mobility (Level Surface) Score 0   Stairs Score 0   Barthel Index Score 35   Modified Alger Scale   Modified Sean Scale 4        GOALS    1) Pt will improve activity tolerance to G for min 45 min txment sessions    2) Pt will complete bed mobility at a Min A level w/ G balance/safety demonstrated    3) Pt will complete UB dressing/bathing w/ mod I using adaptive device and DME as needed    4) Pt will complete LB dressing/self care w/ min A using adaptive device and DME as needed    5) Pt will complete toileting w/ Supervision w/ G hygiene/thoroughness using DME as needed    6) Pt will improve functional transfers to Min A on/off all surfaces using DME as needed w/ G balance/safety     7) Pt will improve functional mobility during ADL/IADL/leisure tasks to Min A using DME as needed w/ G balance/safety     8) Pt will increase standing tolerance to 3-5 mins w/ F+ to increase safety and participation in functional activities (ie: self care routine)    9) Pt will engage in ongoing cognitive assessment w/ G participation w/ mod I to assist w/ safe d/c planning/recommendations    10) Pt will demonstrate G carryover of pt/caregiver education and training as appropriate w/ mod I w/o cues w/ good tolerance    11) Pt will demonstrate 100% carryover of energy conservation techniques w/ mod I t/o functional I/ADL/leisure tasks w/o cues s/p skilled education     12) Pt will increase UE strength by 1 MM grade to increase independence in ADLs and transfers      Daja Lowe, OTR/L    Daja Lowe, OTR/L

## 2018-05-30 NOTE — ED NOTES
SLIM provider aware of patients visual hallucination of little girl playing piano       Shivani Guerra RN  05/29/18 9896

## 2018-05-30 NOTE — PLAN OF CARE
Problem: OCCUPATIONAL THERAPY ADULT  Goal: Performs self-care activities at highest level of function for planned discharge setting  See evaluation for individualized goals  Treatment Interventions: ADL retraining, Functional transfer training, UE strengthening/ROM, Endurance training, Patient/family training, Equipment evaluation/education, Compensatory technique education, Continued evaluation, Energy conservation, Activityengagement          See flowsheet documentation for full assessment, interventions and recommendations  Limitation: Decreased ADL status, Decreased endurance, Decreased Safe judgement during ADL, Decreased self-care trans, Decreased high-level ADLs  Prognosis: Fair  Assessment: Pt is a 39 y o  female seen for OT evaluation s/p adm to [facility] on 5/29/2018 w/ Recurrent falls while walking   CT of head, C-spine, T/L spines, chest/abd/pelvis without acute abnormality  Also had xrays of the R hip and R ankle which were without fracture  Comorbidities affecting pts functional performance include a significant PMH of arthritis, fibromyalgia, herniated cervical disc, HTN, psychiatric disorder, sarcoidosis, and scoliosis  Pt with active OT orders and activity orders for Up with assistance  Pt lives with significant other in a multi-level home w/ 5 DUTCH and FOS to bed/bath located on 2nd floor  Pt reports significant other works 6AM-5PM daily, therefore pt (+) home alone  In addition, pt reports HHA 3 days/wk for 2 hrs/visit to assist w/ household tasks  At baseline, pt was I w/ ADLs and functional mobility/transfers w/ use of RW, required assist w/ IADLs, and reports 2 falls PTA   Upon evaluation, pt currently requires Max A for LB ADLs, Supervision for UB ADLs, Mod A for toileting, Max A of 2 for bed mobility, and Max A of 2 for functional mobility/transfers 2* the following deficits impacting occupational performance: weakness, decreased strength, decreased balance, decreased tolerance, decreased safety awareness and increased pain  These impairments, as well at pts steps to enter environment, limited home support, difficulty performing ADLS, difficulty performing IADLS  and limited insight into deficits  limit pts ability to safely engage in all baseline areas of occupation, including grooming, bathing, dressing, toileting, functional mobility/transfers, community mobility, house maintenance, social participation  and leisure activities   Pt scored overall 35/100 on the Barthel Index  Based on the aforementioned OT evaluation, functional performance deficits, and assessments, pt has been identified as a high complexity evaluation  Pt to continue to benefit from continued acute OT services during hospital stay to address defined deficits and to maximize level of functional independence in the following Occupational Performance areas: grooming, bathing/shower, toilet hygiene, dressing, socialization, health maintenance, functional mobility, community mobility, clothing management, household maintenance, social participation and transfers to common household surfaces  From OT standpoint, recommend STR upon D/C  OT will continue to follow pt 3-5x/wk to address the following goals to  w/in 10-14 days:     OT Discharge Recommendation: Short Term Rehab  OT - OK to Discharge:  (yes to STR, no to home 2* increased assist req   at this time)

## 2018-05-30 NOTE — ED NOTES
SLIM provider at [address] [place] [address], [address] [place] [address]  05/29/18 1174

## 2018-05-30 NOTE — ED NOTES
Patient continues to be tearful  Patient states, "Im in pain and you aren't doing anything for me " Patient reminded that she was just given IV pain medication   Patient states, "yeah you said that was an anti-inflammatory, what's that going to do for me?"      Inez Esqueda, RN  05/29/18 2209

## 2018-05-30 NOTE — CONSULTS
Psychiatric Evaluation - Behavioral Health       Assessment/Plan  Principal Problem:  F31 9 bipolar disorder not otherwise specified  F10 20 Alcohol Use DO NOS by history    PLAN:   1) patient was was already started on Elavil and Seroquel  2) this writer talked to patient about not leaving against medical advice  However this writer feels that patient is competent to make medical decisions for herself  Chief Complaint: "I fell twice the last 24 hr "    History of Present Illness: This is a 59-year-old female who return to ED for ambulatory dysfunction  Patient says that she went home after she was presented to ER she came to ER because she felt that they wanted her to stay however she went home because she wanted to stay home as she was home he me EMS was called the because she could not get to the stairs because of her recent fall and her weight but she fell again on her left arm and lost consciousness because of her new head injury she was brought back to the hospital and she was Re admitted  Psych consult was requested because of the history of bipolar disorder  Patient told this writer that she was on Elavil 10 mg and Seroquel and she wanted to be on those medications again this writer review her medical record and noticed that she is already started on those medications  Patient says that she is doing well psychiatrically she is stable she sleeping well eating well  This writer seen patient couple of times in last year and a half patient has history of bipolar disorder expected the extensive psychiatric problems  Patient also have history of alcohol abuse however patient is denying he drinking alcohol at this time  PAST PSYCH HISTORY:    Past Psychiatric History:   Diagnosis of Bipolar Do but non compliant with meidcation  Currently in treatment with no one  Substance Abuse History: In the past patient is middle drinking wine every day currently she is denying drinking      Family Psychiatric History:   Psychiatric Illness None Illness: None    Social History:  Education: 8th grade  Learning Disabilities: None  Marital history:    Living arrangement, social support: The patient lives in home with her   Occupational History: On disability  Functioning Relationships: Good support system  Other Pertinent History: None      Traumatic History:   Abuse: None  Other Traumatic Events: None  Past Medical History:   Diagnosis Date    Arthritis     Fibromyalgia     Herniated cervical disc     Hypertension     Psychiatric disorder     Sarcoidosis     Scoliosis        Medical Review Of Systems:  All 12 point review of system is normal except for what is mention in medical hisotry      Scheduled Meds:    Current Facility-Administered Medications:  acetaminophen 650 mg Oral Q6H PRN Eliud Rivero MD    albuterol 2 puff Inhalation Q6H Eliud Rivero MD    amitriptyline 10 mg Oral HS Eliud Rivero MD    cyanocobalamin 1,000 mcg Oral Daily Eliud Rivero MD    docusate sodium 100 mg Oral BID Eliud Rivero MD    enoxaparin 40 mg Subcutaneous Daily Eliud Rivero MD    ferrous sulfate 325 mg Oral Daily Eliud Rivero MD    folic acid 1 mg Oral Daily Eliud Rivero MD    furosemide 80 mg Oral Daily Eliud Rivero MD    gabapentin 300 mg Oral TID Eliud Rivero MD    lactulose 20 g Oral Daily Eliud Rivero MD    LORazepam 1 mg Oral TID Eliud Rivero MD    magnesium oxide 400 mg Oral BID Eliud Rivero MD    [START ON 5/31/2018] metolazone 5 mg Oral Once per day on Mon Thu Eliud Rivero MD    ondansetron 4 mg Intravenous Q6H PRN Eliud Rivero MD    oxyCODONE-acetaminophen 1 tablet Oral Q4H PRN Charisse Koo MD    pantoprazole 40 mg Oral Early Morning Eliud Rivero MD    potassium chloride 40 mEq Oral TID Eliud Rivero MD    QUEtiapine 50 mg Oral HS Eliud Rivero MD    sodium chloride 125 mL/hr Intravenous Continuous Norbert Spencer MD Last Rate: 125 mL/hr (05/30/18 9463)   spironolactone 50 mg Oral Daily Norbert Spencer MD      Continuous Infusions:    sodium chloride 125 mL/hr Last Rate: 125 mL/hr (05/30/18 1384)     PRN Meds:   acetaminophen    ondansetron    oxyCODONE-acetaminophen    Vitals:    05/30/18 1601   BP: 107/56   Pulse: (!) 18   Resp: (!) 84   Temp: (!) 97 1 °F (36 2 °C)   SpO2: 98%         Mental Status Evaluation:  Appearance:  Appeared of her   Behavior:   behavior was cooperative    Speech:   speech is normal goal directed    Mood:  She says her mood is stable   Affect Stressed about being in a   Language: naming objects and Goal directed  Thought Process:   logical and linear    Thought Content:  Normal   Perceptual Disturbances:  denying any auditory and visual hallucinations  Risk Potential: None   Sensorium:  person, place, time/date, situation, day of week, month of year and year   Cognition:  grossly intact   Consciousness:   alert, awake, and oriented ×3    Attention: Poor   Intellect: Normal   Fund of Knowledge: awareness of current events: normal    Insight:  Poor insight histrionic behavior   Judgment: Fair   Muscle Strength and Tone: Normal    Gait/Station: It is not assessed but patient is complaining of ability dysfunction  Motor Activity: no abnormal movements     Lab Results: I have personally reviewed pertinent lab results  NOTE: Total of 40 mints were spent    Josie Barajas MD

## 2018-05-30 NOTE — PHYSICAL THERAPY NOTE
PT EVALUATION    39 y o     [id number]    Back pain [M54 9]  Ambulatory dysfunction [R26 2]  Other [facility] ([facility] ) [F31 89]    Past Medical History:   Diagnosis Date    Arthritis     Fibromyalgia     Herniated cervical disc     Hypertension     Psychiatric disorder     Sarcoidosis     Scoliosis          Past Surgical History:   Procedure Laterality Date    GASTRIC BYPASS      HERNIA REPAIR      TOTAL ABDOMINAL HYSTERECTOMY W/ BILATERAL SALPINGOOPHORECTOMY      TUBAL LIGATION          05/30/18 1458   Note Type   Note type Eval only   Pain Assessment   Pain Score Worst Possible Pain   Pain Type Acute pain   Pain Location Back;Leg;Generalized; Foot   Pain Orientation Bilateral   Hospital Pain Intervention(s) Ambulation/increased activity;Repositioned; Emotional support   Response to Interventions satisfied   Pain Rating: FLACC (Rest) - Face 1   Pain Rating: FLACC (Rest) - Legs 1   Pain Rating: FLACC (Rest) - Activity 1   Pain Rating: FLACC (Rest) - Cry 1   Pain Rating: FLACC (Rest) - Consolability 1   Score: FLACC (Rest) 5   Pain Rating: FLACC (Activity) - Face 1   Pain Rating: FLACC (Activity) - Legs 1   Pain Rating: FLACC (Activity) - Activity 1   Pain Rating: FLACC (Activity) - Cry 1   Pain Rating: FLACC (Activity) - Consolability 1   Score: FLACC (Activity) 5   Home Living   Type of Home House   Home Layout Two level;Bed/bath upstairs;Stairs to enter with rails  (5 DUTCH  Bed/bath 2nd floor  Or bath in basement )   Bathroom Shower/Tub Tub/shower unit   Bathroom Accessibility Accessible  (only basement and 2nd floor)   Home Equipment Walker   Additional Comments Amb with RW PTA  Just d/c from rhb     Prior Function   Level of [place] Independent with ADLs and functional mobility   Lives With Significant other   Receives Help From Family   ADL Assistance Independent   IADLs Needs assistance   Falls in the last 6 months 1 to 4  (2)   Comments Home health aide 3 days per week/2 hours per day for household tasks  Restrictions/Precautions   Weight Bearing Precautions Per Order No   Other Precautions Fall Risk;Pain; Chair Alarm; Bed Alarm   General   Additional Pertinent History Pt is 38 y/o female admitted  Family/Caregiver Present No   Cognition   Overall Cognitive Status WFL   Orientation Level Oriented X4   Comments Frequent redirections needed to all tasks  RUE Assessment   RUE Assessment WFL   LUE Assessment   LUE Assessment WFL   RLE Assessment   RLE Assessment X   Strength RLE   R Hip Flexion 2+/5   R Knee Extension 3-/5   R Ankle Dorsiflexion 3-/5   R Ankle Plantar Flexion 3-/5   LLE Assessment   LLE Assessment X   Strength LLE   L Hip Flexion 2+/5   L Knee Extension 3-/5   L Ankle Dorsiflexion 3-/5   L Ankle Plantar Flexion 3-/5   Light Touch   RLE Light Touch Grossly intact   LLE Light Touch Grossly intact   Bed Mobility   Supine to Sit 2  Maximal assistance   Additional items Assist x 2; Increased time required; Bedrails;HOB elevated;LE management;Verbal cues; Comment  (use of bedpad to facilitate transition )   Additional Comments Increased time and cues for all mobiltiy needed  Pt becoming more anxious  Transfers   Sit to Stand 2  Maximal assistance   Additional items Assist x 2; Increased time required;Verbal cues; Other  (bed height elevated )   Stand to Sit 2  Maximal assistance   Additional items Assist x 2; Increased time required;Verbal cues;Armrests   Stand pivot 2  Maximal assistance   Additional items Assist x 2; Increased time required;Verbal cues;Armrests   Additional Comments cues for hand placement with all transitions  Significant time anc cues for all mobitly needed  Decreased safety awareness noted  Ambulation/Elevation   Gait pattern Improper Weight shift; Antalgic;Decreased foot clearance;Decreased R stance;Decreased L stance;Shuffling; Inconsistent shasta; Short stride; Excessively slow; Wide LEONIE  (incresed LEONIE and ER B/L LE)   Gait Assistance 2  Maximal assist   Additional items Assist x 2;Verbal cues; Tactile cues   Assistive Device Bariatric Rolling walker   Distance 3'x1 OOB to [facility]   Balance   Static Sitting Fair +   Dynamic Sitting Fair -   Static Standing Poor +   Dynamic Standing Poor   Ambulatory Poor -   Endurance Deficit   Endurance Deficit Yes   Endurance Deficit Description fatigue, weakness, pain  Activity Tolerance   Activity Tolerance Patient limited by fatigue;Patient limited by pain   Medical Staff Made Aware Nurse, Zeinab Stark   Nurse Made Aware yes  Assessment   Prognosis Fair   Problem List Decreased strength;Decreased range of motion;Decreased endurance; Impaired balance;Decreased mobility; Decreased safety awareness; Obesity;Pain   Assessment Pt is 40 y/o female admitted wtih fall, hypokalemia, elevated CPK  PT consulted  Up with assist   Hx of 2 falls since d/c from [facility] 2/2018  Baseline reports independent with ADLs and ambulation with RW support  REsides with boyfriend but he works 6 am to 5 pm   Gets home health aide 3 days per week for 2 hours  Currently with severe functional limitations given decreased LE strength, decreased LE ROM  Max A of 2 for bed mobitly,transfers and ambualtion with RW support  STep by step cues needed for safety  Decreased insight to impairments noted  Given significnt impairments will require STR prior to home in order to optimzie functional outcomes and return to independent state  PT will follow and progress  Barriers to Discharge Inaccessible home environment;Decreased caregiver support   Goals   Patient Goals go home   STG Expiration Date 06/09/18   Short Term Goal #1 10 days:  Bed mobility with Audi in order to improve abilty to perform bed mobilty unassisted  Transfers with Audi in order to demosntrate improved functional independence  Amb with RW > 150'x1 with Audi in order to improve abiltiy to negotiate safety in her home    Improve overall strength and balance 1 grade in order to improve abiltiy to perform functional tasks  Improve activity tolerance to 45 minutes in order to improve tolerance to self care  NEgotiate steps with Melissa in order to be able to enter home and 2nd floor of home  Treatment Day 0   Plan   Treatment/Interventions Functional transfer training;LE strengthening/ROM; Elevations; Therapeutic exercise; Endurance training;Patient/family training;Equipment eval/education; Bed mobility;Gait training; Compensatory technique education;Continued evaluation;Spoke to nursing;OT   PT Frequency Other (Comment)  (4-6 times per week )   Recommendation   Recommendation Short-term skilled PT   PT - OK to Discharge (yes to rhb, no to home)   Modified [place] Scale   Modified [place] Scale 4   Barthel Index   Feeding 10   Bathing 0   Grooming Score 0   Dressing Score 5   Bladder Score 0   Bowels Score 10   Toilet Use Score 5   Transfers (Bed/Chair) Score 5   Mobility (Level Surface) Score 0   Stairs Score 0   Barthel Index Score 35     History: co - morbidities, fall risk, use of assistive device, assist for adl's,multiple lines, pain, DUTCH home, stairs in home  Exam: impairments in locomotion, musculoskeletal, balance,posture, joint integrity,self care, barthel 35  Clinical: unstable/unpredictable ( fall risk, pain, MO)  Complexity:high  Rain Paredes, PT

## 2018-05-30 NOTE — CASE MANAGEMENT
Initial Clinical Review    Admission: Date/Time/Statement: 5/29/18 @ 2140     Orders Placed This Encounter   Procedures    Inpatient Admission (expected length of stay for this patient is greater than two midnights)     Standing Status:   Standing     Number of Occurrences:   1     Order Specific Question:   Admitting Physician     Answer:   Aida Rey [[id number]]     Order Specific Question:   Level of Care     Answer:   Med Surg [[id number]]     Order Specific Question:   Estimated length of stay     Answer:   More than 2 Midnights     Order Specific Question:   Certification     Answer:   I certify that inpatient services are medically necessary for this patient for a duration of greater than two midnights  See H&P and MD Progress Notes for additional information about the patient's course of treatment  ED: Date/Time/Mode of Arrival:   ED Arrival Information     Expected Arrival Acuity Means of Arrival Escorted By Service Admission Type    - 5/29/2018 21:05 Urgent Ambulance [facility] General Medicine Urgent    Arrival Complaint    Back Pain          Chief Complaint:   Chief Complaint   Patient presents with    Difficulty Walking     Patient had difficulty getting back into her house after leaving AMA from ED earlier today  Due to patient being unable to walk, EMS was called to assist patient  EMS had difficulty getting patient into house where her bedroom is on the second floor  EMS only got patient in her front door and was unable to get her any further in house  History of Illness: 40 yo female returns to the ED for ambulatory dysfunction  She was seen earlier today for recurrent falls at home  Had a full workup, including CT of head, C-spine, T/L spines, chest/abd/pelvis without acute abnormality  Also had xrays of the R hip and R ankle which were without fracture  She had been noncompliant with her home meds and was profoundly hypokalemic  Also element of rhabdoymyolysis with CK >3000   She eventually signed out AMA d/t not wanting to be in a hospital       She returned home with her significant other but had to call EMS to assist her into her home  They were unable to get her onto her second floor living space because she was unable to bear weight long enough to stand and get up the stairs  Pt reports she fell on her L arm during that time  No LOC  No new head injury       Pt now agreeable to admission  ED Vital Signs:   ED Triage Vitals [05/29/18 2116]   Temperature Pulse Respirations Blood Pressure SpO2   98 7 °F (37 1 °C) 91 20 124/59 96 %      Temp Source Heart Rate Source Patient Position - Orthostatic VS BP Location FiO2 (%)   Oral Monitor Sitting Right arm --      Pain Score       Worst Possible Pain        Wt Readings from Last 1 Encounters:   05/29/18 (!) 138 kg (305 lb 1 9 oz)       Abnormal Labs/Diagnostic Test Results:  K 2 4,   Ca 8 2  On initial eval in ED 5/29:  K 2 2,   Ca 7 8,   T Bili 1 47,   ,   Trop 0 05,   CK 3388 ( she was given KCL 40 IV X 1  And 2o IV x 1)  Urine:   Urobilinogen 4 0      ED Treatment:   Medication Administration from 05/29/2018 2105 to 05/29/2018 2243       Date/Time Order Dose Route Action Action by Comments     05/29/2018 6399 ketorolac (TORADOL) injection 15 mg 15 mg Intravenous Given Yasmine Virgen RN           Past Medical/Surgical History:    Active Ambulatory Problems     Diagnosis Date Noted    Ambulatory dysfunction 08/29/2017    Recurrent falls 08/29/2017    Chronic pain syndrome 08/29/2017    [facility] to [facility] 08/29/2017    Hypokalemia 11/26/2017    Anasarca 11/26/2017    Chest pain 11/26/2017    Other [facility] disorder [facility] ) 12/23/2014    Noncompliance with medication regimen 66/84/2934    Alcoholic liver disease ([facility] ) 11/28/2017    RUQ pain 12/01/2017    Febrile illness 12/05/2017    Coag negative Staphylococcus bacteremia 12/07/2017    Anemia 12/07/2017     Resolved Ambulatory Problems Diagnosis Date Noted    Peripheral edema 09/09/2017     Past Medical History:   Diagnosis Date    Arthritis     Fibromyalgia     Herniated cervical disc     Hypertension     Psychiatric disorder     Sarcoidosis     Scoliosis        Admitting Diagnosis: Back pain [M54 9]  Ambulatory dysfunction [R26 2]  Other bipolar disorder [facility]) [F31 89]    Age/Sex: 50-year-old  female presenting to the emergency room with complaints of recurrent falls and difficulty with ambulation  She fell yesterday and again today due to severe weakness of her lower extremities like her legs could not hold up  Emergency medical services was activated for transport to the emergency room where she was found to have severe electrolyte derangements but she elected to leave against medical advise  She is morbidly obese and emergency medical services was unable to get it to her apartment due to her severe weakness she was brought back to the emergency room  Labs earlier in the day showed potassium of 2 2 magnesium 1 4 total CPK 3388 hemogram 9 8 hematocrit 28 8 and ammonia level of 47  Full body Imaging showed no acute fracture or dislocation  Assessment/Plan:   Recurrent falls with ambulatory dysfunction     Acute hypokalemia     Acute hypomagnesemia     Elevated CPK     Hyper ammonia      Probable depression      Chronic pain syndrome     Plan:  Admit for further management of recurrent falls with ambulatory dysfunction likely secondary to multiple electrolyte derangement  Place on aggressive electrolyte repletion of magnesium and potassium  Give 2 g of intravenous magnesium sulfate tonight and continue on oral magnesium twice daily tomorrow  Place on oral potassium 40 mEq 3 times a day pending further review and follow electrolytes daily  Keep on aggressive saline hydration for markedly elevated CPK with intake and output monitoring and daily CPK levels  Continue lactulose twice daily for hyper ammonia    Consult Psychiatry for extremely weepy state and continue routine antidepressant about essential medications  Initiate fall precautions and consult PT and OT for mobilization evaluation and case management for discharge planning  Pain management consult for acute on chronic pain  She will likely require skilled rehab if ambulatory dysfunction does not improve  Anticipate at least 2 midnight stay  Admission Orders:  IP  TELE  Regular  Consult Psych  PT / OT Eval  CBC,  BMP,  Mag  SCD's  Place [place]  Fall Precautions    Scheduled Meds:   MAG SULFATE 2 GMS IV X 1  Reglan IV x 1  Current Facility-Administered Medications:          albuterol 2 puff Inhalation Q6H     amitriptyline 10 mg Oral HS     cyanocobalamin 1,000 mcg Oral Daily     docusate sodium 100 mg Oral BID     enoxaparin 40 mg Subcutaneous Daily     ferrous sulfate 325 mg Oral Daily     folic acid 1 mg Oral Daily     furosemide 80 mg Oral Daily     gabapentin 300 mg Oral TID     lactulose 20 g Oral Daily     LORazepam 1 mg Oral TID     magnesium oxide 400 mg Oral BID     [START ON 5/31/2018] metolazone 5 mg Oral Once per day on Mon Thu                     pantoprazole 40 mg Oral Early Morning     potassium chloride 40 mEq Oral TID     QUEtiapine 50 mg Oral HS             spironolactone 50 mg Oral Daily       Continuous Infusions:   sodium chloride 125 mL/hr Last Rate: 125 mL/hr (05/30/18 0834)     PRN Meds:   acetaminophen    ondansetron    oxyCODONE-acetaminophen    Thank you,  [facility] in the [facility] by Orlando Gomez for 2017  Network Utilization Review Department  Phone: [phone]; Fax [phone]  ATTENTION: The Network Utilization Review Department is now centralized for our 7 Facilities  Make a note that we have a new phone and fax numbers for our Department   Please call with any questions or concerns to [phone] and carefully follow the prompts so that you are directed to the right person  All voicemails are confidential  Fax any determinations, approvals, denials, and requests for initial or continue stay review clinical to [phone]  Due to HIGH CALL volume, it would be easier if you could please send faxed requests to expedite your requests and in part, help us provide discharge notifications faster

## 2018-05-30 NOTE — PHYSICAL THERAPY NOTE
PHYSICAL THERAPY NOTE          Patient Name: Tonia DAVIESM Date: 5/30/2018 05/30/18 1523   Pain Assessment   Pain Score 9   Pain Type Acute pain;Chronic pain   Pain Location Back;Leg;Generalized; Foot   Hospital Pain Intervention(s) Repositioned; Emotional support   Response to Interventions satisfied   Restrictions/Precautions   Weight Bearing Precautions Per Order No   Other Precautions Chair Alarm; Bed Alarm; Fall Risk;Pain   General   Chart Reviewed Yes   Response to Previous Treatment Patient with no complaints from previous session  Family/Caregiver Present No   Cognition   Overall Cognitive Status WFL   Comments frequent redirection for safety needed  Subjective   Subjective Declines OOB to chair following toileting  REquest to return to bed   " I will sit OOB tomorrow"   Bed Mobility   Sit to Supine 2  Maximal assistance   Additional items Assist x 2; Increased time required;Verbal cues;LE management; Bedrails   Additional Comments Increased time and cues for all mobility  Transfers   Sit to Stand 2  Maximal assistance   Additional items Assist x 2; Increased time required;Verbal cues   Stand to Sit 2  Maximal assistance   Additional items Assist x 2; Increased time required;Verbal cues   Toilet transfer 2  Maximal assistance   Additional items Assist x 2; Increased time required;Verbal cues; Commode   Additional Comments performed sit to stand x 2  Once from [facility], once from bed  Ambulation/Elevation   Gait pattern Improper Weight shift; Antalgic; Forward Flexion;Decreased foot clearance; Wide LEONIE;Shuffling; Inconsistent shasta; Short stride; Excessively slow  (incresed B/L ER)   Gait Assistance 2  Maximal assist   Additional items Assist x 2;Verbal cues; Tactile cues   Assistive Device Bariatric Rolling walker   Distance 3'x1 from [facility] to bed    Then 3 lateral steps toward [facility] for improved positoning   Balance   Static Sitting Fair +   Dynamic Sitting Fair -   Static Standing Poor +   Dynamic Standing Poor   Ambulatory Poor -   Endurance Deficit   Endurance Deficit Yes   Endurance Deficit Description fatigue, pain, weakness  Activity Tolerance   Activity Tolerance Patient limited by fatigue;Patient limited by pain   Medical Staff Made Aware NurseValerie   Nurse Made Aware yes   Equipment Use   Comments worked on positioning in supine as with increased ER B/L LE  Positioned to minimize ER  Education given on effects of immobility and importance of OOB  Declines OOB to chair despite education  Assessment   Prognosis Fair   Problem List Decreased strength;Decreased range of motion;Decreased endurance; Impaired balance;Decreased mobility; Decreased safety awareness; Obesity;Pain   Assessment Seen for progression of trnsfers and mobiltiy  Continues to require max A of 2 for trnasfers and ambulation with RW support  Limited tolerance to ambulation only being able to ambulate short distance from [facility] to bedside  Cues needed for all mobilty for safety  Activity tolerance limtied  Cont to rec STR given severe functional limitations and level of assistance  Barriers to Discharge Inaccessible home environment   Goals   Patient Goals go home   STG Expiration Date 06/09/18   Treatment Day 1   Plan   Treatment/Interventions Functional transfer training;LE strengthening/ROM; Elevations; Therapeutic exercise; Endurance training;Patient/family training;Equipment eval/education; Bed mobility;Gait training; Compensatory technique education;Continued evaluation;Spoke to nursing;OT   Progress Slow progress, decreased activity tolerance   PT Frequency Other (Comment)  (4-6 times per week )   Recommendation   Recommendation Short-term skilled PT   PT - OK to Discharge Yes  (to rhb, no to home)   Randa Vergara, PT

## 2018-05-30 NOTE — ED NOTES
Multiple phone calls attempts made to receiving RN  No answer  Charge nurse, Abad Alves, notified that patient will be coming to floor in 10 minutes- verbalized understanding        Juan Baker RN  05/29/18 3280

## 2018-05-30 NOTE — PROGRESS NOTES
[facility] Progress Note  Patient: Kj Verma 39 y o  female   MRN: [id number]  PCP: Rose Marie Martinez MD  Unit/Bed#: [facility] 2 -01 Encounter: [id number]  Date Of Visit: 18    Assessment:    Principal Problem:    Recurrent falls while walking  Active Problems:    Hypomagnesemia    Acute hypokalemia    Elevated CPK    Hyperammonemia ([address] )      Plan:  #1 frequent falls  Unknown cause at this time  This could certainly be related to hypokalemia  We will aggressively replace potassium  IV hydration  Correct electrolytes as needed  Pain control with Tylenol/Percocet as needed  Awaiting for psychiatry evaluation  I will place the patient in fall and aspiration precautions today  We will continue with current care  VTE Pharmacologic Prophylaxis:   Pharmacologic: Heparin  Mechanical VTE Prophylaxis in Place: Yes    Patient Centered Rounds: I have performed bedside rounds with nursing staff today  Discussions with Specialists or Other Care Team Provider: yes    Education and Discussions with Family / Patient: now    Time Spent for Care: 45 minutes  More than 50% of total time spent on counseling and coordination of care as described above  Current Length of Stay: 1 day(s)    Current Patient Status: Inpatient   Certification Statement: The patient will continue to require additional inpatient hospital stay due to rhabdomyolysis, psychosis,    Discharge Plan / Estimated Discharge Date: to be determined    Code Status: Level 1 - Full Code      Subjective:   "I have no complaints "  Nursing reports  Objective:     Vitals:   Temp (24hrs), Av 4 °F (36 9 °C), Min:97 9 °F (36 6 °C), Max:98 7 °F (37 1 °C)    HR:  [80-91] 90  Resp:  [12-25] 16  BP: (100-124)/(51-70) 100/54  SpO2:  [95 %-99 %] 97 %  Body mass index is 47 79 kg/m²  Input and Output Summary (last 24 hours):        Intake/Output Summary (Last 24 hours) at 18 1229  Last data filed at 18 0900   Gross per 24 hour Intake              360 ml   Output                0 ml   Net              360 ml       Physical Exam:     Physical Exam    GENERAL APPEARANCE: WD/WN in NAD pleasant  SKIN: no rash, small bruising in left lower extremity lateral section otherwise benign  HEENT: NC/AT, PERRLA (B), moist MM, no epistaxis  NECK: Supple, no JVD    LUNGS: CTA (B) mildly prolonged expiratory phase,   no use of accessory muscles  HEART:          S1S 2, RRR  , PMI is not displaced  ABDOMEN: Soft, nontender, nondistended, +BS  Rectal exam:  EXTREMITIES: no edema   PERIPHERAL VASCULAR: palpable pulses   NEURO:  AAO x 3, CN 2-12: non focal  MUSCLE STRENGHT: patient refused ambulation but moving all extremities at will  Additional Data:     Labs:      Results from last 7 days  Lab Units 05/30/18  0448 05/29/18  1310   WBC Thousand/uL 5 08 6 48   HEMOGLOBIN g/dL 9 4* 9 8*   HEMATOCRIT % 28 5* 28 8*   PLATELETS Thousands/uL 232 182   NEUTROS PCT %  --  50   LYMPHS PCT %  --  32   MONOS PCT %  --  15*   EOS PCT %  --  3       Results from last 7 days  Lab Units 05/30/18  0448  05/29/18  1310   SODIUM mmol/L 139  < > 142   POTASSIUM mmol/L 2 5*  < > 2 2*   CHLORIDE mmol/L 102  < > 101   CO2 mmol/L 31  < > 32   BUN mg/dL 10  < > 10   CREATININE mg/dL 0 97  < > 1 05   CALCIUM mg/dL 7 8*  < > 7 8*   TOTAL PROTEIN g/dL  --   --  6 4   BILIRUBIN TOTAL mg/dL  --   --  1 47*   ALK PHOS U/L  --   --  78   ALT U/L  --   --  35   AST U/L  --   --  179*   GLUCOSE RANDOM mg/dL 85  < > 110   < > = values in this interval not displayed  * I Have Reviewed All Lab Data Listed Above  * Additional Pertinent Lab Tests Reviewed:  [facility] Admission Reviewed    Imaging:    Imaging Reports Reviewed Today Include: yes  Imaging Personally Reviewed by Myself Includes:  yes    Recent Cultures (last 7 days):           Last 24 Hours Medication List:     Current Facility-Administered Medications:  acetaminophen 650 mg Oral Q6H PRN Octavio Quintanilla Ángel Suarez MD    albuterol 2 puff Inhalation Q6H Barbara Grayson MD    amitriptyline 10 mg Oral HS Barbara Grayson MD    cyanocobalamin 1,000 mcg Oral Daily Barbara Grayson MD    docusate sodium 100 mg Oral BID Barbara Grayson MD    enoxaparin 40 mg Subcutaneous Daily Barbara Grayson MD    ferrous sulfate 325 mg Oral Daily Barbara Grayson MD    folic acid 1 mg Oral Daily Barbara Grayson MD    furosemide 80 mg Oral Daily Barbara Grayson MD    gabapentin 300 mg Oral TID Barbara Grayson MD    lactulose 20 g Oral Daily Barbara Grayson MD    LORazepam 1 mg Oral TID Barbara Grayson MD    magnesium oxide 400 mg Oral BID Barbara Grayson MD    [START ON 5/31/2018] metolazone 5 mg Oral Once per day on Mon Thu Barbara Grayson MD    ondansetron 4 mg Intravenous Q6H PRN Barbara Grayson MD    oxyCODONE-acetaminophen 1 tablet Oral Q4H PRN Rocky Currie MD    pantoprazole 40 mg Oral Early Morning Barbara Grayson MD    potassium chloride 40 mEq Oral TID Barbara Grayson MD    QUEtiapine 50 mg Oral HS Barbara Grayson MD    sodium chloride 125 mL/hr Intravenous Continuous Barbara Grayson MD Last Rate: 125 mL/hr (05/30/18 3939)   spironolactone 50 mg Oral Daily Barbara Grayson MD         Today, Patient Was Seen By: Rocky Currie MD    ** Please Note: This note has been constructed using a voice recognition system   **

## 2018-05-30 NOTE — ED PROVIDER NOTES
38 yo female returns to the ED for ambulatory dysfunction  She was seen earlier today for recurrent falls at home  Had a full workup, including CT of head, C-spine, T/L spines, chest/abd/pelvis without acute abnormality  Also had xrays of the R hip and R ankle which were without fracture  She had been noncompliant with her home meds and was profoundly hypokalemic  Also element of rhabdoymyolysis with CK >3000  She eventually signed out AMA d/t not wanting to be in a hospital      She returned home with her significant other but had to call EMS to assist her into her home  They were unable to get her onto her second floor living space because she was unable to bear weight long enough to stand and get up the stairs  Pt reports she fell on her L arm during that time  No LOC  No new head injury  Pt now agreeable to admission  History  Chief Complaint   Patient presents with    Difficulty Walking     Patient had difficulty getting back into her house after leaving AMA from ED earlier today  Due to patient being unable to walk, EMS was called to assist patient  EMS had difficulty getting patient into house where her bedroom is on the second floor  EMS only got patient in her front door and was unable to get her any further in house  HPI    Prior to Admission Medications   Prescriptions Last Dose Informant Patient Reported? Taking?    GABAPENTIN PO   Yes Yes   Sig: Take 300 mg by mouth 3 (three) times a day     LORazepam (ATIVAN) 1 mg tablet   Yes Yes   Sig: Take 1 mg by mouth 3 (three) times a day   Oyster Shell (OYSTER CALCIUM) 500 MG TABS   Yes Yes   QUEtiapine (SEROquel) 50 mg tablet   No Yes   Sig: Take 1 tablet by mouth daily at bedtime   acetaminophen (TYLENOL) 325 mg tablet   No Yes   Sig: Take 2 tablets by mouth every 6 (six) hours as needed for mild pain or fever   albuterol (PROVENTIL HFA,VENTOLIN HFA) 90 mcg/act inhaler   Yes Yes   Sig: Inhale 2 puffs every 6 (six) hours   amitriptyline (ELAVIL) 10 mg tablet   Yes Yes   Sig: Take 10 mg by mouth daily at bedtime   cyanocobalamin (VITAMIN B-12) 1,000 mcg tablet   Yes Yes   Si tablet daily- not taking   ferrous sulfate 325 (65 Fe) mg tablet   Yes Yes   Sig: Take 1 tablet by mouth daily   folic acid (FOLVITE) 1 mg tablet   Yes Yes   Sig: Take by mouth daily   furosemide (LASIX) 80 mg tablet   No Yes   Sig: Take 1 tablet by mouth 2 (two) times a day   lactulose 20 g/30 mL   No Yes   Sig: Take 30 mL by mouth daily   metolazone (ZAROXOLYN) 5 mg tablet   No Yes   Sig: Take 1 tablet by mouth 2 (two) times a week   nystatin (MYCOSTATIN) powder   No Yes   Sig: Apply topically 4 (four) times a day   omeprazole (PriLOSEC) 20 mg delayed release capsule   Yes Yes   Sig: Take 20 mg by mouth daily   potassium chloride (K-DUR,KLOR-CON) 20 mEq tablet   No Yes   Sig: Take 1 tablet by mouth 4 (four) times a day (with meals and at bedtime)   spironolactone (ALDACTONE) 50 mg tablet   No Yes   Sig: Take 1 tablet by mouth 2 (two) times a day      Facility-Administered Medications: None       Past Medical History:   Diagnosis Date    Arthritis     Fibromyalgia     Herniated cervical disc     Hypertension     Psychiatric disorder     Sarcoidosis     Scoliosis        Past Surgical History:   Procedure Laterality Date    GASTRIC BYPASS      HERNIA REPAIR      TOTAL ABDOMINAL HYSTERECTOMY W/ BILATERAL SALPINGOOPHORECTOMY      TUBAL LIGATION         History reviewed  No pertinent family history  I have reviewed and agree with the history as documented  Social History   Substance Use Topics    Smoking status: Never Smoker    Smokeless tobacco: Never Used    Alcohol use Yes      Comment: occassional        Review of Systems   Constitutional: Negative for chills and fever  HENT: Negative for congestion, hearing loss, mouth sores, nosebleeds and postnasal drip  Eyes: Negative for visual disturbance     Respiratory: Negative for cough, chest tightness and shortness of breath  Cardiovascular: Negative for chest pain  Gastrointestinal: Negative for abdominal pain, constipation, diarrhea, nausea and vomiting  Genitourinary: Negative for flank pain  Neurological: Negative for dizziness, light-headedness and headaches  All other systems reviewed and are negative  Physical Exam  Physical Exam   Constitutional: She is oriented to person, place, and time  Morbidly obese    Intermittently mildly distressed, complains of not having access to her home medications   HENT:   Head: Normocephalic and atraumatic  Right Ear: External ear normal    Left Ear: External ear normal    Mouth/Throat: Oropharynx is clear and moist    Eyes: EOM are normal  Pupils are equal, round, and reactive to light  Neck: Normal range of motion  Neck supple  Cardiovascular: Normal rate, regular rhythm and normal heart sounds  Exam reveals no gallop and no friction rub  No murmur heard  Pulmonary/Chest: Effort normal and breath sounds normal  No respiratory distress  She has no wheezes  She has no rales  Abdominal: Soft  She exhibits no distension  There is no tenderness  Musculoskeletal: Normal range of motion  Neurological: She is alert and oriented to person, place, and time  Skin: Skin is warm and dry  She is not diaphoretic  Psychiatric: She has a normal mood and affect  Her behavior is normal  Judgment and thought content normal    Vitals reviewed        Vital Signs  ED Triage Vitals [05/29/18 2116]   Temperature Pulse Respirations Blood Pressure SpO2   98 7 °F (37 1 °C) 91 20 124/59 96 %      Temp Source Heart Rate Source Patient Position - Orthostatic VS BP Location FiO2 (%)   Oral Monitor Sitting Right arm --      Pain Score       Worst Possible Pain           Vitals:    05/29/18 2116   BP: 124/59   Pulse: 91   Patient Position - Orthostatic VS: Sitting       Visual Acuity      ED Medications  Medications   albuterol (PROVENTIL HFA,VENTOLIN HFA) inhaler 2 puff (not administered)   acetaminophen (TYLENOL) tablet 650 mg (not administered)   amitriptyline (ELAVIL) tablet 10 mg (not administered)   cyanocobalamin (VITAMIN B-12) tablet 1,000 mcg (not administered)   ferrous sulfate tablet 325 mg (not administered)   folic acid (FOLVITE) tablet 1 mg (not administered)   furosemide (LASIX) tablet 80 mg (not administered)   lactulose 20 g/30 mL oral solution 20 g (not administered)   LORazepam (ATIVAN) tablet 1 mg (not administered)   metolazone (ZAROXOLYN) tablet 5 mg (not administered)   pantoprazole (PROTONIX) EC tablet 40 mg (not administered)   potassium chloride (K-DUR,KLOR-CON) CR tablet 40 mEq (not administered)   QUEtiapine (SEROquel) tablet 50 mg (not administered)   spironolactone (ALDACTONE) tablet 50 mg (not administered)   gabapentin (NEURONTIN) capsule 300 mg (not administered)   sodium chloride 0 9 % infusion (not administered)   ondansetron (ZOFRAN) injection 4 mg (not administered)   docusate sodium (COLACE) capsule 100 mg (not administered)   enoxaparin (LOVENOX) subcutaneous injection 40 mg (not administered)   magnesium sulfate 2 g/50 mL IVPB (premix) 2 g (not administered)   magnesium oxide (MAG-OX) tablet 400 mg (not administered)   ketorolac (TORADOL) injection 15 mg (15 mg Intravenous Given 5/29/18 2159)       Diagnostic Studies  Results Reviewed     Procedure Component Value Units Date/Time    Basic metabolic panel [[id number]]  (Abnormal) Collected:  05/29/18 2158    Lab Status:  Final result Specimen:  Blood from Arm, Right Updated:  05/29/18 2237     Sodium 138 mmol/L      Potassium 2 4 (LL) mmol/L      Chloride 100 mmol/L      CO2 31 mmol/L      Anion Gap 7 mmol/L      BUN 10 mg/dL      Creatinine 0 98 mg/dL      Glucose 107 mg/dL      Calcium 8 2 (L) mg/dL      eGFR 70 ml/min/1 73sq m     Narrative:         National Kidney Disease Education Program recommendations are as follows:  GFR calculation is accurate only with a steady state creatinine  Chronic Kidney disease less than 60 ml/min/1 73 sq  meters  Kidney failure less than 15 ml/min/1 73 sq  meters  No orders to display              Procedures  Procedures       Phone Contacts  ED Phone Contact    ED Course                               MDM  Number of Diagnoses or Management Options  Ambulatory dysfunction: new and requires workup  Diagnosis management comments: 40 yo female seen earlier today and found to be in rhabdomyolysis and profoundly hypokalemic with superimposed ambulatory dysfunction; initially signed out AMA, now returns via EMS as she was unable to bear weight to get into her home  Will admit  Will give Toradol for pain  RK is 2 4 now  Received a total of 60 MEQ just a few hours prior to re-presentation  BMP ordered for AM  Patient agreeable         Amount and/or Complexity of Data Reviewed  Clinical lab tests: ordered and reviewed      CritCare Time    Disposition  Final diagnoses:   Ambulatory dysfunction     Time reflects when diagnosis was documented in both MDM as applicable and the Disposition within this note     Time User Action Codes Description Comment    5/29/2018  9:38 PM Anell Prude Add [R26 2] Ambulatory dysfunction     5/29/2018 10:17 PM Adonna Mohs [F31 89] Other bipolar disorder ([facility] [address] )     5/29/2018 10:17 PM Sim Beecarynm [F31 89] Other bipolar disorder [facility])       ED Disposition     ED Disposition Condition Comment    Admit  Case was discussed with Dr Romario Rbuio and the patient's admission status was agreed to be Admission Status: inpatient status to the service of Dr Romario Rubio          Follow-up Information    None         Current Discharge Medication List      CONTINUE these medications which have NOT CHANGED    Details   acetaminophen (TYLENOL) 325 mg tablet Take 2 tablets by mouth every 6 (six) hours as needed for mild pain or fever  Qty: 30 tablet, Refills: 0      albuterol (PROVENTIL HFA,VENTOLIN HFA) 90 mcg/act inhaler Inhale 2 puffs every 6 (six) hours      amitriptyline (ELAVIL) 10 mg tablet Take 10 mg by mouth daily at bedtime      cyanocobalamin (VITAMIN B-12) 1,000 mcg tablet 1 tablet daily- not taking      ferrous sulfate 325 (65 Fe) mg tablet Take 1 tablet by mouth daily      folic acid (FOLVITE) 1 mg tablet Take by mouth daily      furosemide (LASIX) 80 mg tablet Take 1 tablet by mouth 2 (two) times a day  Qty: 60 tablet, Refills: 0      GABAPENTIN PO Take 300 mg by mouth 3 (three) times a day        lactulose 20 g/30 mL Take 30 mL by mouth daily  Refills: 0      LORazepam (ATIVAN) 1 mg tablet Take 1 mg by mouth 3 (three) times a day      metolazone (ZAROXOLYN) 5 mg tablet Take 1 tablet by mouth 2 (two) times a week  Refills: 0      nystatin (MYCOSTATIN) powder Apply topically 4 (four) times a day  Qty: 30 g, Refills: 0      omeprazole (PriLOSEC) 20 mg delayed release capsule Take 20 mg by mouth daily      Oyster Shell (OYSTER CALCIUM) 500 MG TABS       potassium chloride (K-DUR,KLOR-CON) 20 mEq tablet Take 1 tablet by mouth 4 (four) times a day (with meals and at bedtime)  Qty: 60 tablet, Refills: 0      QUEtiapine (SEROquel) 50 mg tablet Take 1 tablet by mouth daily at bedtime  Refills: 0      spironolactone (ALDACTONE) 50 mg tablet Take 1 tablet by mouth 2 (two) times a day  Qty: 60 tablet, Refills: 0           No discharge procedures on file      ED Provider  Electronically Signed by           Sachi Ruiz PA-C  05/29/18 [address] PA-C  05/29/18 3636

## 2018-05-30 NOTE — PROGRESS NOTES
[facility] to verify that the patient is on percocet because it wasn't on her home medication list   Pharmacist stated that it has not been filled since March of 2017 which was only for 5 tablets, before that is was November 2016 for 180 tablets

## 2018-05-30 NOTE — H&P
H&P Exam - Anali Keller 39 y o  female MRN: [id number]    Unit/Bed#: ED 14 Encounter: [id number]    Assessment:  Recurrent falls with ambulatory dysfunction    Acute hypokalemia    Acute hypomagnesemia    Elevated CPK    Hyper ammonia  Probable depression  Chronic pain syndrome    Plan:  Admit for further management of recurrent falls with ambulatory dysfunction likely secondary to multiple electrolyte derangement  Place on aggressive electrolyte repletion of magnesium and potassium  Give 2 g of intravenous magnesium sulfate tonight and continue on oral magnesium twice daily tomorrow  Place on oral potassium 40 mEq 3 times a day pending further review and follow electrolytes daily  Keep on aggressive saline hydration for markedly elevated CPK with intake and output monitoring and daily CPK levels  Continue lactulose twice daily for hyper ammonia  Consult Psychiatry for extremely weepy state and continue routine antidepressant about essential medications  Initiate fall precautions and consult PT and OT for mobilization evaluation and case management for discharge planning  Pain management consult for acute on chronic pain  She will likely require skilled rehab if ambulatory dysfunction does not improve  Anticipate at least 2 midnight stay  History of Present Illness   43-year-old  female presenting to the emergency room with complaints of recurrent falls and difficulty with ambulation  She fell yesterday and again today due to severe weakness of her lower extremities like her legs could not hold up  Emergency medical services was activated for transport to the emergency room where she was found to have severe electrolyte derangements but she elected to leave against medical advise  She is morbidly obese and emergency medical services was unable to get it to her apartment due to her severe weakness she was brought back to the emergency room    Labs earlier in the day showed potassium of 2 2 magnesium 1 4 total CPK 3388 hemogram 9 8 hematocrit 28 8 and ammonia level of 47  Full body Imaging showed no acute fracture or dislocation  I was asked to evaluate for admission  Review of Systems   HENT: Negative  Eyes: Negative  Respiratory: Negative  Cardiovascular: Negative  Gastrointestinal: Negative  Endocrine: Negative  Genitourinary: Negative  Musculoskeletal: Positive for gait problem  Skin: Negative  Allergic/Immunologic: Negative  Neurological: Positive for weakness  Hematological: Negative  Psychiatric/Behavioral: Negative          Historical Information   Past Medical History:   Diagnosis Date    Arthritis     Fibromyalgia     Herniated cervical disc     Hypertension     Psychiatric disorder     Sarcoidosis     Scoliosis      Past Surgical History:   Procedure Laterality Date    GASTRIC BYPASS      HERNIA REPAIR      TOTAL ABDOMINAL HYSTERECTOMY W/ BILATERAL SALPINGOOPHORECTOMY      TUBAL LIGATION       Social History   History   Alcohol Use    Yes     Comment: occassional     History   Drug Use No     History   Smoking Status    Never Smoker   Smokeless Tobacco    Never Used     Family History: non-contributory    Meds/Allergies   all medications and allergies reviewed  Allergies   Allergen Reactions    Pregabalin Swelling    Clonazepam Anxiety       Objective   First Vitals:   Blood Pressure: 124/59 (05/29/18 2116)  Pulse: 91 (05/29/18 2116)  Temperature: 98 7 °F (37 1 °C) (05/29/18 2116)  Temp Source: Oral (05/29/18 2116)  Respirations: 20 (05/29/18 2116)  Weight - Scale: (!) 138 kg (305 lb) (05/29/18 2116)  SpO2: 96 % (05/29/18 2116)    Current Vitals:   Blood Pressure: 124/59 (05/29/18 2116)  Pulse: 91 (05/29/18 2116)  Temperature: 98 7 °F (37 1 °C) (05/29/18 2116)  Temp Source: Oral (05/29/18 2116)  Respirations: 20 (05/29/18 2116)  Weight - Scale: (!) 138 kg (305 lb) (05/29/18 2116)  SpO2: 96 % (05/29/18 2116)    No intake or output data in the 24 hours ending 05/29/18 2223    Invasive Devices     Peripheral Intravenous Line            Peripheral IV 05/29/18 Right Antecubital less than 1 day                Physical Exam   Middle-aged  female extremely weepy fall unclear reasons  She is normocephalic atraumatic she is afebrile, slightly pale, anicteric, not dehydrated  Neck is supple without distended veins or carotid bruits  Her lungs are clear to auscultation  Heart sounds 1 and 2 heard and regular  Abdomen is obese and soft, nontender with normal bowel sounds  Musculoskeletal system/extremities  She Is bruised with some abrasions over both knees  She has no pedal edema  Distal pulses are present  CNS exam she is alert and oriented x3 without focal deficits  Lab Results: **    Results from last 7 days  Lab Units 05/29/18  1310   WBC Thousand/uL 6 48   HEMOGLOBIN g/dL 9 8*   HEMATOCRIT % 28 8*   PLATELETS Thousands/uL 182   NEUTROS PCT % 50   LYMPHS PCT % 32   MONOS PCT % 15*   EOS PCT % 3       Results from last 7 days  Lab Units 05/29/18  1310   SODIUM mmol/L 142   POTASSIUM mmol/L 2 2*   CHLORIDE mmol/L 101   CO2 mmol/L 32   BUN mg/dL 10   CREATININE mg/dL 1 05   CALCIUM mg/dL 7 8*   TOTAL PROTEIN g/dL 6 4   BILIRUBIN TOTAL mg/dL 1 47*   ALK PHOS U/L 78   ALT U/L 35   AST U/L 179*   GLUCOSE RANDOM mg/dL 110     Lab Results   Component Value Date    TROPONINI 0 05 (H) 05/29/2018    CKTOTAL 3,388 (H) 05/29/2018           Urinalysis: Lab Results   Component Value Date    COLORU Yellow 05/29/2018    CLARITYU Clear 05/29/2018    SPECGRAV 1 015 05/29/2018    PHUR 6 0 05/29/2018    LEUKOCYTESUR Negative 05/29/2018    NITRITE Negative 05/29/2018    PROTEINUA Negative 05/29/2018    GLUCOSEU Negative 05/29/2018    KETONESU Negative 05/29/2018    BILIRUBINUR Interference- unable to analyze (A) 05/29/2018    BLOODU Negative 05/29/2018     Lab Results   Component Value Date    BLOODCX No Growth After 5 Days   12/08/2017    BLOODCX No Growth After 5 Days  12/08/2017   *  Imaging:  no acute fracture or dislocation on full body imaging  EKG, Pathology, and Other Studies:     Code Status: Prior  Advance Directive and Living Will:      Power of :    POLST:      Counseling / Coordination of Care:   Greater than 50% of total time was spent with the patient and / or family counseling and / or coordination of care  A description of the counseling / coordination of care: 40  I spent approximately 40 minutes in care coordination and evaluation of this patient  Anticipate at least 2 midnight stay a likely transition to skilled rehab for ambulatory dysfunction with recurrent falls  PTOT consult for mobilization evaluation and case management for discharge planning

## 2018-05-30 NOTE — PLAN OF CARE
Problem: PHYSICAL THERAPY ADULT  Goal: Performs mobility at highest level of function for planned discharge setting  See evaluation for individualized goals  Treatment/Interventions: Functional transfer training, LE strengthening/ROM, Elevations, Therapeutic exercise, Endurance training, Patient/family training, Equipment eval/education, Bed mobility, Gait training, Compensatory technique education, Continued evaluation, Spoke to nursing, OT          See flowsheet documentation for full assessment, interventions and recommendations  Outcome: Progressing  Prognosis: Fair  Problem List: Decreased strength, Decreased range of motion, Decreased endurance, Impaired balance, Decreased mobility, Decreased safety awareness, Obesity, Pain  Assessment: Pt is 38 y/o female admitted wtih fall, hypokalemia, elevated CPK  PT consulted  Up with assist   Hx of 2 falls since d/c from [facility] 2/2018  Baseline reports independent with ADLs and ambulation with RW support  REsides with boyfriend but he works 6 am to 5 pm   Gets home health aide 3 days per week for 2 hours  Currently with severe functional limitations given decreased LE strength, decreased LE ROM  Max A of 2 for bed mobitly,transfers and ambualtion with RW support  STep by step cues needed for safety  Decreased insight to impairments noted  Given significnt impairments will require STR prior to home in order to optimzie functional outcomes and return to independent state  PT will follow and progress  Barriers to Discharge: Inaccessible home environment, Decreased caregiver support     Recommendation: Short-term skilled PT     PT - OK to Discharge:  (yes to rhb, no to home)    See flowsheet documentation for full assessment

## 2018-05-30 NOTE — PLAN OF CARE
Problem: PHYSICAL THERAPY ADULT  Goal: Performs mobility at highest level of function for planned discharge setting  See evaluation for individualized goals  Treatment/Interventions: Functional transfer training, LE strengthening/ROM, Elevations, Therapeutic exercise, Endurance training, Patient/family training, Equipment eval/education, Bed mobility, Gait training, Compensatory technique education, Continued evaluation, Spoke to nursing, OT          See flowsheet documentation for full assessment, interventions and recommendations  Outcome: Progressing  Prognosis: Fair  Problem List: Decreased strength, Decreased range of motion, Decreased endurance, Impaired balance, Decreased mobility, Decreased safety awareness, Obesity, Pain  Assessment: Seen for progression of trnsfers and mobiltiy  Continues to require max A of 2 for trnasfers and ambulation with RW support  Limited tolerance to ambulation only being able to ambulate short distance from [facility] to bedside  Cues needed for all mobilty for safety  Activity tolerance limtied  Cont to rec STR given severe functional limitations and level of assistance  Barriers to Discharge: Inaccessible home environment     Recommendation: Short-term skilled PT     PT - OK to Discharge: Yes    See flowsheet documentation for full assessment

## 2018-05-30 NOTE — ED NOTES
Patient tearful during triage  Patient states, "why am I here on the sidelines? I want to cry alone in my own room  I don't want to be here  I want to go back home " Patient informed that there are no other available beds in the ED right now   Patient continues to state, "I want to be in my own room "      Jaqui Goodrich RN  05/29/18 7668

## 2018-05-30 NOTE — PLAN OF CARE
Depression - IP adult     Effects of depression will be minimized Progressing        GASTROINTESTINAL - ADULT     Maintains or returns to baseline bowel function Progressing        GENITOURINARY - ADULT     Maintains or returns to baseline urinary function Progressing     Absence of urinary retention Progressing        METABOLIC, FLUID AND ELECTROLYTES - ADULT     Electrolytes maintained within normal limits Progressing     Fluid balance maintained Progressing        NEUROSENSORY - ADULT     Remains free of injury related to seizures activity Progressing        Potential for Falls     Patient will remain free of falls Progressing        Prexisting or High Potential for Compromised Skin Integrity     Skin integrity is maintained or improved Progressing

## 2018-05-31 NOTE — SOCIAL WORK
CM met with pt at bedside to discuss discharge needs  Pt reports living in a house with her boyfriend  ADL's are completed independently  Pt reports using a walker  PCP identified as [facility]  Pharmacy identified as [facility] on [address]  Pt reports hx with VNA and hx at [facility]  Pt does report driving  Pt denies POA  PT is recommending pt go to STR  Pt does seem open to the idea  She has requested a referral be sent to [facility], which I explained to her was an acute rehab  CM sent referral but also provided her with a SNF list to choose a few other options  CM following

## 2018-05-31 NOTE — OCCUPATIONAL THERAPY NOTE
Occupational Therapy Cancellation Note:      Patient Name: Edenilson Núñez  LWTMT'U Date: 5/31/2018     Attempted to see pt for OT treatment session this PM, however pt declined at this time  Pt reported, "My dinner tray will be here any second  Can we try tomorrow?" Therefore, pt cx'd this PM  OT to continue to follow pt on caseload as able and appropriate to assist w/ discharge planning and recommendations      Maurilio Navarro, OTR/L

## 2018-05-31 NOTE — PLAN OF CARE
Problem: DISCHARGE PLANNING - CARE MANAGEMENT  Goal: Discharge to post-acute care or home with appropriate resources  INTERVENTIONS:  - Conduct assessment to determine patient/family and health care team treatment goals, and need for post-acute services based on payer coverage, community resources, and patient preferences, and barriers to discharge  - Address psychosocial, clinical, and financial barriers to discharge as identified in assessment in conjunction with the patient/family and health care team  - Arrange appropriate level of post-acute services according to patients   needs and preference and payer coverage in collaboration with the physician and health care team  - Communicate with and update the patient/family, physician, and health care team regarding progress on the discharge plan  - Arrange appropriate transportation to post-acute venues  Outcome: Progressing  Pt needs STR  Pt requests a referral to be sent to [facility]  CM did advise her that it is an acute rehab  CM sent referral but also advised pt to look at the SNF list to choose some other options  Pt will need to be optioned if going to a SNF

## 2018-05-31 NOTE — PROGRESS NOTES
[address] Internal Medicine Progress Note  Patient: Fredricka Olszewski 39 y o  female   MRN: [id number]  PCP: Wayne Craft MD  Unit/Bed#: [place] [address] 2 [facility] 87 [id number] Encounter: [id number]  Date Of Visit: 18    Assessment:    Principal Problem:    Recurrent falls while walking  Active Problems:    Hypomagnesemia    Acute hypokalemia    Elevated CPK    Hyperammonemia ([address] )      Plan:  #1 frequent falls  Unknown cause at this time  This could certainly be related to hypokalemia  We will aggressively replace potassium  IV hydration  Correct electrolytes as needed  Pain control with Tylenol/Percocet as needed  Awaiting for psychiatry evaluation  I will place the patient in fall and aspiration precautions today  We will continue with current care  #2 bipolar disorder  Appreciate psychiatry consultation  Continue Seroquel and Elavil  Mood affect appropriate  Denies suicidal homicidal ideation      Disposition: We will continue with gentle IV hydration for at least 24 hours, plan to discharge to rehab versus skilled nursing facility in a   If placement is available  VTE Pharmacologic Prophylaxis:   Pharmacologic: Heparin  Mechanical VTE Prophylaxis in Place: Yes    Patient Centered Rounds: I have performed bedside rounds with nursing staff today  Discussions with Specialists or Other Care Team Provider: yes    Education and Discussions with Family / Patient: now    Time Spent for Care: 45 minutes  More than 50% of total time spent on counseling and coordination of care as described above      Current Length of Stay: 2 day(s)    Current Patient Status: Inpatient   Certification Statement: The patient will continue to require additional inpatient hospital stay due to rhabdomyolysis, psychosis,    Discharge Plan / Estimated Discharge Date: to be determined    Code Status: Level 1 - Full Code      Subjective:   "I have no complaints "  Nursing reports  Objective:     Vitals:   Temp (24hrs), Av °F (37 2 °C), Min:98 4 °F (36 9 °C), Max:100 2 °F (37 9 °C)    HR:  [74-92] 74  Resp:  [18] 18  BP: ()/(47-55) 96/48  SpO2:  [97 %-99 %] 97 %  Body mass index is 47 79 kg/m²  Input and Output Summary (last 24 hours): Intake/Output Summary (Last 24 hours) at 05/31/18 1649  Last data filed at 05/31/18 1601   Gross per 24 hour   Intake          4288 33 ml   Output             1450 ml   Net          2838 33 ml       Physical Exam:     Physical Exam    GENERAL APPEARANCE: WD/WN in NAD pleasant  SKIN: no rash, small bruising in left lower extremity lateral section otherwise benign  HEENT: NC/AT, PERRLA (B), moist MM, no epistaxis  NECK: Supple, no JVD    LUNGS: CTA (B) mildly prolonged expiratory phase,   no use of accessory muscles  HEART:          S1S 2, RRR  , PMI is not displaced  ABDOMEN: Soft, nontender, nondistended, +BS  Rectal exam:  EXTREMITIES: no edema   PERIPHERAL VASCULAR: palpable pulses   NEURO:  AAO x 3, CN 2-12: non focal  MUSCLE STRENGHT: patient refused ambulation but moving all extremities at will  Additional Data:     Labs:      Results from last 7 days  Lab Units 05/31/18  0517  05/29/18  1310   WBC Thousand/uL 6 37  < > 6 48   HEMOGLOBIN g/dL 9 1*  < > 9 8*   HEMATOCRIT % 27 3*  < > 28 8*   PLATELETS Thousands/uL 224  < > 182   NEUTROS PCT %  --   --  50   LYMPHS PCT %  --   --  32   LYMPHO PCT % 21  --   --    MONOS PCT %  --   --  15*   MONO PCT MAN % 6  --   --    EOS PCT %  --   --  3   EOSINO PCT MANUAL % 1  --   --    < > = values in this interval not displayed      Results from last 7 days  Lab Units 05/31/18  0517  05/29/18  1310   SODIUM mmol/L 141  < > 142   POTASSIUM mmol/L 3 7  < > 2 2*   CHLORIDE mmol/L 104  < > 101   CO2 mmol/L 28  < > 32   BUN mg/dL 8  < > 10   CREATININE mg/dL 0 74  < > 1 05   CALCIUM mg/dL 7 8*  < > 7 8*   TOTAL PROTEIN g/dL  --   --  6 4   BILIRUBIN TOTAL mg/dL  --   --  1 47*   ALK PHOS U/L  --   --  78   ALT U/L  --   --  35   AST U/L  --   --  179*   GLUCOSE RANDOM mg/dL 81  < > 110   < > = values in this interval not displayed  * I Have Reviewed All Lab Data Listed Above  * Additional Pertinent Lab Tests Reviewed: [address] Admission Reviewed    Imaging:    Imaging Reports Reviewed Today Include: yes  Imaging Personally Reviewed by Myself Includes:  yes    Recent Cultures (last 7 days):           Last 24 Hours Medication List:     Current Facility-Administered Medications:  acetaminophen 650 mg Oral Q6H PRN Ancil Ormond, MD    albuterol 2 puff Inhalation Q6H Ancil Ormond, MD    amitriptyline 10 mg Oral HS Ancil Ormond, MD    cyanocobalamin 1,000 mcg Oral Daily Ancil Ormond, MD    docusate sodium 100 mg Oral BID Ancil Ormond, MD    enoxaparin 40 mg Subcutaneous Daily Ancil Ormond, MD    ferrous sulfate 325 mg Oral Daily Ancil Ormond, MD    folic acid 1 mg Oral Daily Ancil Ormond, MD    furosemide 80 mg Oral Daily Ancil Ormond, MD    gabapentin 300 mg Oral TID Ancil Ormond, MD    lactulose 20 g Oral Daily Ancil Ormond, MD    LORazepam 1 mg Oral TID Ancil Ormond, MD    magnesium oxide 400 mg Oral BID Ancil Ormond, MD    metolazone 5 mg Oral Once per day on Mon Thu Ancil Ormond, MD    ondansetron 4 mg Intravenous Q6H PRN Ancil Ormond, MD    oxyCODONE-acetaminophen 1 tablet Oral Q4H PRN Jana Abdullahi MD    pantoprazole 40 mg Oral Early Morning Ancil Ormond, MD    potassium chloride 40 mEq Oral TID Ancil Ormond, MD    QUEtiapine 50 mg Oral HS Ancil Ormond, MD    sodium chloride 125 mL/hr Intravenous Continuous Ancil Ormond, MD Last Rate: 125 mL/hr (05/31/18 1058)   spironolactone 50 mg Oral Daily Ancil Ormond, MD         Today, Patient Was Seen By: Jana Abdullahi MD    ** Please Note: This note has been constructed using a voice recognition system   **

## 2018-06-01 NOTE — PROGRESS NOTES
[address] Internal Medicine Progress Note  Patient: Og Menjivar 39 y o  female   MRN: [id number]  PCP: Marta Sherman MD  [facility] -[facility] Encounter: [id number]  Date Of Visit: 06/01/18    Assessment:    Principal Problem:    Recurrent falls while walking  Active Problems:    Hypomagnesemia    Acute hypokalemia    Elevated CPK    Hyperammonemia ([address] )      Plan:  #1 frequent falls  Unknown cause at this time  This could certainly be related to hypokalemia  We will aggressively replace potassium  IV hydration  Correct electrolytes as needed  Pain control with Tylenol/Percocet as needed  Awaiting for psychiatry evaluation  I will place the patient in fall and aspiration precautions today  We will continue with current care  Patient has made substantialClinical improvement currently has no further complaints  #2 bipolar disorder  Appreciate psychiatry consultation  Continue Seroquel and Elavil  Mood affect appropriate  Denies suicidal homicidal ideation    #3 hypokalemia  Replace by mouth and IV as needed  Disposition: patient is medically stable for discharge, case discussed with case management and told patient "will need to be optioned "prior to discharge  Process has been initiated  VTE Pharmacologic Prophylaxis:   Pharmacologic: Heparin  Mechanical VTE Prophylaxis in Place: Yes    Patient Centered Rounds: I have performed bedside rounds with nursing staff today  Discussions with Specialists or Other Care Team Provider: yes    Education and Discussions with Family / Patient: now    Time Spent for Care: 45 minutes  More than 50% of total time spent on counseling and coordination of care as described above      Current Length of Stay: 3 day(s)    Current Patient Status: Inpatient   Certification Statement: The patient will continue to require additional inpatient hospital stay due to rhabdomyolysis, psychosis,    Discharge Plan / Estimated Discharge Date: to be determined    Code Status: Level 1 - Full Code      Subjective:   "I have no complaints "  Nursing reports  Objective:     Vitals:   Temp (24hrs), Av 4 °F (36 9 °C), Min:98 2 °F (36 8 °C), Max:98 6 °F (37 °C)    HR:  [84-89] 89  Resp:  [18-20] 18  BP: ()/(51-66) 102/60  SpO2:  [90 %-97 %] 97 %  Body mass index is 47 79 kg/m²  Input and Output Summary (last 24 hours): Intake/Output Summary (Last 24 hours) at 18 192  Last data filed at 18 1905   Gross per 24 hour   Intake           2042 5 ml   Output              975 ml   Net           1067 5 ml       Physical Exam:     Physical Exam    GENERAL APPEARANCE: WD/WN in NAD pleasant  SKIN: no rash, small bruising in left lower extremity lateral section otherwise benign  HEENT: NC/AT, PERRLA (B), moist MM, no epistaxis  NECK: Supple, no JVD    LUNGS: CTA (B) mildly prolonged expiratory phase,   no use of accessory muscles  HEART:          S1S 2, RRR  , PMI is not displaced  ABDOMEN: Soft, nontender, nondistended, +BS  Rectal exam:  EXTREMITIES: no edema   PERIPHERAL VASCULAR: palpable pulses   NEURO:  AAO x 3, CN 2-12: non focal  MUSCLE STRENGHT: patient refused ambulation but moving all extremities at will  Additional Data:     Labs:      Results from last 7 days  Lab Units 18  0637   WBC Thousand/uL 4 87   HEMOGLOBIN g/dL 9 8*   HEMATOCRIT % 29 8*   PLATELETS Thousands/uL 210   NEUTROS PCT % 54   LYMPHS PCT % 30   MONOS PCT % 12   EOS PCT % 4       Results from last 7 days  Lab Units 18  0517  18  1310   SODIUM mmol/L 141  < > 142   POTASSIUM mmol/L 3 7  < > 2 2*   CHLORIDE mmol/L 104  < > 101   CO2 mmol/L 28  < > 32   BUN mg/dL 8  < > 10   CREATININE mg/dL 0 74  < > 1 05   CALCIUM mg/dL 7 8*  < > 7 8*   TOTAL PROTEIN g/dL  --   --  6 4   BILIRUBIN TOTAL mg/dL  --   --  1 47*   ALK PHOS U/L  --   --  78   ALT U/L  --   --  35   AST U/L  --   --  179*   GLUCOSE RANDOM mg/dL 81  < > 110   < > = values in this interval not displayed          * I Have Reviewed All Lab Data Listed Above  * Additional Pertinent Lab Tests Reviewed: [address] Admission Reviewed    Imaging:    Imaging Reports Reviewed Today Include: yes  Imaging Personally Reviewed by Myself Includes:  yes    Recent Cultures (last 7 days):           Last 24 Hours Medication List:     Current Facility-Administered Medications:  acetaminophen 650 mg Oral Q6H PRN Cassie Horn MD    albuterol 2 puff Inhalation Q6H Cassie Horn MD    amitriptyline 10 mg Oral HS Cassie Horn MD    cyanocobalamin 1,000 mcg Oral Daily Cassie Horn MD    docusate sodium 100 mg Oral BID Cassie Horn MD    enoxaparin 40 mg Subcutaneous Daily Cassie Horn MD    ferrous sulfate 325 mg Oral Daily Cassie Horn MD    folic acid 1 mg Oral Daily Cassie Horn MD    furosemide 80 mg Oral Daily Cassie Horn MD    gabapentin 300 mg Oral TID Cassie Horn MD    lactulose 20 g Oral Daily Cassie Horn MD    LORazepam 1 mg Oral TID Cassie Horn MD    magnesium oxide 400 mg Oral BID Cassie Horn MD    metolazone 5 mg Oral Once per day on Mon Thu Cassie Horn MD    ondansetron 4 mg Intravenous Q6H PRN Cassie Horn MD    oxyCODONE-acetaminophen 1 tablet Oral Q4H PRN Cheri Henry MD    pantoprazole 40 mg Oral Early Morning Cassie Horn MD    potassium chloride 40 mEq Oral TID Cassie Horn MD    QUEtiapine 50 mg Oral HS Cassie Horn MD    sodium chloride 125 mL/hr Intravenous Continuous Cassie Horn MD Last Rate: 125 mL/hr (06/01/18 1235)   spironolactone 50 mg Oral Daily Cassie Horn MD         Today, Patient Was Seen By: Cheri Henry MD    ** Please Note: This note has been constructed using a voice recognition system   **

## 2018-06-01 NOTE — CONSULTS
Brief Palliative and Supportive Care note: The Palliative and Supportive Care team cannot provide consultation for pain complaints unrelated to a terminal or life-limiting process, or a cancer disease  According to our interpretation, this patient does not qualify on our referral criteria  Furthermore, please note that the Acute Pain Service only provides support at [place]  We regret the limitations in our ability to provide assistance with this patient's care  We would suggest that acute pain requiring opioids be managed with straight oxycodone, not Percocet  There may be little help and significant harm from Tylenol in Percocet to this patient's liver  We recommend contacting the patient's outpatient pain medicine provider for further guidance  Dr Rainell Cheadle, [facility] [phone]     Please D/C the APS and Palliative consults      Luz Maria Gonzalez MD  Palliative and Supportive Care  Pager: [phone]

## 2018-06-01 NOTE — PLAN OF CARE
Problem: PHYSICAL THERAPY ADULT  Goal: Performs mobility at highest level of function for planned discharge setting  See evaluation for individualized goals  Treatment/Interventions: Functional transfer training, LE strengthening/ROM, Elevations, Therapeutic exercise, Endurance training, Patient/family training, Equipment eval/education, Bed mobility, Gait training, Compensatory technique education, Continued evaluation, Spoke to nursing, OT          See flowsheet documentation for full assessment, interventions and recommendations  Outcome: Not Progressing  Prognosis: Fair  Problem List: Decreased strength, Decreased range of motion, Decreased endurance, Impaired balance, Decreased mobility, Impaired judgement, Decreased cognition, Decreased safety awareness, Obesity, Decreased skin integrity, Pain  Assessment: Pt  supine in bed upon my arrival  Pt  reports increased pain this PM, requesting to remain in bed at this time  Several attempts for OOB mobility, however pt  continued to refuse  Agreeable to bedlevel therapy session  Performance of HEP supine in bed requiring A of therapist for proper completion  Limited participation noted by pt  Repositioned supine requiring max A of therapist  Remained supine in bed with alarm active at end of treatments session  PT will continue to recommend rehab upon d/c for continued improvement of noted impairments above  Barriers to Discharge: Inaccessible home environment, Decreased caregiver support  Barriers to Discharge Comments: DUTCH, level of support at home  Recommendation: Short-term skilled PT     PT - OK to Discharge: Yes (if d/c to rehab when medically stable )    See flowsheet documentation for full assessment

## 2018-06-01 NOTE — PHYSICAL THERAPY NOTE
Physical Therapy Progress Note     06/01/18 1520   Pain Assessment   Pain Assessment 0-10   Pain Score Worst Possible Pain   Pain Type Acute pain   Pain Location Groin;Leg   Pain Orientation Left   Hospital Pain Intervention(s) Ambulation/increased activity;Repositioned   Response to Interventions Poorly Tolerated  Restrictions/Precautions   Weight Bearing Precautions Per Order No   Other Precautions Bed Alarm; Fall Risk;Pain; Impulsive;Multiple lines   General   Chart Reviewed Yes   Response to Previous Treatment Patient reporting fatigue but able to participate  Family/Caregiver Present No   Subjective   Subjective "I'm in so much pain "   Bed Mobility   Rolling R 2  Maximal assistance   Additional items Assist x 1;Bedrails; Increased time required;Verbal cues;LE management;Leg    Rolling L 2  Maximal assistance   Additional items Assist x 1;Bedrails;Leg ; Increased time required;LE management;Verbal cues   Endurance Deficit   Endurance Deficit Yes   Endurance Deficit Description fatigue/pain   Activity Tolerance   Activity Tolerance Patient limited by fatigue;Patient limited by pain   Nurse Made Aware Yes   Exercises   TKR Supine;10 reps;AAROM; Bilateral   Assessment   Prognosis Fair   Problem List Decreased strength;Decreased range of motion;Decreased endurance; Impaired balance;Decreased mobility; Impaired judgement;Decreased cognition;Decreased safety awareness; Obesity; Decreased skin integrity;Pain   Assessment Pt  supine in bed upon my arrival  Pt  reports increased pain this PM, requesting to remain in bed at this time  Several attempts for OOB mobility, however pt  continued to refuse  Agreeable to bedlevel therapy session  Performance of HEP supine in bed requiring A of therapist for proper completion  Limited participation noted by pt  Repositioned supine requiring max A of therapist  Remained supine in bed with alarm active at end of treatments session   PT will continue to recommend rehab upon d/c for continued improvement of noted impairments above  Barriers to Discharge Inaccessible home environment;Decreased caregiver support   Barriers to Discharge Comments DUTCH, level of support at home  Goals   Patient Goals To get better  STG Expiration Date 06/09/18   Treatment Day 2   Plan   Treatment/Interventions Functional transfer training;LE strengthening/ROM; Therapeutic exercise; Endurance training;Bed mobility;Spoke to nursing;Spoke to case management   Progress Slow progress, decreased activity tolerance   PT Frequency Other (Comment)  (4-6x/wk)   Recommendation   Recommendation Short-term skilled PT   PT - OK to Discharge Yes  (if d/c to rehab when medically stable )     Liban Carvalho, PTA

## 2018-06-01 NOTE — PLAN OF CARE
Depression - IP adult     Effects of depression will be minimized Progressing        DISCHARGE PLANNING - CARE MANAGEMENT     Discharge to post-acute care or home with appropriate resources Progressing        GASTROINTESTINAL - ADULT     Maintains or returns to baseline bowel function Progressing        GENITOURINARY - ADULT     Maintains or returns to baseline urinary function Progressing     Absence of urinary retention Progressing        METABOLIC, FLUID AND ELECTROLYTES - ADULT     Electrolytes maintained within normal limits Progressing     Fluid balance maintained Progressing        NEUROSENSORY - ADULT     Remains free of injury related to seizures activity Progressing        Potential for Falls     Patient will remain free of falls Progressing        Prexisting or High Potential for Compromised Skin Integrity     Skin integrity is maintained or improved Progressing

## 2018-06-02 PROBLEM — F31.9 BIPOLAR DISORDER (HCC): Status: ACTIVE | Noted: 2018-01-01

## 2018-06-02 NOTE — ASSESSMENT & PLAN NOTE
Non traumatic rhabdomyolysis 2* fall and being down   4000s on admission improved to 2000s on last check  Repeat bmp ck in AM given still on IVF hydration

## 2018-06-02 NOTE — PROGRESS NOTES
Patient reporting that PM medications Elavil and Seroquel were not given  When reassured that meds were given as ordered by this RN, patient stated, "Oh, I forget sometimes "  After exiting room, patient reported to PCA staff that meds were not given  Further medications to be given with second staff member present

## 2018-06-02 NOTE — ASSESSMENT & PLAN NOTE
Mild in the setting of fatty liver disease w/o definitive cirrhosis    No evidence of hepatic encephalopathy  Does not correlate to ammonia levels across BBB  Continue daily lactulose for constipation

## 2018-06-02 NOTE — ASSESSMENT & PLAN NOTE
Likely polyfactorial from electrolyte derangement, habitus and possibly polypharmacy from neurontin, ativan, and seroquel  Initial CT head, cervical, thoracolumbar recon, c/a/p negative for any acute fx or acute pathology  Electrolytes improved, pt recommended for STR awaiting optioning

## 2018-06-02 NOTE — PROGRESS NOTES
Progress Note - Edenilson Núñez 1975, 39 y o  female MRN: [id number]    Unit/Bed#: [place] [address] -01 Encounter: [id number]    Primary Care Provider: Ana Bolaños MD   Date and time admitted to hospital: 5/29/2018  9:05 PM        Bipolar disorder [facility])   Assessment & Plan    [place] to have capacity for mdm by psych  Continue elavil, seroquel, switch ativan to 1mg TID PRN        Hyperammonemia (HCC)   Assessment & Plan    Mild in the setting of fatty liver disease w/o definitive cirrhosis  No evidence of hepatic encephalopathy  Does not correlate to ammonia levels across BBB  Continue daily lactulose for constipation        Elevated CPK   Assessment & Plan    Non traumatic rhabdomyolysis 2* fall and being down   4000s on admission improved to 2000s on last check  Repeat bmp ck in AM given still on IVF hydration            Acute hypokalemia   Assessment & Plan    resolved        Hypomagnesemia   Assessment & Plan    resolved        * Recurrent falls while walking   Assessment & Plan    Likely polyfactorial from electrolyte derangement, habitus and possibly polypharmacy from neurontin, ativan, and seroquel  Initial CT head, cervical, thoracolumbar recon, c/a/p negative for any acute fx or acute pathology  Electrolytes improved, pt recommended for STR awaiting optioning          s/p peng catheter insertion   No hx of retention issues or ever going to urologist   Can continue for now, will need voiding trial prior to d/c  VTE Pharmacologic Prophylaxis:   Pharmacologic: Enoxaparin (Lovenox)  Mechanical VTE Prophylaxis in Place: Yes    Patient Centered Rounds: I have performed bedside rounds with nursing staff today  Discussions with Specialists or Other Care Team Provider:     Education and Discussions with Family / Patient: disposition workup    Time Spent for Care: 20 minutes  More than 50% of total time spent on counseling and coordination of care as described above      Current Length of Stay: 4 day(s)    Current Patient Status: Inpatient   Certification Statement: The patient will continue to require additional inpatient hospital stay due to awaiting placement for str    Discharge Plan:     Code Status: Level 1 - Full Code      Subjective:   Patient seen and examined yesterday evening the patient appeared somewhat confused per nursing staff and her narcotic medications were held as the patient did not appear oriented  This has since resolved  She reports that her legs still feel very weak but has not been participating well in therapy  She is having dysuria in feels like she is passing clots, however her Patino catheter has been present and clear and per nursing in there has been no passage of clots thus making good urine output  Objective:     Vitals:   Temp (24hrs), Av °F (36 7 °C), Min:97 6 °F (36 4 °C), Max:98 3 °F (36 8 °C)    HR:  [83-90] 83  Resp:  [18-20] 18  BP: (102-112)/(51-60) 112/51  SpO2:  [97 %-98 %] 97 %  Body mass index is 47 79 kg/m²  Input and Output Summary (last 24 hours): Intake/Output Summary (Last 24 hours) at 18 1158  Last data filed at 18 1905   Gross per 24 hour   Intake              980 ml   Output              350 ml   Net              630 ml       Physical Exam:     Physical Exam   Constitutional: She appears well-developed  HENT:   Head: Normocephalic and atraumatic  Right Ear: External ear normal    Left Ear: External ear normal    Eyes: Conjunctivae are normal  Right eye exhibits no discharge  Left eye exhibits no discharge  No scleral icterus  Neck: Normal range of motion  Cardiovascular: Normal rate, regular rhythm and normal heart sounds  Exam reveals no gallop and no friction rub  No murmur heard  Pulmonary/Chest: No respiratory distress  She has no wheezes  She has no rales  Abdominal: Soft  She exhibits no distension  There is no tenderness  There is no rebound and no guarding     Genitourinary:   Genitourinary Comments: Patino catheter present   Musculoskeletal: She exhibits edema (1+ edema b/l)  Neurological: She is alert  Skin: Skin is warm and dry  She is not diaphoretic  Psychiatric: She has a normal mood and affect  Vitals reviewed  Additional Data:     Labs:      Results from last 7 days  Lab Units 06/02/18  0656   WBC Thousand/uL 4 88   HEMOGLOBIN g/dL 9 6*   HEMATOCRIT % 28 7*   PLATELETS Thousands/uL 232   NEUTROS PCT % 57   LYMPHS PCT % 26   MONOS PCT % 12   EOS PCT % 5       Results from last 7 days  Lab Units 05/31/18  0517  05/29/18  1310   SODIUM mmol/L 141  < > 142   POTASSIUM mmol/L 3 7  < > 2 2*   CHLORIDE mmol/L 104  < > 101   CO2 mmol/L 28  < > 32   BUN mg/dL 8  < > 10   CREATININE mg/dL 0 74  < > 1 05   CALCIUM mg/dL 7 8*  < > 7 8*   TOTAL PROTEIN g/dL  --   --  6 4   BILIRUBIN TOTAL mg/dL  --   --  1 47*   ALK PHOS U/L  --   --  78   ALT U/L  --   --  35   AST U/L  --   --  179*   GLUCOSE RANDOM mg/dL 81  < > 110   < > = values in this interval not displayed  * I Have Reviewed All Lab Data Listed Above  * Additional Pertinent Lab Tests Reviewed:  All Labs Within Last 24 Hours Reviewed    Imaging:    Imaging Reports Reviewed Today Include:   Imaging Personally Reviewed by Myself Includes:      Recent Cultures (last 7 days):           Last 24 Hours Medication List:     Current Facility-Administered Medications:  acetaminophen 650 mg Oral Q6H PRN Curtis Leal MD    albuterol 2 puff Inhalation Q6H Curtis Leal MD    amitriptyline 10 mg Oral HS Curtis Leal MD    cyanocobalamin 1,000 mcg Oral Daily Curtis Leal MD    docusate sodium 100 mg Oral BID Curtis Leal MD    enoxaparin 40 mg Subcutaneous Daily Curtis Leal MD    ferrous sulfate 325 mg Oral Daily Curtis Leal MD    folic acid 1 mg Oral Daily Curtis Leal MD    furosemide 80 mg Oral Daily Curtis MD Elvis    gabapentin 300 mg Oral TID Curtis Leal MD    lactulose 20 g Oral Daily Brionna Zavala MD    LORazepam 1 mg Oral TID Brionna Zavala MD    magnesium oxide 400 mg Oral BID Brionna Zavala MD    metolazone 5 mg Oral Once per day on Mon Thu Brionna Zavala MD    ondansetron 4 mg Intravenous Q6H PRN Brionna Zavala MD    oxyCODONE-acetaminophen 1 tablet Oral Q4H PRN Fay Ross MD    pantoprazole 40 mg Oral Early Morning Brionna Zavala MD    potassium chloride 40 mEq Oral TID Brionna Zavala MD    QUEtiapine 50 mg Oral HS Brionna Zavala MD    sodium chloride 125 mL/hr Intravenous Continuous Brionna Zavala MD Last Rate: 125 mL/hr (06/01/18 2146)   spironolactone 50 mg Oral Daily Brionna Zavala MD         Today, Patient Was Seen By: Jamie Barajas PA-C    ** Please Note: Dictation voice to text software may have been used in the creation of this document   **

## 2018-06-03 NOTE — OCCUPATIONAL THERAPY NOTE
Occupational Therapy Treatment Note:         06/03/18 1405   Restrictions/Precautions   Weight Bearing Precautions Per Order No   Other Precautions Fall Risk;Pain;Cognitive; Bed Alarm; Chair Alarm;Multiple lines   Pain Assessment   Pain Assessment 0-10   Pain Score 8   Pain Type Chronic pain   Pain Location Leg   ADL   Where Assessed Supine, bed  (HOB elevated  )   Grooming Assistance 4  Minimal Assistance   Grooming Deficit Setup;Verbal cueing;Supervision/safety; Increased time to complete;Wash/dry face; Wash/dry hands   Grooming Comments cues to engage in activities required  LB Dressing Assistance 2  Maximal Assistance   LB Dressing Deficit Setup;Verbal cueing;Supervision/safety; Increased time to complete; Don/doff R sock; Don/doff L sock   LB Dressing Comments Pt with limited functional reach due to noted pain levels  Toileting Comments unable to assess   Functional Standing Tolerance   Comments uanble ot assess, increased anxiety/pain  Bed Mobility   Rolling R 2  Maximal assistance   Additional items Assist x 2   Rolling L 2  Maximal assistance   Additional items Assist x 2   Additional Comments increased pain noted with activity  Transfers   Additional Comments unable to assess  Cognition   Overall Cognitive Status Impaired   Arousal/Participation Cooperative; Alert   Attention Attends with cues to redirect   Orientation Level Oriented X4   Memory Decreased short term memory;Decreased recall of precautions;Decreased recall of recent events   Following Commands Follows one step commands without difficulty   Comments Pt with mild cognitive deficits noted  Poor ST memory with delayed recall  Cognition Assessment Tools MOCA  (MOCA-B)   Score 22   Additional Activities   Additional Activities Other (Comment)  (reviewed current plan of care/fall prevention  )   Additional Activities Comments Pt with limited safety awareness/compliance with previously reviewed information/written handout provided  Activity Tolerance   Activity Tolerance Patient limited by fatigue;Patient limited by pain   Medical Staff Made Aware Reported all findings to nursing staff  Assessment   Assessment Pt was seen for skilled OT with focus on completion of BUE ROM exercises, light grooming activity, bed mobility, completion of the [place] Basic version 7 1  And review of fall prevention checklist  Pt scored 22/30 on the MOCA-B indicating mild cognitive deficits  Areas of deficits included executive functioning scoring 0/1, abstraction scoring 1/3, delayed recall scoring 1/5, attention scoring 2/3  Pt with limited focus to tasks noted with increased pain levels stating 8/10 overall, increased anxiety and tears noted with need for moderate emotional support and reassurance  Pt able to focus on fall prevention review with F understanding noted  Pt stated, "I know I should have been using my walking and not carrying 2 things in my hands"  Pt able to engage in BUE ROM exercises with poor activity tolerance noted  See above levels of A required for all functional tasks  OOB positioning contraindicated at this time due to increased fatigue/pain and erratic behaviors previously noted by nursing staff, indicating poor safety awareness  Pt will require further rehab with focus on achieving highest levels of independence with all functional tasks  Plan   Treatment Interventions ADL retraining;Cognitive reorientation; Endurance training;UE strengthening/ROM; Functional transfer training   Goal Expiration Date 06/13/18   Treatment Day 1   OT Frequency 3-5x/wk   Recommendation   OT Discharge Recommendation Short Term Rehab   Barthel Index   Feeding 10   Bathing 0   Grooming Score 0   Dressing Score 5   Bladder Score 0   Bowels Score 10   Toilet Use Score 5   Transfers (Bed/Chair) Score 5   Mobility (Level Surface) Score 0   Stairs Score 0   Barthel Index Score 35   Modified [place] Scale   Modified [place] Scale 4   Shana Guajardo, [address]

## 2018-06-03 NOTE — PLAN OF CARE
Problem: OCCUPATIONAL THERAPY ADULT  Goal: Performs self-care activities at highest level of function for planned discharge setting  See evaluation for individualized goals  Treatment Interventions: ADL retraining, Functional transfer training, UE strengthening/ROM, Endurance training, Patient/family training, Equipment evaluation/education, Compensatory technique education, Continued evaluation, Energy conservation, Activityengagement          See flowsheet documentation for full assessment, interventions and recommendations  Outcome: Progressing  Limitation: Decreased ADL status, Decreased endurance, Decreased Safe judgement during ADL, Decreased self-care trans, Decreased high-level ADLs  Prognosis: Fair  Assessment: Pt was seen for skilled OT with focus on completion of BUE ROM exercises, light grooming activity, bed mobility, completion of the [place] Basic version 7 1  And review of fall prevention checklist  Pt scored 22/30 on the MOCA-B indicating mild cognitive deficits  Areas of deficits included executive functioning scoring 0/1, abstraction scoring 1/3, delayed recall scoring 1/5, attention scoring 2/3  Pt with limited focus to tasks noted with increased pain levels stating 8/10 overall, increased anxiety and tears noted with need for moderate emotional support and reassurance  Pt able to focus on fall prevention review with F understanding noted  Pt stated, "I know I should have been using my walking and not carrying 2 things in my hands"  Pt able to engage in BUE ROM exercises with poor activity tolerance noted  See above levels of A required for all functional tasks  OOB positioning contraindicated at this time due to increased fatigue/pain and erratic behaviors previously noted by nursing staff, indicating poor safety awareness  Pt will require further rehab with focus on achieving highest levels of independence with all functional tasks        OT Discharge Recommendation: Short Term Rehab  OT - OK to Discharge:  (yes to STR, no to home 2* increased assist req   at this time)      Comments: Michael Figueredo, [address]

## 2018-06-04 NOTE — SOCIAL WORK
CM completed all optioning paperwork and secured all signatures and faxed over to [facility]

## 2018-06-04 NOTE — PLAN OF CARE
Problem: PHYSICAL THERAPY ADULT  Goal: Performs mobility at highest level of function for planned discharge setting  See evaluation for individualized goals  Treatment/Interventions: Functional transfer training, LE strengthening/ROM, Elevations, Therapeutic exercise, Endurance training, Patient/family training, Equipment eval/education, Bed mobility, Gait training, Compensatory technique education, Continued evaluation, Spoke to nursing, OT          See flowsheet documentation for full assessment, interventions and recommendations  Outcome: Progressing  Prognosis: Fair  Problem List: Decreased strength, Decreased range of motion, Decreased endurance, Impaired balance, Decreased mobility, Decreased cognition, Decreased safety awareness, Obesity  Assessment: Pt declining functional mobility training initially, much encouragement to perform transfers, standing and side stepping  Pt  Demonstrates impaired ability toperform bed mobility, transfers and ambulation requiring increased assistance, time and moderate verbal cues for all aspects of mobility  PT demonstrates steady sitting balance at edge of bed  PT progressed to static standing with use of blue Rw and mod- min assist x1 x 4 minutes x1 and 2 minutes  x1  PT requiring verbal cues for improved standing posture, balance and safety  PT progressed to forward and backward stepping and side stepping w mod assist x2-1 with use of rw, encouragement and reassurance  Attempted stand pivot to recliner, however pt became anxious and tearful, stating, " I can't do it! Why do you make me do these things?  " Pt took 2 steps back toward bed sat, rested ,then performed side stepping  x4 toward head of bed with mod assist x1  PT returned to bed with mod assist x2  PT will continue to benefit from inpt PT and rehab inorder to maximize functional mobility and I for safe d/c to home     Barriers to Discharge: Inaccessible home environment, Decreased caregiver support  Barriers to Discharge Comments: DUTCH, level of support at home  Recommendation: Short-term skilled PT     PT - OK to Discharge: Yes (to rehab)    See flowsheet documentation for full assessment

## 2018-06-04 NOTE — ASSESSMENT & PLAN NOTE
Secondary to alcoholic liver disease  Continue furosemide and metolazone  Hypotension secondary to hypoalbuminemia    Will schedule albumin and continue diuretics

## 2018-06-04 NOTE — PROGRESS NOTES
Progress Note - Roslyn Alas 1975, 39 y o  female MRN: [id number]    Unit/Bed#: [place] [address] -01 Encounter: [id number]    Primary Care Provider: Keisha Pak MD   Date and time admitted to hospital: 5/29/2018  9:05 PM        Assessment and Plan  * Recurrent falls while walking   Assessment & Plan    Poly factorial secondary to morbid obesity and anasarca  Awaiting rehab placement  Bipolar disorder ([facility] [address] )   Assessment & Plan    Mood stable  Continue quetiapine and amitriptyline        Hyperammonemia ([facility] [address] )   Assessment & Plan    In the setting of alcoholic liver disease  Continue lactulose        Acute hypokalemia   Assessment & Plan    May need to hold spironolactone if potassium continues to elevate  Hypomagnesemia   Assessment & Plan    Has been repleted  Continue magnesium oxide b i d  Anasarca   Assessment & Plan    Secondary to alcoholic liver disease  Continue furosemide and metolazone  Hypotension secondary to hypoalbuminemia  Will schedule albumin and continue diuretics         VTE Pharmacologic Prophylaxis: Enoxaparin (Lovenox)    Patient Centered Rounds: I have performed bedside rounds with nursing staff today  Discussions with Specialists or Other Care Team Provider:     Education and Discussions with Family / Patient:     Time Spent for Care: 30 mins  More than 50% of total time spent on counseling and coordination of care as described above  Current Length of Stay: 6 day(s)    Current Patient Status: Inpatient   Certification Statement: The patient will continue to require additional inpatient hospital stay due to debility    Discharge Plan / Estimated Discharge Date:     Code Status: Level 1 - Full Code  ______________________________________________________________________________    Subjective:   Patient seen and examined  Complains of diffuse pain more so right upper quadrant      Objective:   Vitals: Blood pressure 100/54, pulse 92, temperature 99 3 °F (37 4 °C), temperature source Tympanic, resp  rate 20, height 5' 7" (1 702 m), weight (!) 138 kg (305 lb 1 9 oz), SpO2 96 %  Physical Exam:   General appearance: alert, appears stated age and cooperative  Head: Normocephalic, without obvious abnormality, atraumatic  Lungs: diminished breath sounds  Heart: regular rate and rhythm  Abdomen: Soft obese nontender  Back: negative  Extremities: edema +2 lower extremities bilaterally  Neurologic: Grossly normal    Additional Data:   Labs:    Results from last 7 days  Lab Units 06/02/18  0656 06/01/18  0637 05/31/18  0517 05/30/18  0448 05/29/18  1310   WBC Thousand/uL 4 88 4 87 6 37 5 08 6 48   HEMOGLOBIN g/dL 9 6* 9 8* 9 1* 9 4* 9 8*   HEMATOCRIT % 28 7* 29 8* 27 3* 28 5* 28 8*   MCV fL 106* 106* 103* 105* 103*   PLATELETS Thousands/uL 232 210 224 232 182       Results from last 7 days  Lab Units 06/03/18  0453 05/31/18  0517 05/30/18 0448 05/29/18  2158 05/29/18  1310   SODIUM mmol/L 139 141 139 138 142   POTASSIUM mmol/L 5 0 3 7 2 5* 2 4* 2 2*   CHLORIDE mmol/L 110* 104 102 100 101   CO2 mmol/L 23 28 31 31 32   ANION GAP mmol/L 6 9 6 7 9   BUN mg/dL 6 8 10 10 10   CREATININE mg/dL 0 78 0 74 0 97 0 98 1 05   CALCIUM mg/dL 8 4 7 8* 7 8* 8 2* 7 8*   BILIRUBIN TOTAL mg/dL  --   --   --   --  1 47*   ALK PHOS U/L  --   --   --   --  78   ALT U/L  --   --   --   --  35   AST U/L  --   --   --   --  179*   EGFR ml/min/1 73sq m 93 99 71 70 65   GLUCOSE RANDOM mg/dL 65 81 85 107 110       Results from last 7 days  Lab Units 05/30/18 0448 05/29/18  1310   MAGNESIUM mg/dL 2 3 1 4*       Results from last 7 days  Lab Units 06/03/18  0453 05/30/18 0448 05/29/18  1310   CK TOTAL U/L 449* 2,769* 3,388*   TROPONIN I ng/mL  --   --  0 05*   CK MB INDEX % <1 0 <1 0 <1 0                          * I Have Reviewed All Lab Data Listed Above  Cultures:           Imaging:  Imaging Reports Reviewed Today Include:   No results found        Scheduled Meds:  Current Facility-Administered Medications:  acetaminophen 650 mg Oral Q6H PRN Eddi Palma MD   albuterol 2 puff Inhalation Q6H Eddi Palma MD   amitriptyline 10 mg Oral HS Eddi Palma MD   cyanocobalamin 1,000 mcg Oral Daily Eddi Palma MD   docusate sodium 100 mg Oral BID Eddi Palma MD   enoxaparin 40 mg Subcutaneous Daily Eddi Palma MD   ferrous sulfate 325 mg Oral Daily Eddi Palma MD   folic acid 1 mg Oral Daily Eddi Palma MD   furosemide 40 mg Oral Daily Javier Keita MD   gabapentin 300 mg Oral TID Eddi Palma MD   lactulose 20 g Oral Daily Eddi Palma MD   LORazepam 1 mg Oral Q8H PRN Evelia Velazquez PA-C   magnesium oxide 400 mg Oral BID Eddi Palma MD   metolazone 5 mg Oral Once per day on Mon Thu Eddi Palma MD   nystatin  Topical BID Evelia Velazquez PA-C   ondansetron 4 mg Intravenous Q6H PRN Eddi Palma MD   oxyCODONE 5 mg Oral Q6H PRN Javier Keita MD   pantoprazole 40 mg Oral Early Morning Eddi Palma MD   QUEtiapine 50 mg Oral HS Eddi Palma MD   spironolactone 25 mg Oral Daily Javier Keita MD     Continuous Infusions:   PRN Meds:    acetaminophen    LORazepam    ondansetron    oxyCODONE     Kalpesh Butler DO

## 2018-06-04 NOTE — PHYSICAL THERAPY NOTE
Physical Therapy Treatment Note       06/04/18 1613   Pain Assessment   Pain Assessment No/denies pain   Pain Score No Pain   Restrictions/Precautions   Other Precautions Multiple lines; Fall Risk;Pain;Bed Alarm   General   Chart Reviewed Yes   Response to Previous Treatment Patient with no complaints from previous session  Family/Caregiver Present No   Cognition   Arousal/Participation Alert; Cooperative   Attention Attends with cues to redirect   Following Commands Follows one step commands without difficulty   Subjective   Subjective " I can"t walk  I can't move my legs  "   Bed Mobility   Supine to Sit 3  Moderate assistance   Additional items Assist x 1;HOB elevated; Increased time required;Verbal cues;LE management   Sit to Supine 3  Moderate assistance   Additional items HOB elevated; Increased time required;Verbal cues;LE management;Assist x 2   Transfers   Sit to Stand 3  Moderate assistance   Additional items Assist x 2; Increased time required;Verbal cues   Stand to Sit 3  Moderate assistance   Additional items Assist x 1; Increased time required;Verbal cues   Stand pivot (attempted pt unable to perform due to fear of falling  )   Additional Comments PT performed static standing with use of Rw and mod- min assist x1 x 4 minutes x1, and 2 minutes x 1 while PCA washed and dried pt 's back and backside, changed pt 's bed linens  WOrking on static standing tolerance , balance, posture and le weightbearing activity  Ambulation/Elevation   Gait pattern Improper Weight shift; Forward Flexion; Wide LEONIE; Decreased foot clearance; Short stride; Excessively slow   Gait Assistance 3  Moderate assist   Additional items Assist x 1;Assist x 2;Verbal cues   Assistive Device Bariatric Rolling walker   Distance Pt ambulated 2 steps forward, 2 steps backward x 1  seated rest, then performed side stepping x  4 steps towrd head of bed with mod assist x1 and cues for guidence and encouragement      Balance   Static Sitting Fair + Dynamic Sitting Fair -   Static Standing Poor +   Dynamic Standing Poor   Ambulatory Poor   Endurance Deficit   Endurance Deficit Yes   Endurance Deficit Description fatigue, fear of falling, weakness   Activity Tolerance   Activity Tolerance Patient limited by fatigue   Medical Staff Made Aware PCA, Nairobys   Exercises   Hip Flexion Sitting;10 reps;AROM; Bilateral;Right  (aarom l le x 10 reps  )   Ankle Pumps Sitting;10 reps;AROM; Bilateral   Equipment Use   Comments Pt was retunred to bed in supine with HOE elevated to pt 's comfort level  pt  set- up with basin of warm water, towels and wash cloth to wash front torso and periarea  Assessment   Prognosis Fair   Problem List Decreased strength;Decreased range of motion;Decreased endurance; Impaired balance;Decreased mobility; Decreased cognition;Decreased safety awareness; Obesity   Assessment Pt declining functional mobility training initially, much encouragement to perform transfers, standing and side stepping  Pt  Demonstrates impaired ability toperform bed mobility, transfers and ambulation requiring increased assistance, time and moderate verbal cues for all aspects of mobility  PT demonstrates steady sitting balance at edge of bed  PT progressed to static standing with use of blue Rw and mod- min assist x1 x 4 minutes x1 and 2 minutes  x1  PT requiring verbal cues for improved standing posture, balance and safety  PT progressed to forward and backward stepping and side stepping w mod assist x2-1 with use of rw, encouragement and reassurance  Attempted stand pivot to recliner, however pt became anxious and tearful, stating, " I can't do it! Why do you make me do these things?  " Pt took 2 steps back toward bed sat, rested ,then performed side stepping  x4 toward head of bed with mod assist x1  PT returned to bed with mod assist x2  PT will continue to benefit from inpt PT and rehab inorder to maximize functional mobility and I for safe d/c to home      Goals Patient Goals to get better   STG Expiration Date 06/09/18   Treatment Day 3   Plan   Treatment/Interventions Functional transfer training;LE strengthening/ROM; Therapeutic exercise; Endurance training;Patient/family training;Equipment eval/education; Bed mobility;Gait training;Spoke to nursing   Progress Slow progress, decreased activity tolerance   PT Frequency (4-6x /week)   Recommendation   Recommendation Short-term skilled PT   PT - OK to Discharge Yes  (to rehab)        06/04/18 1613   Pain Assessment   Pain Assessment No/denies pain   Pain Score No Pain   Restrictions/Precautions   Other Precautions Multiple lines; Fall Risk;Pain;Bed Alarm   General   Chart Reviewed Yes   Response to Previous Treatment Patient with no complaints from previous session  Family/Caregiver Present No   Cognition   Arousal/Participation Alert; Cooperative   Attention Attends with cues to redirect   Following Commands Follows one step commands without difficulty   Subjective   Subjective " I can"t walk  I can't move my legs  "   Bed Mobility   Supine to Sit 3  Moderate assistance   Additional items Assist x 1;HOB elevated; Increased time required;Verbal cues;LE management   Sit to Supine 3  Moderate assistance   Additional items HOB elevated; Increased time required;Verbal cues;LE management;Assist x 2   Transfers   Sit to Stand 3  Moderate assistance   Additional items Assist x 2; Increased time required;Verbal cues   Stand to Sit 3  Moderate assistance   Additional items Assist x 1; Increased time required;Verbal cues   Stand pivot (attempted pt unable to perform due to fear of falling  )   Additional Comments PT performed static standing with use of Rw and mod- min assist x1 x 4 minutes x1, and 2 minutes x 1 while PCA washed and dried pt 's back and backside, changed pt 's bed linens  WOrking on static standing tolerance , balance, posture and le weightbearing activity      Ambulation/Elevation   Gait pattern Improper Weight shift; Forward Flexion; Wide LEONIE; Decreased foot clearance; Short stride; Excessively slow   Gait Assistance 3  Moderate assist   Additional items Assist x 1;Assist x 2;Verbal cues   Assistive Device Bariatric Rolling walker   Distance Pt ambulated 2 steps forward, 2 steps backward x 1  seated rest, then performed side stepping x  4 steps towrd head of bed with mod assist x1 and cues for guidence and encouragement  Balance   Static Sitting Fair +   Dynamic Sitting Fair -   Static Standing Poor +   Dynamic Standing Poor   Ambulatory Poor   Endurance Deficit   Endurance Deficit Yes   Endurance Deficit Description fatigue, fear of falling, weakness   Activity Tolerance   Activity Tolerance Patient limited by fatigue   Medical Staff Made Aware PCA, Nairobys   Exercises   Hip Flexion Sitting;10 reps;AROM; Bilateral;Right  (aarom l le x 10 reps  )   Ankle Pumps Sitting;10 reps;AROM; Bilateral   Equipment Use   Comments Pt was retunred to bed in supine with HOE elevated to pt 's comfort level  pt  set- up with basin of warm water, towels and wash cloth to wash front torso and periarea  Assessment   Prognosis Fair   Problem List Decreased strength;Decreased range of motion;Decreased endurance; Impaired balance;Decreased mobility; Decreased cognition;Decreased safety awareness; Obesity   Assessment Pt declining functional mobility training initially, much encouragement to perform transfers, standing and side stepping  Pt  Demonstrates impaired ability toperform bed mobility, transfers and ambulation requiring increased assistance, time and moderate verbal cues for all aspects of mobility  PT demonstrates steady sitting balance at edge of bed  PT progressed to static standing with use of blue Rw and mod- min assist x1 x 4 minutes x1 and 2 minutes  x1  PT requiring verbal cues for improved standing posture, balance and safety   PT progressed to forward and backward stepping and side stepping w mod assist x2-1 with use of rw, encouragement and reassurance  Attempted stand pivot to recliner, however pt became anxious and tearful, stating, " I can't do it! Why do you make me do these things?  " Pt took 2 steps back toward bed sat, rested ,then performed side stepping  x4 toward head of bed with mod assist x1  PT returned to bed with mod assist x2  PT will continue to benefit from inpt PT and rehab inorder to maximize functional mobility and I for safe d/c to home  Goals   Patient Goals to get better   STG Expiration Date 06/09/18   Treatment Day 3   Plan   Treatment/Interventions Functional transfer training;LE strengthening/ROM; Therapeutic exercise; Endurance training;Patient/family training;Equipment eval/education; Bed mobility;Gait training;Spoke to nursing   Progress Slow progress, decreased activity tolerance   PT Frequency (4-6x /week)   Recommendation   Recommendation Short-term skilled PT   PT - OK to Discharge Yes  (to rehab)       Kindra Regalado, PTA

## 2018-06-04 NOTE — PROGRESS NOTES
Patient given 5mg Oxycodone Hydrochloride tablet  Witnessed this RN giving the medication by Claude Soares 2 Manager Flora Alvarez and Rapid Response C   [place]

## 2018-06-05 PROBLEM — R10.30 LOWER ABDOMINAL PAIN: Status: ACTIVE | Noted: 2018-01-01

## 2018-06-05 PROBLEM — R74.8 ELEVATED CPK: Status: RESOLVED | Noted: 2018-01-01 | Resolved: 2018-01-01

## 2018-06-05 PROBLEM — E83.42 HYPOMAGNESEMIA: Status: RESOLVED | Noted: 2018-01-01 | Resolved: 2018-01-01

## 2018-06-05 NOTE — PROGRESS NOTES
Progress Note - Dustin Dowd 1975, 39 y o  female MRN: [id number]    Unit/Bed#: [place] [address] -01 Encounter: [id number]    Primary Care Provider: Anne Soto MD   Date and time admitted to hospital: 5/29/2018  9:05 PM        Assessment and Plan  * Recurrent falls while walking   Assessment & Plan    Poly factorial secondary to morbid obesity and anasarca  Awaiting rehab placement  Lower abdominal pain   Assessment & Plan     Secondary to anasarca  Toradol x1  Increase diuretics  Trial of tizanidine  Bipolar disorder ([facility], [place] [address] )   Assessment & Plan    Mood stable  Continue quetiapine and amitriptyline        Acute hypokalemia   Assessment & Plan     Monitor with diuretics        Anasarca   Assessment & Plan    Secondary to alcoholic liver disease  Continue metolazone but not much improvement with furosemide  Will change to torsemide 40 mg b I d  Hypotension secondary to hypoalbuminemia; continue IV albumin        Elevated CPKresolved as of 6/5/2018   Assessment & Plan       RESOLVED -- Nontraumatic rhabdomyolysis  Secondary to fall  Hypomagnesemiaresolved as of 6/5/2018   Assessment & Plan    REPLETED -- Continue magnesium oxide b i d          VTE Pharmacologic Prophylaxis: Enoxaparin (Lovenox)    Patient Centered Rounds: I have performed bedside rounds with nursing staff today  Discussions with Specialists or Other Care Team Provider:     Education and Discussions with Family / Patient:     Time Spent for Care: 25 mins  More than 50% of total time spent on counseling and coordination of care as described above      Current Length of Stay: 7 day(s)    Current Patient Status: Inpatient   Certification Statement: The patient will continue to require additional inpatient hospital stay due to rehab placement    Discharge Plan / Estimated Discharge Date:     Code Status: Level 1 - Full Code  ______________________________________________________________________________    Subjective:   Patient seen and examined  No chest pain shortness of breath  Continues to complain of abdominal discomfort  Objective:   Vitals: Blood pressure 109/59, pulse 86, temperature 98 9 °F (37 2 °C), temperature source Temporal, resp  rate 16, height 5' 7" (1 702 m), weight (!) 138 kg (305 lb 1 9 oz), SpO2 96 %      Physical Exam:   General appearance: alert, appears stated age and cooperative  Head: Normocephalic, without obvious abnormality, atraumatic  Lungs: diminished breath sounds  Heart: regular rate and rhythm  Abdomen: Soft obese left lower quadrant tenderness to palpation  Back: negative  Extremities: edema +2-3 anasarca and lower extremities  Neurologic: Grossly normal    Additional Data:   Labs:    Results from last 7 days  Lab Units 06/05/18  0552 06/02/18  0656 06/01/18  0637 05/31/18  0517 05/30/18  0448   WBC Thousand/uL 5 98 4 88 4 87 6 37 5 08   HEMOGLOBIN g/dL 9 4* 9 6* 9 8* 9 1* 9 4*   HEMATOCRIT % 28 7* 28 7* 29 8* 27 3* 28 5*   MCV fL 106* 106* 106* 103* 105*   PLATELETS Thousands/uL 233 232 210 224 232   INR  1 19*  --   --   --   --        Results from last 7 days  Lab Units 06/05/18  0552 06/03/18  0453 05/31/18  0517 05/30/18  0448 05/29/18  2158   SODIUM mmol/L 139 139 141 139 138   POTASSIUM mmol/L 4 1 5 0 3 7 2 5* 2 4*   CHLORIDE mmol/L 107 110* 104 102 100   CO2 mmol/L 23 23 28 31 31   ANION GAP mmol/L 9 6 9 6 7   BUN mg/dL 7 6 8 10 10   CREATININE mg/dL 0 82 0 78 0 74 0 97 0 98   CALCIUM mg/dL 8 6 8 4 7 8* 7 8* 8 2*   BILIRUBIN TOTAL mg/dL 0 87  --   --   --   --    ALK PHOS U/L 97  --   --   --   --    ALT U/L 24  --   --   --   --    AST U/L 48*  --   --   --   --    EGFR ml/min/1 73sq m 87 93 99 71 70   GLUCOSE RANDOM mg/dL 81 65 81 85 107       Results from last 7 days  Lab Units 05/30/18  0448   MAGNESIUM mg/dL 2 3       Results from last 7 days  Lab Units 06/05/18  0964 06/03/18  0453 05/30/18  0448   CK TOTAL U/L 237* 449* 2,769*   CK MB INDEX % <1 0 <1 0 <1 0                          * I Have Reviewed All Lab Data Listed Above  Cultures:           Imaging:  Imaging Reports Reviewed Today Include:   No results found        Scheduled Meds:  Current Facility-Administered Medications:  acetaminophen 650 mg Oral Q6H PRN Aurora Mendoza MD   albumin human 12 5 g Intravenous BID Estuardo Pompa DO   albuterol 2 puff Inhalation Q6H Aurora Mendoza MD   amitriptyline 10 mg Oral HS Aurora Mendoza MD   cyanocobalamin 1,000 mcg Oral Daily Aurora Mendoza MD   docusate sodium 100 mg Oral BID Aurora Mendoza MD   enoxaparin 40 mg Subcutaneous Daily Aurora Mendoza MD   ferrous sulfate 325 mg Oral Daily Aurora Mendoza MD   folic acid 1 mg Oral Daily Aurora Mendoza MD   furosemide 40 mg Oral Daily Lidia Sandoval MD   gabapentin 300 mg Oral TID Aurora Mendoza MD   lactulose 20 g Oral Daily Aurora Mendoza MD   LORazepam 1 mg Oral Q8H PRN Evelia Velazquez PA-C   magnesium oxide 400 mg Oral BID Aurora Mendoza MD   metolazone 5 mg Oral Once per day on Mon Thu Aurora Mendoza MD   nystatin  Topical BID Evelia Velazquez PA-C   ondansetron 4 mg Intravenous Q6H PRN Aurora Mendoza MD   oxyCODONE 5 mg Oral Q6H PRN Lidia Sandoval MD   pantoprazole 40 mg Oral Early Morning Aurora Mendoza MD   QUEtiapine 50 mg Oral HS Aurora Mendoza MD   spironolactone 25 mg Oral Daily Lidia Sandoval MD     Continuous Infusions:   PRN Meds:    acetaminophen    LORazepam    ondansetron    oxyCODONE     Windy Pain, DO

## 2018-06-05 NOTE — PLAN OF CARE
Problem: PHYSICAL THERAPY ADULT  Goal: Performs mobility at highest level of function for planned discharge setting  See evaluation for individualized goals  Treatment/Interventions: Functional transfer training, LE strengthening/ROM, Elevations, Therapeutic exercise, Endurance training, Patient/family training, Equipment eval/education, Bed mobility, Gait training, Compensatory technique education, Continued evaluation, Spoke to nursing, OT          See flowsheet documentation for full assessment, interventions and recommendations  Outcome: Progressing  Prognosis: Fair  Problem List: Decreased strength, Decreased range of motion, Decreased endurance, Impaired balance, Decreased mobility, Obesity, Pain  Assessment: PT declining mobility training due to increased l lower abdominal pain and being medicated with pain meds  PT does not want to try for fear of legs giving out and pt falling  PT performed supine b/l le aa-arom x 10 -15 reps to tolerance  PT required assistance to perform hip and knee flexion exercises to the L le, and assistance to perform hip abd /add to b/l le's  PT performs ap, qs, gs  b/l le hip IR and saq actively to b/l le's  Limited Rom due to increase swelling of b/l le's  PT tend sto position b/l le's in ER with slight knee flexion, encouraged pt to reposition le's in full knee extension and neutral hip rotation  Continue to recommend inpt PT and rehab inorder to maximize functional mobility and I for safe d/c to home  Barriers to Discharge: Inaccessible home environment, Decreased caregiver support  Barriers to Discharge Comments: DUTCH, level of support at home  Recommendation: Short-term skilled PT     PT - OK to Discharge: Yes (to rehab)    See flowsheet documentation for full assessment

## 2018-06-05 NOTE — PHYSICAL THERAPY NOTE
Physical Therapy Treatment Note       06/05/18 9976   Pain Assessment   Pain Assessment 0-10   Pain Score Worst Possible Pain   Pain Type Chronic pain   Pain Location Abdomen   Pain Orientation Left; Lower   [address] Pain Intervention(s) Ambulation/increased activity; Emotional support  (ther ex  )   Response to Interventions tolerated   Restrictions/Precautions   Other Precautions Fall Risk;Pain;Multiple lines   General   Response to Previous Treatment Patient with no complaints from previous session  Cognition   Overall Cognitive Status WFL   Arousal/Participation Alert; Cooperative   Attention Within functional limits   Orientation Level Oriented X4   Following Commands Follows one step commands without difficulty   Subjective   Subjective " I can't do it today, pt reportsing increased l lower abdominal pain  I will try tomooorw if you come in the AM  "  Pt agreeable to bed level exercises  Exercises   Quad Sets Supine;15 reps;AROM; Bilateral   Heelslides Supine;AAROM;AROM; Bilateral  (arom R le and aarom L le x 12 reps  )   Glute Sets Supine;15 reps;AROM; Bilateral   Hip Abduction Supine;10 reps;AAROM; Bilateral   Hip Adduction Supine;10 reps;AAROM; Bilateral   Knee AROM Short Arc Quad Supine;AROM; Bilateral  (x 12 reps  )   Ankle Pumps Supine;AROM; Bilateral  (x 12 reps   )   Balance training  Pt performed b/l le hip IR x 10 reps  arom   Assessment   Prognosis Fair   Problem List Decreased strength;Decreased range of motion;Decreased endurance; Impaired balance;Decreased mobility;Obesity;Pain   Assessment PT declining mobility training due to increased l lower abdominal pain and being medicated with pain meds  PT does not want to try for fear of legs giving out and pt falling  PT performed supine b/l le aa-arom x 10 -15 reps to tolerance  PT required assistance to perform hip and knee flexion exercises to the L le, and assistance to perform hip abd /add to b/l le's    PT performs ap, qs, gs  b/l le hip IR and saq actively to b/l le's  Limited Rom due to increase swelling of b/l le's  PT tend sto position b/l le's in ER with slight knee flexion, encouraged pt to reposition le's in full knee extension and neutral hip rotation  Continue to recommend inpt PT and rehab inorder to maximize functional mobility and I for safe d/c to home  Goals   Patient Goals to be able to walk "     STG Expiration Date 06/09/18   Treatment Day 4   Plan   Treatment/Interventions Functional transfer training;LE strengthening/ROM; Therapeutic exercise;Elevations; Endurance training;Patient/family training;Equipment eval/education;Gait training;Bed mobility;Spoke to nursing; Compensatory technique education;OT   Progress Slow progress, decreased activity tolerance   Recommendation   Recommendation Short-term skilled PT   PT - OK to Discharge Yes  (to rehab)       Donna Carr, PTA

## 2018-06-05 NOTE — ASSESSMENT & PLAN NOTE
Secondary to alcoholic liver disease  Continue metolazone but not much improvement with furosemide  Will change to torsemide 40 mg b I d    Hypotension secondary to hypoalbuminemia; continue IV albumin

## 2018-06-05 NOTE — OCCUPATIONAL THERAPY NOTE
Occupational Therapy Cancellation Note:      Patient Name: Daryl Nunez  Today's Date: 6/5/2018     Attempted to see pt for OT treatment session this AM, however pt declined at this time 2* increased pain in Left abdomen  Pt stated, "The nurse just gave me a lot of medication for my pain, I don't want to get up or do therapy right now " Therefore, pt cx'd this PM  Will attempt at later time  OT to follow pt on caseload as able to A w/ safe d/c planning/recommendations       Artist JAYMIE Nicholson/L

## 2018-06-06 NOTE — OCCUPATIONAL THERAPY NOTE
[address] Progress Note     Patient Name: Theo Fuentes  WDIEM'B Date: 6/6/2018  Problem List  Patient Active Problem List   Diagnosis    Ambulatory dysfunction    Recurrent falls    Chronic pain syndrome    Morbid obesity due to excess calories ([facility] )    Hypokalemia    Anasarca    Chest pain    Other bipolar disorder ([facility] )    Noncompliance with medication regimen    Alcoholic liver disease ([facility] )    RUQ pain    Febrile illness    Coag negative Staphylococcus bacteremia    Anemia    Recurrent falls while walking    Acute hypokalemia    Hyperammonemia ([facility] )    Bipolar disorder ([facility] )    Lower abdominal pain           06/06/18 1050   Restrictions/Precautions   Weight Bearing Precautions Per Order No   Other Precautions Bed Alarm; Fall Risk;Pain   Lifestyle   Autonomy At baseline, pt was I w/ ADLs and functional mobility/transfers w/ use of RW, required assist w/ IADLs, and reports 2 falls PTA  Reciprocal Relationships Lives w/ significant other   Service to Others On disability   Intrinsic Gratification Watching TV   Pain Assessment   Pain Assessment 0-10   Pain Score Worst Possible Pain   Pain Type Chronic pain   Pain Location Leg   Pain Orientation Left; Lower   Pain Descriptors Aching   Pain Frequency Intermittent   Pain Onset Ongoing   Clinical Progression Not changed   Patient's Stated Pain Goal No pain   Hospital Pain Intervention(s) Repositioned; Ambulation/increased activity; Emotional support   Response to Interventions Tolerated   ADL   Grooming Assistance 5  Supervision/Setup   Grooming Deficit Setup;Supervision/safety; Increased time to complete   Grooming Comments Light grooming tasks completed w/ Supervision while lying supine 2* setup required and increased time to complete  UB Dressing Assistance 4  Minimal Assistance   UB Dressing Deficit Setup;Verbal cueing;Supervision/safety; Increased time to complete;Pull around back;Pull down in back   UB Dressing Comments UB dressing completed w/ Min A to don robe 2* assist required to pull robe around and down in back 2* decreased UE strength  LB Dressing Assistance 1  Total Assistance   LB Dressing Deficit Setup;Don/doff R sock; Don/doff L sock; Verbal cueing;Supervision/safety; Increased time to complete   LB Dressing Comments LB dressing completed w/ Total A to don/doff B/L socks 2* decreased functional reach and increased fear of falling when attempting to reach for socks EOB  Functional Standing Tolerance   Time 3 mins   Activity Functional mobility   Comments No LOB noted, however pt requested seated rest break after 3 mins 2* increased fatigue and increased fear of falling   Bed Mobility   Supine to Sit 2  Maximal assistance   Additional items Assist x 2;Bedrails; Increased time required;Verbal cues;LE management   Sit to Supine 2  Maximal assistance   Additional items Assist x 2;Bedrails; Increased time required;Verbal cues;LE management   Additional Comments Pt lying supine at end of session w/ call bell and phone within reach  All needs met and pt reports no further questions for OT at this time  Transfers   Sit to Stand 3  Moderate assistance   Additional items Assist x 2;Bedrails; Increased time required;Verbal cues   Stand to Sit 3  Moderate assistance   Additional items Assist x 2; Increased time required;Verbal cues   Stand pivot 3  Moderate assistance   Additional items Assist x 2; Increased time required;Verbal cues   Additional Comments Transfers (sit<>stand) completed w/ Mod A of 2 2* assist required to initiate forward weight shift from surface for sit>stand and assist for controlled descent to surface for stand>sit  Therapeutic Exercise - ROM   UE-ROM Yes   ROM- Right Upper Extremities   R Shoulder AROM; Flexion; Extension;Horizontal ABduction   R Elbow AROM;Elbow flexion;Elbow extension   R Position Supine   R Weight/Reps/Sets 10 reps x1   RUE ROM Comment UE exercises completed while lying supine to increase UE ROM/strength needed for ADLs and transfers  Educated pt on proper form, pacing, and breathing for all exercises w/ pt verbalizing and demonstrating understanding of education  No c/o pain or fatigue reported  ROM - Left Upper Extremities    L Shoulder AROM; Flexion; Extension;Horizontal ABduction   L Elbow AROM;Elbow flexion;Elbow extension   L Position Supine   L Weight/Reps/Sets 10 reps x1   LUE ROM Comment UE exercises completed while lying supine to increase UE ROM/strength needed for ADLs and transfers  Educated pt on proper form, pacing, and breathing for all exercises w/ pt verbalizing and demonstrating understanding of education  No c/o pain or fatigue reported  Cognition   Overall Cognitive Status WFL   Arousal/Participation Alert; Cooperative   Attention Attends with cues to redirect   Orientation Level Oriented X4   Memory Within functional limits   Following Commands Follows one step commands without difficulty   Comments Pt often required redirection to task 2* pt becoming tearful at times and stating, "I can't"   Activity Tolerance   Activity Tolerance Patient limited by fatigue;Patient limited by pain   Medical Staff Made Aware Pt able to be seen per SCARLET Gomes   Assessment   Assessment Pt seen for OT treatment session this AM focusing on functional activity tolerance, bed mobility, ADLs, and functional mobility/transfers  Pt alert throughout session, however required increased encouragement for participation 2* pt often becoming tearful throughout session and stating, "I can't " Pt lying supine upon entering room, requiring Max A of 2 for supine>sit 2* assist for LE management and trunk control  UB dressing completed w/ Min A to don robe 2* assist required to pull robe around and down in back 2* decreased UE strength  LB dressing completed w/ Total A to don/doff B/L socks 2* decreased functional reach and increased fear of falling when attempting to reach for socks EOB   Transfers (sit<>stand) completed w/ Mod A of 2 2* assist required to initiate forward weight shift from surface for sit>stand and assist for controlled descent to surface for stand>sit  Functional mobility completed at a Mod A of 2 level 2* assist for balance/steadying and cues for safe use of RW  Pt reported increased fear of falling during mobility, requiring increased encouragement from therapists  Encouraged pt to sit OOB in chair, however pt requested to return to supine position  Max A of 2 required for sit>supine 2* increased assist required for LE management and trunk control  Light grooming tasks completed w/ Supervision while lying supine 2* setup required and increased time to complete  UE exercises completed while lying supine to increase UE ROM/strength needed for ADLs and transfers  Educated pt on proper form, pacing, and breathing for all exercises w/ pt verbalizing and demonstrating understanding of education  No c/o pain or fatigue reported  Pt lying supine at end of session w/ call bell and phone within reach  All needs met and pt reports no further questions for OT at this time  Continue to recommend STR upon D/C and pt agreeable  OT to continue to follow pt on caseload  Plan   Treatment Interventions ADL retraining;Functional transfer training;UE strengthening/ROM; Endurance training;Patient/family training;Equipment evaluation/education; Compensatory technique education;Continued evaluation; Energy conservation; Activityengagement   Goal Expiration Date 06/13/18   Treatment Day 2   OT Frequency 3-5x/wk   Recommendation   OT Discharge Recommendation Short Term Rehab   OT - OK to Discharge Yes  (when medically cleared to STR)   Barthel Index   Feeding 10   Bathing 0   Grooming Score 0   Dressing Score 5   Bladder Score 0   Bowels Score 10   Toilet Use Score 5   Transfers (Bed/Chair) Score 5   Mobility (Level Surface) Score 0   Stairs Score 0   Barthel Index Score 35   Modified Wetmore Scale   Modified Wetmore Scale 4       Darlyn Dai OTR/L

## 2018-06-06 NOTE — SOCIAL WORK
[facility] of [address] [place] [address] [facility](Nita Messina) assessed patient  CM provided patient's H&P, facesheet, most recent PT/OT notes, and most recent MD notes

## 2018-06-06 NOTE — ASSESSMENT & PLAN NOTE
Secondary to anasarca  Improved with Toradol x1  Increased diuretics and continue trial of tizanidine

## 2018-06-06 NOTE — PLAN OF CARE
Problem: PHYSICAL THERAPY ADULT  Goal: Performs mobility at highest level of function for planned discharge setting  See evaluation for individualized goals  Treatment/Interventions: Functional transfer training, LE strengthening/ROM, Elevations, Therapeutic exercise, Endurance training, Patient/family training, Equipment eval/education, Bed mobility, Gait training, Compensatory technique education, Continued evaluation, Spoke to nursing, OT          See flowsheet documentation for full assessment, interventions and recommendations  Outcome: Progressing  Prognosis: Fair  Problem List: Decreased strength, Decreased range of motion, Decreased endurance, Impaired balance, Decreased mobility, Decreased safety awareness, Obesity, Pain, Decreased skin integrity  Assessment: Pt agreeable to transfer and gait training, however pt required much encouragement to participate in mobility training  Pt demonstrates impaired ability to perform bed mobility, transfers and ambulation requiring max assist x2 , max cues and increased time to perform bed mobility  Pt continues to require mod assist x2 for transfers and  progressed with ambulation distances to 6' x2 with mod assist x2 max cues for sequencing of le's, proper advancement of rw, ue weightbearing, improved posture,and cues to step up into walker bounds and to maintain close distance to walker at all times  Pt tends to become anxious, requires verbal cues to calm down and relax  Pt was encouraged to sit out of bed in recliner however pt refused  Continue to recommend inpt PT and rehab inorder to maximize functional mobility and I for safe d/c to home  Barriers to Discharge: Inaccessible home environment, Decreased caregiver support  Barriers to Discharge Comments: DUTCH, level of support at home  Recommendation: Short-term skilled PT     PT - OK to Discharge: Yes ( (to rehab))    See flowsheet documentation for full assessment

## 2018-06-06 NOTE — PROGRESS NOTES
Progress Note - Cyrus Diaz 1975, 39 y o  female MRN: [id number]    Unit/Bed#: [place] [address] -01 Encounter: [id number]    Primary Care Provider: Dara Florian MD   Date and time admitted to hospital: 5/29/2018  9:05 PM        Assessment and Plan  * Recurrent falls while walking   Assessment & Plan    Poly-factorial secondary to morbid obesity and anasarca  Awaiting rehab placement  Lower abdominal pain   Assessment & Plan     Secondary to anasarca  Improved with Toradol x1  Increased diuretics and continue trial of tizanidine  Bipolar disorder ([facility] )   Assessment & Plan    Mood stable  Continue quetiapine and amitriptyline        Acute hypokalemia   Assessment & Plan     Monitor with diuretics        Anasarca   Assessment & Plan    Secondary to alcoholic liver disease  Hypotension secondary to hypoalbuminemia; continue IV albumin  Continue torsemide and metolazone        Elevated CPKresolved as of 6/5/2018   Assessment & Plan    RESOLVED -- Nontraumatic rhabdomyolysis  Secondary to fall  Hypomagnesemiaresolved as of 6/5/2018   Assessment & Plan    REPLETED -- Continue magnesium oxide b i d          VTE Pharmacologic Prophylaxis: Enoxaparin (Lovenox)    Patient Centered Rounds: I have performed bedside rounds with nursing staff today  Discussions with Specialists or Other Care Team Provider:     Education and Discussions with Family / Patient:     Time Spent for Care: 25 mins  More than 50% of total time spent on counseling and coordination of care as described above  Current Length of Stay: 8 day(s)    Current Patient Status: Inpatient   Certification Statement: The patient will continue to require additional inpatient hospital stay due to placement    Discharge Plan / Estimated Discharge Date:     Code Status: Level 1 - Full Code  ______________________________________________________________________________    Subjective:   Patient seen and examined    Intermittent left lower quadrant pains  Objective:   Vitals: Blood pressure 110/56, pulse 63, temperature 98 4 °F (36 9 °C), temperature source Temporal, resp  rate 18, height 5' 7" (1 702 m), weight (!) 138 kg (305 lb 1 9 oz), SpO2 92 %  Physical Exam:   General appearance: alert, appears stated age and cooperative  Head: atraumatic  Lungs: diminished breath sounds  Heart: regular rate and rhythm  Abdomen: soft obese anasarca  Back: negative  Extremities: edema +2-3 lower extremities bilaterally  Neurologic: Grossly normal    Additional Data:   Labs:    Results from last 7 days  Lab Units 06/05/18  0552 06/02/18  0656 06/01/18  0637 05/31/18  0517   WBC Thousand/uL 5 98 4 88 4 87 6 37   HEMOGLOBIN g/dL 9 4* 9 6* 9 8* 9 1*   HEMATOCRIT % 28 7* 28 7* 29 8* 27 3*   MCV fL 106* 106* 106* 103*   PLATELETS Thousands/uL 233 232 210 224   INR  1 19*  --   --   --        Results from last 7 days  Lab Units 06/05/18  0552 06/03/18  0453 05/31/18  0517   SODIUM mmol/L 139 139 141   POTASSIUM mmol/L 4 1 5 0 3 7   CHLORIDE mmol/L 107 110* 104   CO2 mmol/L 23 23 28   ANION GAP mmol/L 9 6 9   BUN mg/dL 7 6 8   CREATININE mg/dL 0 82 0 78 0 74   CALCIUM mg/dL 8 6 8 4 7 8*   BILIRUBIN TOTAL mg/dL 0 87  --   --    ALK PHOS U/L 97  --   --    ALT U/L 24  --   --    AST U/L 48*  --   --    EGFR ml/min/1 73sq m 87 93 99   GLUCOSE RANDOM mg/dL 81 65 81           Results from last 7 days  Lab Units 06/05/18  0552 06/03/18  0453   CK TOTAL U/L 237* 449*   CK MB INDEX % <1 0 <1 0                          * I Have Reviewed All Lab Data Listed Above  Cultures:           Imaging:  Imaging Reports Reviewed Today Include:   No results found        Scheduled Meds:  Current Facility-Administered Medications:  acetaminophen 650 mg Oral Q6H PRN Thien Chanel MD   albumin human 12 5 g Intravenous BID Lacrsheyla Ferrera DO   albuterol 2 puff Inhalation Q6H Thien Chanel MD   amitriptyline 10 mg Oral HS Thien Chanel MD   cyanocobalamin 1,000 mcg Oral Daily Eliud Rivero MD   docusate sodium 100 mg Oral BID Eliud Rivero MD   enoxaparin 40 mg Subcutaneous Daily Eliud Rivero MD   ferrous sulfate 325 mg Oral Daily Eliud Rivero MD   folic acid 1 mg Oral Daily Eliud Rivero MD   gabapentin 300 mg Oral TID Eliud Rivero MD   lactulose 20 g Oral Daily Eliud Rivero MD   LORazepam 1 mg Oral Q8H PRN Evelia Velazquez PA-C   magnesium oxide 400 mg Oral BID Eliud Rivero MD   metolazone 5 mg Oral Once per day on Mon Thu Eliud Rivero MD   nystatin  Topical BID Evelia Velazquez PA-C   ondansetron 4 mg Intravenous Q6H PRN Eliud Rivero MD   oxyCODONE 5 mg Oral Q6H PRN Charisse Koo MD   pantoprazole 40 mg Oral Early Morning Eliud Rivero MD   QUEtiapine 50 mg Oral HS Eliud Rivero MD   spironolactone 25 mg Oral Daily Charisse Koo MD   tiZANidine 2 mg Oral TID Damien Stark DO   torsemide 40 mg Oral BID (diuretic) Damien Stark DO     Continuous Infusions:   PRN Meds:    acetaminophen    LORazepam    ondansetron    oxyCODONE     Damien Stark DO

## 2018-06-06 NOTE — ASSESSMENT & PLAN NOTE
Secondary to alcoholic liver disease  Hypotension secondary to hypoalbuminemia; continue IV albumin    Continue torsemide and metolazone

## 2018-06-06 NOTE — PLAN OF CARE
Problem: OCCUPATIONAL THERAPY ADULT  Goal: Performs self-care activities at highest level of function for planned discharge setting  See evaluation for individualized goals  Treatment Interventions: ADL retraining, Functional transfer training, UE strengthening/ROM, Endurance training, Patient/family training, Equipment evaluation/education, Compensatory technique education, Continued evaluation, Energy conservation, Activityengagement          See flowsheet documentation for full assessment, interventions and recommendations  Outcome: Progressing  Limitation: Decreased ADL status, Decreased endurance, Decreased Safe judgement during ADL, Decreased self-care trans, Decreased high-level ADLs  Prognosis: Fair  Assessment: Pt seen for OT treatment session this AM focusing on functional activity tolerance, bed mobility, ADLs, and functional mobility/transfers  Pt alert throughout session, however required increased encouragement for participation 2* pt often becoming tearful throughout session and stating, "I can't " Pt lying supine upon entering room, requiring Max A of 2 for supine>sit 2* assist for LE management and trunk control  UB dressing completed w/ Min A to don robe 2* assist required to pull robe around and down in back 2* decreased UE strength  LB dressing completed w/ Total A to don/doff B/L socks 2* decreased functional reach and increased fear of falling when attempting to reach for socks EOB  Transfers (sit<>stand) completed w/ Mod A of 2 2* assist required to initiate forward weight shift from surface for sit>stand and assist for controlled descent to surface for stand>sit  Functional mobility completed at a Mod A of 2 level 2* assist for balance/steadying and cues for safe use of RW  Pt reported increased fear of falling during mobility, requiring increased encouragement from therapists  Encouraged pt to sit OOB in chair, however pt requested to return to supine position   Max A of 2 required for sit>supine 2* increased assist required for LE management and trunk control  Light grooming tasks completed w/ Supervision while lying supine 2* setup required and increased time to complete  UE exercises completed while lying supine to increase UE ROM/strength needed for ADLs and transfers  Educated pt on proper form, pacing, and breathing for all exercises w/ pt verbalizing and demonstrating understanding of education  No c/o pain or fatigue reported  Pt lying supine at end of session w/ call bell and phone within reach  All needs met and pt reports no further questions for OT at this time  Continue to recommend STR upon D/C and pt agreeable  OT to continue to follow pt on caseload        OT Discharge Recommendation: Short Term Rehab  OT - OK to Discharge: Yes (when medically cleared to STR)

## 2018-06-06 NOTE — PHYSICAL THERAPY NOTE
Physical Therapy Treatment Note     06/06/18 1040   Pain Assessment   Pain Assessment 0-10   Pain Score Worst Possible Pain   Pain Type Chronic pain   Pain Location Leg   Pain Orientation Left; Lower   Hospital Pain Intervention(s) Repositioned; Ambulation/increased activity; Emotional support   Response to Interventions tolerated fair   Restrictions/Precautions   Weight Bearing Precautions Per Order No   Other Precautions Bed Alarm; Fall Risk;Pain   General   Chart Reviewed Yes   Response to Previous Treatment Patient with no complaints from previous session  Family/Caregiver Present No   Cognition   Overall Cognitive Status WFL   Arousal/Participation Alert; Cooperative   Attention Attends with cues to redirect   Orientation Level Oriented X4   Memory Within functional limits   Following Commands Follows one step commands without difficulty   Subjective   Subjective " I don;t wan to sit out in the chiar "     Bed Mobility   Supine to Sit 2  Maximal assistance   Additional items Assist x 2;HOB elevated; Increased time required; Bedrails;Verbal cues;LE management   Sit to Supine 2  Maximal assistance   Additional items Assist x 1;HOB elevated; Increased time required;Verbal cues;LE management; Bedrails   Transfers   Sit to Stand 3  Moderate assistance   Additional items Assist x 2; Increased time required;Verbal cues   Stand to Sit 3  Moderate assistance   Additional items Assist x 2; Increased time required;Verbal cues   Ambulation/Elevation   Gait pattern Improper Weight shift; Poor UE support; Forward Flexion;Decreased foot clearance; Short stride; Step to;Excessively slow; Foward flexed; Wide LEONIE  (ER b/l le's)   Gait Assistance 3  Moderate assist   Additional items Assist x 2;Verbal cues   Assistive Device Bariatric Rolling walker   Distance 6'x2   Balance   Static Sitting Fair   Static Standing Poor +   Dynamic Standing Poor   Ambulatory Poor   Endurance Deficit   Endurance Deficit Description fatigue, fear of falling, pain   Activity Tolerance   Activity Tolerance Patient limited by pain; Patient limited by fatigue;Patient tolerated treatment well   Nurse Made Aware sarai NOVAK   Equipment Use   Comments Pt was returned to bed in supine position with HPB elevated  Assessment   Prognosis Fair   Problem List Decreased strength;Decreased range of motion;Decreased endurance; Impaired balance;Decreased mobility; Decreased safety awareness; Obesity;Pain;Decreased skin integrity   Assessment Pt agreeable to transfer and gait training, however pt required much encouragement to participate in mobility training  Pt demonstrates impaired ability to perform bed mobility, transfers and ambulation requiring max assist x2 , max cues and increased time to perform bed mobility  Pt continues to require mod assist x2 for transfers and  progressed with ambulation distances to 6' x2 with mod assist x2 max cues for sequencing of le's, proper advancement of rw, ue weightbearing, improved posture,and cues to step up into walker bounds and to maintain close distance to walker at all times  Pt tends to become anxious, requires verbal cues to calm down and relax  Pt was encouraged to sit out of bed in recliner however pt refused  Continue to recommend inpt PT and rehab inorder to maximize functional mobility and I for safe d/c to home  Goals   Patient Goals " to be able to go home "     STG Expiration Date 06/09/18   Treatment Day 5   Plan   Treatment/Interventions Functional transfer training;LE strengthening/ROM; Elevations; Therapeutic exercise; Endurance training;Patient/family training;Equipment eval/education; Bed mobility;Gait training;Spoke to nursing;OT   Progress Slow progress, decreased activity tolerance   PT Frequency (4-6x week)   Recommendation   Recommendation Short-term skilled PT   PT - OK to Discharge Yes  ( (to rehab))         Irvin Mcdermott, PTA

## 2018-06-06 NOTE — PROGRESS NOTES
Patient Mague Valero , whilelgiving her bed time meds patient was abusive to the Nurses and Nurses Aids and talking about the previous shift nurse and complaining about them to us and when we told her they did their best , she become aggressive and started verbally abusing the Nurse Jud Henry RN ,she did that  before to the Nurses Aid Juan Gomes but got aggressive when she get unexpected response ,she was yelling at staff regarding not understanding her language and told to go back to their countries and was racist to the assigned nurse and aid  Charge nurse witinessed  the whole situation and hospital supervisor was called

## 2018-06-07 NOTE — ASSESSMENT & PLAN NOTE
Secondary to alcoholic liver disease  Continue IV albumin for hypotension  Better urinary output with torsemide and metolazone    Continue Patino catheter for 24 more hours strict I&Os

## 2018-06-07 NOTE — PROGRESS NOTES
At 2143 on 6/6/18 I witnessed the nurse, Chuck Warner RN, administer 5mg of Oxycodone to the patient

## 2018-06-07 NOTE — PROGRESS NOTES
Progress Note - Anali Keller 1975, 39 y o  female MRN: [id number]    Unit/Bed#: [place] [address] -01 Encounter: [id number]    Primary Care Provider: Marcy Wright MD   Date and time admitted to hospital: 5/29/2018  9:05 PM        Assessment and Plan  * Recurrent falls while walking   Assessment & Plan    Poly-factorial secondary to morbid obesity and anasarca  Awaiting rehab placement  Lower abdominal pain   Assessment & Plan     Secondary to anasarca  Improved with Toradol x1  Increased diuretics and continue trial of tizanidine  Bipolar disorder ([facility] [address] )   Assessment & Plan    Mood stable  Continue quetiapine and amitriptyline        Acute hypokalemia   Assessment & Plan    Replete while on diuretics        Anasarca   Assessment & Plan    Secondary to alcoholic liver disease  Continue IV albumin for hypotension  Better urinary output with torsemide and metolazone  Continue Patino catheter for 24 more hours strict I&Os        Elevated CPKresolved as of 6/5/2018   Assessment & Plan    RESOLVED -- Nontraumatic rhabdomyolysis  Secondary to fall  VTE Pharmacologic Prophylaxis: Enoxaparin (Lovenox)    Patient Centered Rounds: I have performed bedside rounds with nursing staff today  Discussions with Specialists or Other Care Team Provider:     Education and Discussions with Family / Patient:     Time Spent for Care: 25 mins  More than 50% of total time spent on counseling and coordination of care as described above  Current Length of Stay: 9 day(s)    Current Patient Status: Inpatient   Certification Statement: The patient will continue to require additional inpatient hospital stay due to placement    Discharge Plan / Estimated Discharge Date:     Code Status: Level 1 - Full Code  ______________________________________________________________________________    Subjective:   Patient seen and examined    Improved leg swelling abdominal pains    Objective:   Vitals: Blood pressure 101/55, pulse 66, temperature 98 2 °F (36 8 °C), temperature source Temporal, resp  rate 18, height 5' 7" (1 702 m), weight (!) 138 kg (305 lb 1 9 oz), SpO2 95 %  Physical Exam:   General appearance: alert and cooperative  Head: Normocephalic, without obvious abnormality, atraumatic  Lungs: diminished breath sounds  Heart: regular rate and rhythm  Abdomen: soft obese left lower quadrant tenderness to palpation  Back: negative  Extremities: edema +2 lower extremities  Neurologic: Grossly normal    Additional Data:   Labs:    Results from last 7 days  Lab Units 06/05/18  0552 06/02/18  0656 06/01/18  0637   WBC Thousand/uL 5 98 4 88 4 87   HEMOGLOBIN g/dL 9 4* 9 6* 9 8*   HEMATOCRIT % 28 7* 28 7* 29 8*   MCV fL 106* 106* 106*   PLATELETS Thousands/uL 233 232 210   INR  1 19*  --   --        Results from last 7 days  Lab Units 06/07/18  0439 06/05/18  0552 06/03/18  0453   SODIUM mmol/L 139 139 139   POTASSIUM mmol/L 2 8* 4 1 5 0   CHLORIDE mmol/L 102 107 110*   CO2 mmol/L 29 23 23   ANION GAP mmol/L 8 9 6   BUN mg/dL 7 7 6   CREATININE mg/dL 0 81 0 82 0 78   CALCIUM mg/dL 8 5 8 6 8 4   BILIRUBIN TOTAL mg/dL 0 84 0 87  --    ALK PHOS U/L 90 97  --    ALT U/L 22 24  --    AST U/L 42 48*  --    EGFR ml/min/1 73sq m 89 87 93   GLUCOSE RANDOM mg/dL 89 81 65           Results from last 7 days  Lab Units 06/05/18  0552 06/03/18  0453   CK TOTAL U/L 237* 449*   CK MB INDEX % <1 0 <1 0                          * I Have Reviewed All Lab Data Listed Above  Cultures:           Imaging:  Imaging Reports Reviewed Today Include:   No results found        Scheduled Meds:  Current Facility-Administered Medications:  acetaminophen 650 mg Oral Q6H PRN Lilibeth Cobos MD   albumin human 12 5 g Intravenous BID Ivan Rattler, DO   albuterol 2 puff Inhalation Q6H Lilibeth Cobos MD   amitriptyline 10 mg Oral HS Lilibeth Cobos MD   cyanocobalamin 1,000 mcg Oral Daily Lilibeth Cobos MD   docusate sodium 100 mg Oral BID Juan Sic, MD   enoxaparin 40 mg Subcutaneous Daily Gearl Sic, MD   ferrous sulfate 325 mg Oral Daily Gearl Sic, MD   folic acid 1 mg Oral Daily Gearl Sic, MD   gabapentin 300 mg Oral TID Gearl Sic, MD   lactulose 20 g Oral Daily Gearl Sic, MD   LORazepam 1 mg Oral Q8H PRN Evelia Velazquez PA-C   magnesium oxide 400 mg Oral BID Gearcalvin Sic, MD   metolazone 5 mg Oral Once per day on Mon Thu Gearl Sic, MD   nystatin  Topical BID Evelia Velazquez PA-C   ondansetron 4 mg Intravenous Q6H PRN Gearcalvin Sic, MD   oxyCODONE 5 mg Oral Q6H PRN Alejo James MD   pantoprazole 40 mg Oral Early Morning Gearl Sic, MD   QUEtiapine 50 mg Oral HS Gearl Sic, MD   spironolactone 25 mg Oral Daily Alejo James MD   tiZANidine 2 mg Oral TID Hans Wilkins DO   torsemide 40 mg Oral BID (diuretic) Hans Wilkins DO     Continuous Infusions:   PRN Meds:    acetaminophen    LORazepam    ondansetron    oxyCODONE     Hans Wilkins DO

## 2018-06-08 NOTE — SOCIAL WORK
CM attempted to met with patient at bedside, but she was having therapy at that time  Will attempt to see later in the day or tomorrow

## 2018-06-08 NOTE — ASSESSMENT & PLAN NOTE
Secondary to alcoholic liver disease  Continue IV albumin for hypotension  Better urinary output with torsemide and metolazone    DC Patino catheter

## 2018-06-08 NOTE — PROGRESS NOTES
Progress Note - Staci Muller 1975, 39 y o  female MRN: [id number]    Unit/Bed#: [place] [address] -01 Encounter: [id number]    Primary Care Provider: Yanet Sarah MD   Date and time admitted to hospital: 5/29/2018  9:05 PM        Assessment and Plan  * Recurrent falls while walking   Assessment & Plan    Poly-factorial secondary to morbid obesity and anasarca  Awaiting rehab placement  Lower abdominal pain   Assessment & Plan     Secondary to anasarca  Improved with Toradol x1  Increased diuretics and tizanidine  Bipolar disorder ([facility] [address] )   Assessment & Plan    Mood stable  Continue quetiapine and amitriptyline        Acute hypokalemia   Assessment & Plan    Replete while on diuretics        Anasarca   Assessment & Plan    Secondary to alcoholic liver disease  Continue IV albumin for hypotension  Better urinary output with torsemide and metolazone  DC Patino catheter        Elevated CPKresolved as of 6/5/2018   Assessment & Plan    RESOLVED -- Nontraumatic rhabdomyolysis  Secondary to fall  Hypomagnesemiaresolved as of 6/5/2018   Assessment & Plan    REPLETED -- Continue magnesium oxide b i d          VTE Pharmacologic Prophylaxis: Enoxaparin (Lovenox)    Patient Centered Rounds: I have performed bedside rounds with nursing staff today  Discussions with Specialists or Other Care Team Provider:  Case Management    Education and Discussions with Family / Patient:     Time Spent for Care: 25 mins  More than 50% of total time spent on counseling and coordination of care as described above      Current Length of Stay: 10 day(s)    Current Patient Status: Inpatient   Certification Statement: The patient will continue to require additional inpatient hospital stay due to rehab placement    Discharge Plan / Estimated Discharge Date:     Code Status: Level 1 - Full Code  ______________________________________________________________________________    Subjective:   Patient seen and examined  Refusing to have Patino catheter removed  Able to move legs better now that swelling has improved    Objective:   Vitals: Blood pressure 107/58, pulse 68, temperature 98 4 °F (36 9 °C), temperature source Temporal, resp  rate 20, height 5' 7" (1 702 m), weight (!) 138 kg (305 lb 1 9 oz), SpO2 96 %  Physical Exam:   General appearance: alert, appears stated age and cooperative  Head: Normocephalic, without obvious abnormality, atraumatic  Lungs: diminished breath sounds  Heart: regular rate and rhythm  Abdomen: Soft obese anasarca present abdominal wall  Back: negative  Extremities: edema +2 lower extremities bilaterally  Neurologic: Grossly normal    Additional Data:   Labs:    Results from last 7 days  Lab Units 06/05/18  0552 06/02/18  0656   WBC Thousand/uL 5 98 4 88   HEMOGLOBIN g/dL 9 4* 9 6*   HEMATOCRIT % 28 7* 28 7*   MCV fL 106* 106*   PLATELETS Thousands/uL 233 232   INR  1 19*  --        Results from last 7 days  Lab Units 06/08/18  0522 06/07/18  0439 06/05/18  0552 06/03/18  0453   SODIUM mmol/L 140 139 139 139   POTASSIUM mmol/L 3 4* 2 8* 4 1 5 0   CHLORIDE mmol/L 104 102 107 110*   CO2 mmol/L 30 29 23 23   ANION GAP mmol/L 6 8 9 6   BUN mg/dL 7 7 7 6   CREATININE mg/dL 0 77 0 81 0 82 0 78   CALCIUM mg/dL 8 4 8 5 8 6 8 4   BILIRUBIN TOTAL mg/dL  --  0 84 0 87  --    ALK PHOS U/L  --  90 97  --    ALT U/L  --  22 24  --    AST U/L  --  42 48*  --    EGFR ml/min/1 73sq m 94 89 87 93   GLUCOSE RANDOM mg/dL 95 89 81 65           Results from last 7 days  Lab Units 06/05/18  0552 06/03/18  0453   CK TOTAL U/L 237* 449*   CK MB INDEX % <1 0 <1 0                          * I Have Reviewed All Lab Data Listed Above  Cultures:           Imaging:  Imaging Reports Reviewed Today Include:   No results found        Scheduled Meds:  Current Facility-Administered Medications:  acetaminophen 650 mg Oral Q6H PRN Lilibeth Cobos MD   albumin human 12 5 g Intravenous BID Ivan Rattler, DO   albuterol 2 puff Inhalation Q6H Redding Hard, MD   amitriptyline 10 mg Oral HS Redding Hard, MD   cyanocobalamin 1,000 mcg Oral Daily Redding Hard, MD   docusate sodium 100 mg Oral BID Redding Hard, MD   enoxaparin 40 mg Subcutaneous Daily Redding Hard, MD   ferrous sulfate 325 mg Oral Daily Redding Hard, MD   folic acid 1 mg Oral Daily Redding Hard, MD   gabapentin 300 mg Oral TID Redding Hard, MD   lactulose 20 g Oral Daily Redding Hard, MD   LORazepam 1 mg Oral Q8H PRN Evelia Velazquez PA-C   magnesium oxide 400 mg Oral BID Redding Hard, MD   menthol-methyl salicylate  Apply externally 4x Daily PRN Roman Margaux, DO   metolazone 5 mg Oral Once per day on Mon Thu Redding Hard, MD   nystatin  Topical BID Evelia Velazquez PA-C   ondansetron 4 mg Intravenous Q6H PRN Redding Georges, MD   oxyCODONE 5 mg Oral Q6H PRN Ibrahima Thakur MD   pantoprazole 40 mg Oral Early Morning Redding Hard, MD   potassium chloride 40 mEq Oral Daily Estuardo Pompa DO   QUEtiapine 50 mg Oral HS Redding Hard, MD   spironolactone 25 mg Oral Daily Ibrahima Thakur MD   torsemide 40 mg Oral BID (diuretic) Ole Margaux, DO     Continuous Infusions:   PRN Meds:    acetaminophen    LORazepam    menthol-methyl salicylate    ondansetron    oxyCODONE     Ole Margaux, DO

## 2018-06-08 NOTE — PLAN OF CARE
Problem: OCCUPATIONAL THERAPY ADULT  Goal: Performs self-care activities at highest level of function for planned discharge setting  See evaluation for individualized goals  Treatment Interventions: ADL retraining, Functional transfer training, UE strengthening/ROM, Endurance training, Patient/family training, Equipment evaluation/education, Compensatory technique education, Continued evaluation, Energy conservation, Activityengagement          See flowsheet documentation for full assessment, interventions and recommendations  Outcome: Progressing  Limitation: Decreased ADL status, Decreased endurance, Decreased Safe judgement during ADL, Decreased self-care trans, Decreased high-level ADLs  Prognosis: Fair  Assessment: Pt seen for OT treatment session this PM focusing on functional activity tolerance, energy conservation education, functional mobility, and transfers to common household surfaces (beside recliner, BSC)  Pt alert and cooperative throughout session  Pt seated OOB in chair at start of session  UE exercises completed while seated OOB in chair to increase UE ROM/strength needed for ADLs and transfers  Pt demonstrated decreased carryover of education from previous session regarding technique, pacing, and form, requiring cues to ensure safety of pt  No c/o pain reported, however seated rest breaks taken between exercises 2* increased fatigue demonstrated  Increased SOB demonstrated by pt, therefore educated pt on energy conservation techniques for ADLs and IADLs (ie: seated rest breaks, compensatory breathing, planning, prioritizing, etc )  w/ pt verbalizing understanding of education  Transfers (sit<>stand, RW<>BSC) completed w/ Mod-Min A of 2 w/ increased assist required for transfer from BSC>RW  Assist required to initiate forward weight shift from surface for sit>stand and assist for controlled descent to surface for stand>sit   Min A of 2 required for functional mobility w/ assist for steadying/balance  Pt seated OOB in chair at end of session w/ call bell and phone within reach  All needs met and pt reports no further questions for OT at this time  Continue to recommend STR upon D/C  OT to continue to follow pt on caseload        OT Discharge Recommendation: Short Term Rehab  OT - OK to Discharge: Yes (yes to STR when medically cleared)

## 2018-06-08 NOTE — PROGRESS NOTES
Patient stated this evening that she feels very depressed and does not want to keep on living the way she is  Patient denies any thoughts of wanting to hurt herself or suicidal ideations  She also states that she feels like her amitriptoline is not working for her anymore

## 2018-06-08 NOTE — PLAN OF CARE
Depression - IP adult     Effects of depression will be minimized Progressing        DISCHARGE PLANNING - CARE MANAGEMENT     Discharge to post-acute care or home with appropriate resources Progressing        GASTROINTESTINAL - ADULT     Maintains or returns to baseline bowel function Progressing        GENITOURINARY - ADULT     Maintains or returns to baseline urinary function Progressing     Absence of urinary retention Progressing        METABOLIC, FLUID AND ELECTROLYTES - ADULT     Electrolytes maintained within normal limits Progressing     Fluid balance maintained Progressing        NEUROSENSORY - ADULT     Remains free of injury related to seizures activity Progressing        Nutrition/Hydration-ADULT     Nutrient/Hydration intake appropriate for improving, restoring or maintaining nutritional needs Progressing        Potential for Falls     Patient will remain free of falls Progressing        Prexisting or High Potential for Compromised Skin Integrity     Skin integrity is maintained or improved Progressing

## 2018-06-08 NOTE — OCCUPATIONAL THERAPY NOTE
[address] Progress Note     Patient Name: Leela Hubbard  [id number] Date: 6/8/2018  Problem List  Patient Active Problem List   Diagnosis    Ambulatory dysfunction    Recurrent falls    Chronic pain syndrome    Morbid obesity due to excess calories ([facility] )    Hypokalemia    Anasarca    Chest pain    Other bipolar disorder ([facility] )    Noncompliance with medication regimen    Alcoholic liver disease ([facility] )    RUQ pain    Febrile illness    Coag negative Staphylococcus bacteremia    Anemia    Recurrent falls while walking    Acute hypokalemia    Hyperammonemia (HCC)    Bipolar disorder ([facility] )    Lower abdominal pain         06/08/18 1510   Restrictions/Precautions   Weight Bearing Precautions Per Order No   Other Precautions Fall Risk;Pain   Lifestyle   Autonomy At baseline, pt was I w/ ADLs and functional mobility/transfers w/ use of RW, required assist w/ IADLs, and reports 2 falls PTA  Reciprocal Relationships Lives w/ significant other   Service to Others On disability   Intrinsic Gratification Watching TV   Pain Assessment   Pain Assessment FLACC   Pain Location Abdomen;Leg   Pain Orientation Left;Bilateral   Pain Descriptors Aching   Pain Frequency Intermittent   Pain Onset Ongoing   Clinical Progression Gradually improving   Patient's Stated Pain Goal No pain   Hospital Pain Intervention(s) Repositioned; Ambulation/increased activity; Emotional support   Response to Interventions Tolerated   Pain Rating: FLACC (Rest) - Face 0   Pain Rating: FLACC (Rest) - Legs 0   Pain Rating: FLACC (Rest) - Activity 0   Pain Rating: FLACC (Rest) - Cry 0   Pain Rating: FLACC (Rest) - Consolability 0   Score: FLACC (Rest) 0   Pain Rating: FLACC (Activity) - Face 1   Pain Rating: FLACC (Activity) - Legs 0   Pain Rating: FLACC (Activity) - Activity 1   Pain Rating: FLACC (Activity) - Cry 1   Pain Rating: FLACC (Activity) - Consolability 1   Score: FLACC (Activity) 4   Functional Standing Tolerance   Time 4 mins Activity Functional mobility   Comments No LOB noted   Bed Mobility   Supine to Sit Unable to assess  (Pt seated OOB in chair at start/end of session)   Sit to Supine Unable to assess  (Pt seated OOB in chair at start/end of session)   Additional Comments Pt seated OOB in chair at end of session w/ call bell and phone within reach  All needs met and pt reports no further questions for OT at this time  Transfers   Sit to Stand 4  Minimal assistance   Additional items Assist x 2;Armrests; Increased time required;Verbal cues   Stand to Sit 4  Minimal assistance   Additional items Assist x 2;Armrests; Increased time required;Verbal cues   Toilet transfer 3  Moderate assistance   Additional items Assist x 2; Increased time required;Verbal cues; Commode   Additional Comments Transfers (sit<>stand, RW<>BSC) completed w/ Mod-Min A of 2 w/ increased assist required for transfer from BSC>RW  Assist required to initiate forward weight shift from surface for sit>stand and assist for controlled descent to surface for stand>sit  Functional Mobility   Functional Mobility 4  Minimal assistance   Additional Comments Assist x2   Additional items Rolling walker   Toilet Transfers   Toilet Transfer From Rolling walker   Toilet Transfer Type To and from   Toilet Transfer to Standard bedside commode   Toilet Transfer Technique Stand pivot; Ambulating   Toilet Transfers Moderate assistance   Toilet Transfers Comments Assist x2 w/ increased assist required to initiate forward weight shift from BSC surface  Cues required for safety, technique, and hand placement during transfer   Therapeutic Exercise - ROM   UE-ROM Yes   ROM- Right Upper Extremities   R Shoulder AROM; Flexion; Extension;Horizontal ABduction   R Elbow AROM;Elbow extension;Elbow flexion   R Position Seated   R Weight/Reps/Sets 10 reps x1   RUE ROM Comment UE exercises completed while seated OOB in chair to increase UE ROM/strength needed for ADLs and transfers   Pt demonstrated decreased carryover of education from previous session regarding technique, pacing, and form, requiring cues to ensure safety of pt  No c/o pain reported, however seated rest breaks taken between exercises 2* increased fatigue demonstrated  ROM - Left Upper Extremities    L Shoulder AROM; Flexion; Extension;Horizontal ABduction   L Elbow AROM;Elbow flexion;Elbow extension   L Position Seated   L Weight/Reps/Sets 10 reps x1   LUE ROM Comment UE exercises completed while seated OOB in chair to increase UE ROM/strength needed for ADLs and transfers  Pt demonstrated decreased carryover of education from previous session regarding technique, pacing, and form, requiring cues to ensure safety of pt  No c/o pain reported, however seated rest breaks taken between exercises 2* increased fatigue demonstrated  Cognition   Overall Cognitive Status WFL   Arousal/Participation Alert; Cooperative   Attention Within functional limits   Orientation Level Oriented X4   Memory Within functional limits   Following Commands Follows one step commands without difficulty   Additional Activities   Additional Activities Other (Comment)  (Energy Conservation Education)   Additional Activities Comments Increased SOB demonstrated by pt, therefore educated pt on energy conservation techniques for ADLs and IADLs (ie: seated rest breaks, compensatory breathing, planning, prioritizing, etc )  w/ pt verbalizing understanding of education  Activity Tolerance   Activity Tolerance Patient limited by fatigue;Patient limited by pain   Medical Staff Made Aware Pt able to be seen per [facility] [place]   Assessment Pt seen for OT treatment session this PM focusing on functional activity tolerance, energy conservation education, functional mobility, and transfers to common household surfaces (beside recliner, BSC)  Pt alert and cooperative throughout session  Pt seated OOB in chair at start of session   UE exercises completed while seated OOB in chair to increase UE ROM/strength needed for ADLs and transfers  Pt demonstrated decreased carryover of education from previous session regarding technique, pacing, and form, requiring cues to ensure safety of pt  No c/o pain reported, however seated rest breaks taken between exercises 2* increased fatigue demonstrated  Increased SOB demonstrated by pt, therefore educated pt on energy conservation techniques for ADLs and IADLs (ie: seated rest breaks, compensatory breathing, planning, prioritizing, etc )  w/ pt verbalizing understanding of education  Transfers (sit<>stand, RW<>BSC) completed w/ Mod-Min A of 2 w/ increased assist required for transfer from BSC>RW  Assist required to initiate forward weight shift from surface for sit>stand and assist for controlled descent to surface for stand>sit  Min A of 2 required for functional mobility w/ assist for steadying/balance  Pt seated OOB in chair at end of session w/ call bell and phone within reach  All needs met and pt reports no further questions for OT at this time  Continue to recommend STR upon D/C  OT to continue to follow pt on caseload  Plan   Treatment Interventions ADL retraining;Functional transfer training;UE strengthening/ROM; Endurance training;Patient/family training;Equipment evaluation/education; Compensatory technique education;Continued evaluation; Energy conservation; Activityengagement   Goal Expiration Date 06/13/18   Treatment Day 3   OT Frequency 3-5x/wk   Recommendation   OT Discharge Recommendation Short Term Rehab   OT - OK to Discharge Yes  (yes to STR when medically cleared)   Barthel Index   Feeding 10   Bathing 0   Grooming Score 5   Dressing Score 5   Bladder Score 0   Bowels Score 10   Toilet Use Score 5   Transfers (Bed/Chair) Score 5   Mobility (Level Surface) Score 0   Stairs Score 0   Barthel Index Score 40   Modified Hitchcock Scale   Modified Hitchcock Scale 4       Lidia Saenz, OTR/L

## 2018-06-08 NOTE — PLAN OF CARE
Problem: PHYSICAL THERAPY ADULT  Goal: Performs mobility at highest level of function for planned discharge setting  See evaluation for individualized goals  Treatment/Interventions: Functional transfer training, LE strengthening/ROM, Elevations, Therapeutic exercise, Endurance training, Patient/family training, Equipment eval/education, Bed mobility, Gait training, Compensatory technique education, Continued evaluation, Spoke to nursing, OT          See flowsheet documentation for full assessment, interventions and recommendations  Outcome: Progressing  Prognosis: Fair  Problem List: Decreased strength, Decreased range of motion, Decreased endurance, Impaired balance, Decreased mobility, Decreased safety awareness, Obesity, Pain, Decreased skin integrity  Assessment: Pt out of bed in chair upon arrival   Pt performed b/l le arom exercises x15 reps  PT tolerated well, required rest breaks between sets of exercises  PT progressed with ambulation distances to 15'x2 with min assist x2 with verbal cues to step into walker bounds,to maintain close distance to walker at all times and to maintain use of walker at all times  Pt tends to push walker aside as pt is turning and backing up to chair  PT demonstrates impaired ability to peform sit to stand transfers and requires mod assist x2 to perform  PT remained calm, no anxiety noted during PT session  PT remained seated out of bed in chair post PT session  Pt declined SCD's to le's  Continue to recommend inpt PT and rehab inorder to maximize functional mobility and I for safe d/c to home  Barriers to Discharge: Inaccessible home environment, Decreased caregiver support  Barriers to Discharge Comments: DUTCH, level of support at home  Recommendation: Short-term skilled PT     PT - OK to Discharge: Yes (to rehab)    See flowsheet documentation for full assessment

## 2018-06-08 NOTE — CASE MANAGEMENT
Continued Stay Review    Date:  6/8/2018    Vital Signs: /59 (BP Location: Right arm)   Pulse 69   Temp 97 7 °F (36 5 °C) (Temporal)   Resp 20   Ht 5' 7" (1 702 m)   Wt (!) 138 kg (305 lb 1 9 oz)   SpO2 95%   BMI 47 79 kg/m²     Medications:   Scheduled Meds:   Current Facility-Administered Medications:         albumin human 12 5 g Intravenous BID Estuardo Pompa DO   albuterol 2 puff Inhalation Q6H Gearl Sic, MD   amitriptyline 10 mg Oral HS Gearl Sic, MD   cyanocobalamin 1,000 mcg Oral Daily Gearl Sic, MD   docusate sodium 100 mg Oral BID Gearl Sic, MD   enoxaparin 40 mg Subcutaneous Daily Gearl Sic, MD   ferrous sulfate 325 mg Oral Daily Gearl Sic, MD   folic acid 1 mg Oral Daily Gearl Sic, MD   gabapentin 300 mg Oral TID Gearl Sic, MD   lactulose 20 g Oral Daily Gearl Sic, MD          magnesium oxide 400 mg Oral BID Gearl Sic, MD          metolazone 5 mg Oral Once per day on Mon Thu Gearcalvin Sic, MD   nystatin  Topical BID Evelia Velazquez PA-C                 pantoprazole 40 mg Oral Early Morning Gearl Sic, MD   potassium chloride 40 mEq Oral Daily Estuardo Pompa DO   QUEtiapine 50 mg Oral HS Gearl Sic, MD   spironolactone 25 mg Oral Daily Alejo James MD   torsemide 40 mg Oral BID (diuretic) Hans Wilkins DO     Continuous Infusions:    PRN Meds:   acetaminophen    LORazepam po x 1    menthol-methyl salicylate    ondansetron    oxyCODONE x 3    Abnormal Labs/Diagnostic Results:   K 3 4  ( was 2 8 on 6/7)      Age/Sex: 39 y o  female     Assessment/Plan:         * Recurrent falls while walking   Assessment & Plan     Poly-factorial secondary to morbid obesity and anasarca  rehab placement           Lower abdominal pain   Assessment & Plan      Secondary to anasarca  Improved with Toradol x1    Increased diuretics and tizanidine           Bipolar disorder [facility])   Assessment & Plan     Mood stable  Continue quetiapine and amitriptyline          Acute hypokalemia   Assessment & Plan     Replete while on diuretics          Anasarca   Assessment & Plan     Secondary to alcoholic liver disease  Continue IV albumin for hypotension  Better urinary output with torsemide and metolazone  DC Patino catheter          Elevated CPKresolved as of 6/5/2018   Assessment & Plan     RESOLVED -- Nontraumatic rhabdomyolysis  Secondary to fall           Hypomagnesemiaresolved as of 6/5/2018   Assessment & Plan     REPLETED -- Continue magnesium oxide b i d           Discharge Plan:  Needs STR  Option in process - [facility]    Thank you,  [address] [facility] in the [facility] by Orlando Gomez for 2017  Network Utilization Review Department  Phone: [phone]; Fax [phone]  ATTENTION: The Network Utilization Review Department is now centralized for our 7 Facilities  Make a note that we have a new phone and fax numbers for our Department  Please call with any questions or concerns to [phone] and carefully follow the prompts so that you are directed to the right person  All voicemails are confidential  Fax any determinations, approvals, denials, and requests for initial or continue stay review clinical to [phone]  Due to HIGH CALL volume, it would be easier if you could please send faxed requests to expedite your requests and in part, help us provide discharge notifications faster

## 2018-06-08 NOTE — PHYSICAL THERAPY NOTE
Physical Therapy Treatment Note       06/08/18 1431   Pain Assessment   Pain Assessment No/denies pain   Pain Score No Pain   Restrictions/Precautions   Weight Bearing Precautions Per Order No   Other Precautions Fall Risk   General   Chart Reviewed Yes   Response to Previous Treatment Patient with no complaints from previous session  Family/Caregiver Present No   Cognition   Overall Cognitive Status WFL   Arousal/Participation Alert; Cooperative   Attention Attends with cues to redirect   Orientation Level Oriented X4   Memory Within functional limits   Following Commands Follows one step commands without difficulty   Subjective   Subjective PT  reports discomfort Left lower abdomen, but no real pain  Transfers   Sit to Stand 4  Minimal assistance   Additional items Assist x 2;Armrests; Increased time required;Verbal cues   Stand to Sit 4  Minimal assistance   Additional items Assist x 2;Armrests; Verbal cues; Increased time required   Toilet transfer 2  Maximal assistance   Additional items Assist x 2;Armrests; Increased time required;Verbal cues; Commode   Ambulation/Elevation   Gait pattern Improper Weight shift;Decreased foot clearance; Wide LEONIE; Short stride; Step to;Excessively slow   Gait Assistance 4  Minimal assist   Additional items Assist x 2   Assistive Device Bariatric Rolling walker   Distance 15'x2   Balance   Static Sitting Fair   Dynamic Sitting Fair   Static Standing Poor +   Dynamic Standing Poor   Ambulatory Poor   Endurance Deficit   Endurance Deficit Yes   Endurance Deficit Description fatigue,    Activity Tolerance   Activity Tolerance Patient limited by pain; Patient limited by fatigue;Patient tolerated treatment well   Medical Staff Made Aware OT Zulay Louder  Exercises   Hip Flexion Sitting;15 reps;AROM; Bilateral   Hip Abduction Sitting;15 reps;AROM; Bilateral   Hip Adduction Sitting;15 reps;AROM; Bilateral  (isometric hip add   )   Knee AROM Long Arc Quad Sitting;15 reps;AROM; Bilateral   Ankle Pumps Sitting;15 reps;AROM; Bilateral   Assessment   Prognosis Fair   Problem List Decreased strength;Decreased range of motion;Decreased endurance; Impaired balance;Decreased mobility; Decreased safety awareness; Obesity;Pain;Decreased skin integrity   Assessment Pt out of bed in chair upon arrival   Pt performed b/l le arom exercises x15 reps  PT tolerated well, required rest breaks between sets of exercises  PT progressed with ambulation distances to 15'x2 with min assist x2 with verbal cues to step into walker bounds,to maintain close distance to walker at all times and to maintain use of walker at all times  Pt tends to push walker aside as pt is turning and backing up to chair  PT demonstrates impaired ability to peform sit to stand transfers and requires mod assist x2 to perform  PT remained calm, no anxiety noted during PT session  PT remained seated out of bed in chair post PT session  Pt declined SCD's to le's  Continue to recommend inpt PT and rehab inorder to maximize functional mobility and I for safe d/c to home  Goals   Patient Goals " to be ablet o go home "     STG Expiration Date 06/09/18   Treatment Day 6   Plan   Treatment/Interventions LE strengthening/ROM; Functional transfer training;Elevations; Endurance training; Therapeutic exercise;Patient/family training;Equipment eval/education; Bed mobility;Gait training;Spoke to nursing;OT   Progress Progressing toward goals   Recommendation   Recommendation Short-term skilled PT   PT - OK to Discharge Yes  (to rehab)        06/08/18 1431   Pain Assessment   Pain Assessment No/denies pain   Pain Score No Pain   Restrictions/Precautions   Weight Bearing Precautions Per Order No   Other Precautions Fall Risk   General   Chart Reviewed Yes   Response to Previous Treatment Patient with no complaints from previous session  Family/Caregiver Present No   Cognition   Overall Cognitive Status WFL   Arousal/Participation Alert; Cooperative   Attention Attends with cues to redirect   Orientation Level Oriented X4   Memory Within functional limits   Following Commands Follows one step commands without difficulty   Subjective   Subjective PT  reports discomfort Left lower abdomen, but no real pain  Transfers   Sit to Stand 4  Minimal assistance   Additional items Assist x 2;Armrests; Increased time required;Verbal cues   Stand to Sit 4  Minimal assistance   Additional items Assist x 2;Armrests; Verbal cues; Increased time required   Toilet transfer 2  Maximal assistance   Additional items Assist x 2;Armrests; Increased time required;Verbal cues; Commode   Ambulation/Elevation   Gait pattern Improper Weight shift;Decreased foot clearance; Wide LEONIE; Short stride; Step to;Excessively slow   Gait Assistance 4  Minimal assist   Additional items Assist x 2   Assistive Device Bariatric Rolling walker   Distance 15'x2   Balance   Static Sitting Fair   Dynamic Sitting Fair   Static Standing Poor +   Dynamic Standing Poor   Ambulatory Poor   Endurance Deficit   Endurance Deficit Yes   Endurance Deficit Description fatigue,    Activity Tolerance   Activity Tolerance Patient limited by pain; Patient limited by fatigue;Patient tolerated treatment well   Medical Staff Made Aware OT Dory Solorzano  Exercises   Hip Flexion Sitting;15 reps;AROM; Bilateral   Hip Abduction Sitting;15 reps;AROM; Bilateral   Hip Adduction Sitting;15 reps;AROM; Bilateral  (isometric hip add   )   Knee AROM Long Arc Quad Sitting;15 reps;AROM; Bilateral   Ankle Pumps Sitting;15 reps;AROM; Bilateral   Assessment   Prognosis Fair   Problem List Decreased strength;Decreased range of motion;Decreased endurance; Impaired balance;Decreased mobility; Decreased safety awareness; Obesity;Pain;Decreased skin integrity   Assessment Pt out of bed in chair upon arrival   Pt performed b/l le arom exercises x15 reps  PT tolerated well, required rest breaks between sets of exercises    PT progressed with ambulation distances to 15'x2 with min assist x2 with verbal cues to step into walker bounds,to maintain close distance to walker at all times and to maintain use of walker at all times  Pt tends to push walker aside as pt is turning and backing up to chair  PT demonstrates impaired ability to peform sit to stand transfers and requires mod assist x2 to perform  PT remained calm, no anxiety noted during PT session  PT remained seated out of bed in chair post PT session  Pt declined SCD's to le's  Continue to recommend inpt PT and rehab inorder to maximize functional mobility and I for safe d/c to home  Goals   Patient Goals " to be ablet o go home "     STG Expiration Date 06/09/18   Treatment Day 6   Plan   Treatment/Interventions LE strengthening/ROM; Functional transfer training;Elevations; Endurance training; Therapeutic exercise;Patient/family training;Equipment eval/education; Bed mobility;Gait training;Spoke to nursing;OT   Progress Progressing toward goals   Recommendation   Recommendation Short-term skilled PT   PT - OK to Discharge Yes  (to rehab)       Alan Viramontes, PTA    Time of PT treatment session: [id number]

## 2018-06-09 NOTE — ASSESSMENT & PLAN NOTE
Anxious at times  Increase quetiapine to 150 mg q h s     Add clonazepam bid  Continue amitriptyline

## 2018-06-09 NOTE — ASSESSMENT & PLAN NOTE
Secondary to alcoholic liver disease  Continue but increase IV albumin for hypotension  Better urinary output with torsemide and metolazone    DC'ed Patino catheter

## 2018-06-09 NOTE — PROGRESS NOTES
Progress Note - Roslyn Alas 1975, 39 y o  female MRN: [id number]    Unit/Bed#: [place] [address] -01 Encounter: [id number]    Primary Care Provider: Keisha Pak MD   Date and time admitted to hospital: 5/29/2018  9:05 PM        Assessment and Plan  * Recurrent falls while walking   Assessment & Plan    Poly-factorial secondary to morbid obesity and anasarca  Awaiting rehab placement  Lower abdominal pain   Assessment & Plan    Secondary to anasarca  Continue diuretics and tizanidine  Bipolar disorder ([address] )   Assessment & Plan    Anxious at times  Increase quetiapine to 150 mg q h s     Add clonazepam bid  Continue amitriptyline  Acute hypokalemia   Assessment & Plan    Replete while on diuretics        Anasarca   Assessment & Plan    Secondary to alcoholic liver disease  Continue but increase IV albumin for hypotension  Better urinary output with torsemide and metolazone  DC'ed Patino catheter        Morbid obesity with BMI of 50 0-59 9, adult [facility])   Assessment & Plan      Needs to lose more weight         VTE Pharmacologic Prophylaxis: Enoxaparin (Lovenox)    Patient Centered Rounds: I have performed bedside rounds with nursing staff today  Discussions with Specialists or Other Care Team Provider: case management    Education and Discussions with Family / Patient:     Time Spent for Care: 30 mins  More than 50% of total time spent on counseling and coordination of care as described above  Current Length of Stay: 11 day(s)    Current Patient Status: Inpatient   Certification Statement: The patient will continue to require additional inpatient hospital stay due to placement    Discharge Plan / Estimated Discharge Date:     Code Status: Level 1 - Full Code  ______________________________________________________________________________    Subjective:   Patient seen and examined  Complains of anxiety and left lower quadrant pains      Objective:   Vitals: Blood pressure 107/53, pulse 68, temperature 99 6 °F (37 6 °C), temperature source Tympanic, resp  rate 20, height 5' 7" (1 702 m), weight (!) 150 kg (330 lb 7 5 oz), SpO2 95 %  Physical Exam:   General appearance: alert, appears stated age and cooperative  Head: Normocephalic, without obvious abnormality, atraumatic  Lungs: diminished breath sounds  Heart: regular rate and rhythm  Abdomen: soft obese LLQ tenderness to palpation  Back: negative  Extremities: edema +2 lower extremities  Neurologic: Grossly normal    Additional Data:   Labs:    Results from last 7 days  Lab Units 06/05/18  0552   WBC Thousand/uL 5 98   HEMOGLOBIN g/dL 9 4*   HEMATOCRIT % 28 7*   MCV fL 106*   PLATELETS Thousands/uL 233   INR  1 19*       Results from last 7 days  Lab Units 06/08/18  0522 06/07/18  0439 06/05/18  0552 06/03/18  0453   SODIUM mmol/L 140 139 139 139   POTASSIUM mmol/L 3 4* 2 8* 4 1 5 0   CHLORIDE mmol/L 104 102 107 110*   CO2 mmol/L 30 29 23 23   ANION GAP mmol/L 6 8 9 6   BUN mg/dL 7 7 7 6   CREATININE mg/dL 0 77 0 81 0 82 0 78   CALCIUM mg/dL 8 4 8 5 8 6 8 4   BILIRUBIN TOTAL mg/dL  --  0 84 0 87  --    ALK PHOS U/L  --  90 97  --    ALT U/L  --  22 24  --    AST U/L  --  42 48*  --    EGFR ml/min/1 73sq m 94 89 87 93   GLUCOSE RANDOM mg/dL 95 89 81 65           Results from last 7 days  Lab Units 06/05/18  0552 06/03/18  0453   CK TOTAL U/L 237* 449*   CK MB INDEX % <1 0 <1 0                          * I Have Reviewed All Lab Data Listed Above  Cultures:           Imaging:  Imaging Reports Reviewed Today Include:   No results found        Scheduled Meds:  Current Facility-Administered Medications:  acetaminophen 650 mg Oral Q6H PRN Barbara Grayson MD   albumin human 12 5 g Intravenous BID Ashley Yanez,    albuterol 2 puff Inhalation Q6H Barbara Grayson MD   amitriptyline 10 mg Oral HS Barbara Grayson MD   cyanocobalamin 1,000 mcg Oral Daily Barbara Grayson MD   docusate sodium 100 mg Oral BID Barbara Grayson MD enoxaparin 40 mg Subcutaneous Daily Cachorro Rivera MD   ferrous sulfate 325 mg Oral Daily Cachorro Rivera MD   folic acid 1 mg Oral Daily Cachorro Rivera MD   gabapentin 300 mg Oral TID Cachorro Rivera MD   lactulose 20 g Oral Daily Cachorro Rivera MD   LORazepam 1 mg Oral Q8H PRN Evelia Velazquez PA-C   magnesium oxide 400 mg Oral BID Cachorro Rivera MD   menthol-methyl salicylate  Apply externally 4x Daily PRN Josy Ca DO   metolazone 5 mg Oral Once per day on Mon Thu Cachorro Rivera MD   nystatin  Topical BID Evelia Velazquez PA-C   ondansetron 4 mg Intravenous Q6H PRN Cachorro Rivera MD   oxyCODONE 5 mg Oral Q6H PRN Zulma Pelaez MD   pantoprazole 40 mg Oral Early Morning Cachorro Rivera MD   potassium chloride 40 mEq Oral Daily Estuardo Pompa DO   QUEtiapine 50 mg Oral HS Cachorro Rivera MD   spironolactone 25 mg Oral Daily Zulma Pelaez MD   torsemide 40 mg Oral BID (diuretic) Josy Ca DO     Continuous Infusions:   PRN Meds:    acetaminophen    LORazepam    menthol-methyl salicylate    ondansetron    oxyCODONE     Josy Ca DO

## 2018-06-09 NOTE — PLAN OF CARE
Depression - IP adult     Effects of depression will be minimized Progressing          GASTROINTESTINAL - ADULT     Maintains or returns to baseline bowel function Progressing          GENITOURINARY - ADULT     Maintains or returns to baseline urinary function Progressing     Absence of urinary retention Progressing          METABOLIC, FLUID AND ELECTROLYTES - ADULT     Electrolytes maintained within normal limits Progressing     Fluid balance maintained Progressing          NEUROSENSORY - ADULT     Remains free of injury related to seizures activity Progressing          Nutrition/Hydration-ADULT     Nutrient/Hydration intake appropriate for improving, restoring or maintaining nutritional needs Progressing          Potential for Falls     Patient will remain free of falls Progressing          Prexisting or High Potential for Compromised Skin Integrity     Skin integrity is maintained or improved Progressing

## 2018-06-09 NOTE — SOCIAL WORK
CM updated patient on [facility] admission  Liaison informed CM that they have a bed for patient at [facility]  Patient reported she had been there before and was not agreeable to return there  CM stated that perhaps [facility] would accept as patient had never been there before  Referral sent

## 2018-06-10 NOTE — ASSESSMENT & PLAN NOTE
Increased quetiapine to 150 mg q h s  and added clonazepam with improvement in anxiety  Continue amitriptyline

## 2018-06-10 NOTE — ASSESSMENT & PLAN NOTE
Poly-factorial secondary to morbid obesity and anasarca    Possibly plan for [facility] in the upcoming days

## 2018-06-10 NOTE — PROGRESS NOTES
Progress Note - Ezequiel Gloria 1975, 39 y o  female MRN: [id number]    Unit/Bed#: [place] [address] -01 Encounter: [id number]    Primary Care Provider: Adina Brandt MD   Date and time admitted to hospital: 5/29/2018  9:05 PM        Assessment and Plan  * Recurrent falls while walking   Assessment & Plan    Poly-factorial secondary to morbid obesity and anasarca  Possibly plan for [facility] in the upcoming days        Lower abdominal pain   Assessment & Plan    Secondary to anasarca  Continue diuretics and tizanidine  Bipolar disorder ([facility] [address] )   Assessment & Plan    Increased quetiapine to 150 mg q h s  and added clonazepam with improvement in anxiety  Continue amitriptyline  Acute hypokalemia   Assessment & Plan    Replete while on diuretics        Anasarca   Assessment & Plan    Secondary to alcoholic liver disease  Continue current dose of IV albumin for hypotension  Better urinary output with torsemide and metolazone  DC'ed Patino catheter        Morbid obesity with BMI of 50 0-59 9, adult [facility])   Assessment & Plan    Needs to lose more weight        Elevated CPKresolved as of 6/5/2018   Assessment & Plan    RESOLVED -- Nontraumatic rhabdomyolysis  Secondary to fall  Hypomagnesemiaresolved as of 6/5/2018   Assessment & Plan    REPLETED -- Continue magnesium oxide b i d          VTE Pharmacologic Prophylaxis: Enoxaparin (Lovenox)    Patient Centered Rounds: I have performed bedside rounds with nursing staff today  Discussions with Specialists or Other Care Team Provider:     Education and Discussions with Family / Patient:     Time Spent for Care: 25 mins  More than 50% of total time spent on counseling and coordination of care as described above      Current Patient Status: Inpatient   Certification Statement: The patient will continue to require additional inpatient hospital stay due to placement    Discharge Plan / Estimated Discharge Date: when placement available    Code Status: Level 1 - Full Code  ______________________________________________________________________________    Subjective:   Patient seen and examined  No new complaints  Able to ambulate and shuffle over to commode  Objective:   Vitals: Blood pressure 115/59, pulse 79, temperature 99 6 °F (37 6 °C), temperature source Tympanic, resp  rate 16, height 5' 7" (1 702 m), weight (!) 150 kg (330 lb 7 5 oz), SpO2 94 %  Physical Exam:   General appearance: alert, appears stated age and cooperative  Head: Normocephalic, without obvious abnormality, atraumatic  Lungs: diminished breath sounds  Heart: regular rate and rhythm  Abdomen: soft, left lower quadrant wall edema with tenderness to palpation  Back: negative  Extremities: edema +2 L>R LE  Neurologic: Grossly normal    Additional Data:   Labs:    Results from last 7 days  Lab Units 06/10/18  0458 06/05/18  0552   WBC Thousand/uL 3 63* 5 98   HEMOGLOBIN g/dL 9 6* 9 4*   HEMATOCRIT % 29 2* 28 7*   MCV fL 105* 106*   PLATELETS Thousands/uL 229 233   INR   --  1 19*       Results from last 7 days  Lab Units 06/10/18  0458 06/08/18  0522 06/07/18  0439 06/05/18  0552   SODIUM mmol/L 139 140 139 139   POTASSIUM mmol/L 3 4* 3 4* 2 8* 4 1   CHLORIDE mmol/L 105 104 102 107   CO2 mmol/L 29 30 29 23   ANION GAP mmol/L 5 6 8 9   BUN mg/dL 5 7 7 7   CREATININE mg/dL 0 88 0 77 0 81 0 82   CALCIUM mg/dL 9 1 8 4 8 5 8 6   BILIRUBIN TOTAL mg/dL 0 77  --  0 84 0 87   ALK PHOS U/L 75  --  90 97   ALT U/L 19  --  22 24   AST U/L 32  --  42 48*   EGFR ml/min/1 73sq m 80 94 89 87   GLUCOSE RANDOM mg/dL 81 95 89 81       Results from last 7 days  Lab Units 06/10/18  0458   MAGNESIUM mg/dL 1 5*   PHOSPHORUS mg/dL 3 4       Results from last 7 days  Lab Units 06/05/18  0552   CK TOTAL U/L 237*   CK MB INDEX % <1 0                          * I Have Reviewed All Lab Data Listed Above      Cultures:           Imaging:  Imaging Reports Reviewed Today Include:   No results found       Scheduled Meds:  Current Facility-Administered Medications:  acetaminophen 650 mg Oral Q6H PRN Candis Solis MD    albumin human 25 g Intravenous BID Demarcus Means, DO Last Rate: 0 g (06/09/18 1800)   albuterol 2 puff Inhalation Q6H Candis Solis MD    amitriptyline 10 mg Oral HS Candis Solis, MD    clonazePAM 0 5 mg Oral BID Estuardo Pompa,     cyanocobalamin 1,000 mcg Oral Daily Candis Solis, MD    docusate sodium 100 mg Oral BID Candis Solis, MD    enoxaparin 40 mg Subcutaneous Daily Candis Solis, MD    ferrous sulfate 325 mg Oral Daily Candis Solis, MD    folic acid 1 mg Oral Daily Candis Solis, MD    gabapentin 300 mg Oral TID Candis Solis, MD    lactulose 20 g Oral Daily Candis Solis, MD    LORazepam 1 mg Oral Q8H PRN Evelia Velazquez PA-C    magnesium oxide 400 mg Oral BID Candis Solis MD    menthol-methyl salicylate  Apply externally 4x Daily PRN Demarcus Desiree, DO    metolazone 5 mg Oral Once per day on Mon Thu Candis Solis MD    nystatin  Topical BID Evelia Velazquez PA-C    ondansetron 4 mg Intravenous Q6H PRN Candis Solis MD    oxyCODONE 5 mg Oral Q6H PRN Ana Moran MD    pantoprazole 40 mg Oral Early Morning Candis Solis MD    potassium chloride 40 mEq Oral Daily Estuardo Pompa, DO    QUEtiapine 150 mg Oral HS Estuardo Pompa, DO    spironolactone 25 mg Oral Daily Ana Moran MD    torsemide 40 mg Oral BID (diuretic) Demarcus Means, DO      Continuous Infusions:   PRN Meds:    acetaminophen    LORazepam    menthol-methyl salicylate    ondansetron    oxyCODONE     Demarcus Means, DO  [facility] Internal [facility]  Hospitalist

## 2018-06-10 NOTE — ASSESSMENT & PLAN NOTE
Secondary to alcoholic liver disease  Continue current dose of IV albumin for hypotension  Better urinary output with torsemide and metolazone    DC'ed Patino catheter

## 2018-06-11 PROBLEM — K74.69 OTHER CIRRHOSIS OF LIVER (HCC): Status: ACTIVE | Noted: 2018-01-01

## 2018-06-11 PROBLEM — R10.30 LOWER ABDOMINAL PAIN: Status: RESOLVED | Noted: 2018-01-01 | Resolved: 2018-01-01

## 2018-06-11 NOTE — PROGRESS NOTES
Progress Note - Naya Valdez 39 y o  female MRN: [id number]    Unit/Bed#: [facility] [facility] 2 -01 Encounter: [id number]      Assessment/Plan:  1-recurrent falls:  Patient presented with ambulatory dysfunction and recurrent falls  Patient had fallen was unable to get up and remained for prolonged period of time on the ground  Was felt to be exacerbated by her morbid obesity and anasarca  -continue physical therapy   -inpatient rehab pending    2-abdominal pain:  Patient denies any current abdominal pain  She notes she has intermittent nausea that is controlled with Zofran    -liver function tests unremarkable   -CT abdomen/pelvis without any acute abnormality that would account for her prior abdominal pain    3-bipolar disorder:  Patient was evaluated by the Psychiatry service in her medications were titrated  Patient denies feeling sedated wishes to continue on this current regimen    -continue amitriptyline 10 mg q h s , clonazepam 0 5 mg b i d , and Seroquel 150 mg at bedtime  4-hypokalemia/hypomagnesemia:  Repleted    5-cirrhosis:  Patient has been evaluated by the GI team in the past   It is suspected that she has underlying cirrhosis, however she does not have significant thrombocytopenia, or coagulopathy    -during prior admission GI team notes patient may require biopsy for further evaluation    -continue diuretics    6-anasarca:  Continue diuretics    7-morbid obesity:  Continue nutritional counseling and education    8-rhabdomyolysis:  Patient suffered rhabdomyolysis status post fall  Her initial CK was 2769  Markedly improved  She did not have any abnormalities in her creatinine  9-fibromyalgia with chronic pain syndrome:  Continue outpatient follow-up with her pain management team     10-anemia:  Patient has a previous history of macrocytic anemia with prior B12/folate/TSH levels within normal limits    This is felt to be secondary to her underlying liver disease    -hemoglobin is stable at 9    11-family:  Called significant other, harsha araujo and left message to call my cell number for update    VTE Pharmacologic Prophylaxis: Enoxaparin (Lovenox)  VTE Mechanical Prophylaxis: sequential compression device    Certification Statement: The patient will continue to require additional inpatient hospital stay due to need for further acute intervention for STR    Status: inpatient     ===================================================================    Subjective:  Patient notes she still has pain in her left knee, however it has improved  She denies any pain anywhere else apart from her chronic back pain  She denies any shortness of breath, or cough  She relates her previous cough has resolved  She notes she has intermittent nausea and vomiting which are controlled with Zofran  She is tolerating p o  She notes she is agreeable to going to inpatient rehab  She denies feeling somnolent with the Seroquel and declines any dose changes  Physical Exam:   Temp:  [98 1 °F (36 7 °C)-98 5 °F (36 9 °C)] 98 3 °F (36 8 °C)  HR:  [71-92] 71  Resp:  [18-19] 19  BP: ()/(53-56) 107/56    Gen:  Pleasant, non-tachypnic, non-dyspnic  Conversant  Heart: regular rate and rhythm, S1S2 present, no murmur, rub or gallop  Lungs: clear to ausculatation bilaterally  No wheezing, crackles, or rhonchi  No accessory muscle use or respiratory distress  Abd: soft, non-tender, non-distended  NABS, no guarding, rebound or peritoneal signs  Extremities: no clubbing, cyanosis or edema  2+pedal pulses bilaterally  Neuro: awake, alert and oriented  Fluent speech  Skin: warm and dry: no petechiae, purpura and rash  Small, healing abrasion noted on left knee  No other abrasions or ecchymoses noted on left lower extremity      LABS:     Results from last 7 days  Lab Units 06/10/18  0458 06/05/18  0552   WBC Thousand/uL 3 63* 5 98   HEMOGLOBIN g/dL 9 6* 9 4*   HEMATOCRIT % 29 2* 28 7*   PLATELETS Thousands/uL 229 233       Results from last 7 days  Lab Units 06/11/18  0451 06/10/18  0458 06/08/18  0522   SODIUM mmol/L 141 139 140   POTASSIUM mmol/L 3 6 3 4* 3 4*   CHLORIDE mmol/L 105 105 104   CO2 mmol/L 28 29 30   BUN mg/dL 5 5 7   CREATININE mg/dL 0 86 0 88 0 77   GLUCOSE RANDOM mg/dL 79 81 95   CALCIUM mg/dL 9 1 9 1 8 4       Hospital Data:    5/29:  CT thoracolumbar spine:  No fracture or traumatic subluxation    5/29:  CT chest/abdomen/pelvis with contrast:  No acute traumatic injury identified  Stable subcentimeter subpleural bilateral upper lobe pulmonary nodules  Stable mild celiac axis adenopathy  Mildly enlarged bilateral inguinal lymph nodes  Findings are nonspecific  Enlarged fatty liver  Gallbladder sludge suggested    5/29:  CT cervical spine:  No fracture or traumatic malalignment  Mild cervical degenerative changes  Stable small subpleural nodular densities in the upper lobes  5/29:  CT brain:  No acute abnormalities    5/29:  X-ray right ankle: And x-ray right hip/pelvis:  No acute osseous abnormalities      ---------------------------------------------------------------------------------------------------------------  This note has been constructed using a voice recognition system

## 2018-06-11 NOTE — CASE MANAGEMENT
CM received a letter requesting a psychiatric evaluation prior to the determination letter being provided   CM called attending and requested a consult go in for a psych eval

## 2018-06-12 NOTE — CONSULTS
Psychiatric Evaluation - Behavioral Health       Assessment/Plan  Principal Problem:  F31 9 bipolar disorder not otherwise specified  F10 20 Alcohol Use DO NOS by history    PLAN:   1) Patient said that she wants her Elavil increased as she is sleep a little better but she wants to sleep better  2) Narcisanet is to be transferred to a physical rehab facility  It is this writer's clinical opinion that patient is competent to make medical decisions for self and she is safe to be transfered to a NH  Chief Complaint: "I feel a little better but still not sleeping   "    History of Present Illness: This is a 41-year-old female who was seen but this writer yoni during her stay  A psychiatric evaluation was re ordered as patient is not to be transfrredered to another rehab facility  Patient said that she feels much better now and she is not as depressed but she is still not sleeping well  She said she is willing to go to a NH home prior to going back home if that is required  She was pleasant and talkative  She said her mood is improved  No suicidal or homicidal ideas  No hallucinations or delusions  Past Psychiatric History:   Diagnosis of Bipolar Do but non compliant with meidcation  Currently in treatment with no one  Substance Abuse History: In the past patient is middle drinking wine every day currently she is denying drinking  Family Psychiatric History:   Psychiatric Illness None Illness: None    Social History:  Education: 8th grade  Learning Disabilities: None  Marital history:    Living arrangement, social support: The patient lives in home with her   Occupational History: On disability  Functioning Relationships: Good support system  Other Pertinent History: None      Traumatic History:   Abuse: None  Other Traumatic Events: None      Past Medical History:   Diagnosis Date    Arthritis     Fibromyalgia     Herniated cervical disc     Hypertension     Psychiatric disorder     Sarcoidosis     Scoliosis        Medical Review Of Systems:  All 12 point review of system is normal except for what is mention in medical hisotry  Scheduled Meds:    Current Facility-Administered Medications:  acetaminophen 650 mg Oral Q6H PRN Norbert Spencer MD    albumin human 25 g Intravenous BID Amy Nunes DO Last Rate: 0 g (06/10/18 1000)   albuterol 2 puff Inhalation Q6H Norbert Spencer MD    amitriptyline 10 mg Oral HS Norbert Spencer MD    clonazePAM 0 5 mg Oral BID Amy Nunes DO    cyanocobalamin 1,000 mcg Oral Daily Norbert Spencer MD    docusate sodium 100 mg Oral BID Norbert Spencer MD    enoxaparin 40 mg Subcutaneous Daily Norbert Spencer MD    ferrous sulfate 325 mg Oral Daily Norbert Spencer MD    folic acid 1 mg Oral Daily Norbert Spencer MD    gabapentin 300 mg Oral TID Norbert Spencer MD    lactulose 20 g Oral Daily Norbert Spencer MD    LORazepam 1 mg Oral Q8H PRN Evelia Velazquez PA-C    magnesium oxide 400 mg Oral BID Norbert Spencer MD    menthol-methyl salicylate  Apply externally 4x Daily PRN Amy Nunes DO    metolazone 5 mg Oral Once per day on Mon Thu Norbert Spencer MD    nystatin  Topical BID Evelia Velazquez PA-C    ondansetron 4 mg Intravenous Q6H PRN Norbert Spencer MD    oxyCODONE 5 mg Oral Q6H PRN Vadim Hernandez MD    pantoprazole 40 mg Oral Early Morning Norbert Spencer MD    potassium chloride 40 mEq Oral Daily Estuardo Pompa DO    QUEtiapine 150 mg Oral HS Estuardo Pompa DO    spironolactone 25 mg Oral Daily Vadim Hernandez MD    torsemide 40 mg Oral BID (diuretic) Amy Nunes DO      Continuous Infusions:     PRN Meds:   acetaminophen    LORazepam    menthol-methyl salicylate    ondansetron    oxyCODONE    Vitals:    06/11/18 2235   BP: 98/53   Pulse: 86   Resp: 19   Temp: 99 8 °F (37 7 °C)   SpO2: 94%         Mental Status Evaluation:  Appearance:  Appeared of her age  Was much calmer today  Behavior:   behavior was cooperative    Speech:   speech is normal goal directed    Mood:  Improved  Affect Calm, pleasant  Language: naming objects and Goal directed  Thought Process:   logical and linear    Thought Content:  Normal   Perceptual Disturbances:  denying any auditory and visual hallucinations  Risk Potential: None   Sensorium:  person, place, time/date, situation, day of week, month of year and year   Cognition:  grossly intact   Consciousness:   alert, awake, and oriented ×3    Attention: Poor   Intellect: Normal   Fund of Knowledge: awareness of current events: normal    Insight:  Poor insight histrionic behavior   Judgment: Fair   Muscle Strength and Tone: Normal    Gait/Station: It is not assessed but patient is complaining of ability dysfunction  Motor Activity: no abnormal movements     Lab Results: I have personally reviewed pertinent lab results  NOTE: Total of 40 mints were spent    Reba Patel MD

## 2018-06-12 NOTE — PROGRESS NOTES
Progress Note - Edenilson Núñez 39 y o  female MRN: [id number]    Unit/Bed#: [place] 2 -01 Encounter: [id number]      Assessment/Plan:  1-recurrent falls:  Patient presented with ambulatory dysfunction and recurrent falls  Patient had fallen was unable to get up and remained for prolonged period of time on the ground, prior to admission  Was felt to be exacerbated by her morbid obesity and anasarca  -continue physical therapy              -inpatient rehab pending     2-status post abdominal pain:  Patient denies any current abdominal pain  She notes she has intermittent nausea that is controlled with Zofran               -liver function tests unremarkable              -CT abdomen/pelvis without any acute abnormality that would account for her prior abdominal pain     3-bipolar disorder:  Patient was evaluated by the Psychiatry service in her medications were titrated  Patient denies feeling sedated wishes to continue on this current regimen               -continue amitriptyline 10 mg q h s , clonazepam 0 5 mg b i d , and Seroquel 150 mg at bedtime      4-hypokalemia/hypomagnesemia:  Repleted and normalized     5-possible cirrhosis:  Patient has been evaluated by the GI team in the past   It is suspected that she has underlying cirrhosis, however she does not have significant thrombocytopenia, or coagulopathy               -during prior admission GI team notes patient may require biopsy for further evaluation               -continue diuretics   -will need outpatient GI follow-up     6-anasarca:  Continue diuretics     7-morbid obesity:  Continue nutritional counseling and education     8-rhabdomyolysis:  Patient suffered rhabdomyolysis status post fall  Her initial CK was 2769  Markedly improved    She did not have any abnormalities in her creatinine      9-fibromyalgia with chronic pain syndrome:  Continue outpatient follow-up with her pain management team      10-anemia:  Patient has a previous history of macrocytic anemia with prior B12/folate/TSH levels within normal limits  This is felt to be secondary to her underlying liver disease               -hemoglobin is stable at 9    11-left leg pain:  Pt c/o left leg pain  Abrasion noted  Xray were not done of LLE on admission   -check xray left knee/hip to eval pain   -lidoderm patch     11-family:  Called significant other, Terry Espinosa and left another message to call me for update     VTE Pharmacologic Prophylaxis: Enoxaparin (Lovenox)  VTE Mechanical Prophylaxis: sequential compression device    Certification Statement: The patient will continue to require additional inpatient hospital stay due to need for further acute intervention for STR arrangements    Status: inpatient     D/w pt's nurse and     ===================================================================    Subjective:  Patient complains of pain in her left knee  She complains of pain down her entire left leg  She is agreeable to trying a Lidoderm patch  She denies any other pain anywhere else at this time  She denies any shortness of breath or cough  She denies any phlegm  She notes nausea, which is improved with Zofran  She is requesting a dose of Zofran  She notes she did not tolerate her lunch today  Physical Exam:   Temp:  [98 2 °F (36 8 °C)-99 8 °F (37 7 °C)] 98 2 °F (36 8 °C)  HR:  [78-91] 91  Resp:  [18-19] 18  BP: ()/(51-56) 104/56    Gen:  Pleasant, non-tachypnic, non-dyspnic  Conversant  Heart: regular rate and rhythm, S1S2 present, no murmur, rub or gallop  Lungs: clear to ausculatation bilaterally  No wheezing, crackles, or rhonchi  No accessory muscle use or respiratory distress  Abd: soft, non-tender, non-distended  NABS, no guarding, rebound or peritoneal signs  Extremities: no clubbing, cyanosis or edema  2+pedal pulses bilaterally  Neuro:  Sleeping, but awakens to voice  Answers questions with fluent and goal directed speech  Follows commands  Skin: warm and dry: no petechiae, purpura and rash  LABS:     Results from last 7 days  Lab Units 06/10/18  0458   WBC Thousand/uL 3 63*   HEMOGLOBIN g/dL 9 6*   HEMATOCRIT % 29 2*   PLATELETS Thousands/uL 229       Results from last 7 days  Lab Units 06/11/18  0451 06/10/18  0458 06/08/18  0522   SODIUM mmol/L 141 139 140   POTASSIUM mmol/L 3 6 3 4* 3 4*   CHLORIDE mmol/L 105 105 104   CO2 mmol/L 28 29 30   BUN mg/dL 5 5 7   CREATININE mg/dL 0 86 0 88 0 77   GLUCOSE RANDOM mg/dL 79 81 95   CALCIUM mg/dL 9 1 9 1 8 4       Hospital Data:    5/29:  CT thoracolumbar spine:  No fracture or traumatic subluxation     5/29:  CT chest/abdomen/pelvis with contrast:  No acute traumatic injury identified  Stable subcentimeter subpleural bilateral upper lobe pulmonary nodules  Stable mild celiac axis adenopathy   Mildly enlarged bilateral inguinal lymph nodes   Findings are nonspecific  Enlarged fatty liver  Gallbladder sludge suggested     5/29:  CT cervical spine:  No fracture or traumatic malalignment  Mild cervical degenerative changes  Stable small subpleural nodular densities in the upper lobes      5/29:  CT brain:  No acute abnormalities     5/29:  X-ray right ankle: And x-ray right hip/pelvis:  No acute osseous abnormalities        ---------------------------------------------------------------------------------------------------------------  This note has been constructed using a voice recognition system

## 2018-06-12 NOTE — OCCUPATIONAL THERAPY NOTE
Occupational Therapy Treatment Note:       06/12/18 1430   Restrictions/Precautions   Weight Bearing Precautions Per Order No   Other Precautions Fall Risk;Pain;Cognitive; Bed Alarm; Chair Alarm   Pain Assessment   Pain Assessment 0-10   Pain Score Worst Possible Pain   Pain Type Chronic pain   Pain Location Leg   Pain Orientation Left   ADL   Where Assessed Edge of bed   Grooming Assistance 5  Supervision/Setup   Grooming Deficit Setup   LB Bathing Assistance 3  Moderate Assistance   LB Bathing Deficit Setup;Steadying;Verbal cueing;Supervision/safety; Increased time to complete;Perineal area; Abdomen;Buttocks;Right lower leg including foot; Left lower leg including foot   LB Bathing Comments simulated activity  LB Dressing Assistance 4  Minimal Assistance   LB Dressing Deficit Setup;Steadying; Requires assistive device for steadying;Verbal cueing; Increased time to complete;Supervision/safety; Don/doff L sock; Don/doff R sock   LB Dressing Comments Pt reports, "It's easier to not wear underwear"  Pt declined need stating, "I have to wait until my boyfriend brings mine in"  Toileting Assistance  3  Moderate Assistance   Toileting Deficit Setup;Steadying;Supervison/safety;Verbal cueing; Increased time to complete; Bedside commode;Perineal hygiene   Toileting Comments Pt currently using extrawide commode with activity  Limited functional reach for nina mahendra hygiene  Functional Standing Tolerance   Time 6 mins   Activity functional mobility  Comments Pt with increased fatigue/pain following activity  Bed Mobility   Supine to Sit 4  Minimal assistance   Additional items Assist x 1   Sit to Supine 3  Moderate assistance   Additional items Assist x 1   Additional Comments Mood change noted with return to supine positioning  Increased anxiety and pain noted  Transfers   Sit to Stand 4  Minimal assistance   Additional items Assist x 2; Increased time required;Verbal cues;Armrests; Bedrails   Stand to Sit 4  Minimal assistance   Additional items Assist x 2;Bedrails;Armrests; Increased time required;Verbal cues   Stand pivot 3  Moderate assistance   Additional items Assist x 2;Armrests; Bedrails; Increased time required;Verbal cues   Toilet transfer 3  Moderate assistance   Additional items Assist x 2   Additional Comments Pt with limited carry over of reviewed techs to push from base of support with activity  Functional Mobility   Functional Mobility 4  Minimal assistance   Additional Comments x2   Additional items Rolling walker   Cognition   Overall Cognitive Status Impaired   Arousal/Participation Alert; Responsive   Attention Attends with cues to redirect   Orientation Level Oriented X4   Memory Decreased short term memory;Decreased recall of precautions   Following Commands Follows multistep commands with increased time or repetition   Comments Inconsistent mood noted during later part of tx session  Additional Activities   Additional Activities Other (Comment)  (balance activity with item retrival  )   Additional Activities Comments Increased pain and anxiety with activity  Inconsistent safety noted  Activity Tolerance   Activity Tolerance Patient limited by fatigue;Patient limited by pain   Medical Staff Made Aware Reported all findings to nursing staff  Assessment   Assessment Pt was seen for skilled OT with focus on completion of self care tasks, bed mobility, functional transfers, completion of dynamic stand balance activity with item retrival and review of current plan of care  Pt with improved motivation noted at start of tx session stating, "I know I have to start moving, I want to go home"  Pt able to tolerate functional transfers and LE dressing activity with Min A overall and cues to carry over reviewed techs  Increased anxiety and pain noted with completion of dynamic stand balance activity with item retrival  Pt resistive to redirection with use of RW to remain with in RW perimeter for safety   Pt stated, "I cant do this anymore"  Pt returned to supine positioning with Mod A x2 due to noted pain levels  Reported all findings to nursing staff  Pt calmed and thanked staff for there help  See above levels of A required for all functional tasks and activity tolerance/balance instance  Pt will benefit from further rehab with focus on achieving optimal performance levels with all functional tasks  Plan   Treatment Interventions ADL retraining;Functional transfer training; Endurance training;Cognitive reorientation;UE strengthening/ROM   Goal Expiration Date 06/13/18   Treatment Day 4   OT Frequency 3-5x/wk   Recommendation   OT Discharge Recommendation Short Term Rehab   Barthel Index   Feeding 10   Bathing 0   Grooming Score 5   Dressing Score 5   Bladder Score 0   Bowels Score 10   Toilet Use Score 5   Transfers (Bed/Chair) Score 5   Mobility (Level Surface) Score 0   Stairs Score 0   Barthel Index Score 40   Modified [place] Scale   Modified Sean Scale 4   Charna Olszewski, [address]

## 2018-06-12 NOTE — PLAN OF CARE
Problem: OCCUPATIONAL THERAPY ADULT  Goal: Performs self-care activities at highest level of function for planned discharge setting  See evaluation for individualized goals  Treatment Interventions: ADL retraining, Functional transfer training, UE strengthening/ROM, Endurance training, Patient/family training, Equipment evaluation/education, Compensatory technique education, Continued evaluation, Energy conservation, Activityengagement          See flowsheet documentation for full assessment, interventions and recommendations  Outcome: Progressing  Limitation: Decreased ADL status, Decreased endurance, Decreased Safe judgement during ADL, Decreased self-care trans, Decreased high-level ADLs  Prognosis: Fair  Assessment: Pt was seen for skilled OT with focus on completion of self care tasks, bed mobility, functional transfers, completion of dynamic stand balance activity with item retrival and review of current plan of care  Pt with improved motivation noted at start of tx session stating, "I know I have to start moving, I want to go home"  Pt able to tolerate functional transfers and LE dressing activity with Min A overall and cues to carry over reviewed techs  Increased anxiety and pain noted with completion of dynamic stand balance activity with item retrival  Pt resistive to redirection with use of RW to remain with in RW perimeter for safety  Pt stated, "I cant do this anymore"  Pt returned to supine positioning with Mod A x2 due to noted pain levels  Reported all findings to nursing staff  Pt calmed and thanked staff for there help  See above levels of A required for all functional tasks and activity tolerance/balance instance  Pt will benefit from further rehab with focus on achieving optimal performance levels with all functional tasks        OT Discharge Recommendation: Short Term Rehab  OT - OK to Discharge: Yes (yes to STR when medically cleared)      Comments: Garnet Dance, [address]

## 2018-06-12 NOTE — PLAN OF CARE
Problem: PHYSICAL THERAPY ADULT  Goal: Performs mobility at highest level of function for planned discharge setting  See evaluation for individualized goals  Treatment/Interventions: Functional transfer training, LE strengthening/ROM, Elevations, Therapeutic exercise, Endurance training, Patient/family training, Equipment eval/education, Bed mobility, Gait training, Compensatory technique education, Continued evaluation, Spoke to nursing, OT          See flowsheet documentation for full assessment, interventions and recommendations  Outcome: Progressing  Prognosis: Fair  Problem List: Decreased strength, Decreased range of motion, Decreased endurance, Impaired balance, Decreased mobility, Decreased safety awareness, Obesity, Orthopedic restrictions, Pain  Assessment: Seen for progression of PT and updated goals  In supine upon arrival   Carissa Rodriguez for supine to sit, but modA of 2 for sit to supine to return to bed  Tires with mobilty and requires encouragement with increased time, becoming emotional when fatigued  Able to progress with ambulation using RW support  Cues for safety needed with RW use as well as to improve upright posture and UE support  Gait very slowed  Also education given on safety with transfers to assure keeping RW with her for completion of transfers as well as appropriate hand placement during transitions  Tolerated few LE ther ex  EOB sitting x 15 minutes  Improving overall balance  Improving activity tolerance  Continues to function below baseline and requiring increased assistance for all mobiltiy  Will cont to require STR in order to address impairments  Barriers to Discharge: Inaccessible home environment, Decreased caregiver support  Barriers to Discharge Comments: DUTCH, decreased support  Recommendation: Short-term skilled PT     PT - OK to Discharge: Yes    See flowsheet documentation for full assessment

## 2018-06-13 NOTE — DISCHARGE INSTRUCTIONS
=================================================    Continue physical therapy/occupational therapy    Continue fall precautions  Oxycodone dose has been increased temporarily to 10 mg every 6 hours as needed for pain  Is anticipated that as she continues to improve this will be tapered off      Please return to the ER with any problems at all, especially any fever, chills, difficulty breathing, dizziness, weakness, lightheadedness, or any other problems at all     ======================================================

## 2018-06-13 NOTE — DISCHARGE SUMMARY
Discharge Summary - Medical Og Menjivar 39 y o  female MRN: [id number]    [address] MED SURG Room / Bed: [facility] [place] [facility] M* Encounter: [id number]    BRIEF OVERVIEW      Admission Date: 5/29/2018       Discharge Date:  06/13/2018    Primary Diagnoses  Principal Problem:    Recurrent falls while walking  Active Problems: Morbid obesity with BMI of 50 0-59 9, adult (HCC)    Anasarca    Anemia    Acute hypokalemia    Hyperammonemia (HCC)    Bipolar disorder (HCC)    Other cirrhosis of liver ([facility] [address] )  Resolved Problems:    Hypomagnesemia    Elevated CPK    Lower abdominal pain    Service:  [facility], [facility]  Consulting Providers   Psychiatry:  Dr Cherelle Avila    Procedures Performed   None    Hospital Studies:  6/12:  X-ray left hip/pelvis:  No acute osseous abnormality    6/12:  X-ray left knee:  No acute osseous abnormality  Small joint effusion  Diffuse soft tissue swelling    5/29:  CT thoracolumbar spine:  No fracture or traumatic subluxation     5/29:  CT chest/abdomen/pelvis with contrast:  No acute traumatic injury identified  Stable subcentimeter subpleural bilateral upper lobe pulmonary nodules  Stable mild celiac axis adenopathy   Mildly enlarged bilateral inguinal lymph nodes   Findings are nonspecific  Enlarged fatty liver    Gallbladder sludge suggested     5/29:  CT cervical spine:  No fracture or traumatic malalignment   Mild cervical degenerative changes   Stable small subpleural nodular densities in the upper lobes      5/29:  CT brain:  No acute abnormalities     5/29:  X-ray right ankle:  And x-ray right hip/pelvis:  No acute osseous abnormalities    Results from last 7 days  Lab Units 06/10/18  0458   WBC Thousand/uL 3 63*   HEMOGLOBIN g/dL 9 6*   HEMATOCRIT % 29 2*   PLATELETS Thousands/uL 229       Results from last 7 days  Lab Units 06/11/18  0451 06/10/18  0458 06/08/18  0522   SODIUM mmol/L 141 139 140 POTASSIUM mmol/L 3 6 3 4* 3 4*   CHLORIDE mmol/L 105 105 104   CO2 mmol/L 28 29 30   BUN mg/dL 5 5 7   CREATININE mg/dL 0 86 0 88 0 77   GLUCOSE RANDOM mg/dL 79 81 95   CALCIUM mg/dL 9 1 9 1 8 4       History and Physical Exam:  Please refer to the Admission H&P note    Hospital Course by Problem  1-recurrent falls:  Patient presented with ambulatory dysfunction and recurrent falls   Patient had fallen was unable to get up and remained for prolonged period of time on the ground, prior to admission   Was felt to be exacerbated by her morbid obesity and anasarca               -continue physical therapy              -discharge to inpatient rehab      2-status post abdominal pain:  Patient noted abdominal pain on admission  This has resolved    Patient denies any current abdominal pain   She notes she has intermittent nausea that is controlled with Zofran               -liver function tests unremarkable              -CT abdomen/pelvis without any acute abnormality that would account for her prior abdominal pain     3-bipolar disorder:  Patient was evaluated by the Psychiatry service in her medications were titrated   Patient denies feeling sedated wishes to continue on this current regimen               -continue amitriptyline 10 mg q h s , clonazepam 0 5 mg b i d , and Seroquel 150 mg at bedtime      4-hypokalemia/hypomagnesemia:  Repleted and normalized     5-possible cirrhosis:  Patient has been evaluated by the GI team in the past  Tomy Sheets is suspected that she has underlying cirrhosis, however she does not have significant thrombocytopenia, or coagulopathy               -during prior admission GI team notes patient may require biopsy for further evaluation               -continue diuretics              -will need outpatient GI follow-up     6-anasarca:  Continue diuretics     7-morbid obesity:  Continue nutritional counseling and education     8-rhabdomyolysis:  Patient suffered rhabdomyolysis status post fall   Her initial CK was 2769  Marianela Ty improved   She did not have any abnormalities in her creatinine      9-fibromyalgia with chronic pain syndrome:  Continue outpatient follow-up with her pain management team      10-anemia: Quoc Brown has a previous history of macrocytic anemia with prior B12/folate/TSH levels within normal limits   This is felt to be secondary to her underlying liver disease               -hemoglobin is stable at 9     11-left leg pain:  Pt c/o left leg pain  Abrasion noted  Xray did not reveal any acute osseous abnormality  Continue analgesics with oxycodone, for a brief course, as well as Lidoderm patch  Continue physical therapy  -patient requests her oxycodone dose be increased from 5 mg to 10 mg  As she suffered muscular strain from her fall, this will be done for a very brief course, with the anticipation that she will be tapered off all narcotics during her rehab stay    -the [facility] website was queried prior to discharge and there are no red flags  Patient has not had any controlled substance prescriptions since 2017             11-family:  Still awaiting call back from significant other  Discussed with patient's nurse and      VTE Pharmacologic Prophylaxis: Enoxaparin (Lovenox)  VTE Mechanical Prophylaxis: sequential compression device    Discharge Condition: Improved  Discharge Disposition:  Home    Discharge Note and Physical Exam:   Patient complains of pain in her left leg  She notes the oxycodone helps but the dose is not strong enough  She requests a dose be increased to 10 mg  She believes the Lidoderm patch has helped her pain was like discontinued  She denies any pain anywhere else  She denies any headache, chest pain, abdominal pain  She denies any current nausea or vomiting  She is tolerating p   O  She denies any shortness of breath or cough  Patient relates she has has had history of urinary incontinence since her vaginal delivery      Vitals: 06/13/18 0820   BP: 107/58   Pulse: 82   Resp: 18   Temp: 99 5 °F (37 5 °C)   SpO2: 93%     General:  Pleasant, non-tachypnic, non-dyspnic  Conversant  Heart: Regular rate and rhythm, S1S2 present  No murmur, rub or gallop  Lungs: Clear to auscultation bilaterally, no wheezing, rhonchi, or crackles  Good air movement  No accessory muscle use or respiratory distress  Abdomen: soft, non-tender, non-distended, NABS  No rebound or guarding  No mass or peritoneal signs  Extremities: no clubbing, cyanosis or edema  2+ pedal pulses bilaterally  Neurologic:  Awake, alert, communicative  Fluent speech  Moving all 4 extremities  Skin: warm and dry  No petechiae, purpura or rash  Discharge Medications   Please see Medical Reconciliation Discharge Form    Discharge Follow Up Appointments:   Perez De La Rosa MD: 1 week  GI: Dr Tyrone Carr: 2 weeks      Discharge  Statement   Total Time Spent today including physical exam, discussion with patient and her nurse and , and discharge arrangements/care = 40 minutes      This note has been constructed using a voice recognition system

## 2018-06-13 NOTE — SOCIAL WORK
CM received determination letter from state  Reached out to [facility] to see if they could accept patient at another facility  They were able to place patient at the [address]  Sent over the [place], [id number] and the determination letter for their records  Transport set up with David Shepard with [facility] for 7:00, as patient requires a blue stretcher  Informed: attending, RN, unit clerk and facility notified via [address]  Met with patient at bedside to update  Numbers for report in EPIC  Transportation form in chart and copy of it in bin for MR

## 2018-06-29 NOTE — TELEPHONE ENCOUNTER
Shaniqua left message requesting refill for amlodipine 10mg  Patient did not show for her appt 6/14, looks like she was discharged, yet again, from  the day prior but never called to cancel  We have had nothing but problems with her in the past  Not showing up for appts, drug seeking behavior, then she spent a long time at [facility] and has been in and out of the hospital multiple times since then  Stephan Xavier had taken her from [place]  I don't know what you want to do with her  She has no upcoming appts

## 2018-09-18 NOTE — CONSULTS
Consultation - [facility]  Patt Veliz 36 y o  female MRN: [id number]  Unit/Bed#: [place] [address] [facility] 87 [id number] Encounter: [id number]        Inpatient consult to gastroenterology  Consult performed by: Lien Wallis ordered by: Flo Rojas          Reason for Consult / Principal Problem: anasarca, cirrhosis    HPI: 62-year-old morbidly obese female with prior gastric bypass surgery, sarcoidosis, and alcohol abuse presenting after a fall at home  She reports frequent falls lately and has been bed ridden  She was found to have elevated LFTs and hepatomegaly on CT scan  She was found to have anasarca as well  She denies prior history of liver disease or hepatitis  She admits to drinking more than 10 mixed drinks every weekend for more than 10 years  She denies IV drug use  She reports daily sharp RUQ pain which comes and goes  Nothing seems to make the pain worse  The pain waxes and wanes  Drinking alcohol decreases the pain  She has occasional nausea and vomiting  She reports diarrhea when she takes diuretics  She denies melena and hematochezia  No recent weight loss  She had gastric bypass surgery 2 years ago at [facility]  She has not followed up with her bariatric surgeon lately due to being bed ridden  She is unsure if she had prior EGD  She believes she had colonoscopy before gastric bypass surgery which was unremarkable  REVIEW OF SYSTEMS:    CONSTITUTIONAL: Denies any fever, chills  Good appetite, and no recent weight loss  HEENT: No earache or tinnitus  Denies hearing loss or visual disturbances  CARDIOVASCULAR: No chest pain or palpitations  RESPIRATORY: Denies any cough, hemoptysis, shortness of breath or dyspnea on exertion  GASTROINTESTINAL: As noted in the History of Present Illness  GENITOURINARY: No problems with urination  Denies any hematuria or dysuria  NEUROLOGIC: No dizziness or vertigo, denies headaches     MUSCULOSKELETAL: Denies any muscle or joint pain  SKIN: Denies skin rashes or itching  ENDOCRINE: Denies excessive thirst  Denies intolerance to heat or cold  PSYCHOSOCIAL: Denies depression or anxiety  Denies any recent memory loss  Historical Information   Past Medical History:   Diagnosis Date    Arthritis     Chronic pain     Fibromyalgia     Herniated cervical disc     Hypertension     Obesities, morbid ([address] )     Psychiatric disorder     Sarcoidosis     Scoliosis      Past Surgical History:   Procedure Laterality Date    GASTRIC BYPASS      HERNIA REPAIR      TOTAL ABDOMINAL HYSTERECTOMY W/ BILATERAL SALPINGOOPHORECTOMY      TUBAL LIGATION       Social History   History   Alcohol Use    Yes     Comment: occassional     History   Drug Use No     History   Smoking Status    Never Smoker   Smokeless Tobacco    Never Used     History reviewed  No pertinent family history      Meds/Allergies     Prescriptions Prior to Admission   Medication    ALPRAZolam (XANAX) 0 5 mg tablet    acetaminophen (TYLENOL) 325 mg tablet    cyanocobalamin (VITAMIN B-12) 1,000 mcg tablet    ferrous sulfate 325 (65 Fe) mg tablet    folic acid (FOLVITE) 1 mg tablet    furosemide (LASIX) 80 mg tablet    GABAPENTIN PO    lidocaine (LIDODERM) 5 %    nystatin (MYCOSTATIN) powder    Oyster Shell (OYSTER CALCIUM) 500 MG TABS    pantoprazole (PROTONIX) 40 mg tablet    potassium chloride (K-DUR,KLOR-CON) 20 mEq tablet    spironolactone (ALDACTONE) 50 mg tablet     Current Facility-Administered Medications   Medication Dose Route Frequency    acetaminophen (TYLENOL) tablet 650 mg  650 mg Oral Q4H PRN    aluminum-magnesium hydroxide-simethicone (MYLANTA) 200-200-20 mg/5 mL oral suspension 30 mL  30 mL Oral Q6H PRN    enoxaparin (LOVENOX) subcutaneous injection 40 mg  40 mg Subcutaneous Daily    furosemide (LASIX) injection 80 mg  80 mg Intravenous BID (diuretic)    gabapentin (NEURONTIN) capsule 300 mg  300 mg Oral TID    lactulose 20 g/30 mL oral solution 20 g  20 g Oral BID    levalbuterol (XOPENEX) inhalation solution 1 25 mg  1 25 mg Nebulization Q6H PRN    metolazone (ZAROXOLYN) tablet 5 mg  5 mg Oral Daily    nystatin (MYCOSTATIN) powder   Topical 4x Daily    ondansetron (ZOFRAN) injection 4 mg  4 mg Intravenous Q6H PRN    pantoprazole (PROTONIX) EC tablet 40 mg  40 mg Oral Early Morning    potassium chloride (K-DUR,KLOR-CON) CR tablet 40 mEq  40 mEq Oral 4x Daily    spironolactone (ALDACTONE) tablet 50 mg  50 mg Oral BID       Allergies   Allergen Reactions    Pregabalin Swelling    Clonazepam Anxiety           Objective     Blood pressure 100/53, pulse 88, temperature 98 1 °F (36 7 °C), temperature source Tympanic, resp  rate 18, weight (!) 171 kg (377 lb 10 4 oz), SpO2 96 %  Intake/Output Summary (Last 24 hours) at 11/28/17 1039  Last data filed at 11/27/17 2100   Gross per 24 hour   Intake                0 ml   Output             1528 ml   Net            -1528 ml         PHYSICAL EXAM:      General Appearance:   Alert, obese female, cooperative, no distress, appears stated age    HEENT:   Normocephalic, atraumatic, anicteric      Neck:  Supple, symmetrical, trachea midline, no adenopathy    Lungs:   Clear to auscultation bilaterally; no rales, rhonchi or wheezing; respirations unlabored    Heart[de-identified]   S1 and S2 normal; regular rate and rhythm; no murmur, rub, or gallop  Abdomen:   Obese, soft, non-tender, non-distended; normal bowel sounds; no masses, no organomegaly    Genitalia:   Deferred    Rectal:   Deferred    Extremities:  No cyanosis, clubbing   3+ pitting edema bilateral lower extremities     Pulses:  2+ and symmetric all extremities    Skin:  Skin color, texture, turgor normal, no rashes or lesions    Lymph nodes:  No palpable cervical lymphadenopathy        Lab Results:     Results from last 7 days  Lab Units 11/28/17  0605   WBC Thousand/uL 7 85   HEMOGLOBIN g/dL 8 8*   HEMATOCRIT % 27 0*   PLATELETS Thousands/uL 230 NEUTROS PCT % 48   LYMPHS PCT % 31   MONOS PCT % 16*   EOS PCT % 4       Results from last 7 days  Lab Units 11/28/17  0605   SODIUM mmol/L 139   POTASSIUM mmol/L 4 1   CHLORIDE mmol/L 104   CO2 mmol/L 31   BUN mg/dL 3*   CREATININE mg/dL 0 82   CALCIUM mg/dL 8 0*   TOTAL PROTEIN g/dL 6 7   BILIRUBIN TOTAL mg/dL 3 71*   ALK PHOS U/L 151*   ALT U/L 26   AST U/L 107*   GLUCOSE RANDOM mg/dL 79               Imaging Studies: I have personally reviewed pertinent imaging studies  Xr Knee 4+ Vw Left Injury    Result Date: 11/27/2017  Impression: No acute osseous abnormality  Generalized soft tissue swelling with stranding in the subcutaneous fat which might indicate edema or cellulitis Workstation performed: [id number]     Xr Ankle 3+ Views Left    Result Date: 11/27/2017  Impression: No acute osseous abnormality  Soft tissue swelling  Workstation performed: [id number]     Pe Study With Ct Abdomen And Pelvis With Contrast    Result Date: 11/26/2017  Impression: No acute traumatic abnormality  Patchy alveolar densities in the lung apices likely infectious or inflammatory in origin  5 mm nodule adjacent to the major fissure, left upper lobe (series 2 image 18)  Negative for pulmonary embolism within the limitations of the study  See above additional nonacute findings  Findings are consistent with the preliminary report from [facility] which was read at 11:23 PM November 25, 2017 [employer]  49  time  Workstation performed: [id number]       ASSESSMENT and PLAN:      58-year-old morbidly obese female with prior gastric bypass surgery, sarcoidosis, and alcohol abuse presenting after a fall at home found to have anasarca with elevated LFTs  1) Elevated LFTs: Likely secondary to alcoholic hepatitis  Liver function testing reveals AST:ALT > 2:1, hyperbilirubinemia, and hypoalbuminemia  Awaiting INR to determine discriminant function   Her platelets are normal and CT scan shows hepatomegaly, making a diagnosis of cirrhosis less likely      -Await INR to determine discriminant function  -Check chronic hepatitis panel  -Monitor LFTs  -Discussed the importance of complete alcohol cessation, and patient expressed understanding  -Patient would likely benefit from EGD to screen for esophageal varices    2) Anasarca: Likely related to hypoalbuminemia however cannot rule out cardiac causes  -Agree with diuretics  -Monitor BMP  -2 gram sodium diet  -Await echocardiogram  -Appreciate recommendations from Cardiology    Patient was seen and examined by Dr Tawnya Sanders  All ag medical decisions were made by Dr Tawnya Sanders  Thank you for allowing us to participate in the care of this present patient  We will follow-up with you closely  Statement Selected

## 2018-10-23 NOTE — ED NOTES
Attempted to use disimpactor on patient  While trying to use it, patient turned around and pushed me stating, "I dont want you to do this! Stop!" I immediately removed disimpactor from patient  Dr Sarah Montana, [place]  10/23/18 3530

## 2018-10-23 NOTE — DISCHARGE INSTRUCTIONS
Fecal Impaction   WHAT YOU NEED TO KNOW:   Fecal impaction is a buildup of hardened bowel movements in your rectum that you cannot pass  Your bowel movements may form one large mass or several smaller masses  DISCHARGE INSTRUCTIONS:   Medicines: You may need any of the following:  · Laxatives  are drinks or drink mixtures that help loosen your bowel movement blockage so you can pass it with ease  · Mineral oils  are lubricants for your bowels that help soften your bowel movements so they pass smoothly  These may be used if your healthcare provider could not completely remove the blockage  · Take your medicine as directed  Contact your healthcare provider if you think your medicine is not helping or if you have side effects  Tell him of her if you are allergic to any medicine  Keep a list of the medicines, vitamins, and herbs you take  Include the amounts, and when and why you take them  Bring the list or the pill bottles to follow-up visits  Carry your medicine list with you in case of an emergency  Self-care:   · Eat foods that are high in fiber  Healthy high-fiber foods include fruits, vegetables, whole-grain breads, and beans  Ask if you need to be on a special diet and how much fiber you should eat each day  · Drink liquids as directed  You may need to drink 1 5 to 2 liters of water each day  Water will help keep your bowel movements soft and help you pass them with less pain  Ask your healthcare provider how much liquid to drink each day and which liquids are best for you  · Try to pass a bowel movement as soon as you get the urge  This will help prevent more constipation  · Exercise regularly  This will help keep your bowels working well and may help you pass bowel movements more often  · Keep a diary of your bowel movement schedule  Write down the date and time of each bowel movement  Also write down if you had trouble passing it   This will help you know if you are passing bowel movements as you should  Prevent another fecal impaction:   · Set up a regular toileting schedule at the same time every day  Try to pass a bowel movement when you first wake up, or half an hour after you eat  This may help you control your bowel movements and help you know when they will happen  · Take fiber supplements daily as directed  Fiber supplements will help you have a regular bowel movement schedule  · Use enemas, laxatives, or constipation medicines as directed  Ask your healthcare provider how often to take medicine when you begin to feel constipated  Follow up with your healthcare provider as directed:  Write down your questions so you remember to ask them during your visits  Contact your healthcare provider if:   · You are not able to pass a bowel movement, even after treatment, or you pass fewer than 3 bowel movements in a week  · You feel lightheaded, dizzy, or faint  · You have nausea, vomiting, or diarrhea that will not stop  · You have a fever  · You have questions or concerns about your condition or care  Return to the emergency department if:   · You have swelling in your abdomen  · You have bloody bowel movements  · You bleed from your rectum  · You have severe pain  © 2017 [address] Information is for End User's use only and may not be sold, redistributed or otherwise used for commercial purposes  All illustrations and images included in CareNotes® are the copyrighted property of [employer]  or Orlando Gomez  The above information is an  only  It is not intended as medical advice for individual conditions or treatments  Talk to your doctor, nurse or pharmacist before following any medical regimen to see if it is safe and effective for you

## 2018-10-23 NOTE — ED NOTES
Pt not wanting to use disimpactor until IM medication starts working  Pt's bed linens changed because patient urinated and had liquid stool on sheets       John Cruz RN  10/23/18 8486

## 2018-10-23 NOTE — ED NOTES
Pt asking for a lidocaine shot to numb around her butt because it hurts       Austin Conti, RN  10/23/18 3495

## 2018-10-23 NOTE — ED NOTES
Attempted to use disimpactor on patient  Pt refusing to move to the side, states she is in too much pain  Pt finally moved self to edge of bed  Pt moaning and crying stating she is in so much pain  Explained procedure to pt and able to complete first pass with disimpactor with minimal stool return when pt began screaming and asked staff to remove probe  Had lengthy conversation with pt regarding procedure  Pt noted to be leaking stool and states she is in too much pain and asked if area could be numbed  Pt informed that entire rectum could not be numbed as probe is going internally  Pt states, "can't you just stick the needle in my ass cheeks?!" Informed pt that, that is not possible and still will not numb the area internally  Pt actively touching buttocks stating that she is in pain  Asked pt not to to that as stool continues to leak from rectum  Pt refusing to continue procedure and asking to speak with physician  Dr Juan Gonzalez notified and will evaluate pt       Hudson Canseco RN  10/23/18 9238

## 2018-10-23 NOTE — ED PROVIDER NOTES
History  Chief Complaint   Patient presents with    Constipation     Pt unable to have bowel movement for past three days  Reports hard ball of stool that needs to come out  Tried Colace at home  History provided by:  Patient   used: No    Constipation   Time since last bowel movement:  3 days  Timing:  Constant  Progression:  Unchanged  Chronicity:  New  Stool description:  None produced  Relieved by:  Nothing  Worsened by:  Nothing  Ineffective treatments: Colace  Associated symptoms: abdominal pain    Associated symptoms: no back pain, no diarrhea, no dysuria, no fever, no nausea and no vomiting        Prior to Admission Medications   Prescriptions Last Dose Informant Patient Reported? Taking?    GABAPENTIN PO   Yes Yes   Sig: Take 600 mg by mouth 3 (three) times a day     Oyster Shell (OYSTER CALCIUM) 500 MG TABS   Yes Yes   QUEtiapine (SEROquel) 100 mg tablet   No Yes   Sig: Take 1 5 tablets (150 mg total) by mouth daily at bedtime   acetaminophen (TYLENOL) 325 mg tablet   No Yes   Sig: Take 2 tablets by mouth every 6 (six) hours as needed for mild pain or fever   amLODIPine (NORVASC) 10 mg tablet   No Yes   Sig: Take 1 tablet (10 mg total) by mouth daily   amitriptyline (ELAVIL) 10 mg tablet   Yes Yes   Sig: Take 10 mg by mouth daily at bedtime   docusate sodium (COLACE) 100 mg capsule   No Yes   Sig: Take 1 capsule (100 mg total) by mouth 2 (two) times a day   ferrous sulfate 325 (65 Fe) mg tablet   Yes Yes   Sig: Take 1 tablet by mouth daily   folic acid (FOLVITE) 1 mg tablet   Yes Yes   Sig: Take by mouth daily   lactulose 20 g/30 mL   No Yes   Sig: Take 30 mL by mouth daily   magnesium oxide (MAG-OX) 400 mg   No Yes   Sig: Take 1 tablet (400 mg total) by mouth 2 (two) times a day   nystatin (MYCOSTATIN) powder   No Yes   Sig: Apply topically 2 (two) times a day   omeprazole (PriLOSEC) 20 mg delayed release capsule   Yes Yes   Sig: Take 20 mg by mouth daily   potassium chloride (K-DUR,KLOR-CON) 20 mEq tablet   No Yes   Sig: Take 1 tablet by mouth 4 (four) times a day (with meals and at bedtime)   spironolactone (ALDACTONE) 25 mg tablet   No Yes   Sig: Take 1 tablet (25 mg total) by mouth daily   torsemide (DEMADEX) 20 mg tablet   No Yes   Sig: Take 2 tablets (40 mg total) by mouth 2 (two) times a day      Facility-Administered Medications: None       Past Medical History:   Diagnosis Date    Arthritis     Fibromyalgia     Herniated cervical disc     Hypertension     Psychiatric disorder     Sarcoidosis     Scoliosis        Past Surgical History:   Procedure Laterality Date    GASTRIC BYPASS      HERNIA REPAIR      OOPHORECTOMY Left 2013    TOTAL ABDOMINAL HYSTERECTOMY W/ BILATERAL SALPINGOOPHORECTOMY      TUBAL LIGATION         Family History   Problem Relation Age of Onset    Thyroid disease Mother         disorder    Lung cancer Father     Hypertension Father     Hypertension Paternal Aunt     Obesity Paternal Aunt     Diabetes Paternal Aunt      I have reviewed and agree with the history as documented  Social History   Substance Use Topics    Smoking status: Never Smoker    Smokeless tobacco: Never Used    Alcohol use Yes      Comment: occassional        Review of Systems   Constitutional: Negative for appetite change, chills and fever  HENT: Negative for congestion and rhinorrhea  Respiratory: Negative for cough  Gastrointestinal: Positive for abdominal pain and constipation  Negative for abdominal distention, blood in stool, diarrhea, nausea and vomiting  Genitourinary: Negative for dysuria, flank pain, frequency, hematuria, urgency, vaginal bleeding and vaginal discharge  Musculoskeletal: Negative for back pain  All other systems reviewed and are negative  Physical Exam  Physical Exam   Constitutional: She is oriented to person, place, and time  She appears well-developed and well-nourished  No distress  HENT:   Head: Normocephalic  Mouth/Throat: Oropharynx is clear and moist and mucous membranes are normal    Neck: Normal range of motion  Neck supple  Cardiovascular: Normal rate, regular rhythm, normal heart sounds and intact distal pulses  Exam reveals no gallop and no friction rub  No murmur heard  Pulmonary/Chest: Effort normal and breath sounds normal  No respiratory distress  She has no wheezes  She has no rales  Abdominal: Soft  Normal appearance and bowel sounds are normal  She exhibits no distension and no mass  There is no tenderness  There is no rigidity, no rebound, no guarding, no CVA tenderness, no tenderness at McBurney's point and negative Cordon's sign  Genitourinary:   Genitourinary Comments: Pt refuses rectal exam   Lymphadenopathy:     She has no cervical adenopathy  Neurological: She is alert and oriented to person, place, and time  Skin: Skin is warm, dry and intact  No rash noted  No pallor  Nursing note and vitals reviewed        Vital Signs  ED Triage Vitals   Temperature Pulse Respirations Blood Pressure SpO2   10/23/18 1511 10/23/18 1224 10/23/18 1224 10/23/18 1224 10/23/18 1224   98 3 °F (36 8 °C) 83 22 136/80 100 %      Temp Source Heart Rate Source Patient Position - Orthostatic VS BP Location FiO2 (%)   10/23/18 1224 10/23/18 1224 10/23/18 1224 10/23/18 1224 --   Oral Monitor Lying Left arm       Pain Score       10/23/18 1224       Worst Possible Pain           Vitals:    10/23/18 1224 10/23/18 1511 10/23/18 1710   BP: 136/80 121/62 151/86   Pulse: 83 86 80   Patient Position - Orthostatic VS: Lying Lying Sitting       Visual Acuity      ED Medications  Medications   lidocaine (LMX) 4 % cream (1 application Topical Given 10/23/18 1344)   haloperidol lactate (HALDOL) injection 5 mg (5 mg Intramuscular Given 10/23/18 1343)   HYDROmorphone (DILAUDID) injection 1 mg (1 mg Intramuscular Given 10/23/18 1423)   OLANZapine (ZyPREXA) IM injection 10 mg (10 mg Intramuscular Given 10/23/18 1424) ketorolac (TORADOL) injection 15 mg (15 mg Intramuscular Given 10/23/18 1708)       Diagnostic Studies  Results Reviewed     None                 No orders to display              Procedures  Procedures       Phone Contacts  ED Phone Contact    ED Course  ED Course as of Oct 23 1733   Tue Oct 23, 2018   1340 Called into room  Pt wants to be "put to sleep" for the disimpaction  I told her we would not be putting her to sleep for it, but I can give her some medicine to make her less anxious for the procedure  Will also place lidocaine cream anally  1411 Pt refusing to let nurses help because she's in pain and wants to be put to sleep  I told her again I would not be putting her to sleep  She told me she is not going to do the procedure  I told her it will help her and the pain, but if she refuses, the only thing I can do for her is prescribe a bowel prep and that may not help  Pt states she does not want meds and wants me to help her  I repeated the same things I previously said  I told her I will give her pain meds, and we will try again  64 Pt is still refusing disimpaction unless she is put to sleep  I feel a procedural sedation on a morbidly obese women who we would have to lay prone would have more risk  I told pt I will not be putting her to sleep  I told pt that I can give her an rx for bowel prep and she can try taking that at home  Pt agrees  MDM  CritCare Time    Disposition  Final diagnoses:   Fecal impaction ([address] )     Time reflects when diagnosis was documented in both MDM as applicable and the Disposition within this note     Time User Action Codes Description Comment    10/23/2018  3:25 PM Pari Olson [K56 41] Fecal impaction [facility])       ED Disposition     ED Disposition Condition Comment    Discharge  Jeovanny Pardeepdesire discharge to home/self care      Condition at discharge: Good        Follow-up Information    None         Patient's Medications Discharge Prescriptions    POLYETHYLENE GLYCOL (GOLYTELY) 4000 ML SOLUTION    Take 4,000 mL by mouth once for 1 dose       Start Date: 10/23/2018End Date: 10/23/2018       Order Dose: 4,000 mL       Quantity: 4000 mL    Refills: 0     No discharge procedures on file      ED Provider  Electronically Signed by           Allan Ness 24, DO  10/23/18 6342

## 2018-10-23 NOTE — ED NOTES
Patient's linens changed because she had a small bowel movement  Some stool was liquid and some was hard formed balls of stool       Marisol Marucs, RN  10/23/18 1865

## 2018-10-23 NOTE — ED NOTES
Patient's  will be coming to get patient after he gets out of work at 64       Yuly Huff RN  10/23/18 2476

## 2018-11-26 NOTE — ED NOTES
Pt stating I did not bring her the lidocaine patch and now she wants it  Pt told that when I asked if she wanted the meds that were ordered, she stated, "I cannot swallow pills and I just want to go home " Pt stating I did not offer her the lidocaine patch  Pt told I can bring it to her now  Pt again refusing patch stating she just wants to go home   at bedside to get patient       Gamaliel Foote RN  11/26/18 6531

## 2018-11-26 NOTE — ED PROVIDER NOTES
History  Chief Complaint   Patient presents with    Back Pain     Back pain since yesterday  Pt has scoliosis and bulging discs  Pt was seen at another hospital last night for same  States she ran out of her medicine for her back pain  Did take Gabapentin to try to relieve pain  Here with complaints of acute exacerbation of chronic back pain  History difficult to obtain  Pt crying and moaning and keeps changing her answers  Reportedly w/ scoliosis and herniated/bulging discs  Reports that the follows w/ Dr Emperatriz Peoples, Pain management at [facility]  Said she had a series of steroid shots within the last couple of months that were ineffective  States she was told to take acetaminophen and given gabapentin rx (now at 300 TID)  Initially denied any recent falls or injuries, but then said she fell yesterday and was seen at [facility] for the fall  Not given anything for pain at that time  Started crying that she was out of her pain meds  Initially indicated gabapentin, but then said she was on percocet from Dr Edna Dolan - also a pain management doctor  Said she ran out a few days ago and had just filled the prescription recently  PDMP reviewed and pt hasn't had any controlled substances filled since June (6/29 oxycodone 5mg, #30 from [facility], [address])  June appears to be the last month she had any controlled substances prescribed and had them from 3 different providers for a total of #140 tab oxycodone 5mg, #50 tabs lorazepam 0 5mg, #64 tabs clonazepam 0 5mg  There are no prescriptions from a Dr Edna Dolan and the only Dr Emperatriz Peoples gave her Lyrica in April  Upon review of records there have been concerns raised in the past for drug seeking behavior          History provided by:  Patient and medical records  History limited by:  Psychiatric disorder   used: No    Back Pain   Location:  Lumbar spine  Quality:  Stabbing and aching  Radiates to:  R posterior upper leg and L posterior upper leg  Pain severity:  Severe  Pain is:  Same all the time  Onset quality:  Gradual  Timing:  Constant  Progression:  Unchanged  Chronicity:  Chronic  Context: falling    Context: not recent illness, not recent injury and not twisting    Relieved by:  Nothing  Worsened by:  Palpation, sitting, lying down, bending and movement  Ineffective treatments:  None tried  Associated symptoms: no abdominal pain, no bladder incontinence, no bowel incontinence, no dysuria, no fever, no numbness, no paresthesias, no perianal numbness, no tingling and no weight loss    Risk factors: obesity        Prior to Admission Medications   Prescriptions Last Dose Informant Patient Reported? Taking?    GABAPENTIN PO   Yes No   Sig: Take 600 mg by mouth 3 (three) times a day     Oyster Shell (OYSTER CALCIUM) 500 MG TABS   Yes No   QUEtiapine (SEROquel) 100 mg tablet   No No   Sig: Take 1 5 tablets (150 mg total) by mouth daily at bedtime   acetaminophen (TYLENOL) 325 mg tablet   No No   Sig: Take 2 tablets by mouth every 6 (six) hours as needed for mild pain or fever   amLODIPine (NORVASC) 10 mg tablet   No No   Sig: Take 1 tablet (10 mg total) by mouth daily   amitriptyline (ELAVIL) 10 mg tablet   Yes No   Sig: Take 10 mg by mouth daily at bedtime   docusate sodium (COLACE) 100 mg capsule   No No   Sig: Take 1 capsule (100 mg total) by mouth 2 (two) times a day   ferrous sulfate 325 (65 Fe) mg tablet   Yes No   Sig: Take 1 tablet by mouth daily   folic acid (FOLVITE) 1 mg tablet   Yes No   Sig: Take by mouth daily   lactulose 20 g/30 mL   No No   Sig: Take 30 mL by mouth daily   magnesium oxide (MAG-OX) 400 mg   No No   Sig: Take 1 tablet (400 mg total) by mouth 2 (two) times a day   nystatin (MYCOSTATIN) powder   No No   Sig: Apply topically 2 (two) times a day   omeprazole (PriLOSEC) 20 mg delayed release capsule   Yes No   Sig: Take 20 mg by mouth daily   polyethylene glycol (GOLYTELY) 4000 mL solution   No No   Sig: Take 4,000 mL by mouth once for 1 dose   potassium chloride (K-DUR,KLOR-CON) 20 mEq tablet   No No   Sig: Take 1 tablet by mouth 4 (four) times a day (with meals and at bedtime)   spironolactone (ALDACTONE) 25 mg tablet   No No   Sig: Take 1 tablet (25 mg total) by mouth daily   torsemide (DEMADEX) 20 mg tablet   No No   Sig: Take 2 tablets (40 mg total) by mouth 2 (two) times a day      Facility-Administered Medications: None       Past Medical History:   Diagnosis Date    Arthritis     Fibromyalgia     Herniated cervical disc     Hypertension     Psychiatric disorder     Sarcoidosis     Scoliosis        Past Surgical History:   Procedure Laterality Date    GASTRIC BYPASS      HERNIA REPAIR      OOPHORECTOMY Left 2013    TOTAL ABDOMINAL HYSTERECTOMY W/ BILATERAL SALPINGOOPHORECTOMY      TUBAL LIGATION         Family History   Problem Relation Age of Onset    Thyroid disease Mother         disorder    Lung cancer Father     Hypertension Father     Hypertension Paternal Aunt     Obesity Paternal Aunt     Diabetes Paternal Aunt      I have reviewed and agree with the history as documented  Social History   Substance Use Topics    Smoking status: Never Smoker    Smokeless tobacco: Never Used    Alcohol use Yes      Comment: occassional        Review of Systems   Unable to perform ROS: Psychiatric disorder   Constitutional: Negative for fever and weight loss  Gastrointestinal: Negative for abdominal pain and bowel incontinence  Genitourinary: Negative for bladder incontinence and dysuria  Musculoskeletal: Positive for back pain  Neurological: Negative for tingling, numbness and paresthesias  Physical Exam  Physical Exam   Constitutional: She appears well-developed  Morbidly obese w/ BMI 50 06   HENT:   Nose: Nose normal    Mouth/Throat: Oropharynx is clear and moist    Eyes: Conjunctivae are normal    Neck: Neck supple  Cardiovascular: Normal rate  Pulmonary/Chest: Effort normal    Abdominal: Soft     Exam limited due to body habitus   Musculoskeletal: She exhibits no deformity  Lumbar back: She exhibits decreased range of motion and tenderness  She exhibits no swelling, no deformity and no spasm  Back:    Neurological: She is alert  Skin: Skin is warm  Psychiatric: Her affect is labile and inappropriate  Her speech is slurred  She is agitated  She expresses inappropriate judgment  Yelling and screaming - calling out to nursing repeatedly  When informed she would not be receiving any opiate pain medication, pt became angry yelling that "I'm going to die - you're killing me "  Attempted to calm and re-direct pt without success  Yelled "I'm going to die and it's your fault "  Again attempted to calm pt and re-assure her that her chronic pain would not actually kill her  Nursing note and vitals reviewed  Vital Signs  ED Triage Vitals [11/26/18 1610]   Temperature Pulse Respirations Blood Pressure SpO2   97 6 °F (36 4 °C) 84 20 120/70 94 %      Temp Source Heart Rate Source Patient Position - Orthostatic VS BP Location FiO2 (%)   Oral Monitor Lying Right arm --      Pain Score       Worst Possible Pain           Vitals:    11/26/18 1610   BP: 120/70   Pulse: 84   Patient Position - Orthostatic VS: Lying       Visual Acuity      ED Medications  Medications   ketorolac (TORADOL) injection 30 mg (30 mg Intramuscular Given 11/26/18 1645)       Diagnostic Studies  Results Reviewed     None                 No orders to display              Procedures  Procedures       Phone Contacts  ED Phone Contact    ED Course  ED Course as of Nov 27 1512 Mon Nov 26, 2018   1700 Pt yelling that 'i'm going to die from the pain' and demanding opiate pain meds  Long d/w pt regarding history of drug seeking behavior and the limitations on what we can use due to her cirrhosis  Offered lidocaine patch and to refill her gabapentin   Now says she 'doesn't like taking those pills bc they are too big '    Said she got her percocet from somewhere in [facility] (gave me a street name I didn't understand)  0 Records indicate pt has appt 11/29 w/ Dr Mariann Barnes, Neurosurg to go over her MRI and discuss options  Will refill gabapentin to last through 11/29 when she will have follow up    [address]  d/w  regarding concerns about pt behavior in the ED and hx of opiate abuse  Said pt was given 3 percocet by [facility] when she left last night and took all 3  Informed  she wouldn't receive any opiates in the ED or rx b/c unsafe w/ her cirrhosis  Also cautioned regarding the acetaminophen use  55 Pt yelling for nurse again  Refused gabapentin and lido patch  Again d/w pt needs to find other coping mechanisms for her chronic pain other than opiates  Pt agrees to use lido patch now                                MDM  CritCare Time    Disposition  Final diagnoses:   Acute exacerbation of chronic low back pain   Drug-seeking behavior     Time reflects when diagnosis was documented in both MDM as applicable and the Disposition within this note     Time User Action Codes Description Comment    11/26/2018  5:18 PM Sinai Don Add [M54 5,  G89 29] Acute exacerbation of chronic low back pain     11/26/2018  5:18 PM Harleen Armas Add [Z76 5] Drug-seeking behavior       ED Disposition     ED Disposition Condition Comment    Discharge  Jena Lucio discharge to home/self care      Condition at discharge: Stable        Follow-up Information     Follow up With Specialties Details Why Contact Info    Leigh Harrington MD  On 11/29/2018 as previously scheduled to discuss further treatment of your chronic back pain [address] [place] [id number] [phone]            Discharge Medication List as of 11/26/2018  5:23 PM      START taking these medications    Details   !! gabapentin (NEURONTIN) 300 mg capsule Take 1 capsule (300 mg total) by mouth 3 (three) times a day for 3 days For post-herpetic neuralgia: Take 1 tablet on day 1,  Then take 2 tablets on day 2, Then take 3 tablets on day 3 and every day after that as instructed by your doctor , Starting Tue 11/27/2018, Until Fri 11/30/2018, Print      lidocaine (LIDODERM) 5 % Apply 1 patch topically daily Remove & Discard patch within 12 hours or as directed by MD, Starting Mon 11/26/2018, Print       !! - Potential duplicate medications found  Please discuss with provider        CONTINUE these medications which have NOT CHANGED    Details   acetaminophen (TYLENOL) 325 mg tablet Take 2 tablets by mouth every 6 (six) hours as needed for mild pain or fever, Starting Thu 12/14/2017, No Print      amitriptyline (ELAVIL) 10 mg tablet Take 10 mg by mouth daily at bedtime, Historical Med      amLODIPine (NORVASC) 10 mg tablet Take 1 tablet (10 mg total) by mouth daily, Starting Mon 7/2/2018, Normal      docusate sodium (COLACE) 100 mg capsule Take 1 capsule (100 mg total) by mouth 2 (two) times a day, Starting Wed 6/13/2018, Print      ferrous sulfate 325 (65 Fe) mg tablet Take 1 tablet by mouth daily, Starting Mon 4/18/2016, Historical Med      folic acid (FOLVITE) 1 mg tablet Take by mouth daily, Historical Med      !! GABAPENTIN PO Take 600 mg by mouth 3 (three) times a day  , Historical Med      lactulose 20 g/30 mL Take 30 mL by mouth daily, Starting Fri 12/15/2017, No Print      magnesium oxide (MAG-OX) 400 mg Take 1 tablet (400 mg total) by mouth 2 (two) times a day, Starting Wed 6/13/2018, Print      nystatin (MYCOSTATIN) powder Apply topically 2 (two) times a day, Starting Wed 6/13/2018, Print      omeprazole (PriLOSEC) 20 mg delayed release capsule Take 20 mg by mouth daily, Historical Med      [employer] [facility] [employer] (OYSTER CALCIUM) 500 MG TABS Starting Mon 4/11/2016, Historical Med      polyethylene glycol (GOLYTELY) 4000 mL solution Take 4,000 mL by mouth once for 1 dose, Starting Tue 10/23/2018, Print      potassium chloride (K-DUR,KLOR-CON) 20 mEq tablet Take 1 tablet by mouth 4 (four) times a day (with meals and at bedtime), Starting Tue 9/19/2017, Print      QUEtiapine (SEROquel) 100 mg tablet Take 1 5 tablets (150 mg total) by mouth daily at bedtime, Starting Wed 6/13/2018, No Print      spironolactone (ALDACTONE) 25 mg tablet Take 1 tablet (25 mg total) by mouth daily, Starting Wed 6/13/2018, Print      torsemide (DEMADEX) 20 mg tablet Take 2 tablets (40 mg total) by mouth 2 (two) times a day, Starting Wed 6/13/2018, Print       !! - Potential duplicate medications found  Please discuss with provider  No discharge procedures on file      ED Provider  Electronically Signed by           Olu Simon DO  11/27/18 5962

## 2018-11-26 NOTE — ED NOTES
Pt refusing medications stating, " I cannot swallow pills and I just want to go home " Dr Austin Richardson aware       John Cruz RN  11/26/18 0197

## 2018-11-26 NOTE — DISCHARGE INSTRUCTIONS
Chronic Back Pain   WHAT YOU NEED TO KNOW:   Chronic back pain is back pain that lasts 3 months or longer  This may include pain that has not been controlled or does not improve with treatment  Your back pain may cause weakness or pain that spreads to your arms or legs  DISCHARGE INSTRUCTIONS:   Return to the emergency department if:   · You have severe pain  · You have new signs of numbness or weakness, especially in your lower back, legs, arms, or genital area  · You lose control of your bladder or bowel movements  · You have a fever or sudden weight loss  Contact your healthcare provider if:   · You have new or worsened pain  · You have questions or concerns about your condition or care  Medicines:   · NSAIDs  help decrease swelling and pain  This medicine can be bought with or without a doctor's order  This medicine can cause stomach bleeding or kidney problems in certain people  If you take blood thinner medicine, always ask your healthcare provider if NSAIDs are safe for you  Always read the medicine label and follow the directions on it before using this medicine  · Acetaminophen  decreases pain  It is available without a doctor's order  Ask how much to take and how often to take it  Follow directions  Acetaminophen can cause liver damage if not taken correctly  · Prescription pain medicine  may also be given to decrease pain  Do not wait until the pain is severe before you take this medicine  · Muscle relaxers  help decrease muscle spasms and back pain  · Take your medicine as directed  Contact your healthcare provider if you think your medicine is not helping or if you have side effects  Tell him if you are allergic to any medicine  Keep a list of the medicines, vitamins, and herbs you take  Include the amounts, and when and why you take them  Bring the list or the pill bottles to follow-up visits  Carry your medicine list with you in case of an emergency    Follow up with your healthcare provider as directed: You may be referred to a sports medicine or spine specialist  Write down your questions so you remember to ask them during your visits  Manage your chronic back pain:   · Heat  helps decrease pain and muscle spasms  Apply heat on your back for 15 to 20 minutes every 2 hours or as often as directed  · Stay active  as much as you can without causing more pain  Ask your healthcare provider what exercises are right for you  Do not sit or lie down for long periods  This could make your back pain worse  Avoid heavy lifting until your pain is gone  · A physical therapist  can teach you exercises to help improve movement and strength, and to decrease pain  © 2017 [facility] Information is for End User's use only and may not be sold, redistributed or otherwise used for commercial purposes  All illustrations and images included in CareNotes® are the copyrighted property of [employer]  or Orlando Gomez  The above information is an  only  It is not intended as medical advice for individual conditions or treatments  Talk to your doctor, nurse or pharmacist before following any medical regimen to see if it is safe and effective for you

## 2019-01-01 ENCOUNTER — APPOINTMENT (EMERGENCY)
Dept: CT IMAGING | Facility: HOSPITAL | Age: 44
DRG: 280 | End: 2019-01-01
Payer: COMMERCIAL

## 2019-01-01 ENCOUNTER — APPOINTMENT (EMERGENCY)
Dept: CT IMAGING | Facility: HOSPITAL | Age: 44
End: 2019-01-01
Payer: COMMERCIAL

## 2019-01-01 ENCOUNTER — APPOINTMENT (INPATIENT)
Dept: NON INVASIVE DIAGNOSTICS | Facility: HOSPITAL | Age: 44
DRG: 280 | End: 2019-01-01
Payer: COMMERCIAL

## 2019-01-01 ENCOUNTER — HOSPITAL ENCOUNTER (INPATIENT)
Facility: HOSPITAL | Age: 44
LOS: 13 days | DRG: 280 | End: 2019-05-17
Attending: EMERGENCY MEDICINE | Admitting: INTERNAL MEDICINE
Payer: COMMERCIAL

## 2019-01-01 ENCOUNTER — APPOINTMENT (EMERGENCY)
Dept: RADIOLOGY | Facility: HOSPITAL | Age: 44
End: 2019-01-01
Payer: COMMERCIAL

## 2019-01-01 ENCOUNTER — APPOINTMENT (INPATIENT)
Dept: ULTRASOUND IMAGING | Facility: HOSPITAL | Age: 44
DRG: 280 | End: 2019-01-01
Payer: COMMERCIAL

## 2019-01-01 ENCOUNTER — APPOINTMENT (EMERGENCY)
Dept: CT IMAGING | Facility: HOSPITAL | Age: 44
DRG: 058 | End: 2019-01-01
Payer: COMMERCIAL

## 2019-01-01 ENCOUNTER — APPOINTMENT (INPATIENT)
Dept: RADIOLOGY | Facility: HOSPITAL | Age: 44
DRG: 280 | End: 2019-01-01
Attending: INTERNAL MEDICINE
Payer: COMMERCIAL

## 2019-01-01 ENCOUNTER — APPOINTMENT (INPATIENT)
Dept: RADIOLOGY | Facility: HOSPITAL | Age: 44
DRG: 280 | End: 2019-01-01
Payer: COMMERCIAL

## 2019-01-01 ENCOUNTER — HOSPITAL ENCOUNTER (EMERGENCY)
Facility: HOSPITAL | Age: 44
Discharge: HOME | End: 2019-02-05
Attending: EMERGENCY MEDICINE | Admitting: EMERGENCY MEDICINE
Payer: COMMERCIAL

## 2019-01-01 ENCOUNTER — HOSPITAL ENCOUNTER (INPATIENT)
Facility: HOSPITAL | Age: 44
LOS: 2 days | Discharge: HOME HEALTH | DRG: 058 | End: 2019-02-11
Attending: EMERGENCY MEDICINE | Admitting: INTERNAL MEDICINE
Payer: COMMERCIAL

## 2019-01-01 VITALS
HEIGHT: 70 IN | SYSTOLIC BLOOD PRESSURE: 98 MMHG | HEART RATE: 24 BPM | OXYGEN SATURATION: 41 % | TEMPERATURE: 97.2 F | WEIGHT: 293 LBS | BODY MASS INDEX: 41.95 KG/M2 | DIASTOLIC BLOOD PRESSURE: 45 MMHG

## 2019-01-01 VITALS
OXYGEN SATURATION: 95 % | BODY MASS INDEX: 55.9 KG/M2 | HEART RATE: 84 BPM | DIASTOLIC BLOOD PRESSURE: 65 MMHG | TEMPERATURE: 98.2 F | SYSTOLIC BLOOD PRESSURE: 117 MMHG | WEIGHT: 293 LBS | RESPIRATION RATE: 18 BRPM

## 2019-01-01 VITALS
SYSTOLIC BLOOD PRESSURE: 116 MMHG | HEIGHT: 70 IN | TEMPERATURE: 98 F | RESPIRATION RATE: 18 BRPM | WEIGHT: 293 LBS | HEART RATE: 75 BPM | BODY MASS INDEX: 41.95 KG/M2 | OXYGEN SATURATION: 97 % | DIASTOLIC BLOOD PRESSURE: 73 MMHG

## 2019-01-01 DIAGNOSIS — R17 JAUNDICE: ICD-10-CM

## 2019-01-01 DIAGNOSIS — R29.6 FREQUENT FALLS: Primary | ICD-10-CM

## 2019-01-01 DIAGNOSIS — R29.6 RECURRENT FALLS: ICD-10-CM

## 2019-01-01 DIAGNOSIS — K72.90 LIVER FAILURE (HCC): ICD-10-CM

## 2019-01-01 DIAGNOSIS — F10.929 ALCOHOL INTOXICATION (HCC): ICD-10-CM

## 2019-01-01 DIAGNOSIS — J96.01 ACUTE RESPIRATORY FAILURE WITH HYPOXIA (HCC): ICD-10-CM

## 2019-01-01 DIAGNOSIS — F31.70 BIPOLAR AFFECTIVE DISORDER IN REMISSION (HCC): ICD-10-CM

## 2019-01-01 DIAGNOSIS — F31.63 BIPOLAR DISORDER, CURRENT EPISODE MIXED, SEVERE, WITHOUT PSYCHOTIC FEATURES (HCC): ICD-10-CM

## 2019-01-01 DIAGNOSIS — G89.29 CHRONIC PAIN: ICD-10-CM
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
## 2019-01-01 DIAGNOSIS — R78.81 GRAM-NEGATIVE BACTEREMIA: ICD-10-CM

## 2019-01-01 DIAGNOSIS — K70.10 ALCOHOLIC HEPATITIS: ICD-10-CM

## 2019-01-01 DIAGNOSIS — R77.8 ELEVATED TROPONIN: ICD-10-CM

## 2019-01-01 DIAGNOSIS — J81.1 PULMONARY EDEMA: ICD-10-CM

## 2019-01-01 DIAGNOSIS — D69.6 THROMBOCYTOPENIA (HCC): ICD-10-CM

## 2019-01-01 DIAGNOSIS — Z76.5 DRUG-SEEKING BEHAVIOR: ICD-10-CM

## 2019-01-01 DIAGNOSIS — S31.000A SACRAL WOUND: ICD-10-CM

## 2019-01-01 DIAGNOSIS — L89.90 DECUBITAL ULCER: ICD-10-CM

## 2019-01-01 DIAGNOSIS — S83.005A CLOSED PATELLAR DISLOCATION, LEFT, INITIAL ENCOUNTER: Primary | ICD-10-CM

## 2019-01-01 DIAGNOSIS — K70.9 ALCOHOLIC LIVER DISEASE (HCC): ICD-10-CM

## 2019-01-01 DIAGNOSIS — R45.851 SUICIDAL IDEATION: ICD-10-CM

## 2019-01-01 LAB
ABO GROUP BLD: NORMAL
AFP-TM SERPL-MCNC: 3.3 NG/ML (ref 0.5–8)
ALBUMIN SERPL BCP-MCNC: 1.5 G/DL (ref 3.5–5)
ALBUMIN SERPL BCP-MCNC: 1.8 G/DL (ref 3.5–5)
ALBUMIN SERPL BCP-MCNC: 1.9 G/DL (ref 3.5–5)
ALBUMIN SERPL BCP-MCNC: 1.9 G/DL (ref 3.5–5)
ALBUMIN SERPL BCP-MCNC: 2 G/DL (ref 3.5–5)
ALBUMIN SERPL BCP-MCNC: 2 G/DL (ref 3.5–5)
ALBUMIN SERPL BCP-MCNC: 2.1 G/DL (ref 3.5–5)
ALBUMIN SERPL BCP-MCNC: 2.5 G/DL (ref 3.5–5)
ALBUMIN SERPL BCP-MCNC: 2.5 G/DL (ref 3.5–5)
ALBUMIN SERPL BCP-MCNC: 2.7 G/DL (ref 3.5–5)
ALBUMIN SERPL BCP-MCNC: 2.8 G/DL (ref 3.5–5)
ALBUMIN SERPL BCP-MCNC: 2.8 G/DL (ref 3.5–5)
ALP SERPL-CCNC: 101 U/L (ref 46–116)
ALP SERPL-CCNC: 104 U/L (ref 46–116)
ALP SERPL-CCNC: 107 U/L (ref 46–116)
ALP SERPL-CCNC: 110 U/L (ref 46–116)
ALP SERPL-CCNC: 111 U/L (ref 46–116)
ALP SERPL-CCNC: 112 U/L (ref 46–116)
ALP SERPL-CCNC: 114 U/L (ref 46–116)
ALP SERPL-CCNC: 131 U/L (ref 46–116)
ALP SERPL-CCNC: 165 U/L (ref 46–116)
ALP SERPL-CCNC: 166 U/L (ref 46–116)
ALP SERPL-CCNC: 176 U/L (ref 46–116)
ALP SERPL-CCNC: 98 U/L (ref 46–116)
ALP SERPL-CCNC: 98 U/L (ref 46–116)
ALP SERPL-CCNC: 99 U/L (ref 46–116)
ALT SERPL W P-5'-P-CCNC: 51 U/L (ref 12–78)
ALT SERPL W P-5'-P-CCNC: 59 U/L (ref 12–78)
ALT SERPL W P-5'-P-CCNC: 62 U/L (ref 12–78)
ALT SERPL W P-5'-P-CCNC: 64 U/L (ref 12–78)
ALT SERPL W P-5'-P-CCNC: 65 U/L (ref 12–78)
ALT SERPL W P-5'-P-CCNC: 66 U/L (ref 12–78)
ALT SERPL W P-5'-P-CCNC: 70 U/L (ref 12–78)
ALT SERPL W P-5'-P-CCNC: 75 U/L (ref 12–78)
ALT SERPL W P-5'-P-CCNC: 78 U/L (ref 12–78)
ALT SERPL W P-5'-P-CCNC: 79 U/L (ref 12–78)
ALT SERPL W P-5'-P-CCNC: 80 U/L (ref 12–78)
ALT SERPL W P-5'-P-CCNC: 82 U/L (ref 12–78)
ALT SERPL W P-5'-P-CCNC: 82 U/L (ref 12–78)
ALT SERPL W P-5'-P-CCNC: 85 U/L (ref 12–78)
AMMONIA PLAS-SCNC: 28 UMOL/L (ref 11–35)
AMMONIA PLAS-SCNC: 38 UMOL/L (ref 11–35)
AMMONIA PLAS-SCNC: 56 UMOL/L (ref 11–35)
AMMONIA PLAS-SCNC: 93 UMOL/L (ref 11–35)
AMPHETAMINES SERPL QL SCN: NEGATIVE
ANION GAP SERPL CALCULATED.3IONS-SCNC: 10 MMOL/L (ref 4–13)
ANION GAP SERPL CALCULATED.3IONS-SCNC: 11 MMOL/L (ref 4–13)
ANION GAP SERPL CALCULATED.3IONS-SCNC: 12 MMOL/L (ref 4–13)
ANION GAP SERPL CALCULATED.3IONS-SCNC: 7 MMOL/L (ref 4–13)
ANION GAP SERPL CALCULATED.3IONS-SCNC: 8 MMOL/L (ref 4–13)
ANION GAP SERPL CALCULATED.3IONS-SCNC: 9 MMOL/L (ref 4–13)
ANISOCYTOSIS BLD QL SMEAR: PRESENT
APTT PPP: 58 SECONDS (ref 26–38)
ARTERIAL PATENCY WRIST A: YES
ARTERIAL PATENCY WRIST A: YES
AST SERPL W P-5'-P-CCNC: 194 U/L (ref 5–45)
AST SERPL W P-5'-P-CCNC: 229 U/L (ref 5–45)
AST SERPL W P-5'-P-CCNC: 236 U/L (ref 5–45)
AST SERPL W P-5'-P-CCNC: 237 U/L (ref 5–45)
AST SERPL W P-5'-P-CCNC: 241 U/L (ref 5–45)
AST SERPL W P-5'-P-CCNC: 244 U/L (ref 5–45)
AST SERPL W P-5'-P-CCNC: 256 U/L (ref 5–45)
AST SERPL W P-5'-P-CCNC: 276 U/L (ref 5–45)
AST SERPL W P-5'-P-CCNC: 277 U/L (ref 5–45)
AST SERPL W P-5'-P-CCNC: 293 U/L (ref 5–45)
AST SERPL W P-5'-P-CCNC: 317 U/L (ref 5–45)
AST SERPL W P-5'-P-CCNC: 339 U/L (ref 5–45)
AST SERPL W P-5'-P-CCNC: 375 U/L (ref 5–45)
AST SERPL W P-5'-P-CCNC: 398 U/L (ref 5–45)
ATRIAL RATE: 92 BPM
ATRIAL RATE: 98 BPM
BACTERIA BLD CULT: ABNORMAL
BACTERIA BLD CULT: ABNORMAL
BACTERIA BLD CULT: NORMAL
BACTERIA BLD CULT: NORMAL
BACTERIA UR QL AUTO: ABNORMAL /HPF
BARBITURATES UR QL: NEGATIVE
BASE EXCESS BLDA CALC-SCNC: 14 MMOL/L
BASE EXCESS BLDA CALC-SCNC: 8.6 MMOL/L
BASOPHILS # BLD AUTO: 0.03 THOUSANDS/ΜL (ref 0–0.1)
BASOPHILS # BLD AUTO: 0.04 THOUSANDS/ΜL (ref 0–0.1)
BASOPHILS # BLD AUTO: 0.04 THOUSANDS/ΜL (ref 0–0.1)
BASOPHILS # BLD AUTO: 0.05 THOUSANDS/ΜL (ref 0–0.1)
BASOPHILS # BLD AUTO: 0.06 THOUSANDS/ΜL (ref 0–0.1)
BASOPHILS # BLD AUTO: 0.08 THOUSANDS/ΜL (ref 0–0.1)
BASOPHILS # BLD MANUAL: 0 THOUSAND/UL (ref 0–0.1)
BASOPHILS NFR BLD AUTO: 0 % (ref 0–1)
BASOPHILS NFR BLD AUTO: 1 % (ref 0–1)
BASOPHILS NFR BLD AUTO: 2 % (ref 0–1)
BASOPHILS NFR BLD AUTO: 2 % (ref 0–1)
BASOPHILS NFR MAR MANUAL: 0 % (ref 0–1)
BENZODIAZ UR QL: POSITIVE
BILIRUB SERPL-MCNC: 13.25 MG/DL (ref 0.2–1)
BILIRUB SERPL-MCNC: 13.27 MG/DL (ref 0.2–1)
BILIRUB SERPL-MCNC: 15.74 MG/DL (ref 0.2–1)
BILIRUB SERPL-MCNC: 19.71 MG/DL (ref 0.2–1)
BILIRUB SERPL-MCNC: 22.54 MG/DL (ref 0.2–1)
BILIRUB SERPL-MCNC: 26.05 MG/DL (ref 0.2–1)
BILIRUB SERPL-MCNC: 26.67 MG/DL (ref 0.2–1)
BILIRUB SERPL-MCNC: 27.09 MG/DL (ref 0.2–1)
BILIRUB SERPL-MCNC: 29.2 MG/DL (ref 0.2–1)
BILIRUB SERPL-MCNC: 31.85 MG/DL (ref 0.2–1)
BILIRUB SERPL-MCNC: 35.56 MG/DL (ref 0.2–1)
BILIRUB SERPL-MCNC: 39.62 MG/DL (ref 0.2–1)
BILIRUB SERPL-MCNC: 40.31 MG/DL (ref 0.2–1)
BILIRUB SERPL-MCNC: 42.87 MG/DL (ref 0.2–1)
BILIRUB UR QL STRIP: ABNORMAL
BLD GP AB SCN SERPL QL: NEGATIVE
BUN SERPL-MCNC: 11 MG/DL (ref 5–25)
BUN SERPL-MCNC: 13 MG/DL (ref 5–25)
BUN SERPL-MCNC: 13 MG/DL (ref 5–25)
BUN SERPL-MCNC: 15 MG/DL (ref 5–25)
BUN SERPL-MCNC: 15 MG/DL (ref 5–25)
BUN SERPL-MCNC: 16 MG/DL (ref 5–25)
BUN SERPL-MCNC: 17 MG/DL (ref 5–25)
BUN SERPL-MCNC: 18 MG/DL (ref 5–25)
BUN SERPL-MCNC: 19 MG/DL (ref 5–25)
BUN SERPL-MCNC: 22 MG/DL (ref 5–25)
BUN SERPL-MCNC: 29 MG/DL (ref 5–25)
BUN SERPL-MCNC: 5 MG/DL (ref 5–25)
BUN SERPL-MCNC: 6 MG/DL (ref 5–25)
BUN SERPL-MCNC: 7 MG/DL (ref 5–25)
BUN SERPL-MCNC: 8 MG/DL (ref 5–25)
BUN SERPL-MCNC: 8 MG/DL (ref 5–25)
CA-I BLD-SCNC: 1.1 MMOL/L (ref 1.12–1.32)
CALCIUM SERPL-MCNC: 7.7 MG/DL (ref 8.3–10.1)
CALCIUM SERPL-MCNC: 8 MG/DL (ref 8.3–10.1)
CALCIUM SERPL-MCNC: 8.1 MG/DL (ref 8.3–10.1)
CALCIUM SERPL-MCNC: 8.1 MG/DL (ref 8.3–10.1)
CALCIUM SERPL-MCNC: 8.2 MG/DL (ref 8.3–10.1)
CALCIUM SERPL-MCNC: 8.4 MG/DL (ref 8.3–10.1)
CALCIUM SERPL-MCNC: 8.5 MG/DL (ref 8.3–10.1)
CALCIUM SERPL-MCNC: 8.6 MG/DL (ref 8.3–10.1)
CALCIUM SERPL-MCNC: 8.6 MG/DL (ref 8.3–10.1)
CALCIUM SERPL-MCNC: 8.7 MG/DL (ref 8.3–10.1)
CALCIUM SERPL-MCNC: 8.9 MG/DL (ref 8.3–10.1)
CALCIUM SERPL-MCNC: 9.1 MG/DL (ref 8.3–10.1)
CALCIUM SERPL-MCNC: 9.2 MG/DL (ref 8.3–10.1)
CALCIUM SERPL-MCNC: 9.4 MG/DL (ref 8.3–10.1)
CALCIUM SERPL-MCNC: 9.6 MG/DL (ref 8.3–10.1)
CHLORIDE SERPL-SCNC: 100 MMOL/L (ref 100–108)
CHLORIDE SERPL-SCNC: 100 MMOL/L (ref 100–108)
CHLORIDE SERPL-SCNC: 101 MMOL/L (ref 100–108)
CHLORIDE SERPL-SCNC: 102 MMOL/L (ref 100–108)
CHLORIDE SERPL-SCNC: 102 MMOL/L (ref 100–108)
CHLORIDE SERPL-SCNC: 104 MMOL/L (ref 100–108)
CHLORIDE SERPL-SCNC: 105 MMOL/L (ref 100–108)
CHLORIDE SERPL-SCNC: 95 MMOL/L (ref 100–108)
CHLORIDE SERPL-SCNC: 96 MMOL/L (ref 100–108)
CHLORIDE SERPL-SCNC: 97 MMOL/L (ref 100–108)
CHLORIDE SERPL-SCNC: 97 MMOL/L (ref 100–108)
CHLORIDE SERPL-SCNC: 98 MMOL/L (ref 100–108)
CHLORIDE SERPL-SCNC: 99 MMOL/L (ref 100–108)
CK MB SERPL-MCNC: 0.3 NG/ML (ref 0–5)
CK MB SERPL-MCNC: <1 % (ref 0–2.5)
CK SERPL-CCNC: 127 U/L (ref 26–192)
CK SERPL-CCNC: 141 U/L (ref 26–192)
CLARITY UR: ABNORMAL
CO2 SERPL-SCNC: 26 MMOL/L (ref 21–32)
CO2 SERPL-SCNC: 27 MMOL/L (ref 21–32)
CO2 SERPL-SCNC: 28 MMOL/L (ref 21–32)
CO2 SERPL-SCNC: 30 MMOL/L (ref 21–32)
CO2 SERPL-SCNC: 31 MMOL/L (ref 21–32)
CO2 SERPL-SCNC: 32 MMOL/L (ref 21–32)
CO2 SERPL-SCNC: 33 MMOL/L (ref 21–32)
CO2 SERPL-SCNC: 35 MMOL/L (ref 21–32)
CO2 SERPL-SCNC: 37 MMOL/L (ref 21–32)
CO2 SERPL-SCNC: 37 MMOL/L (ref 21–32)
CO2 SERPL-SCNC: 38 MMOL/L (ref 21–32)
COARSE GRAN CASTS URNS QL MICRO: ABNORMAL /LPF
COCAINE UR QL: NEGATIVE
COLOR UR: ABNORMAL
CREAT SERPL-MCNC: 0.55 MG/DL (ref 0.6–1.3)
CREAT SERPL-MCNC: 0.61 MG/DL (ref 0.6–1.3)
CREAT SERPL-MCNC: 0.66 MG/DL (ref 0.6–1.3)
CREAT SERPL-MCNC: 0.69 MG/DL (ref 0.6–1.3)
CREAT SERPL-MCNC: 0.72 MG/DL (ref 0.6–1.3)
CREAT SERPL-MCNC: 0.73 MG/DL (ref 0.6–1.3)
CREAT SERPL-MCNC: 0.78 MG/DL (ref 0.6–1.3)
CREAT SERPL-MCNC: 0.83 MG/DL (ref 0.6–1.3)
CREAT SERPL-MCNC: 0.83 MG/DL (ref 0.6–1.3)
CREAT SERPL-MCNC: 0.88 MG/DL (ref 0.6–1.3)
CREAT SERPL-MCNC: 0.89 MG/DL (ref 0.6–1.3)
CREAT SERPL-MCNC: 0.95 MG/DL (ref 0.6–1.3)
CREAT SERPL-MCNC: 0.96 MG/DL (ref 0.6–1.3)
CREAT SERPL-MCNC: 0.97 MG/DL (ref 0.6–1.3)
CREAT SERPL-MCNC: 0.98 MG/DL (ref 0.6–1.3)
CREAT SERPL-MCNC: 0.99 MG/DL (ref 0.6–1.3)
CREAT SERPL-MCNC: 0.99 MG/DL (ref 0.6–1.3)
CREAT SERPL-MCNC: 1.13 MG/DL (ref 0.6–1.3)
CREAT SERPL-MCNC: 1.31 MG/DL (ref 0.6–1.3)
EOSINOPHIL # BLD AUTO: 0.01 THOUSAND/ΜL (ref 0–0.61)
EOSINOPHIL # BLD AUTO: 0.1 THOUSAND/ΜL (ref 0–0.61)
EOSINOPHIL # BLD AUTO: 0.13 THOUSAND/ΜL (ref 0–0.61)
EOSINOPHIL # BLD AUTO: 0.13 THOUSAND/ΜL (ref 0–0.61)
EOSINOPHIL # BLD AUTO: 0.15 THOUSAND/ΜL (ref 0–0.61)
EOSINOPHIL # BLD AUTO: 0.16 THOUSAND/ΜL (ref 0–0.61)
EOSINOPHIL # BLD MANUAL: 0 THOUSAND/UL (ref 0–0.4)
EOSINOPHIL # BLD MANUAL: 0.07 THOUSAND/UL (ref 0–0.4)
EOSINOPHIL # BLD MANUAL: 0.13 THOUSAND/UL (ref 0–0.4)
EOSINOPHIL NFR BLD AUTO: 0 % (ref 0–6)
EOSINOPHIL NFR BLD AUTO: 1 % (ref 0–6)
EOSINOPHIL NFR BLD AUTO: 1 % (ref 0–6)
EOSINOPHIL NFR BLD AUTO: 2 % (ref 0–6)
EOSINOPHIL NFR BLD AUTO: 3 % (ref 0–6)
EOSINOPHIL NFR BLD AUTO: 4 % (ref 0–6)
EOSINOPHIL NFR BLD MANUAL: 0 % (ref 0–6)
EOSINOPHIL NFR BLD MANUAL: 1 % (ref 0–6)
EOSINOPHIL NFR BLD MANUAL: 1 % (ref 0–6)
ERYTHROCYTE [DISTWIDTH] IN BLOOD BY AUTOMATED COUNT: 22 % (ref 11.6–15.1)
ERYTHROCYTE [DISTWIDTH] IN BLOOD BY AUTOMATED COUNT: 22.5 % (ref 11.6–15.1)
ERYTHROCYTE [DISTWIDTH] IN BLOOD BY AUTOMATED COUNT: 22.6 % (ref 11.6–15.1)
ERYTHROCYTE [DISTWIDTH] IN BLOOD BY AUTOMATED COUNT: 22.6 % (ref 11.6–15.1)
ERYTHROCYTE [DISTWIDTH] IN BLOOD BY AUTOMATED COUNT: 22.7 % (ref 11.6–15.1)
ERYTHROCYTE [DISTWIDTH] IN BLOOD BY AUTOMATED COUNT: 22.7 % (ref 11.6–15.1)
ERYTHROCYTE [DISTWIDTH] IN BLOOD BY AUTOMATED COUNT: 23.1 % (ref 11.6–15.1)
ERYTHROCYTE [DISTWIDTH] IN BLOOD BY AUTOMATED COUNT: 23.5 % (ref 11.6–15.1)
ERYTHROCYTE [DISTWIDTH] IN BLOOD BY AUTOMATED COUNT: 23.6 % (ref 11.6–15.1)
ERYTHROCYTE [DISTWIDTH] IN BLOOD BY AUTOMATED COUNT: 23.7 % (ref 11.6–15.1)
ERYTHROCYTE [DISTWIDTH] IN BLOOD BY AUTOMATED COUNT: 23.8 % (ref 11.6–15.1)
ERYTHROCYTE [DISTWIDTH] IN BLOOD BY AUTOMATED COUNT: 23.9 % (ref 11.6–15.1)
ERYTHROCYTE [DISTWIDTH] IN BLOOD BY AUTOMATED COUNT: 23.9 % (ref 11.6–15.1)
ERYTHROCYTE [DISTWIDTH] IN BLOOD BY AUTOMATED COUNT: 24.3 % (ref 11.6–15.1)
ERYTHROCYTE [DISTWIDTH] IN BLOOD BY AUTOMATED COUNT: 25 % (ref 11.6–15.1)
ERYTHROCYTE [DISTWIDTH] IN BLOOD BY AUTOMATED COUNT: 25 % (ref 11.6–15.1)
ETHANOL SERPL-MCNC: 345 MG/DL (ref 0–3)
FINE GRAN CASTS URNS QL MICRO: ABNORMAL /LPF
GFR SERPL CREATININE-BSD FRML MDRD: 100 ML/MIN/1.73SQ M
GFR SERPL CREATININE-BSD FRML MDRD: 101 ML/MIN/1.73SQ M
GFR SERPL CREATININE-BSD FRML MDRD: 105 ML/MIN/1.73SQ M
GFR SERPL CREATININE-BSD FRML MDRD: 107 ML/MIN/1.73SQ M
GFR SERPL CREATININE-BSD FRML MDRD: 110 ML/MIN/1.73SQ M
GFR SERPL CREATININE-BSD FRML MDRD: 114 ML/MIN/1.73SQ M
GFR SERPL CREATININE-BSD FRML MDRD: 49 ML/MIN/1.73SQ M
GFR SERPL CREATININE-BSD FRML MDRD: 59 ML/MIN/1.73SQ M
GFR SERPL CREATININE-BSD FRML MDRD: 69 ML/MIN/1.73SQ M
GFR SERPL CREATININE-BSD FRML MDRD: 69 ML/MIN/1.73SQ M
GFR SERPL CREATININE-BSD FRML MDRD: 70 ML/MIN/1.73SQ M
GFR SERPL CREATININE-BSD FRML MDRD: 71 ML/MIN/1.73SQ M
GFR SERPL CREATININE-BSD FRML MDRD: 72 ML/MIN/1.73SQ M
GFR SERPL CREATININE-BSD FRML MDRD: 73 ML/MIN/1.73SQ M
GFR SERPL CREATININE-BSD FRML MDRD: 79 ML/MIN/1.73SQ M
GFR SERPL CREATININE-BSD FRML MDRD: 80 ML/MIN/1.73SQ M
GFR SERPL CREATININE-BSD FRML MDRD: 85 ML/MIN/1.73SQ M
GFR SERPL CREATININE-BSD FRML MDRD: 85 ML/MIN/1.73SQ M
GFR SERPL CREATININE-BSD FRML MDRD: 92 ML/MIN/1.73SQ M
GLUCOSE SERPL-MCNC: 101 MG/DL (ref 65–140)
GLUCOSE SERPL-MCNC: 105 MG/DL (ref 65–140)
GLUCOSE SERPL-MCNC: 111 MG/DL (ref 65–140)
GLUCOSE SERPL-MCNC: 115 MG/DL (ref 65–140)
GLUCOSE SERPL-MCNC: 115 MG/DL (ref 65–140)
GLUCOSE SERPL-MCNC: 64 MG/DL (ref 65–140)
GLUCOSE SERPL-MCNC: 77 MG/DL (ref 65–140)
GLUCOSE SERPL-MCNC: 77 MG/DL (ref 65–140)
GLUCOSE SERPL-MCNC: 81 MG/DL (ref 65–140)
GLUCOSE SERPL-MCNC: 81 MG/DL (ref 65–140)
GLUCOSE SERPL-MCNC: 82 MG/DL (ref 65–140)
GLUCOSE SERPL-MCNC: 82 MG/DL (ref 65–140)
GLUCOSE SERPL-MCNC: 83 MG/DL (ref 65–140)
GLUCOSE SERPL-MCNC: 83 MG/DL (ref 65–140)
GLUCOSE SERPL-MCNC: 88 MG/DL (ref 65–140)
GLUCOSE SERPL-MCNC: 90 MG/DL (ref 65–140)
GLUCOSE SERPL-MCNC: 91 MG/DL (ref 65–140)
GLUCOSE SERPL-MCNC: 92 MG/DL (ref 65–140)
GLUCOSE SERPL-MCNC: 92 MG/DL (ref 65–140)
GLUCOSE SERPL-MCNC: 94 MG/DL (ref 65–140)
GLUCOSE SERPL-MCNC: 98 MG/DL (ref 65–140)
GLUCOSE SERPL-MCNC: 98 MG/DL (ref 65–140)
GLUCOSE SERPL-MCNC: 99 MG/DL (ref 65–140)
GLUCOSE SERPL-MCNC: 99 MG/DL (ref 65–140)
GLUCOSE UR STRIP-MCNC: ABNORMAL MG/DL
GRAM STN SPEC: ABNORMAL
GRAM STN SPEC: ABNORMAL
HAV IGM SER QL: NORMAL
HBV CORE IGM SER QL: NORMAL
HBV SURFACE AG SER QL: NORMAL
HCG SERPL QL: NEGATIVE
HCO3 BLDA-SCNC: 32.6 MMOL/L (ref 22–28)
HCO3 BLDA-SCNC: 38.3 MMOL/L (ref 22–28)
HCT VFR BLD AUTO: 24.6 % (ref 34.8–46.1)
HCT VFR BLD AUTO: 24.9 % (ref 34.8–46.1)
HCT VFR BLD AUTO: 25.1 % (ref 34.8–46.1)
HCT VFR BLD AUTO: 25.3 % (ref 34.8–46.1)
HCT VFR BLD AUTO: 25.3 % (ref 34.8–46.1)
HCT VFR BLD AUTO: 28 % (ref 34.8–46.1)
HCT VFR BLD AUTO: 28.1 % (ref 34.8–46.1)
HCT VFR BLD AUTO: 28.1 % (ref 34.8–46.1)
HCT VFR BLD AUTO: 28.4 % (ref 34.8–46.1)
HCT VFR BLD AUTO: 29 % (ref 34.8–46.1)
HCT VFR BLD AUTO: 29.4 % (ref 34.8–46.1)
HCT VFR BLD AUTO: 30.4 % (ref 34.8–46.1)
HCT VFR BLD AUTO: 30.9 % (ref 34.8–46.1)
HCT VFR BLD AUTO: 32 % (ref 34.8–46.1)
HCT VFR BLD AUTO: 32.1 % (ref 34.8–46.1)
HCT VFR BLD AUTO: 32.9 % (ref 34.8–46.1)
HCV AB SER QL: NORMAL
HGB BLD-MCNC: 10.1 G/DL (ref 11.5–15.4)
HGB BLD-MCNC: 10.6 G/DL (ref 11.5–15.4)
HGB BLD-MCNC: 10.7 G/DL (ref 11.5–15.4)
HGB BLD-MCNC: 11.1 G/DL (ref 11.5–15.4)
HGB BLD-MCNC: 11.3 G/DL (ref 11.5–15.4)
HGB BLD-MCNC: 7.9 G/DL (ref 11.5–15.4)
HGB BLD-MCNC: 8 G/DL (ref 11.5–15.4)
HGB BLD-MCNC: 8.1 G/DL (ref 11.5–15.4)
HGB BLD-MCNC: 8.2 G/DL (ref 11.5–15.4)
HGB BLD-MCNC: 8.3 G/DL (ref 11.5–15.4)
HGB BLD-MCNC: 9 G/DL (ref 11.5–15.4)
HGB BLD-MCNC: 9 G/DL (ref 11.5–15.4)
HGB BLD-MCNC: 9.1 G/DL (ref 11.5–15.4)
HGB BLD-MCNC: 9.2 G/DL (ref 11.5–15.4)
HGB BLD-MCNC: 9.6 G/DL (ref 11.5–15.4)
HGB BLD-MCNC: 9.7 G/DL (ref 11.5–15.4)
HGB UR QL STRIP.AUTO: ABNORMAL
IMM GRANULOCYTES # BLD AUTO: 0.01 THOUSAND/UL (ref 0–0.2)
IMM GRANULOCYTES # BLD AUTO: 0.02 THOUSAND/UL (ref 0–0.2)
IMM GRANULOCYTES # BLD AUTO: 0.05 THOUSAND/UL (ref 0–0.2)
IMM GRANULOCYTES # BLD AUTO: 0.14 THOUSAND/UL (ref 0–0.2)
IMM GRANULOCYTES # BLD AUTO: >0.5 THOUSAND/UL (ref 0–0.2)
IMM GRANULOCYTES # BLD AUTO: >0.5 THOUSAND/UL (ref 0–0.2)
IMM GRANULOCYTES NFR BLD AUTO: 0 % (ref 0–2)
IMM GRANULOCYTES NFR BLD AUTO: 1 % (ref 0–2)
IMM GRANULOCYTES NFR BLD AUTO: 1 % (ref 0–2)
IMM GRANULOCYTES NFR BLD AUTO: 12 % (ref 0–2)
IMM GRANULOCYTES NFR BLD AUTO: 9 % (ref 0–2)
INR PPP: 1.99 (ref 0.86–1.17)
INR PPP: 2.42 (ref 0.86–1.17)
INR PPP: 2.46 (ref 0.86–1.17)
INR PPP: 2.47 (ref 0.86–1.17)
INR PPP: 2.58 (ref 0.86–1.17)
IPAP: 15
KETONES UR STRIP-MCNC: ABNORMAL MG/DL
LACTATE SERPL-SCNC: 2.2 MMOL/L (ref 0.5–2)
LACTATE SERPL-SCNC: 3.3 MMOL/L (ref 0.5–2)
LEUKOCYTE ESTERASE UR QL STRIP: NEGATIVE
LG PLATELETS BLD QL SMEAR: PRESENT
LG PLATELETS BLD QL SMEAR: PRESENT
LIPASE SERPL-CCNC: 213 U/L (ref 73–393)
LYMPHOCYTES # BLD AUTO: 0.98 THOUSANDS/ΜL (ref 0.6–4.47)
LYMPHOCYTES # BLD AUTO: 1.29 THOUSANDS/ΜL (ref 0.6–4.47)
LYMPHOCYTES # BLD AUTO: 1.3 THOUSANDS/ΜL (ref 0.6–4.47)
LYMPHOCYTES # BLD AUTO: 1.31 THOUSANDS/ΜL (ref 0.6–4.47)
LYMPHOCYTES # BLD AUTO: 1.4 THOUSAND/UL (ref 0.6–4.47)
LYMPHOCYTES # BLD AUTO: 1.46 THOUSANDS/ΜL (ref 0.6–4.47)
LYMPHOCYTES # BLD AUTO: 1.47 THOUSANDS/ΜL (ref 0.6–4.47)
LYMPHOCYTES # BLD AUTO: 1.5 THOUSANDS/ΜL (ref 0.6–4.47)
LYMPHOCYTES # BLD AUTO: 1.59 THOUSANDS/ΜL (ref 0.6–4.47)
LYMPHOCYTES # BLD AUTO: 1.67 THOUSAND/UL (ref 0.6–4.47)
LYMPHOCYTES # BLD AUTO: 11 % (ref 14–44)
LYMPHOCYTES # BLD AUTO: 13 % (ref 14–44)
LYMPHOCYTES # BLD AUTO: 2.17 THOUSAND/UL (ref 0.6–4.47)
LYMPHOCYTES # BLD AUTO: 23 % (ref 14–44)
LYMPHOCYTES NFR BLD AUTO: 14 % (ref 14–44)
LYMPHOCYTES NFR BLD AUTO: 15 % (ref 14–44)
LYMPHOCYTES NFR BLD AUTO: 15 % (ref 14–44)
LYMPHOCYTES NFR BLD AUTO: 18 % (ref 14–44)
LYMPHOCYTES NFR BLD AUTO: 25 % (ref 14–44)
LYMPHOCYTES NFR BLD AUTO: 26 % (ref 14–44)
LYMPHOCYTES NFR BLD AUTO: 27 % (ref 14–44)
LYMPHOCYTES NFR BLD AUTO: 33 % (ref 14–44)
MACROCYTES BLD QL AUTO: PRESENT
MACROCYTES BLD QL AUTO: PRESENT
MAGNESIUM SERPL-MCNC: 1.3 MG/DL (ref 1.6–2.6)
MAGNESIUM SERPL-MCNC: 1.4 MG/DL (ref 1.6–2.6)
MAGNESIUM SERPL-MCNC: 1.5 MG/DL (ref 1.6–2.6)
MAGNESIUM SERPL-MCNC: 1.6 MG/DL (ref 1.6–2.6)
MAGNESIUM SERPL-MCNC: 1.7 MG/DL (ref 1.6–2.6)
MAGNESIUM SERPL-MCNC: 2.1 MG/DL (ref 1.6–2.6)
MAGNESIUM SERPL-MCNC: 2.2 MG/DL (ref 1.6–2.6)
MCH RBC QN AUTO: 35.9 PG (ref 26.8–34.3)
MCH RBC QN AUTO: 36.1 PG (ref 26.8–34.3)
MCH RBC QN AUTO: 36.4 PG (ref 26.8–34.3)
MCH RBC QN AUTO: 36.6 PG (ref 26.8–34.3)
MCH RBC QN AUTO: 36.8 PG (ref 26.8–34.3)
MCH RBC QN AUTO: 37 PG (ref 26.8–34.3)
MCH RBC QN AUTO: 37.1 PG (ref 26.8–34.3)
MCH RBC QN AUTO: 37.3 PG (ref 26.8–34.3)
MCH RBC QN AUTO: 37.5 PG (ref 26.8–34.3)
MCH RBC QN AUTO: 37.9 PG (ref 26.8–34.3)
MCH RBC QN AUTO: 38.1 PG (ref 26.8–34.3)
MCH RBC QN AUTO: 38.3 PG (ref 26.8–34.3)
MCH RBC QN AUTO: 38.4 PG (ref 26.8–34.3)
MCH RBC QN AUTO: 38.7 PG (ref 26.8–34.3)
MCH RBC QN AUTO: 38.9 PG (ref 26.8–34.3)
MCH RBC QN AUTO: 40 PG (ref 26.8–34.3)
MCHC RBC AUTO-ENTMCNC: 31.7 G/DL (ref 31.4–37.4)
MCHC RBC AUTO-ENTMCNC: 32 G/DL (ref 31.4–37.4)
MCHC RBC AUTO-ENTMCNC: 32.1 G/DL (ref 31.4–37.4)
MCHC RBC AUTO-ENTMCNC: 32.1 G/DL (ref 31.4–37.4)
MCHC RBC AUTO-ENTMCNC: 32.3 G/DL (ref 31.4–37.4)
MCHC RBC AUTO-ENTMCNC: 32.4 G/DL (ref 31.4–37.4)
MCHC RBC AUTO-ENTMCNC: 32.4 G/DL (ref 31.4–37.4)
MCHC RBC AUTO-ENTMCNC: 32.7 G/DL (ref 31.4–37.4)
MCHC RBC AUTO-ENTMCNC: 32.8 G/DL (ref 31.4–37.4)
MCHC RBC AUTO-ENTMCNC: 32.9 G/DL (ref 31.4–37.4)
MCHC RBC AUTO-ENTMCNC: 33.2 G/DL (ref 31.4–37.4)
MCHC RBC AUTO-ENTMCNC: 33.4 G/DL (ref 31.4–37.4)
MCHC RBC AUTO-ENTMCNC: 33.4 G/DL (ref 31.4–37.4)
MCHC RBC AUTO-ENTMCNC: 34.3 G/DL (ref 31.4–37.4)
MCHC RBC AUTO-ENTMCNC: 34.3 G/DL (ref 31.4–37.4)
MCHC RBC AUTO-ENTMCNC: 34.6 G/DL (ref 31.4–37.4)
MCV RBC AUTO: 104 FL (ref 82–98)
MCV RBC AUTO: 105 FL (ref 82–98)
MCV RBC AUTO: 107 FL (ref 82–98)
MCV RBC AUTO: 110 FL (ref 82–98)
MCV RBC AUTO: 110 FL (ref 82–98)
MCV RBC AUTO: 111 FL (ref 82–98)
MCV RBC AUTO: 112 FL (ref 82–98)
MCV RBC AUTO: 113 FL (ref 82–98)
MCV RBC AUTO: 116 FL (ref 82–98)
MCV RBC AUTO: 118 FL (ref 82–98)
MCV RBC AUTO: 119 FL (ref 82–98)
MCV RBC AUTO: 121 FL (ref 82–98)
MCV RBC AUTO: 125 FL (ref 82–98)
METAMYELOCYTES NFR BLD MANUAL: 2 % (ref 0–1)
METHADONE UR QL: NEGATIVE
MONOCYTES # BLD AUTO: 0.33 THOUSAND/UL (ref 0–1.22)
MONOCYTES # BLD AUTO: 0.39 THOUSAND/ΜL (ref 0.17–1.22)
MONOCYTES # BLD AUTO: 0.55 THOUSAND/ΜL (ref 0.17–1.22)
MONOCYTES # BLD AUTO: 0.63 THOUSAND/ΜL (ref 0.17–1.22)
MONOCYTES # BLD AUTO: 0.67 THOUSAND/ΜL (ref 0.17–1.22)
MONOCYTES # BLD AUTO: 0.72 THOUSAND/UL (ref 0–1.22)
MONOCYTES # BLD AUTO: 1.05 THOUSAND/ΜL (ref 0.17–1.22)
MONOCYTES # BLD AUTO: 1.08 THOUSAND/ΜL (ref 0.17–1.22)
MONOCYTES # BLD AUTO: 1.66 THOUSAND/UL (ref 0–1.22)
MONOCYTES # BLD AUTO: 1.99 THOUSAND/ΜL (ref 0.17–1.22)
MONOCYTES # BLD AUTO: 2.13 THOUSAND/ΜL (ref 0.17–1.22)
MONOCYTES NFR BLD AUTO: 10 % (ref 4–12)
MONOCYTES NFR BLD AUTO: 11 % (ref 4–12)
MONOCYTES NFR BLD AUTO: 11 % (ref 4–12)
MONOCYTES NFR BLD AUTO: 14 % (ref 4–12)
MONOCYTES NFR BLD AUTO: 14 % (ref 4–12)
MONOCYTES NFR BLD AUTO: 15 % (ref 4–12)
MONOCYTES NFR BLD AUTO: 19 % (ref 4–12)
MONOCYTES NFR BLD AUTO: 20 % (ref 4–12)
MONOCYTES NFR BLD: 10 % (ref 4–12)
MONOCYTES NFR BLD: 13 % (ref 4–12)
MONOCYTES NFR BLD: 2 % (ref 4–12)
MYELOCYTES NFR BLD MANUAL: 1 % (ref 0–1)
NASAL CANNULA: ABNORMAL
NEUTROPHILS # BLD AUTO: 2.09 THOUSANDS/ΜL (ref 1.85–7.62)
NEUTROPHILS # BLD AUTO: 2.2 THOUSANDS/ΜL (ref 1.85–7.62)
NEUTROPHILS # BLD AUTO: 2.66 THOUSANDS/ΜL (ref 1.85–7.62)
NEUTROPHILS # BLD AUTO: 3.21 THOUSANDS/ΜL (ref 1.85–7.62)
NEUTROPHILS # BLD AUTO: 4.72 THOUSANDS/ΜL (ref 1.85–7.62)
NEUTROPHILS # BLD AUTO: 5.66 THOUSANDS/ΜL (ref 1.85–7.62)
NEUTROPHILS # BLD AUTO: 5.82 THOUSANDS/ΜL (ref 1.85–7.62)
NEUTROPHILS # BLD AUTO: 7.32 THOUSANDS/ΜL (ref 1.85–7.62)
NEUTROPHILS # BLD MANUAL: 14.04 THOUSAND/UL (ref 1.85–7.62)
NEUTROPHILS # BLD MANUAL: 4.63 THOUSAND/UL (ref 1.85–7.62)
NEUTROPHILS # BLD MANUAL: 9.05 THOUSAND/UL (ref 1.85–7.62)
NEUTS BAND NFR BLD MANUAL: 1 % (ref 0–8)
NEUTS BAND NFR BLD MANUAL: 12 % (ref 0–8)
NEUTS BAND NFR BLD MANUAL: 3 % (ref 0–8)
NEUTS SEG NFR BLD AUTO: 48 % (ref 43–75)
NEUTS SEG NFR BLD AUTO: 52 % (ref 43–75)
NEUTS SEG NFR BLD AUTO: 54 % (ref 43–75)
NEUTS SEG NFR BLD AUTO: 57 % (ref 43–75)
NEUTS SEG NFR BLD AUTO: 59 % (ref 43–75)
NEUTS SEG NFR BLD AUTO: 59 % (ref 43–75)
NEUTS SEG NFR BLD AUTO: 60 % (ref 43–75)
NEUTS SEG NFR BLD AUTO: 63 % (ref 43–75)
NEUTS SEG NFR BLD AUTO: 63 % (ref 43–75)
NEUTS SEG NFR BLD AUTO: 73 % (ref 43–75)
NEUTS SEG NFR BLD AUTO: 81 % (ref 43–75)
NITRITE UR QL STRIP: NEGATIVE
NON VENT- BIPAP: ABNORMAL
NON-SQ EPI CELLS URNS QL MICRO: ABNORMAL /HPF
NRBC BLD AUTO-RTO: 0 /100 WBCS
NRBC BLD AUTO-RTO: 1 /100 WBC (ref 0–2)
NRBC BLD AUTO-RTO: 1 /100 WBC (ref 0–2)
NRBC BLD AUTO-RTO: 1 /100 WBCS
NRBC BLD AUTO-RTO: 3 /100 WBCS
NRBC BLD AUTO-RTO: 4 /100 WBCS
NRBC BLD AUTO-RTO: 4 /100 WBCS
NRBC BLD AUTO-RTO: 7 /100 WBCS
NT-PROBNP SERPL-MCNC: 1164 PG/ML
NT-PROBNP SERPL-MCNC: 335 PG/ML
NT-PROBNP SERPL-MCNC: 3532 PG/ML
O2 CT BLDA-SCNC: 10.9 ML/DL (ref 16–23)
O2 CT BLDA-SCNC: 12.1 ML/DL (ref 16–23)
OPIATES UR QL SCN: POSITIVE
OXYHGB MFR BLDA: 72.7 % (ref 94–97)
OXYHGB MFR BLDA: 92.2 % (ref 94–97)
P AXIS: 57 DEGREES
P AXIS: 63 DEGREES
PCO2 BLDA: 42.9 MM HG (ref 36–44)
PCO2 BLDA: 47.9 MM HG (ref 36–44)
PCP UR QL: NEGATIVE
PEEP MAX SETTING VENT: 5 CM[H2O]
PH BLDA: 7.5 [PH] (ref 7.35–7.45)
PH BLDA: 7.52 [PH] (ref 7.35–7.45)
PH UR STRIP.AUTO: 5.5 [PH]
PHOSPHATE SERPL-MCNC: 0.6 MG/DL (ref 2.7–4.5)
PHOSPHATE SERPL-MCNC: 1.2 MG/DL (ref 2.7–4.5)
PHOSPHATE SERPL-MCNC: 1.3 MG/DL (ref 2.7–4.5)
PHOSPHATE SERPL-MCNC: 1.5 MG/DL (ref 2.7–4.5)
PHOSPHATE SERPL-MCNC: 1.6 MG/DL (ref 2.7–4.5)
PHOSPHATE SERPL-MCNC: 2.1 MG/DL (ref 2.7–4.5)
PHOSPHATE SERPL-MCNC: 3.1 MG/DL (ref 2.7–4.5)
PLATELET # BLD AUTO: 106 THOUSANDS/UL (ref 149–390)
PLATELET # BLD AUTO: 110 THOUSANDS/UL (ref 149–390)
PLATELET # BLD AUTO: 111 THOUSANDS/UL (ref 149–390)
PLATELET # BLD AUTO: 117 THOUSANDS/UL (ref 149–390)
PLATELET # BLD AUTO: 121 THOUSANDS/UL (ref 149–390)
PLATELET # BLD AUTO: 122 THOUSANDS/UL (ref 149–390)
PLATELET # BLD AUTO: 122 THOUSANDS/UL (ref 149–390)
PLATELET # BLD AUTO: 144 THOUSANDS/UL (ref 149–390)
PLATELET # BLD AUTO: 146 THOUSANDS/UL (ref 149–390)
PLATELET # BLD AUTO: 150 THOUSANDS/UL (ref 149–390)
PLATELET # BLD AUTO: 150 THOUSANDS/UL (ref 149–390)
PLATELET # BLD AUTO: 160 THOUSANDS/UL (ref 149–390)
PLATELET # BLD AUTO: 163 THOUSANDS/UL (ref 149–390)
PLATELET # BLD AUTO: 171 THOUSANDS/UL (ref 149–390)
PLATELET # BLD AUTO: 204 THOUSANDS/UL (ref 149–390)
PLATELET # BLD AUTO: 99 THOUSANDS/UL (ref 149–390)
PLATELET BLD QL SMEAR: ABNORMAL
PLATELET BLD QL SMEAR: ABNORMAL
PLATELET BLD QL SMEAR: ADEQUATE
PMV BLD AUTO: 10.1 FL (ref 8.9–12.7)
PMV BLD AUTO: 10.4 FL (ref 8.9–12.7)
PMV BLD AUTO: 9 FL (ref 8.9–12.7)
PMV BLD AUTO: 9.1 FL (ref 8.9–12.7)
PMV BLD AUTO: 9.2 FL (ref 8.9–12.7)
PMV BLD AUTO: 9.3 FL (ref 8.9–12.7)
PMV BLD AUTO: 9.4 FL (ref 8.9–12.7)
PMV BLD AUTO: 9.5 FL (ref 8.9–12.7)
PMV BLD AUTO: 9.5 FL (ref 8.9–12.7)
PMV BLD AUTO: 9.7 FL (ref 8.9–12.7)
PMV BLD AUTO: 9.8 FL (ref 8.9–12.7)
PMV BLD AUTO: 9.8 FL (ref 8.9–12.7)
PMV BLD AUTO: 9.9 FL (ref 8.9–12.7)
PMV BLD AUTO: 9.9 FL (ref 8.9–12.7)
PO2 BLDA: 41.6 MM HG (ref 75–129)
PO2 BLDA: 79 MM HG (ref 75–129)
POLYCHROMASIA BLD QL SMEAR: PRESENT
POLYCHROMASIA BLD QL SMEAR: PRESENT
POTASSIUM SERPL-SCNC: 2.2 MMOL/L (ref 3.5–5.3)
POTASSIUM SERPL-SCNC: 2.3 MMOL/L (ref 3.5–5.3)
POTASSIUM SERPL-SCNC: 2.5 MMOL/L (ref 3.5–5.3)
POTASSIUM SERPL-SCNC: 2.7 MMOL/L (ref 3.5–5.3)
POTASSIUM SERPL-SCNC: 2.8 MMOL/L (ref 3.5–5.3)
POTASSIUM SERPL-SCNC: 2.9 MMOL/L (ref 3.5–5.3)
POTASSIUM SERPL-SCNC: 3 MMOL/L (ref 3.5–5.3)
POTASSIUM SERPL-SCNC: 3.1 MMOL/L (ref 3.5–5.3)
POTASSIUM SERPL-SCNC: 3.2 MMOL/L (ref 3.5–5.3)
POTASSIUM SERPL-SCNC: 3.3 MMOL/L (ref 3.5–5.3)
POTASSIUM SERPL-SCNC: 3.4 MMOL/L (ref 3.5–5.3)
POTASSIUM SERPL-SCNC: 3.5 MMOL/L (ref 3.5–5.3)
PR INTERVAL: 144 MS
PR INTERVAL: 188 MS
PROCALCITONIN SERPL-MCNC: 1.13 NG/ML
PROCALCITONIN SERPL-MCNC: 1.16 NG/ML
PROCALCITONIN SERPL-MCNC: 1.25 NG/ML
PROCALCITONIN SERPL-MCNC: 1.3 NG/ML
PROCALCITONIN SERPL-MCNC: 1.47 NG/ML
PROCALCITONIN SERPL-MCNC: 1.77 NG/ML
PROT SERPL-MCNC: 6.6 G/DL (ref 6.4–8.2)
PROT SERPL-MCNC: 6.7 G/DL (ref 6.4–8.2)
PROT SERPL-MCNC: 6.9 G/DL (ref 6.4–8.2)
PROT SERPL-MCNC: 6.9 G/DL (ref 6.4–8.2)
PROT SERPL-MCNC: 7 G/DL (ref 6.4–8.2)
PROT SERPL-MCNC: 7 G/DL (ref 6.4–8.2)
PROT SERPL-MCNC: 7.1 G/DL (ref 6.4–8.2)
PROT SERPL-MCNC: 7.2 G/DL (ref 6.4–8.2)
PROT SERPL-MCNC: 7.3 G/DL (ref 6.4–8.2)
PROT SERPL-MCNC: 7.5 G/DL (ref 6.4–8.2)
PROT SERPL-MCNC: 7.5 G/DL (ref 6.4–8.2)
PROT SERPL-MCNC: 7.7 G/DL (ref 6.4–8.2)
PROT UR STRIP-MCNC: ABNORMAL MG/DL
PROTHROMBIN TIME: 22.7 SECONDS (ref 11.8–14.2)
PROTHROMBIN TIME: 26.4 SECONDS (ref 11.8–14.2)
PROTHROMBIN TIME: 26.7 SECONDS (ref 11.8–14.2)
PROTHROMBIN TIME: 26.8 SECONDS (ref 11.8–14.2)
PROTHROMBIN TIME: 27.7 SECONDS (ref 11.8–14.2)
QRS AXIS: 82 DEGREES
QRS AXIS: 82 DEGREES
QRSD INTERVAL: 92 MS
QRSD INTERVAL: 92 MS
QT INTERVAL: 325 MS
QT INTERVAL: 370 MS
QTC INTERVAL: 415 MS
QTC INTERVAL: 457 MS
RBC # BLD AUTO: 2 MILLION/UL (ref 3.81–5.12)
RBC # BLD AUTO: 2.06 MILLION/UL (ref 3.81–5.12)
RBC # BLD AUTO: 2.08 MILLION/UL (ref 3.81–5.12)
RBC # BLD AUTO: 2.12 MILLION/UL (ref 3.81–5.12)
RBC # BLD AUTO: 2.19 MILLION/UL (ref 3.81–5.12)
RBC # BLD AUTO: 2.36 MILLION/UL (ref 3.81–5.12)
RBC # BLD AUTO: 2.37 MILLION/UL (ref 3.81–5.12)
RBC # BLD AUTO: 2.4 MILLION/UL (ref 3.81–5.12)
RBC # BLD AUTO: 2.48 MILLION/UL (ref 3.81–5.12)
RBC # BLD AUTO: 2.6 MILLION/UL (ref 3.81–5.12)
RBC # BLD AUTO: 2.64 MILLION/UL (ref 3.81–5.12)
RBC # BLD AUTO: 2.76 MILLION/UL (ref 3.81–5.12)
RBC # BLD AUTO: 2.91 MILLION/UL (ref 3.81–5.12)
RBC # BLD AUTO: 2.94 MILLION/UL (ref 3.81–5.12)
RBC # BLD AUTO: 3 MILLION/UL (ref 3.81–5.12)
RBC # BLD AUTO: 3.15 MILLION/UL (ref 3.81–5.12)
RBC #/AREA URNS AUTO: ABNORMAL /HPF
RBC MORPH BLD: PRESENT
RH BLD: POSITIVE
SODIUM SERPL-SCNC: 133 MMOL/L (ref 136–145)
SODIUM SERPL-SCNC: 136 MMOL/L (ref 136–145)
SODIUM SERPL-SCNC: 136 MMOL/L (ref 136–145)
SODIUM SERPL-SCNC: 138 MMOL/L (ref 136–145)
SODIUM SERPL-SCNC: 139 MMOL/L (ref 136–145)
SODIUM SERPL-SCNC: 140 MMOL/L (ref 136–145)
SODIUM SERPL-SCNC: 141 MMOL/L (ref 136–145)
SODIUM SERPL-SCNC: 141 MMOL/L (ref 136–145)
SODIUM SERPL-SCNC: 142 MMOL/L (ref 136–145)
SODIUM SERPL-SCNC: 142 MMOL/L (ref 136–145)
SP GR UR STRIP.AUTO: 1.01 (ref 1–1.03)
SPECIMEN EXPIRATION DATE: NORMAL
SPECIMEN SOURCE: ABNORMAL
SPECIMEN SOURCE: ABNORMAL
STOMATOCYTES BLD QL SMEAR: PRESENT
T WAVE AXIS: 23 DEGREES
T WAVE AXIS: 55 DEGREES
T4 FREE SERPL-MCNC: 1.15 NG/DL (ref 0.76–1.46)
THC UR QL: NEGATIVE
TOTAL CELLS COUNTED SPEC: 100
TROPONIN I SERPL-MCNC: 0.11 NG/ML
TROPONIN I SERPL-MCNC: 0.13 NG/ML
TROPONIN I SERPL-MCNC: 0.14 NG/ML
TROPONIN I SERPL-MCNC: 0.16 NG/ML
TROPONIN I SERPL-MCNC: 0.17 NG/ML
TROPONIN I SERPL-MCNC: 0.18 NG/ML
TSH SERPL DL<=0.05 MIU/L-ACNC: 5.2 UIU/ML (ref 0.36–3.74)
UROBILINOGEN UR QL STRIP.AUTO: 2 E.U./DL
VARIANT LYMPHS # BLD AUTO: 2 %
VARIANT LYMPHS # BLD AUTO: 2 %
VENT BIPAP FIO2: 80 %
VENTRICULAR RATE: 92 BPM
VENTRICULAR RATE: 98 BPM
WBC # BLD AUTO: 10.01 THOUSAND/UL (ref 4.31–10.16)
WBC # BLD AUTO: 10.49 THOUSAND/UL (ref 4.31–10.16)
WBC # BLD AUTO: 10.84 THOUSAND/UL (ref 4.31–10.16)
WBC # BLD AUTO: 12.54 THOUSAND/UL (ref 4.31–10.16)
WBC # BLD AUTO: 12.75 THOUSAND/UL (ref 4.31–10.16)
WBC # BLD AUTO: 16.71 THOUSAND/UL (ref 4.31–10.16)
WBC # BLD AUTO: 28.27 THOUSAND/UL (ref 4.31–10.16)
WBC # BLD AUTO: 3.74 THOUSAND/UL (ref 4.31–10.16)
WBC # BLD AUTO: 32.5 THOUSAND/UL (ref 4.31–10.16)
WBC # BLD AUTO: 4.42 THOUSAND/UL (ref 4.31–10.16)
WBC # BLD AUTO: 4.87 THOUSAND/UL (ref 4.31–10.16)
WBC # BLD AUTO: 5.26 THOUSAND/UL (ref 4.31–10.16)
WBC # BLD AUTO: 6.64 THOUSAND/UL (ref 4.31–10.16)
WBC # BLD AUTO: 7.24 THOUSAND/UL (ref 4.31–10.16)
WBC # BLD AUTO: 7.36 THOUSAND/UL (ref 4.31–10.16)
WBC # BLD AUTO: 8.63 THOUSAND/UL (ref 4.31–10.16)
WBC #/AREA URNS AUTO: ABNORMAL /HPF

## 2019-01-01 PROCEDURE — 99232 SBSQ HOSP IP/OBS MODERATE 35: CPT | Performed by: INTERNAL MEDICINE

## 2019-01-01 PROCEDURE — 85027 COMPLETE CBC AUTOMATED: CPT | Performed by: STUDENT IN AN ORGANIZED HEALTH CARE EDUCATION/TRAINING PROGRAM

## 2019-01-01 PROCEDURE — 02HV33Z INSERTION OF INFUSION DEVICE INTO SUPERIOR VENA CAVA, PERCUTANEOUS APPROACH: ICD-10-PCS | Performed by: FAMILY MEDICINE

## 2019-01-01 PROCEDURE — 99233 SBSQ HOSP IP/OBS HIGH 50: CPT | Performed by: INTERNAL MEDICINE

## 2019-01-01 PROCEDURE — 74177 CT ABD & PELVIS W/CONTRAST: CPT

## 2019-01-01 PROCEDURE — 85610 PROTHROMBIN TIME: CPT | Performed by: FAMILY MEDICINE

## 2019-01-01 PROCEDURE — 82805 BLOOD GASES W/O2 SATURATION: CPT | Performed by: FAMILY MEDICINE

## 2019-01-01 PROCEDURE — 82140 ASSAY OF AMMONIA: CPT | Performed by: EMERGENCY MEDICINE

## 2019-01-01 PROCEDURE — 85027 COMPLETE CBC AUTOMATED: CPT | Performed by: FAMILY MEDICINE

## 2019-01-01 PROCEDURE — 82330 ASSAY OF CALCIUM: CPT | Performed by: NURSE PRACTITIONER

## 2019-01-01 PROCEDURE — 99254 IP/OBS CNSLTJ NEW/EST MOD 60: CPT | Performed by: FAMILY MEDICINE

## 2019-01-01 PROCEDURE — 80053 COMPREHEN METABOLIC PANEL: CPT | Performed by: STUDENT IN AN ORGANIZED HEALTH CARE EDUCATION/TRAINING PROGRAM

## 2019-01-01 PROCEDURE — 83735 ASSAY OF MAGNESIUM: CPT | Performed by: INTERNAL MEDICINE

## 2019-01-01 PROCEDURE — 85025 COMPLETE CBC W/AUTO DIFF WBC: CPT | Performed by: INTERNAL MEDICINE

## 2019-01-01 PROCEDURE — 99238 HOSP IP/OBS DSCHRG MGMT 30/<: CPT | Performed by: INTERNAL MEDICINE

## 2019-01-01 PROCEDURE — 84100 ASSAY OF PHOSPHORUS: CPT | Performed by: INTERNAL MEDICINE

## 2019-01-01 PROCEDURE — 73502 X-RAY EXAM HIP UNI 2-3 VIEWS: CPT

## 2019-01-01 PROCEDURE — 80307 DRUG TEST PRSMV CHEM ANLYZR: CPT | Performed by: NURSE PRACTITIONER

## 2019-01-01 PROCEDURE — G8979 MOBILITY GOAL STATUS: HCPCS

## 2019-01-01 PROCEDURE — 97116 GAIT TRAINING THERAPY: CPT

## 2019-01-01 PROCEDURE — 94640 AIRWAY INHALATION TREATMENT: CPT

## 2019-01-01 PROCEDURE — 99233 SBSQ HOSP IP/OBS HIGH 50: CPT | Performed by: FAMILY MEDICINE

## 2019-01-01 PROCEDURE — 99244 OFF/OP CNSLTJ NEW/EST MOD 40: CPT | Performed by: PHYSICIAN ASSISTANT

## 2019-01-01 PROCEDURE — 85025 COMPLETE CBC W/AUTO DIFF WBC: CPT | Performed by: STUDENT IN AN ORGANIZED HEALTH CARE EDUCATION/TRAINING PROGRAM

## 2019-01-01 PROCEDURE — 97110 THERAPEUTIC EXERCISES: CPT | Performed by: PHYSICAL THERAPIST

## 2019-01-01 PROCEDURE — G8988 SELF CARE GOAL STATUS: HCPCS

## 2019-01-01 PROCEDURE — 97110 THERAPEUTIC EXERCISES: CPT

## 2019-01-01 PROCEDURE — 85027 COMPLETE CBC AUTOMATED: CPT | Performed by: NURSE PRACTITIONER

## 2019-01-01 PROCEDURE — 71260 CT THORAX DX C+: CPT

## 2019-01-01 PROCEDURE — 85007 BL SMEAR W/DIFF WBC COUNT: CPT | Performed by: INTERNAL MEDICINE

## 2019-01-01 PROCEDURE — 99285 EMERGENCY DEPT VISIT HI MDM: CPT

## 2019-01-01 PROCEDURE — 72125 CT NECK SPINE W/O DYE: CPT

## 2019-01-01 PROCEDURE — 99220 PR INITIAL OBSERVATION CARE/DAY 70 MINUTES: CPT | Performed by: INTERNAL MEDICINE

## 2019-01-01 PROCEDURE — 99254 IP/OBS CNSLTJ NEW/EST MOD 60: CPT | Performed by: INTERNAL MEDICINE

## 2019-01-01 PROCEDURE — 80048 BASIC METABOLIC PNL TOTAL CA: CPT | Performed by: INTERNAL MEDICINE

## 2019-01-01 PROCEDURE — 94762 N-INVAS EAR/PLS OXIMTRY CONT: CPT

## 2019-01-01 PROCEDURE — 49083 ABD PARACENTESIS W/IMAGING: CPT

## 2019-01-01 PROCEDURE — NC001 PR NO CHARGE: Performed by: NURSE PRACTITIONER

## 2019-01-01 PROCEDURE — 83735 ASSAY OF MAGNESIUM: CPT | Performed by: STUDENT IN AN ORGANIZED HEALTH CARE EDUCATION/TRAINING PROGRAM

## 2019-01-01 PROCEDURE — 84484 ASSAY OF TROPONIN QUANT: CPT | Performed by: STUDENT IN AN ORGANIZED HEALTH CARE EDUCATION/TRAINING PROGRAM

## 2019-01-01 PROCEDURE — 97530 THERAPEUTIC ACTIVITIES: CPT

## 2019-01-01 PROCEDURE — 92610 EVALUATE SWALLOWING FUNCTION: CPT

## 2019-01-01 PROCEDURE — 73564 X-RAY EXAM KNEE 4 OR MORE: CPT

## 2019-01-01 PROCEDURE — 97163 PT EVAL HIGH COMPLEX 45 MIN: CPT

## 2019-01-01 PROCEDURE — 84145 PROCALCITONIN (PCT): CPT | Performed by: INTERNAL MEDICINE

## 2019-01-01 PROCEDURE — 80053 COMPREHEN METABOLIC PANEL: CPT | Performed by: NURSE PRACTITIONER

## 2019-01-01 PROCEDURE — 85007 BL SMEAR W/DIFF WBC COUNT: CPT | Performed by: FAMILY MEDICINE

## 2019-01-01 PROCEDURE — G8987 SELF CARE CURRENT STATUS: HCPCS

## 2019-01-01 PROCEDURE — 80053 COMPREHEN METABOLIC PANEL: CPT | Performed by: FAMILY MEDICINE

## 2019-01-01 PROCEDURE — 85007 BL SMEAR W/DIFF WBC COUNT: CPT | Performed by: STUDENT IN AN ORGANIZED HEALTH CARE EDUCATION/TRAINING PROGRAM

## 2019-01-01 PROCEDURE — 99284 EMERGENCY DEPT VISIT MOD MDM: CPT

## 2019-01-01 PROCEDURE — 99239 HOSP IP/OBS DSCHRG MGMT >30: CPT | Performed by: INTERNAL MEDICINE

## 2019-01-01 PROCEDURE — 85007 BL SMEAR W/DIFF WBC COUNT: CPT | Performed by: EMERGENCY MEDICINE

## 2019-01-01 PROCEDURE — 84443 ASSAY THYROID STIM HORMONE: CPT | Performed by: EMERGENCY MEDICINE

## 2019-01-01 PROCEDURE — 85610 PROTHROMBIN TIME: CPT | Performed by: INTERNAL MEDICINE

## 2019-01-01 PROCEDURE — 80048 BASIC METABOLIC PNL TOTAL CA: CPT | Performed by: PHYSICIAN ASSISTANT

## 2019-01-01 PROCEDURE — 94660 CPAP INITIATION&MGMT: CPT

## 2019-01-01 PROCEDURE — 71045 X-RAY EXAM CHEST 1 VIEW: CPT

## 2019-01-01 PROCEDURE — 83880 ASSAY OF NATRIURETIC PEPTIDE: CPT | Performed by: INTERNAL MEDICINE

## 2019-01-01 PROCEDURE — 85730 THROMBOPLASTIN TIME PARTIAL: CPT | Performed by: EMERGENCY MEDICINE

## 2019-01-01 PROCEDURE — 96374 THER/PROPH/DIAG INJ IV PUSH: CPT

## 2019-01-01 PROCEDURE — 80053 COMPREHEN METABOLIC PANEL: CPT | Performed by: INTERNAL MEDICINE

## 2019-01-01 PROCEDURE — 83605 ASSAY OF LACTIC ACID: CPT | Performed by: INTERNAL MEDICINE

## 2019-01-01 PROCEDURE — 85025 COMPLETE CBC W/AUTO DIFF WBC: CPT | Performed by: PHYSICIAN ASSISTANT

## 2019-01-01 PROCEDURE — 85027 COMPLETE CBC AUTOMATED: CPT | Performed by: INTERNAL MEDICINE

## 2019-01-01 PROCEDURE — 86900 BLOOD TYPING SEROLOGIC ABO: CPT | Performed by: EMERGENCY MEDICINE

## 2019-01-01 PROCEDURE — 82948 REAGENT STRIP/BLOOD GLUCOSE: CPT

## 2019-01-01 PROCEDURE — 99223 1ST HOSP IP/OBS HIGH 75: CPT | Performed by: STUDENT IN AN ORGANIZED HEALTH CARE EDUCATION/TRAINING PROGRAM

## 2019-01-01 PROCEDURE — 82140 ASSAY OF AMMONIA: CPT | Performed by: INTERNAL MEDICINE

## 2019-01-01 PROCEDURE — G8996 SWALLOW CURRENT STATUS: HCPCS

## 2019-01-01 PROCEDURE — 84484 ASSAY OF TROPONIN QUANT: CPT | Performed by: PHYSICIAN ASSISTANT

## 2019-01-01 PROCEDURE — 87186 SC STD MICRODIL/AGAR DIL: CPT | Performed by: INTERNAL MEDICINE

## 2019-01-01 PROCEDURE — 93010 ELECTROCARDIOGRAM REPORT: CPT | Performed by: INTERNAL MEDICINE

## 2019-01-01 PROCEDURE — 84145 PROCALCITONIN (PCT): CPT | Performed by: NURSE PRACTITIONER

## 2019-01-01 PROCEDURE — G8978 MOBILITY CURRENT STATUS: HCPCS

## 2019-01-01 PROCEDURE — 84132 ASSAY OF SERUM POTASSIUM: CPT | Performed by: NURSE PRACTITIONER

## 2019-01-01 PROCEDURE — 83735 ASSAY OF MAGNESIUM: CPT | Performed by: FAMILY MEDICINE

## 2019-01-01 PROCEDURE — 92526 ORAL FUNCTION THERAPY: CPT

## 2019-01-01 PROCEDURE — 83690 ASSAY OF LIPASE: CPT | Performed by: EMERGENCY MEDICINE

## 2019-01-01 PROCEDURE — 93005 ELECTROCARDIOGRAM TRACING: CPT

## 2019-01-01 PROCEDURE — 99232 SBSQ HOSP IP/OBS MODERATE 35: CPT | Performed by: FAMILY MEDICINE

## 2019-01-01 PROCEDURE — 36600 WITHDRAWAL OF ARTERIAL BLOOD: CPT

## 2019-01-01 PROCEDURE — 84703 CHORIONIC GONADOTROPIN ASSAY: CPT | Performed by: STUDENT IN AN ORGANIZED HEALTH CARE EDUCATION/TRAINING PROGRAM

## 2019-01-01 PROCEDURE — 99213 OFFICE O/P EST LOW 20 MIN: CPT | Performed by: PHYSICIAN ASSISTANT

## 2019-01-01 PROCEDURE — 82553 CREATINE MB FRACTION: CPT | Performed by: EMERGENCY MEDICINE

## 2019-01-01 PROCEDURE — 99225 PR SBSQ OBSERVATION CARE/DAY 25 MINUTES: CPT | Performed by: INTERNAL MEDICINE

## 2019-01-01 PROCEDURE — 83735 ASSAY OF MAGNESIUM: CPT | Performed by: EMERGENCY MEDICINE

## 2019-01-01 PROCEDURE — 81001 URINALYSIS AUTO W/SCOPE: CPT | Performed by: INTERNAL MEDICINE

## 2019-01-01 PROCEDURE — 80053 COMPREHEN METABOLIC PANEL: CPT | Performed by: EMERGENCY MEDICINE

## 2019-01-01 PROCEDURE — 93971 EXTREMITY STUDY: CPT | Performed by: SURGERY

## 2019-01-01 PROCEDURE — 99232 SBSQ HOSP IP/OBS MODERATE 35: CPT | Performed by: STUDENT IN AN ORGANIZED HEALTH CARE EDUCATION/TRAINING PROGRAM

## 2019-01-01 PROCEDURE — 73700 CT LOWER EXTREMITY W/O DYE: CPT

## 2019-01-01 PROCEDURE — 85025 COMPLETE CBC W/AUTO DIFF WBC: CPT | Performed by: NURSE PRACTITIONER

## 2019-01-01 PROCEDURE — 97167 OT EVAL HIGH COMPLEX 60 MIN: CPT

## 2019-01-01 PROCEDURE — 82140 ASSAY OF AMMONIA: CPT | Performed by: PHYSICIAN ASSISTANT

## 2019-01-01 PROCEDURE — 80053 COMPREHEN METABOLIC PANEL: CPT | Performed by: PHYSICIAN ASSISTANT

## 2019-01-01 PROCEDURE — 85610 PROTHROMBIN TIME: CPT | Performed by: EMERGENCY MEDICINE

## 2019-01-01 PROCEDURE — 83735 ASSAY OF MAGNESIUM: CPT | Performed by: NURSE PRACTITIONER

## 2019-01-01 PROCEDURE — 36415 COLL VENOUS BLD VENIPUNCTURE: CPT | Performed by: EMERGENCY MEDICINE

## 2019-01-01 PROCEDURE — 99285 EMERGENCY DEPT VISIT HI MDM: CPT | Performed by: EMERGENCY MEDICINE

## 2019-01-01 PROCEDURE — 36415 COLL VENOUS BLD VENIPUNCTURE: CPT | Performed by: PHYSICIAN ASSISTANT

## 2019-01-01 PROCEDURE — 85610 PROTHROMBIN TIME: CPT | Performed by: NURSE PRACTITIONER

## 2019-01-01 PROCEDURE — 76705 ECHO EXAM OF ABDOMEN: CPT

## 2019-01-01 PROCEDURE — 82550 ASSAY OF CK (CPK): CPT | Performed by: PHYSICIAN ASSISTANT

## 2019-01-01 PROCEDURE — 84145 PROCALCITONIN (PCT): CPT | Performed by: FAMILY MEDICINE

## 2019-01-01 PROCEDURE — 97530 THERAPEUTIC ACTIVITIES: CPT | Performed by: PHYSICAL THERAPIST

## 2019-01-01 PROCEDURE — 82105 ALPHA-FETOPROTEIN SERUM: CPT | Performed by: PHYSICIAN ASSISTANT

## 2019-01-01 PROCEDURE — 80320 DRUG SCREEN QUANTALCOHOLS: CPT | Performed by: EMERGENCY MEDICINE

## 2019-01-01 PROCEDURE — 84484 ASSAY OF TROPONIN QUANT: CPT | Performed by: EMERGENCY MEDICINE

## 2019-01-01 PROCEDURE — 84439 ASSAY OF FREE THYROXINE: CPT | Performed by: INTERNAL MEDICINE

## 2019-01-01 PROCEDURE — 97535 SELF CARE MNGMENT TRAINING: CPT

## 2019-01-01 PROCEDURE — 83605 ASSAY OF LACTIC ACID: CPT | Performed by: PHYSICIAN ASSISTANT

## 2019-01-01 PROCEDURE — 93971 EXTREMITY STUDY: CPT

## 2019-01-01 PROCEDURE — 83880 ASSAY OF NATRIURETIC PEPTIDE: CPT | Performed by: PHYSICIAN ASSISTANT

## 2019-01-01 PROCEDURE — 86850 RBC ANTIBODY SCREEN: CPT | Performed by: EMERGENCY MEDICINE

## 2019-01-01 PROCEDURE — C1751 CATH, INF, PER/CENT/MIDLINE: HCPCS

## 2019-01-01 PROCEDURE — 82805 BLOOD GASES W/O2 SATURATION: CPT | Performed by: PHYSICIAN ASSISTANT

## 2019-01-01 PROCEDURE — 87040 BLOOD CULTURE FOR BACTERIA: CPT | Performed by: INTERNAL MEDICINE

## 2019-01-01 PROCEDURE — 96361 HYDRATE IV INFUSION ADD-ON: CPT

## 2019-01-01 PROCEDURE — 83880 ASSAY OF NATRIURETIC PEPTIDE: CPT | Performed by: EMERGENCY MEDICINE

## 2019-01-01 PROCEDURE — G8997 SWALLOW GOAL STATUS: HCPCS

## 2019-01-01 PROCEDURE — 82550 ASSAY OF CK (CPK): CPT | Performed by: EMERGENCY MEDICINE

## 2019-01-01 PROCEDURE — 99231 SBSQ HOSP IP/OBS SF/LOW 25: CPT | Performed by: INTERNAL MEDICINE

## 2019-01-01 PROCEDURE — 85027 COMPLETE CBC AUTOMATED: CPT | Performed by: EMERGENCY MEDICINE

## 2019-01-01 PROCEDURE — 36569 INSJ PICC 5 YR+ W/O IMAGING: CPT

## 2019-01-01 PROCEDURE — 70450 CT HEAD/BRAIN W/O DYE: CPT

## 2019-01-01 PROCEDURE — 73610 X-RAY EXAM OF ANKLE: CPT

## 2019-01-01 PROCEDURE — 86901 BLOOD TYPING SEROLOGIC RH(D): CPT | Performed by: EMERGENCY MEDICINE

## 2019-01-01 PROCEDURE — 80048 BASIC METABOLIC PNL TOTAL CA: CPT | Performed by: NURSE PRACTITIONER

## 2019-01-01 PROCEDURE — 80074 ACUTE HEPATITIS PANEL: CPT | Performed by: PHYSICIAN ASSISTANT

## 2019-01-01 PROCEDURE — 99255 IP/OBS CONSLTJ NEW/EST HI 80: CPT | Performed by: INTERNAL MEDICINE

## 2019-01-01 PROCEDURE — 87077 CULTURE AEROBIC IDENTIFY: CPT | Performed by: INTERNAL MEDICINE

## 2019-01-01 PROCEDURE — 85610 PROTHROMBIN TIME: CPT | Performed by: PHYSICIAN ASSISTANT

## 2019-01-01 PROCEDURE — 76705 ECHO EXAM OF ABDOMEN: CPT | Performed by: RADIOLOGY

## 2019-01-01 PROCEDURE — 70486 CT MAXILLOFACIAL W/O DYE: CPT

## 2019-01-01 RX ORDER — CHLORDIAZEPOXIDE HYDROCHLORIDE 10 MG/1
10 CAPSULE, GELATIN COATED ORAL 2 TIMES DAILY
Status: DISCONTINUED | OUTPATIENT
Start: 2019-01-01 | End: 2019-01-01

## 2019-01-01 RX ORDER — POTASSIUM CHLORIDE 29.8 MG/ML
40 INJECTION INTRAVENOUS ONCE
Status: COMPLETED | OUTPATIENT
Start: 2019-01-01 | End: 2019-01-01

## 2019-01-01 RX ORDER — LEVALBUTEROL 1.25 MG/.5ML
1.25 SOLUTION, CONCENTRATE RESPIRATORY (INHALATION) EVERY 8 HOURS PRN
Status: DISCONTINUED | OUTPATIENT
Start: 2019-01-01 | End: 2019-01-01

## 2019-01-01 RX ORDER — LORAZEPAM 2 MG/ML
2 INJECTION INTRAMUSCULAR
Status: DISCONTINUED | OUTPATIENT
Start: 2019-01-01 | End: 2019-01-01 | Stop reason: HOSPADM

## 2019-01-01 RX ORDER — ONDANSETRON 2 MG/ML
4 INJECTION INTRAMUSCULAR; INTRAVENOUS EVERY 4 HOURS PRN
Status: DISCONTINUED | OUTPATIENT
Start: 2019-01-01 | End: 2019-01-01

## 2019-01-01 RX ORDER — TORSEMIDE 20 MG/1
40 TABLET ORAL DAILY
Status: DISCONTINUED | OUTPATIENT
Start: 2019-01-01 | End: 2019-01-01

## 2019-01-01 RX ORDER — LORAZEPAM 1 MG/1
2 TABLET ORAL ONCE
Status: DISCONTINUED | OUTPATIENT
Start: 2019-01-01 | End: 2019-01-01

## 2019-01-01 RX ORDER — LACTULOSE 20 G/30ML
20 SOLUTION ORAL 2 TIMES DAILY
Status: DISCONTINUED | OUTPATIENT
Start: 2019-01-01 | End: 2019-01-01

## 2019-01-01 RX ORDER — CHLORHEXIDINE GLUCONATE 0.12 MG/ML
15 RINSE ORAL EVERY 12 HOURS SCHEDULED
Status: DISCONTINUED | OUTPATIENT
Start: 2019-01-01 | End: 2019-01-01

## 2019-01-01 RX ORDER — LEVOFLOXACIN 5 MG/ML
750 INJECTION, SOLUTION INTRAVENOUS EVERY 24 HOURS
Status: DISCONTINUED | OUTPATIENT
Start: 2019-01-01 | End: 2019-01-01 | Stop reason: HOSPADM

## 2019-01-01 RX ORDER — ACETAMINOPHEN 325 MG/1
650 TABLET ORAL EVERY 6 HOURS PRN
Status: DISCONTINUED | OUTPATIENT
Start: 2019-01-01 | End: 2019-01-01 | Stop reason: HOSPADM

## 2019-01-01 RX ORDER — FUROSEMIDE 10 MG/ML
80 INJECTION INTRAMUSCULAR; INTRAVENOUS
Status: DISCONTINUED | OUTPATIENT
Start: 2019-01-01 | End: 2019-01-01 | Stop reason: HOSPADM

## 2019-01-01 RX ORDER — POTASSIUM CHLORIDE 14.9 MG/ML
20 INJECTION INTRAVENOUS ONCE
Status: DISCONTINUED | OUTPATIENT
Start: 2019-01-01 | End: 2019-01-01

## 2019-01-01 RX ORDER — LACTULOSE 20 G/30ML
20 SOLUTION ORAL DAILY PRN
Status: DISCONTINUED | OUTPATIENT
Start: 2019-01-01 | End: 2019-01-01 | Stop reason: HOSPADM

## 2019-01-01 RX ORDER — SERTRALINE HYDROCHLORIDE 100 MG/1
100 TABLET, FILM COATED ORAL DAILY
Status: DISCONTINUED | OUTPATIENT
Start: 2019-01-01 | End: 2019-01-01

## 2019-01-01 RX ORDER — MAGNESIUM SULFATE HEPTAHYDRATE 40 MG/ML
2 INJECTION, SOLUTION INTRAVENOUS ONCE
Status: COMPLETED | OUTPATIENT
Start: 2019-01-01 | End: 2019-01-01

## 2019-01-01 RX ORDER — ACETAMINOPHEN 325 MG/1
650 TABLET ORAL EVERY 6 HOURS PRN
Status: DISCONTINUED | OUTPATIENT
Start: 2019-01-01 | End: 2019-01-01

## 2019-01-01 RX ORDER — POTASSIUM CHLORIDE 14.9 MG/ML
20 INJECTION INTRAVENOUS
Status: COMPLETED | OUTPATIENT
Start: 2019-01-01 | End: 2019-01-01

## 2019-01-01 RX ORDER — HEPARIN SODIUM 5000 [USP'U]/ML
5000 INJECTION, SOLUTION INTRAVENOUS; SUBCUTANEOUS EVERY 8 HOURS SCHEDULED
Status: DISCONTINUED | OUTPATIENT
Start: 2019-01-01 | End: 2019-01-01

## 2019-01-01 RX ORDER — CALCIUM CARBONATE 200(500)MG
500 TABLET,CHEWABLE ORAL 3 TIMES DAILY PRN
Status: DISCONTINUED | OUTPATIENT
Start: 2019-01-01 | End: 2019-01-01 | Stop reason: HOSPADM

## 2019-01-01 RX ORDER — SPIRONOLACTONE 50 MG/1
50 TABLET, FILM COATED ORAL DAILY
Qty: 30 TABLET | Refills: 2 | Status: SHIPPED | OUTPATIENT
Start: 2019-01-01

## 2019-01-01 RX ORDER — POTASSIUM CHLORIDE 20 MEQ/1
40 TABLET, EXTENDED RELEASE ORAL 2 TIMES DAILY
Status: COMPLETED | OUTPATIENT
Start: 2019-01-01 | End: 2019-01-01

## 2019-01-01 RX ORDER — NYSTATIN 100000 [USP'U]/G
POWDER TOPICAL 2 TIMES DAILY
Status: DISCONTINUED | OUTPATIENT
Start: 2019-01-01 | End: 2019-01-01 | Stop reason: HOSPADM

## 2019-01-01 RX ORDER — FOLIC ACID 1 MG/1
1 TABLET ORAL DAILY
Status: DISCONTINUED | OUTPATIENT
Start: 2019-01-01 | End: 2019-01-01 | Stop reason: HOSPADM

## 2019-01-01 RX ORDER — SPIRONOLACTONE 25 MG/1
25 TABLET ORAL DAILY
Status: DISCONTINUED | OUTPATIENT
Start: 2019-01-01 | End: 2019-01-01

## 2019-01-01 RX ORDER — SODIUM CHLORIDE 9 MG/ML
75 INJECTION, SOLUTION INTRAVENOUS CONTINUOUS
Status: DISCONTINUED | OUTPATIENT
Start: 2019-01-01 | End: 2019-01-01

## 2019-01-01 RX ORDER — HYDROMORPHONE HCL/PF 1 MG/ML
0.2 SYRINGE (ML) INJECTION ONCE
Status: COMPLETED | OUTPATIENT
Start: 2019-01-01 | End: 2019-01-01

## 2019-01-01 RX ORDER — METOLAZONE 5 MG/1
5 TABLET ORAL ONCE
Status: COMPLETED | OUTPATIENT
Start: 2019-01-01 | End: 2019-01-01

## 2019-01-01 RX ORDER — ALBUTEROL SULFATE 90 UG/1
2 AEROSOL, METERED RESPIRATORY (INHALATION) EVERY 4 HOURS PRN
Status: DISCONTINUED | OUTPATIENT
Start: 2019-01-01 | End: 2019-01-01 | Stop reason: HOSPADM

## 2019-01-01 RX ORDER — TORSEMIDE 20 MG/1
40 TABLET ORAL DAILY
Status: DISCONTINUED | OUTPATIENT
Start: 2019-01-01 | End: 2019-01-01 | Stop reason: HOSPADM

## 2019-01-01 RX ORDER — FLUTICASONE PROPIONATE 50 MCG
1 SPRAY, SUSPENSION (ML) NASAL DAILY
COMMUNITY

## 2019-01-01 RX ORDER — TRAMADOL HYDROCHLORIDE 50 MG/1
50 TABLET ORAL EVERY 8 HOURS PRN
Status: DISCONTINUED | OUTPATIENT
Start: 2019-01-01 | End: 2019-01-01

## 2019-01-01 RX ORDER — POTASSIUM CHLORIDE 20 MEQ/1
20 TABLET, EXTENDED RELEASE ORAL 2 TIMES DAILY
Qty: 60 TABLET | Refills: 0 | Status: SHIPPED | OUTPATIENT
Start: 2019-01-01 | End: 2019-01-01 | Stop reason: SDUPTHER

## 2019-01-01 RX ORDER — GABAPENTIN 300 MG/1
300 CAPSULE ORAL 3 TIMES DAILY
Status: DISCONTINUED | OUTPATIENT
Start: 2019-01-01 | End: 2019-01-01 | Stop reason: HOSPADM

## 2019-01-01 RX ORDER — CHLORDIAZEPOXIDE HYDROCHLORIDE 5 MG/1
5 CAPSULE, GELATIN COATED ORAL EVERY 8 HOURS SCHEDULED
Status: DISCONTINUED | OUTPATIENT
Start: 2019-01-01 | End: 2019-01-01

## 2019-01-01 RX ORDER — ALBUMIN (HUMAN) 12.5 G/50ML
25 SOLUTION INTRAVENOUS 2 TIMES DAILY
Status: DISCONTINUED | OUTPATIENT
Start: 2019-01-01 | End: 2019-01-01

## 2019-01-01 RX ORDER — SPIRONOLACTONE 50 MG/1
50 TABLET, FILM COATED ORAL DAILY
Status: DISCONTINUED | OUTPATIENT
Start: 2019-01-01 | End: 2019-01-01

## 2019-01-01 RX ORDER — HYDROMORPHONE HCL/PF 1 MG/ML
1 SYRINGE (ML) INJECTION ONCE
Status: COMPLETED | OUTPATIENT
Start: 2019-01-01 | End: 2019-01-01

## 2019-01-01 RX ORDER — KETOROLAC TROMETHAMINE 30 MG/ML
15 INJECTION, SOLUTION INTRAMUSCULAR; INTRAVENOUS ONCE
Status: COMPLETED | OUTPATIENT
Start: 2019-01-01 | End: 2019-01-01

## 2019-01-01 RX ORDER — LACTULOSE 20 G/30ML
30 SOLUTION ORAL 3 TIMES DAILY
Status: DISCONTINUED | OUTPATIENT
Start: 2019-01-01 | End: 2019-01-01

## 2019-01-01 RX ORDER — FUROSEMIDE 10 MG/ML
40 INJECTION INTRAMUSCULAR; INTRAVENOUS
Status: DISCONTINUED | OUTPATIENT
Start: 2019-01-01 | End: 2019-01-01

## 2019-01-01 RX ORDER — AMITRIPTYLINE HYDROCHLORIDE 10 MG/1
10 TABLET, FILM COATED ORAL
Status: DISCONTINUED | OUTPATIENT
Start: 2019-01-01 | End: 2019-01-01

## 2019-01-01 RX ORDER — LORAZEPAM 2 MG/ML
1 INJECTION INTRAMUSCULAR EVERY 4 HOURS PRN
Status: DISCONTINUED | OUTPATIENT
Start: 2019-01-01 | End: 2019-01-01 | Stop reason: HOSPADM

## 2019-01-01 RX ORDER — POTASSIUM CHLORIDE 14.9 MG/ML
20 INJECTION INTRAVENOUS EVERY 4 HOURS
Status: DISPENSED | OUTPATIENT
Start: 2019-01-01 | End: 2019-01-01

## 2019-01-01 RX ORDER — POTASSIUM CHLORIDE 14.9 MG/ML
20 INJECTION INTRAVENOUS
Status: DISCONTINUED | OUTPATIENT
Start: 2019-01-01 | End: 2019-01-01

## 2019-01-01 RX ORDER — LACTULOSE 20 G/30ML
20 SOLUTION ORAL DAILY
Status: DISCONTINUED | OUTPATIENT
Start: 2019-01-01 | End: 2019-01-01

## 2019-01-01 RX ORDER — DICYCLOMINE HYDROCHLORIDE 10 MG/1
10 CAPSULE ORAL 3 TIMES DAILY PRN
Status: DISCONTINUED | OUTPATIENT
Start: 2019-01-01 | End: 2019-01-01

## 2019-01-01 RX ORDER — POTASSIUM CHLORIDE 20MEQ/15ML
40 LIQUID (ML) ORAL ONCE
Status: COMPLETED | OUTPATIENT
Start: 2019-01-01 | End: 2019-01-01

## 2019-01-01 RX ORDER — FOLIC ACID 1 MG/1
1 TABLET ORAL DAILY
Status: DISCONTINUED | OUTPATIENT
Start: 2019-01-01 | End: 2019-01-01

## 2019-01-01 RX ORDER — HALOPERIDOL 5 MG/ML
2 INJECTION INTRAMUSCULAR ONCE
Status: COMPLETED | OUTPATIENT
Start: 2019-01-01 | End: 2019-01-01

## 2019-01-01 RX ORDER — QUETIAPINE FUMARATE 100 MG/1
100 TABLET, FILM COATED ORAL
Start: 2019-01-01

## 2019-01-01 RX ORDER — FERROUS SULFATE 325(65) MG
325 TABLET ORAL DAILY
Status: DISCONTINUED | OUTPATIENT
Start: 2019-01-01 | End: 2019-01-01 | Stop reason: HOSPADM

## 2019-01-01 RX ORDER — OXYCODONE HYDROCHLORIDE 5 MG/1
5 CAPSULE ORAL EVERY 4 HOURS PRN
Status: ON HOLD | COMMUNITY
End: 2019-01-01 | Stop reason: CLARIF

## 2019-01-01 RX ORDER — THIAMINE MONONITRATE (VIT B1) 100 MG
100 TABLET ORAL DAILY
Status: DISCONTINUED | OUTPATIENT
Start: 2019-01-01 | End: 2019-01-01

## 2019-01-01 RX ORDER — TRAMADOL HYDROCHLORIDE 50 MG/1
50 TABLET ORAL EVERY 6 HOURS PRN
Status: DISCONTINUED | OUTPATIENT
Start: 2019-01-01 | End: 2019-01-01

## 2019-01-01 RX ORDER — QUETIAPINE FUMARATE 100 MG/1
100 TABLET, FILM COATED ORAL
Status: DISCONTINUED | OUTPATIENT
Start: 2019-01-01 | End: 2019-01-01

## 2019-01-01 RX ORDER — LORAZEPAM 2 MG/ML
2 INJECTION INTRAMUSCULAR ONCE
Status: COMPLETED | OUTPATIENT
Start: 2019-01-01 | End: 2019-01-01

## 2019-01-01 RX ORDER — MUSCLE RUB CREAM 100; 150 MG/G; MG/G
CREAM TOPICAL 4 TIMES DAILY PRN
Status: DISCONTINUED | OUTPATIENT
Start: 2019-01-01 | End: 2019-01-01 | Stop reason: HOSPADM

## 2019-01-01 RX ORDER — AMLODIPINE BESYLATE 10 MG/1
10 TABLET ORAL DAILY
Status: DISCONTINUED | OUTPATIENT
Start: 2019-01-01 | End: 2019-01-01

## 2019-01-01 RX ORDER — SODIUM CHLORIDE FOR INHALATION 0.9 %
3 VIAL, NEBULIZER (ML) INHALATION EVERY 8 HOURS PRN
Status: DISCONTINUED | OUTPATIENT
Start: 2019-01-01 | End: 2019-01-01 | Stop reason: HOSPADM

## 2019-01-01 RX ORDER — POTASSIUM CHLORIDE 20 MEQ/1
20 TABLET, EXTENDED RELEASE ORAL 2 TIMES DAILY
Qty: 60 TABLET | Refills: 0
Start: 2019-01-01

## 2019-01-01 RX ORDER — OXYCODONE HYDROCHLORIDE 5 MG/1
5 TABLET ORAL ONCE
Status: COMPLETED | OUTPATIENT
Start: 2019-01-01 | End: 2019-01-01

## 2019-01-01 RX ORDER — FUROSEMIDE 10 MG/ML
80 INJECTION INTRAMUSCULAR; INTRAVENOUS
Status: DISCONTINUED | OUTPATIENT
Start: 2019-01-01 | End: 2019-01-01

## 2019-01-01 RX ORDER — TRAMADOL HYDROCHLORIDE 50 MG/1
50 TABLET ORAL EVERY 4 HOURS PRN
Status: DISCONTINUED | OUTPATIENT
Start: 2019-01-01 | End: 2019-01-01 | Stop reason: HOSPADM

## 2019-01-01 RX ORDER — LACTULOSE 20 G/30ML
20 SOLUTION ORAL 3 TIMES DAILY
Status: DISCONTINUED | OUTPATIENT
Start: 2019-01-01 | End: 2019-01-01

## 2019-01-01 RX ORDER — ONDANSETRON 2 MG/ML
4 INJECTION INTRAMUSCULAR; INTRAVENOUS EVERY 6 HOURS SCHEDULED
Status: DISCONTINUED | OUTPATIENT
Start: 2019-01-01 | End: 2019-01-01 | Stop reason: HOSPADM

## 2019-01-01 RX ORDER — POTASSIUM CHLORIDE 20 MEQ/1
20 TABLET, EXTENDED RELEASE ORAL DAILY
Status: DISCONTINUED | OUTPATIENT
Start: 2019-01-01 | End: 2019-01-01 | Stop reason: HOSPADM

## 2019-01-01 RX ORDER — POTASSIUM CHLORIDE 29.8 MG/ML
40 INJECTION INTRAVENOUS 2 TIMES DAILY
Status: DISCONTINUED | OUTPATIENT
Start: 2019-01-01 | End: 2019-01-01 | Stop reason: SDUPTHER

## 2019-01-01 RX ORDER — KETOROLAC TROMETHAMINE 30 MG/ML
15 INJECTION, SOLUTION INTRAMUSCULAR; INTRAVENOUS EVERY 6 HOURS PRN
Status: DISCONTINUED | OUTPATIENT
Start: 2019-01-01 | End: 2019-01-01

## 2019-01-01 RX ORDER — IBUPROFEN 400 MG/1
400 TABLET ORAL EVERY 8 HOURS PRN
Status: DISCONTINUED | OUTPATIENT
Start: 2019-01-01 | End: 2019-01-01

## 2019-01-01 RX ORDER — PROMETHAZINE HYDROCHLORIDE 25 MG/ML
25 INJECTION, SOLUTION INTRAMUSCULAR; INTRAVENOUS EVERY 6 HOURS PRN
Status: DISCONTINUED | OUTPATIENT
Start: 2019-01-01 | End: 2019-01-01 | Stop reason: HOSPADM

## 2019-01-01 RX ORDER — POTASSIUM CHLORIDE 14.9 MG/ML
20 INJECTION INTRAVENOUS ONCE
Status: COMPLETED | OUTPATIENT
Start: 2019-01-01 | End: 2019-01-01

## 2019-01-01 RX ORDER — METHOCARBAMOL 500 MG/1
500 TABLET, FILM COATED ORAL EVERY 6 HOURS PRN
Status: DISCONTINUED | OUTPATIENT
Start: 2019-01-01 | End: 2019-01-01

## 2019-01-01 RX ORDER — CHLORDIAZEPOXIDE HYDROCHLORIDE 10 MG/1
10 CAPSULE, GELATIN COATED ORAL EVERY 8 HOURS SCHEDULED
Status: DISCONTINUED | OUTPATIENT
Start: 2019-01-01 | End: 2019-01-01

## 2019-01-01 RX ORDER — SPIRONOLACTONE 25 MG/1
50 TABLET ORAL DAILY
Status: DISCONTINUED | OUTPATIENT
Start: 2019-01-01 | End: 2019-01-01 | Stop reason: HOSPADM

## 2019-01-01 RX ORDER — METHOCARBAMOL 500 MG/1
500 TABLET, FILM COATED ORAL ONCE
Status: COMPLETED | OUTPATIENT
Start: 2019-01-01 | End: 2019-01-01

## 2019-01-01 RX ORDER — AMITRIPTYLINE HYDROCHLORIDE 10 MG/1
10 TABLET, FILM COATED ORAL
Status: DISCONTINUED | OUTPATIENT
Start: 2019-01-01 | End: 2019-01-01 | Stop reason: HOSPADM

## 2019-01-01 RX ORDER — LACTULOSE 20 G/30ML
10 SOLUTION ORAL DAILY
Status: DISCONTINUED | OUTPATIENT
Start: 2019-01-01 | End: 2019-01-01

## 2019-01-01 RX ORDER — LEVALBUTEROL 1.25 MG/.5ML
1.25 SOLUTION, CONCENTRATE RESPIRATORY (INHALATION) EVERY 6 HOURS SCHEDULED
Status: DISCONTINUED | OUTPATIENT
Start: 2019-01-01 | End: 2019-01-01 | Stop reason: HOSPADM

## 2019-01-01 RX ORDER — CHLORDIAZEPOXIDE HYDROCHLORIDE 10 MG/1
10 CAPSULE, GELATIN COATED ORAL EVERY 6 HOURS PRN
Status: DISCONTINUED | OUTPATIENT
Start: 2019-01-01 | End: 2019-01-01

## 2019-01-01 RX ORDER — FUROSEMIDE 10 MG/ML
20 INJECTION INTRAMUSCULAR; INTRAVENOUS ONCE
Status: COMPLETED | OUTPATIENT
Start: 2019-01-01 | End: 2019-01-01

## 2019-01-01 RX ORDER — DOCUSATE SODIUM 100 MG/1
100 CAPSULE, LIQUID FILLED ORAL 2 TIMES DAILY
Status: DISCONTINUED | OUTPATIENT
Start: 2019-01-01 | End: 2019-01-01 | Stop reason: HOSPADM

## 2019-01-01 RX ORDER — POTASSIUM CHLORIDE 20 MEQ/1
40 TABLET, EXTENDED RELEASE ORAL
Status: DISPENSED | OUTPATIENT
Start: 2019-01-01 | End: 2019-01-01

## 2019-01-01 RX ORDER — ONDANSETRON 2 MG/ML
4 INJECTION INTRAMUSCULAR; INTRAVENOUS EVERY 6 HOURS PRN
Status: DISCONTINUED | OUTPATIENT
Start: 2019-01-01 | End: 2019-01-01 | Stop reason: HOSPADM

## 2019-01-01 RX ORDER — POTASSIUM CHLORIDE 20 MEQ/1
20 TABLET, EXTENDED RELEASE ORAL ONCE
Status: COMPLETED | OUTPATIENT
Start: 2019-01-01 | End: 2019-01-01

## 2019-01-01 RX ORDER — SENNOSIDES 8.6 MG
1 TABLET ORAL DAILY
Status: DISCONTINUED | OUTPATIENT
Start: 2019-01-01 | End: 2019-01-01 | Stop reason: HOSPADM

## 2019-01-01 RX ORDER — TRAMADOL HYDROCHLORIDE 50 MG/1
50 TABLET ORAL EVERY 8 HOURS PRN
Qty: 30 TABLET | Refills: 0 | Status: SHIPPED | OUTPATIENT
Start: 2019-01-01

## 2019-01-01 RX ORDER — LIDOCAINE 50 MG/G
1 PATCH TOPICAL DAILY
Status: DISCONTINUED | OUTPATIENT
Start: 2019-01-01 | End: 2019-01-01 | Stop reason: HOSPADM

## 2019-01-01 RX ORDER — ALBUTEROL SULFATE 90 UG/1
2 AEROSOL, METERED RESPIRATORY (INHALATION) EVERY 4 HOURS PRN
COMMUNITY

## 2019-01-01 RX ORDER — POTASSIUM CHLORIDE 20 MEQ/1
40 TABLET, EXTENDED RELEASE ORAL
Status: DISCONTINUED | OUTPATIENT
Start: 2019-01-01 | End: 2019-01-01

## 2019-01-01 RX ORDER — PREDNISOLONE SODIUM PHOSPHATE 15 MG/5ML
40 SOLUTION ORAL DAILY
Status: DISCONTINUED | OUTPATIENT
Start: 2019-01-01 | End: 2019-01-01

## 2019-01-01 RX ORDER — POTASSIUM CHLORIDE 20 MEQ/1
40 TABLET, EXTENDED RELEASE ORAL
Status: COMPLETED | OUTPATIENT
Start: 2019-01-01 | End: 2019-01-01

## 2019-01-01 RX ORDER — POTASSIUM CHLORIDE 20 MEQ/1
40 TABLET, EXTENDED RELEASE ORAL ONCE
Status: DISCONTINUED | OUTPATIENT
Start: 2019-01-01 | End: 2019-01-01

## 2019-01-01 RX ORDER — SPIRONOLACTONE 25 MG/1
50 TABLET ORAL DAILY
Status: DISCONTINUED | OUTPATIENT
Start: 2019-01-01 | End: 2019-01-01

## 2019-01-01 RX ORDER — METHOCARBAMOL 500 MG/1
500 TABLET, FILM COATED ORAL 4 TIMES DAILY
Qty: 20 TABLET | Refills: 0 | Status: SHIPPED | OUTPATIENT
Start: 2019-01-01 | End: 2019-01-01

## 2019-01-01 RX ORDER — POTASSIUM CHLORIDE 20MEQ/15ML
40 LIQUID (ML) ORAL 2 TIMES DAILY
Status: COMPLETED | OUTPATIENT
Start: 2019-01-01 | End: 2019-01-01

## 2019-01-01 RX ORDER — ALPRAZOLAM 0.5 MG/1
1 TABLET ORAL 3 TIMES DAILY
Status: DISCONTINUED | OUTPATIENT
Start: 2019-01-01 | End: 2019-01-01

## 2019-01-01 RX ORDER — ALBUMIN (HUMAN) 12.5 G/50ML
25 SOLUTION INTRAVENOUS 2 TIMES DAILY
Status: COMPLETED | OUTPATIENT
Start: 2019-01-01 | End: 2019-01-01

## 2019-01-01 RX ORDER — GABAPENTIN 300 MG/1
600 CAPSULE ORAL 3 TIMES DAILY
Status: DISCONTINUED | OUTPATIENT
Start: 2019-01-01 | End: 2019-01-01

## 2019-01-01 RX ADMIN — QUETIAPINE FUMARATE 150 MG: 100 TABLET ORAL at 22:15

## 2019-01-01 RX ADMIN — LEVOFLOXACIN 750 MG: 5 INJECTION, SOLUTION INTRAVENOUS at 16:48

## 2019-01-01 RX ADMIN — GABAPENTIN 600 MG: 300 CAPSULE ORAL at 09:27

## 2019-01-01 RX ADMIN — ALBUMIN (HUMAN) 25 G: 0.25 INJECTION, SOLUTION INTRAVENOUS at 17:14

## 2019-01-01 RX ADMIN — POTASSIUM CHLORIDE 20 MEQ: 200 INJECTION, SOLUTION INTRAVENOUS at 11:57

## 2019-01-01 RX ADMIN — AMLODIPINE BESYLATE 10 MG: 10 TABLET ORAL at 08:25

## 2019-01-01 RX ADMIN — GABAPENTIN 300 MG: 300 CAPSULE ORAL at 09:33

## 2019-01-01 RX ADMIN — FUROSEMIDE 80 MG: 10 INJECTION, SOLUTION INTRAMUSCULAR; INTRAVENOUS at 13:30

## 2019-01-01 RX ADMIN — TRAMADOL HYDROCHLORIDE 50 MG: 50 TABLET, COATED ORAL at 10:01

## 2019-01-01 RX ADMIN — HEPARIN SODIUM 5000 UNITS: 5000 INJECTION INTRAVENOUS; SUBCUTANEOUS at 21:07

## 2019-01-01 RX ADMIN — ONDANSETRON 4 MG: 2 INJECTION INTRAMUSCULAR; INTRAVENOUS at 08:31

## 2019-01-01 RX ADMIN — TRAMADOL HYDROCHLORIDE 50 MG: 50 TABLET, COATED ORAL at 17:53

## 2019-01-01 RX ADMIN — GABAPENTIN 300 MG: 300 CAPSULE ORAL at 08:54

## 2019-01-01 RX ADMIN — NYSTATIN: 100000 POWDER TOPICAL at 09:47

## 2019-01-01 RX ADMIN — LACTULOSE 20 G: 10 SOLUTION ORAL at 08:25

## 2019-01-01 RX ADMIN — POTASSIUM CHLORIDE 40 MEQ: 20 SOLUTION ORAL at 18:08

## 2019-01-01 RX ADMIN — HYDROMORPHONE HYDROCHLORIDE 1 MG: 1 INJECTION, SOLUTION INTRAMUSCULAR; INTRAVENOUS; SUBCUTANEOUS at 12:32

## 2019-01-01 RX ADMIN — GABAPENTIN 300 MG: 300 CAPSULE ORAL at 21:00

## 2019-01-01 RX ADMIN — POTASSIUM CHLORIDE 40 MEQ: 1500 TABLET, EXTENDED RELEASE ORAL at 18:04

## 2019-01-01 RX ADMIN — LEVOFLOXACIN 750 MG: 5 INJECTION, SOLUTION INTRAVENOUS at 15:26

## 2019-01-01 RX ADMIN — SPIRONOLACTONE 50 MG: 50 TABLET ORAL at 09:18

## 2019-01-01 RX ADMIN — THIAMINE HCL TAB 100 MG 100 MG: 100 TAB at 09:18

## 2019-01-01 RX ADMIN — TRAMADOL HYDROCHLORIDE 50 MG: 50 TABLET, COATED ORAL at 13:40

## 2019-01-01 RX ADMIN — LACTULOSE 10 G: 10 SOLUTION ORAL at 08:31

## 2019-01-01 RX ADMIN — HEPARIN SODIUM 5000 UNITS: 5000 INJECTION INTRAVENOUS; SUBCUTANEOUS at 21:40

## 2019-01-01 RX ADMIN — AMITRIPTYLINE HYDROCHLORIDE 10 MG: 10 TABLET, FILM COATED ORAL at 01:40

## 2019-01-01 RX ADMIN — MAGNESIUM SULFATE HEPTAHYDRATE 2 G: 40 INJECTION, SOLUTION INTRAVENOUS at 11:10

## 2019-01-01 RX ADMIN — LACTULOSE 20 G: 10 SOLUTION ORAL at 17:22

## 2019-01-01 RX ADMIN — SPIRONOLACTONE 50 MG: 25 TABLET ORAL at 08:37

## 2019-01-01 RX ADMIN — TRAMADOL HYDROCHLORIDE 50 MG: 50 TABLET, COATED ORAL at 07:18

## 2019-01-01 RX ADMIN — MAGNESIUM SULFATE HEPTAHYDRATE 2 G: 40 INJECTION, SOLUTION INTRAVENOUS at 21:51

## 2019-01-01 RX ADMIN — AMITRIPTYLINE HYDROCHLORIDE 10 MG: 10 TABLET, FILM COATED ORAL at 21:20

## 2019-01-01 RX ADMIN — CALCIUM CARBONATE (ANTACID) CHEW TAB 500 MG 500 MG: 500 CHEW TAB at 16:10

## 2019-01-01 RX ADMIN — SPIRONOLACTONE 50 MG: 25 TABLET, FILM COATED ORAL at 08:15

## 2019-01-01 RX ADMIN — GABAPENTIN 300 MG: 300 CAPSULE ORAL at 16:38

## 2019-01-01 RX ADMIN — PIPERACILLIN SODIUM,TAZOBACTAM SODIUM 4.5 G: 4; .5 INJECTION, POWDER, FOR SOLUTION INTRAVENOUS at 20:18

## 2019-01-01 RX ADMIN — CHLORDIAZEPOXIDE HYDROCHLORIDE 10 MG: 10 CAPSULE ORAL at 05:37

## 2019-01-01 RX ADMIN — FUROSEMIDE 80 MG: 10 INJECTION, SOLUTION INTRAMUSCULAR; INTRAVENOUS at 17:30

## 2019-01-01 RX ADMIN — ALBUMIN (HUMAN) 25 G: 0.25 INJECTION, SOLUTION INTRAVENOUS at 09:24

## 2019-01-01 RX ADMIN — LACTULOSE 20 G: 10 SOLUTION ORAL at 17:13

## 2019-01-01 RX ADMIN — DOCUSATE SODIUM 100 MG: 100 CAPSULE, LIQUID FILLED ORAL at 18:35

## 2019-01-01 RX ADMIN — POTASSIUM PHOSPHATE, MONOBASIC AND POTASSIUM PHOSPHATE, DIBASIC 30 MMOL: 224; 236 INJECTION, SOLUTION INTRAVENOUS at 13:42

## 2019-01-01 RX ADMIN — POTASSIUM CHLORIDE 40 MEQ: 1500 TABLET, EXTENDED RELEASE ORAL at 09:06

## 2019-01-01 RX ADMIN — FERROUS SULFATE TAB 325 MG (65 MG ELEMENTAL FE) 325 MG: 325 (65 FE) TAB at 09:29

## 2019-01-01 RX ADMIN — NYSTATIN: 100000 POWDER TOPICAL at 18:08

## 2019-01-01 RX ADMIN — LACTULOSE 20 G: 10 SOLUTION ORAL at 17:53

## 2019-01-01 RX ADMIN — SODIUM CHLORIDE 75 ML/HR: 0.9 INJECTION, SOLUTION INTRAVENOUS at 17:48

## 2019-01-01 RX ADMIN — QUETIAPINE FUMARATE 150 MG: 100 TABLET ORAL at 22:08

## 2019-01-01 RX ADMIN — NYSTATIN 1 APPLICATION: 100000 POWDER TOPICAL at 09:00

## 2019-01-01 RX ADMIN — PIPERACILLIN SODIUM,TAZOBACTAM SODIUM 4.5 G: 4; .5 INJECTION, POWDER, FOR SOLUTION INTRAVENOUS at 22:22

## 2019-01-01 RX ADMIN — LACTULOSE 20 G: 10 SOLUTION ORAL at 21:20

## 2019-01-01 RX ADMIN — TRAMADOL HYDROCHLORIDE 50 MG: 50 TABLET, COATED ORAL at 05:35

## 2019-01-01 RX ADMIN — FUROSEMIDE 80 MG: 10 INJECTION, SOLUTION INTRAMUSCULAR; INTRAVENOUS at 05:25

## 2019-01-01 RX ADMIN — PIPERACILLIN SODIUM,TAZOBACTAM SODIUM 4.5 G: 4; .5 INJECTION, POWDER, FOR SOLUTION INTRAVENOUS at 03:18

## 2019-01-01 RX ADMIN — TRAMADOL HYDROCHLORIDE 50 MG: 50 TABLET, COATED ORAL at 14:13

## 2019-01-01 RX ADMIN — CHLORDIAZEPOXIDE HYDROCHLORIDE 5 MG: 5 CAPSULE ORAL at 05:35

## 2019-01-01 RX ADMIN — FERROUS SULFATE TAB 325 MG (65 MG ELEMENTAL FE) 325 MG: 325 (65 FE) TAB at 08:15

## 2019-01-01 RX ADMIN — SPIRONOLACTONE 50 MG: 50 TABLET ORAL at 09:22

## 2019-01-01 RX ADMIN — PIPERACILLIN SODIUM,TAZOBACTAM SODIUM 4.5 G: 4; .5 INJECTION, POWDER, FOR SOLUTION INTRAVENOUS at 00:52

## 2019-01-01 RX ADMIN — TRAMADOL HYDROCHLORIDE 50 MG: 50 TABLET, COATED ORAL at 14:43

## 2019-01-01 RX ADMIN — OXYCODONE HYDROCHLORIDE 5 MG: 5 TABLET ORAL at 17:22

## 2019-01-01 RX ADMIN — SODIUM CHLORIDE 75 ML/HR: 0.9 INJECTION, SOLUTION INTRAVENOUS at 19:27

## 2019-01-01 RX ADMIN — GABAPENTIN 300 MG: 300 CAPSULE ORAL at 16:10

## 2019-01-01 RX ADMIN — FUROSEMIDE 20 MG: 10 INJECTION, SOLUTION INTRAMUSCULAR; INTRAVENOUS at 22:40

## 2019-01-01 RX ADMIN — GABAPENTIN 300 MG: 300 CAPSULE ORAL at 08:41

## 2019-01-01 RX ADMIN — FOLIC ACID 1 MG: 1 TABLET ORAL at 08:25

## 2019-01-01 RX ADMIN — ONDANSETRON 4 MG: 2 INJECTION INTRAMUSCULAR; INTRAVENOUS at 06:39

## 2019-01-01 RX ADMIN — POTASSIUM CHLORIDE 20 MEQ: 1500 TABLET, EXTENDED RELEASE ORAL at 09:27

## 2019-01-01 RX ADMIN — PIPERACILLIN SODIUM,TAZOBACTAM SODIUM 4.5 G: 4; .5 INJECTION, POWDER, FOR SOLUTION INTRAVENOUS at 18:33

## 2019-01-01 RX ADMIN — CHLORDIAZEPOXIDE HYDROCHLORIDE 10 MG: 10 CAPSULE ORAL at 09:06

## 2019-01-01 RX ADMIN — THIAMINE HCL TAB 100 MG 100 MG: 100 TAB at 08:25

## 2019-01-01 RX ADMIN — ACETAMINOPHEN 650 MG: 325 TABLET, FILM COATED ORAL at 16:10

## 2019-01-01 RX ADMIN — PIPERACILLIN SODIUM,TAZOBACTAM SODIUM 4.5 G: 4; .5 INJECTION, POWDER, FOR SOLUTION INTRAVENOUS at 15:32

## 2019-01-01 RX ADMIN — LEVALBUTEROL 1.25 MG: 1.25 SOLUTION, CONCENTRATE RESPIRATORY (INHALATION) at 23:36

## 2019-01-01 RX ADMIN — NYSTATIN: 100000 POWDER TOPICAL at 13:00

## 2019-01-01 RX ADMIN — LACTULOSE 30 G: 10 SOLUTION ORAL at 09:49

## 2019-01-01 RX ADMIN — POTASSIUM & SODIUM PHOSPHATES POWDER PACK 280-160-250 MG 1 PACKET: 280-160-250 PACK at 16:57

## 2019-01-01 RX ADMIN — AMITRIPTYLINE HYDROCHLORIDE 10 MG: 10 TABLET, FILM COATED ORAL at 22:08

## 2019-01-01 RX ADMIN — POTASSIUM CHLORIDE 20 MEQ: 200 INJECTION, SOLUTION INTRAVENOUS at 06:43

## 2019-01-01 RX ADMIN — GABAPENTIN 300 MG: 300 CAPSULE ORAL at 17:54

## 2019-01-01 RX ADMIN — SODIUM CHLORIDE 0.5 MCG/KG/HR: 9 INJECTION, SOLUTION INTRAVENOUS at 08:46

## 2019-01-01 RX ADMIN — QUETIAPINE FUMARATE 150 MG: 100 TABLET ORAL at 22:18

## 2019-01-01 RX ADMIN — IPRATROPIUM BROMIDE 0.5 MG: 0.5 SOLUTION RESPIRATORY (INHALATION) at 23:36

## 2019-01-01 RX ADMIN — NYSTATIN: 100000 POWDER TOPICAL at 17:56

## 2019-01-01 RX ADMIN — TRAMADOL HYDROCHLORIDE 50 MG: 50 TABLET, COATED ORAL at 02:47

## 2019-01-01 RX ADMIN — LIDOCAINE 1 PATCH: 50 PATCH TOPICAL at 09:14

## 2019-01-01 RX ADMIN — NYSTATIN: 100000 POWDER TOPICAL at 08:33

## 2019-01-01 RX ADMIN — FUROSEMIDE 40 MG: 10 INJECTION, SOLUTION INTRAMUSCULAR; INTRAVENOUS at 15:27

## 2019-01-01 RX ADMIN — FUROSEMIDE 80 MG: 10 INJECTION, SOLUTION INTRAMUSCULAR; INTRAVENOUS at 17:13

## 2019-01-01 RX ADMIN — AMITRIPTYLINE HYDROCHLORIDE 10 MG: 10 TABLET, FILM COATED ORAL at 22:18

## 2019-01-01 RX ADMIN — TRAMADOL HYDROCHLORIDE 50 MG: 50 TABLET, COATED ORAL at 18:07

## 2019-01-01 RX ADMIN — NYSTATIN: 100000 POWDER TOPICAL at 17:15

## 2019-01-01 RX ADMIN — POTASSIUM & SODIUM PHOSPHATES POWDER PACK 280-160-250 MG 1 PACKET: 280-160-250 PACK at 09:22

## 2019-01-01 RX ADMIN — FOLIC ACID 1 MG: 1 TABLET ORAL at 09:44

## 2019-01-01 RX ADMIN — QUETIAPINE FUMARATE 100 MG: 100 TABLET ORAL at 09:33

## 2019-01-01 RX ADMIN — TRAMADOL HYDROCHLORIDE 50 MG: 50 TABLET, COATED ORAL at 20:09

## 2019-01-01 RX ADMIN — CHLORDIAZEPOXIDE HYDROCHLORIDE 10 MG: 10 CAPSULE ORAL at 17:40

## 2019-01-01 RX ADMIN — FUROSEMIDE 80 MG: 10 INJECTION, SOLUTION INTRAMUSCULAR; INTRAVENOUS at 18:18

## 2019-01-01 RX ADMIN — POTASSIUM CHLORIDE 40 MEQ: 1500 TABLET, EXTENDED RELEASE ORAL at 18:26

## 2019-01-01 RX ADMIN — ISODIUM CHLORIDE 3 ML: 0.03 SOLUTION RESPIRATORY (INHALATION) at 19:40

## 2019-01-01 RX ADMIN — TORSEMIDE 40 MG: 20 TABLET ORAL at 08:41

## 2019-01-01 RX ADMIN — HEPARIN SODIUM 5000 UNITS: 5000 INJECTION INTRAVENOUS; SUBCUTANEOUS at 05:44

## 2019-01-01 RX ADMIN — FUROSEMIDE 80 MG: 10 INJECTION, SOLUTION INTRAMUSCULAR; INTRAVENOUS at 18:03

## 2019-01-01 RX ADMIN — AMITRIPTYLINE HYDROCHLORIDE 10 MG: 10 TABLET, FILM COATED ORAL at 21:40

## 2019-01-01 RX ADMIN — DOCUSATE SODIUM 100 MG: 100 CAPSULE, LIQUID FILLED ORAL at 08:41

## 2019-01-01 RX ADMIN — CHLORDIAZEPOXIDE HYDROCHLORIDE 10 MG: 10 CAPSULE ORAL at 17:30

## 2019-01-01 RX ADMIN — SODIUM CHLORIDE 75 ML/HR: 0.9 INJECTION, SOLUTION INTRAVENOUS at 06:23

## 2019-01-01 RX ADMIN — THIAMINE HCL TAB 100 MG 100 MG: 100 TAB at 09:06

## 2019-01-01 RX ADMIN — POTASSIUM CHLORIDE 20 MEQ: 200 INJECTION, SOLUTION INTRAVENOUS at 20:34

## 2019-01-01 RX ADMIN — SPIRONOLACTONE 50 MG: 50 TABLET ORAL at 08:29

## 2019-01-01 RX ADMIN — POTASSIUM CHLORIDE 40 MEQ: 1500 TABLET, EXTENDED RELEASE ORAL at 17:14

## 2019-01-01 RX ADMIN — POTASSIUM CHLORIDE 20 MEQ: 200 INJECTION, SOLUTION INTRAVENOUS at 19:50

## 2019-01-01 RX ADMIN — FERROUS SULFATE TAB 325 MG (65 MG ELEMENTAL FE) 325 MG: 325 (65 FE) TAB at 08:41

## 2019-01-01 RX ADMIN — ALBUMIN (HUMAN) 25 G: 0.25 INJECTION, SOLUTION INTRAVENOUS at 18:58

## 2019-01-01 RX ADMIN — FERROUS SULFATE TAB 325 MG (65 MG ELEMENTAL FE) 325 MG: 325 (65 FE) TAB at 09:44

## 2019-01-01 RX ADMIN — LORAZEPAM 1 MG: 2 INJECTION INTRAMUSCULAR; INTRAVENOUS at 20:16

## 2019-01-01 RX ADMIN — METHOCARBAMOL TABLETS 500 MG: 500 TABLET, COATED ORAL at 05:23

## 2019-01-01 RX ADMIN — LACTULOSE 20 G: 10 SOLUTION ORAL at 18:26

## 2019-01-01 RX ADMIN — TRAMADOL HYDROCHLORIDE 50 MG: 50 TABLET, COATED ORAL at 05:27

## 2019-01-01 RX ADMIN — LEVALBUTEROL 1.25 MG: 1.25 SOLUTION, CONCENTRATE RESPIRATORY (INHALATION) at 19:40

## 2019-01-01 RX ADMIN — TRAMADOL HYDROCHLORIDE 50 MG: 50 TABLET, COATED ORAL at 01:40

## 2019-01-01 RX ADMIN — FUROSEMIDE 80 MG: 10 INJECTION, SOLUTION INTRAMUSCULAR; INTRAVENOUS at 05:05

## 2019-01-01 RX ADMIN — TRAMADOL HYDROCHLORIDE 50 MG: 50 TABLET, COATED ORAL at 22:20

## 2019-01-01 RX ADMIN — LORAZEPAM 1 MG: 2 INJECTION INTRAMUSCULAR; INTRAVENOUS at 04:03

## 2019-01-01 RX ADMIN — PIPERACILLIN SODIUM,TAZOBACTAM SODIUM 4.5 G: 4; .5 INJECTION, POWDER, FOR SOLUTION INTRAVENOUS at 04:02

## 2019-01-01 RX ADMIN — ALBUMIN (HUMAN) 25 G: 0.25 INJECTION, SOLUTION INTRAVENOUS at 09:25

## 2019-01-01 RX ADMIN — PIPERACILLIN SODIUM,TAZOBACTAM SODIUM 4.5 G: 4; .5 INJECTION, POWDER, FOR SOLUTION INTRAVENOUS at 06:20

## 2019-01-01 RX ADMIN — RIFAXIMIN 400 MG: 200 TABLET ORAL at 21:16

## 2019-01-01 RX ADMIN — AMLODIPINE BESYLATE 10 MG: 10 TABLET ORAL at 08:06

## 2019-01-01 RX ADMIN — MAGNESIUM SULFATE HEPTAHYDRATE 2 G: 40 INJECTION, SOLUTION INTRAVENOUS at 16:48

## 2019-01-01 RX ADMIN — AMITRIPTYLINE HYDROCHLORIDE 10 MG: 10 TABLET, FILM COATED ORAL at 22:12

## 2019-01-01 RX ADMIN — PIPERACILLIN SODIUM,TAZOBACTAM SODIUM 4.5 G: 4; .5 INJECTION, POWDER, FOR SOLUTION INTRAVENOUS at 13:51

## 2019-01-01 RX ADMIN — FOLIC ACID 1 MG: 1 TABLET ORAL at 09:24

## 2019-01-01 RX ADMIN — PIPERACILLIN SODIUM,TAZOBACTAM SODIUM 4.5 G: 4; .5 INJECTION, POWDER, FOR SOLUTION INTRAVENOUS at 16:27

## 2019-01-01 RX ADMIN — TRAMADOL HYDROCHLORIDE 50 MG: 50 TABLET, COATED ORAL at 13:56

## 2019-01-01 RX ADMIN — POTASSIUM CHLORIDE 20 MEQ: 1500 TABLET, EXTENDED RELEASE ORAL at 09:44

## 2019-01-01 RX ADMIN — FOLIC ACID 1 MG: 1 TABLET ORAL at 09:22

## 2019-01-01 RX ADMIN — PIPERACILLIN SODIUM,TAZOBACTAM SODIUM 4.5 G: 4; .5 INJECTION, POWDER, FOR SOLUTION INTRAVENOUS at 11:02

## 2019-01-01 RX ADMIN — PIPERACILLIN SODIUM,TAZOBACTAM SODIUM 4.5 G: 4; .5 INJECTION, POWDER, FOR SOLUTION INTRAVENOUS at 01:00

## 2019-01-01 RX ADMIN — TRAMADOL HYDROCHLORIDE 50 MG: 50 TABLET, COATED ORAL at 22:18

## 2019-01-01 RX ADMIN — POTASSIUM CHLORIDE 20 MEQ: 200 INJECTION, SOLUTION INTRAVENOUS at 03:23

## 2019-01-01 RX ADMIN — POTASSIUM CHLORIDE 20 MEQ: 200 INJECTION, SOLUTION INTRAVENOUS at 06:15

## 2019-01-01 RX ADMIN — FOLIC ACID 1 MG: 1 TABLET ORAL at 09:07

## 2019-01-01 RX ADMIN — POTASSIUM CHLORIDE 20 MEQ: 1500 TABLET, EXTENDED RELEASE ORAL at 08:15

## 2019-01-01 RX ADMIN — POTASSIUM PHOSPHATE, MONOBASIC AND POTASSIUM PHOSPHATE, DIBASIC 21 MMOL: 224; 236 INJECTION, SOLUTION INTRAVENOUS at 13:18

## 2019-01-01 RX ADMIN — FUROSEMIDE 80 MG: 10 INJECTION, SOLUTION INTRAMUSCULAR; INTRAVENOUS at 12:09

## 2019-01-01 RX ADMIN — AMLODIPINE BESYLATE 10 MG: 10 TABLET ORAL at 09:00

## 2019-01-01 RX ADMIN — TRAMADOL HYDROCHLORIDE 50 MG: 50 TABLET, COATED ORAL at 05:10

## 2019-01-01 RX ADMIN — DOCUSATE SODIUM 100 MG: 100 CAPSULE, LIQUID FILLED ORAL at 09:37

## 2019-01-01 RX ADMIN — POTASSIUM CHLORIDE 20 MEQ: 200 INJECTION, SOLUTION INTRAVENOUS at 18:43

## 2019-01-01 RX ADMIN — MAGNESIUM SULFATE HEPTAHYDRATE 2 G: 40 INJECTION, SOLUTION INTRAVENOUS at 22:22

## 2019-01-01 RX ADMIN — POTASSIUM CHLORIDE 20 MEQ: 200 INJECTION, SOLUTION INTRAVENOUS at 09:30

## 2019-01-01 RX ADMIN — POTASSIUM CHLORIDE 40 MEQ: 1500 TABLET, EXTENDED RELEASE ORAL at 09:03

## 2019-01-01 RX ADMIN — POTASSIUM CHLORIDE 40 MEQ: 20 SOLUTION ORAL at 20:21

## 2019-01-01 RX ADMIN — RIFAXIMIN 400 MG: 200 TABLET ORAL at 05:26

## 2019-01-01 RX ADMIN — ONDANSETRON 4 MG: 2 INJECTION INTRAMUSCULAR; INTRAVENOUS at 20:34

## 2019-01-01 RX ADMIN — FERROUS SULFATE TAB 325 MG (65 MG ELEMENTAL FE) 325 MG: 325 (65 FE) TAB at 09:27

## 2019-01-01 RX ADMIN — PIPERACILLIN SODIUM,TAZOBACTAM SODIUM 4.5 G: 4; .5 INJECTION, POWDER, FOR SOLUTION INTRAVENOUS at 11:52

## 2019-01-01 RX ADMIN — ALBUMIN (HUMAN) 25 G: 0.25 INJECTION, SOLUTION INTRAVENOUS at 08:29

## 2019-01-01 RX ADMIN — LORAZEPAM 1 MG: 2 INJECTION INTRAMUSCULAR; INTRAVENOUS at 04:06

## 2019-01-01 RX ADMIN — TRAMADOL HYDROCHLORIDE 50 MG: 50 TABLET, COATED ORAL at 11:35

## 2019-01-01 RX ADMIN — TRAMADOL HYDROCHLORIDE 50 MG: 50 TABLET, COATED ORAL at 20:24

## 2019-01-01 RX ADMIN — ONDANSETRON 4 MG: 2 INJECTION INTRAMUSCULAR; INTRAVENOUS at 20:00

## 2019-01-01 RX ADMIN — FUROSEMIDE 40 MG: 10 INJECTION, SOLUTION INTRAMUSCULAR; INTRAVENOUS at 08:06

## 2019-01-01 RX ADMIN — ENOXAPARIN SODIUM 40 MG: 40 INJECTION SUBCUTANEOUS at 09:43

## 2019-01-01 RX ADMIN — LACTULOSE 20 G: 10 SOLUTION ORAL at 17:32

## 2019-01-01 RX ADMIN — POTASSIUM CHLORIDE 40 MEQ: 400 INJECTION, SOLUTION INTRAVENOUS at 10:40

## 2019-01-01 RX ADMIN — GABAPENTIN 300 MG: 300 CAPSULE ORAL at 18:07

## 2019-01-01 RX ADMIN — POTASSIUM CHLORIDE 40 MEQ: 400 INJECTION, SOLUTION INTRAVENOUS at 08:31

## 2019-01-01 RX ADMIN — FUROSEMIDE 40 MG: 10 INJECTION, SOLUTION INTRAMUSCULAR; INTRAVENOUS at 18:26

## 2019-01-01 RX ADMIN — TRAMADOL HYDROCHLORIDE 50 MG: 50 TABLET, COATED ORAL at 11:26

## 2019-01-01 RX ADMIN — POTASSIUM CHLORIDE 20 MEQ: 1500 TABLET, EXTENDED RELEASE ORAL at 08:40

## 2019-01-01 RX ADMIN — TRAMADOL HYDROCHLORIDE 50 MG: 50 TABLET, COATED ORAL at 16:55

## 2019-01-01 RX ADMIN — TRAMADOL HYDROCHLORIDE 50 MG: 50 TABLET, COATED ORAL at 18:27

## 2019-01-01 RX ADMIN — LACTULOSE 30 G: 10 SOLUTION ORAL at 16:46

## 2019-01-01 RX ADMIN — ALBUMIN (HUMAN) 25 G: 0.25 INJECTION, SOLUTION INTRAVENOUS at 17:30

## 2019-01-01 RX ADMIN — PREDNISOLONE SODIUM PHOSPHATE 40 MG: 15 SOLUTION ORAL at 10:06

## 2019-01-01 RX ADMIN — ENOXAPARIN SODIUM 40 MG: 40 INJECTION SUBCUTANEOUS at 08:14

## 2019-01-01 RX ADMIN — FOLIC ACID 1 MG: 1 TABLET ORAL at 08:15

## 2019-01-01 RX ADMIN — MAGNESIUM SULFATE HEPTAHYDRATE 2 G: 40 INJECTION, SOLUTION INTRAVENOUS at 10:43

## 2019-01-01 RX ADMIN — TRAMADOL HYDROCHLORIDE 50 MG: 50 TABLET, COATED ORAL at 17:13

## 2019-01-01 RX ADMIN — GABAPENTIN 300 MG: 300 CAPSULE ORAL at 08:15

## 2019-01-01 RX ADMIN — TRAMADOL HYDROCHLORIDE 50 MG: 50 TABLET, COATED ORAL at 08:30

## 2019-01-01 RX ADMIN — GABAPENTIN 300 MG: 300 CAPSULE ORAL at 15:41

## 2019-01-01 RX ADMIN — NYSTATIN 1 APPLICATION: 100000 POWDER TOPICAL at 18:23

## 2019-01-01 RX ADMIN — POTASSIUM & SODIUM PHOSPHATES POWDER PACK 280-160-250 MG 1 PACKET: 280-160-250 PACK at 17:14

## 2019-01-01 RX ADMIN — LACTULOSE 30 G: 10 SOLUTION ORAL at 20:55

## 2019-01-01 RX ADMIN — FUROSEMIDE 40 MG: 10 INJECTION, SOLUTION INTRAMUSCULAR; INTRAVENOUS at 17:53

## 2019-01-01 RX ADMIN — POTASSIUM CHLORIDE 40 MEQ: 1500 TABLET, EXTENDED RELEASE ORAL at 08:31

## 2019-01-01 RX ADMIN — TRAMADOL HYDROCHLORIDE 50 MG: 50 TABLET, COATED ORAL at 09:00

## 2019-01-01 RX ADMIN — POTASSIUM CHLORIDE 40 MEQ: 1500 TABLET, EXTENDED RELEASE ORAL at 09:24

## 2019-01-01 RX ADMIN — POTASSIUM CHLORIDE 20 MEQ: 200 INJECTION, SOLUTION INTRAVENOUS at 17:49

## 2019-01-01 RX ADMIN — POTASSIUM CHLORIDE 40 MEQ: 1500 TABLET, EXTENDED RELEASE ORAL at 17:32

## 2019-01-01 RX ADMIN — PREDNISOLONE SODIUM PHOSPHATE 40 MG: 15 SOLUTION ORAL at 08:56

## 2019-01-01 RX ADMIN — SPIRONOLACTONE 25 MG: 25 TABLET ORAL at 09:33

## 2019-01-01 RX ADMIN — FUROSEMIDE 80 MG: 10 INJECTION, SOLUTION INTRAMUSCULAR; INTRAVENOUS at 08:29

## 2019-01-01 RX ADMIN — MAGNESIUM SULFATE HEPTAHYDRATE 2 G: 40 INJECTION, SOLUTION INTRAVENOUS at 19:42

## 2019-01-01 RX ADMIN — PIPERACILLIN SODIUM,TAZOBACTAM SODIUM 4.5 G: 4; .5 INJECTION, POWDER, FOR SOLUTION INTRAVENOUS at 18:36

## 2019-01-01 RX ADMIN — HYDROMORPHONE HYDROCHLORIDE 0.2 MG: 1 INJECTION, SOLUTION INTRAMUSCULAR; INTRAVENOUS; SUBCUTANEOUS at 19:23

## 2019-01-01 RX ADMIN — TRAMADOL HYDROCHLORIDE 50 MG: 50 TABLET, COATED ORAL at 22:00

## 2019-01-01 RX ADMIN — LORAZEPAM 1 MG: 2 INJECTION INTRAMUSCULAR; INTRAVENOUS at 02:14

## 2019-01-01 RX ADMIN — ALPRAZOLAM 1 MG: 0.5 TABLET ORAL at 20:17

## 2019-01-01 RX ADMIN — LACTULOSE 20 G: 10 SOLUTION ORAL at 21:45

## 2019-01-01 RX ADMIN — RIFAXIMIN 400 MG: 200 TABLET ORAL at 06:06

## 2019-01-01 RX ADMIN — TRAMADOL HYDROCHLORIDE 50 MG: 50 TABLET, COATED ORAL at 04:57

## 2019-01-01 RX ADMIN — FOLIC ACID 1 MG: 1 TABLET ORAL at 08:30

## 2019-01-01 RX ADMIN — POTASSIUM & SODIUM PHOSPHATES POWDER PACK 280-160-250 MG 1 PACKET: 280-160-250 PACK at 09:21

## 2019-01-01 RX ADMIN — ALBUMIN (HUMAN) 25 G: 0.25 INJECTION, SOLUTION INTRAVENOUS at 09:17

## 2019-01-01 RX ADMIN — PIPERACILLIN SODIUM,TAZOBACTAM SODIUM 4.5 G: 4; .5 INJECTION, POWDER, FOR SOLUTION INTRAVENOUS at 06:28

## 2019-01-01 RX ADMIN — FUROSEMIDE 80 MG: 10 INJECTION, SOLUTION INTRAMUSCULAR; INTRAVENOUS at 17:34

## 2019-01-01 RX ADMIN — TRAMADOL HYDROCHLORIDE 50 MG: 50 TABLET, COATED ORAL at 23:26

## 2019-01-01 RX ADMIN — THIAMINE HCL TAB 100 MG 100 MG: 100 TAB at 08:06

## 2019-01-01 RX ADMIN — ALBUMIN (HUMAN) 25 G: 0.25 INJECTION, SOLUTION INTRAVENOUS at 17:55

## 2019-01-01 RX ADMIN — TRAMADOL HYDROCHLORIDE 50 MG: 50 TABLET, COATED ORAL at 08:42

## 2019-01-01 RX ADMIN — TRAMADOL HYDROCHLORIDE 50 MG: 50 TABLET, COATED ORAL at 22:39

## 2019-01-01 RX ADMIN — TRAMADOL HYDROCHLORIDE 50 MG: 50 TABLET, COATED ORAL at 04:11

## 2019-01-01 RX ADMIN — PIPERACILLIN SODIUM,TAZOBACTAM SODIUM 4.5 G: 4; .5 INJECTION, POWDER, FOR SOLUTION INTRAVENOUS at 00:54

## 2019-01-01 RX ADMIN — PIPERACILLIN SODIUM,TAZOBACTAM SODIUM 4.5 G: 4; .5 INJECTION, POWDER, FOR SOLUTION INTRAVENOUS at 09:17

## 2019-01-01 RX ADMIN — SENNOSIDES 8.6 MG: 8.6 TABLET, FILM COATED ORAL at 09:33

## 2019-01-01 RX ADMIN — HEPARIN SODIUM 5000 UNITS: 5000 INJECTION INTRAVENOUS; SUBCUTANEOUS at 14:13

## 2019-01-01 RX ADMIN — RIFAXIMIN 550 MG: 550 TABLET ORAL at 13:56

## 2019-01-01 RX ADMIN — GABAPENTIN 300 MG: 300 CAPSULE ORAL at 20:09

## 2019-01-01 RX ADMIN — QUETIAPINE FUMARATE 100 MG: 100 TABLET ORAL at 01:40

## 2019-01-01 RX ADMIN — GABAPENTIN 300 MG: 300 CAPSULE ORAL at 09:44

## 2019-01-01 RX ADMIN — NYSTATIN: 100000 POWDER TOPICAL at 09:37

## 2019-01-01 RX ADMIN — LORAZEPAM 1 MG: 2 INJECTION INTRAMUSCULAR; INTRAVENOUS at 00:50

## 2019-01-01 RX ADMIN — OXYCODONE HYDROCHLORIDE 5 MG: 5 TABLET ORAL at 17:57

## 2019-01-01 RX ADMIN — TRAMADOL HYDROCHLORIDE 50 MG: 50 TABLET, COATED ORAL at 00:00

## 2019-01-01 RX ADMIN — LACTULOSE 20 G: 10 SOLUTION ORAL at 20:58

## 2019-01-01 RX ADMIN — FOLIC ACID 1 MG: 1 TABLET ORAL at 09:18

## 2019-01-01 RX ADMIN — SPIRONOLACTONE 50 MG: 50 TABLET ORAL at 08:25

## 2019-01-01 RX ADMIN — AMLODIPINE BESYLATE 10 MG: 10 TABLET ORAL at 09:06

## 2019-01-01 RX ADMIN — LACTULOSE 20 G: 10 SOLUTION ORAL at 17:29

## 2019-01-01 RX ADMIN — HEPARIN SODIUM 5000 UNITS: 5000 INJECTION INTRAVENOUS; SUBCUTANEOUS at 05:11

## 2019-01-01 RX ADMIN — CHLORDIAZEPOXIDE HYDROCHLORIDE 5 MG: 5 CAPSULE ORAL at 14:13

## 2019-01-01 RX ADMIN — FOLIC ACID 1 MG: 1 TABLET ORAL at 09:33

## 2019-01-01 RX ADMIN — LACTULOSE 20 G: 10 SOLUTION ORAL at 17:48

## 2019-01-01 RX ADMIN — FUROSEMIDE 40 MG: 10 INJECTION, SOLUTION INTRAMUSCULAR; INTRAVENOUS at 09:06

## 2019-01-01 RX ADMIN — POTASSIUM PHOSPHATE, MONOBASIC AND POTASSIUM PHOSPHATE, DIBASIC 12 MMOL: 224; 236 INJECTION, SOLUTION INTRAVENOUS at 21:42

## 2019-01-01 RX ADMIN — TRAMADOL HYDROCHLORIDE 50 MG: 50 TABLET, COATED ORAL at 05:11

## 2019-01-01 RX ADMIN — HEPARIN SODIUM 5000 UNITS: 5000 INJECTION INTRAVENOUS; SUBCUTANEOUS at 05:37

## 2019-01-01 RX ADMIN — TORSEMIDE 40 MG: 20 TABLET ORAL at 09:00

## 2019-01-01 RX ADMIN — FUROSEMIDE 80 MG: 10 INJECTION, SOLUTION INTRAMUSCULAR; INTRAVENOUS at 09:18

## 2019-01-01 RX ADMIN — TRAMADOL HYDROCHLORIDE 50 MG: 50 TABLET, COATED ORAL at 21:28

## 2019-01-01 RX ADMIN — FUROSEMIDE 80 MG: 10 INJECTION, SOLUTION INTRAMUSCULAR; INTRAVENOUS at 06:05

## 2019-01-01 RX ADMIN — POTASSIUM CHLORIDE 20 MEQ: 200 INJECTION, SOLUTION INTRAVENOUS at 16:27

## 2019-01-01 RX ADMIN — ALBUMIN (HUMAN) 25 G: 0.25 INJECTION, SOLUTION INTRAVENOUS at 09:07

## 2019-01-01 RX ADMIN — LACTULOSE 20 G: 10 SOLUTION ORAL at 09:18

## 2019-01-01 RX ADMIN — SPIRONOLACTONE 50 MG: 50 TABLET ORAL at 09:07

## 2019-01-01 RX ADMIN — SENNOSIDES 8.6 MG: 8.6 TABLET, FILM COATED ORAL at 08:51

## 2019-01-01 RX ADMIN — ENOXAPARIN SODIUM 40 MG: 40 INJECTION SUBCUTANEOUS at 09:28

## 2019-01-01 RX ADMIN — ALBUMIN (HUMAN) 25 G: 0.25 INJECTION, SOLUTION INTRAVENOUS at 08:06

## 2019-01-01 RX ADMIN — LACTULOSE 20 G: 10 SOLUTION ORAL at 22:00

## 2019-01-01 RX ADMIN — POTASSIUM CHLORIDE 20 MEQ: 1500 TABLET, EXTENDED RELEASE ORAL at 09:33

## 2019-01-01 RX ADMIN — POTASSIUM CHLORIDE 40 MEQ: 1500 TABLET, EXTENDED RELEASE ORAL at 08:36

## 2019-01-01 RX ADMIN — LIDOCAINE 1 PATCH: 50 PATCH TOPICAL at 09:29

## 2019-01-01 RX ADMIN — AMITRIPTYLINE HYDROCHLORIDE 10 MG: 10 TABLET, FILM COATED ORAL at 22:00

## 2019-01-01 RX ADMIN — GABAPENTIN 300 MG: 300 CAPSULE ORAL at 21:28

## 2019-01-01 RX ADMIN — ANORECTAL OINTMENT: 15.7; .44; 24; 20.6 OINTMENT TOPICAL at 08:33

## 2019-01-01 RX ADMIN — ONDANSETRON 4 MG: 2 INJECTION INTRAMUSCULAR; INTRAVENOUS at 11:34

## 2019-01-01 RX ADMIN — DOCUSATE SODIUM 100 MG: 100 CAPSULE, LIQUID FILLED ORAL at 17:54

## 2019-01-01 RX ADMIN — FOLIC ACID 1 MG: 1 TABLET ORAL at 09:00

## 2019-01-01 RX ADMIN — PIPERACILLIN SODIUM,TAZOBACTAM SODIUM 4.5 G: 4; .5 INJECTION, POWDER, FOR SOLUTION INTRAVENOUS at 06:18

## 2019-01-01 RX ADMIN — RIFAXIMIN 400 MG: 200 TABLET ORAL at 00:32

## 2019-01-01 RX ADMIN — TRAMADOL HYDROCHLORIDE 50 MG: 50 TABLET, COATED ORAL at 12:50

## 2019-01-01 RX ADMIN — LEVALBUTEROL 1.25 MG: 1.25 SOLUTION, CONCENTRATE RESPIRATORY (INHALATION) at 08:02

## 2019-01-01 RX ADMIN — ALBUMIN (HUMAN) 25 G: 0.25 INJECTION, SOLUTION INTRAVENOUS at 11:35

## 2019-01-01 RX ADMIN — PIPERACILLIN SODIUM,TAZOBACTAM SODIUM 4.5 G: 4; .5 INJECTION, POWDER, FOR SOLUTION INTRAVENOUS at 12:52

## 2019-01-01 RX ADMIN — HALOPERIDOL LACTATE 2 MG: 5 INJECTION INTRAMUSCULAR at 22:23

## 2019-01-01 RX ADMIN — TRAMADOL HYDROCHLORIDE 50 MG: 50 TABLET, COATED ORAL at 22:43

## 2019-01-01 RX ADMIN — LACTULOSE 20 G: 10 SOLUTION ORAL at 09:22

## 2019-01-01 RX ADMIN — CALCIUM GLUCONATE 1 G: 98 INJECTION, SOLUTION INTRAVENOUS at 06:32

## 2019-01-01 RX ADMIN — METHOCARBAMOL TABLETS 500 MG: 500 TABLET, COATED ORAL at 20:44

## 2019-01-01 RX ADMIN — TRAMADOL HYDROCHLORIDE 50 MG: 50 TABLET, COATED ORAL at 15:41

## 2019-01-01 RX ADMIN — FUROSEMIDE 40 MG: 10 INJECTION, SOLUTION INTRAMUSCULAR; INTRAVENOUS at 10:59

## 2019-01-01 RX ADMIN — POTASSIUM CHLORIDE 40 MEQ: 1500 TABLET, EXTENDED RELEASE ORAL at 17:29

## 2019-01-01 RX ADMIN — THIAMINE HCL TAB 100 MG 100 MG: 100 TAB at 08:30

## 2019-01-01 RX ADMIN — POTASSIUM CHLORIDE 40 MEQ: 20 SOLUTION ORAL at 09:46

## 2019-01-01 RX ADMIN — LACTULOSE 20 G: 10 SOLUTION ORAL at 08:06

## 2019-01-01 RX ADMIN — ALBUMIN (HUMAN) 25 G: 0.25 INJECTION, SOLUTION INTRAVENOUS at 08:25

## 2019-01-01 RX ADMIN — LEVALBUTEROL 1.25 MG: 1.25 SOLUTION, CONCENTRATE RESPIRATORY (INHALATION) at 21:16

## 2019-01-01 RX ADMIN — ACETAMINOPHEN 650 MG: 325 TABLET, FILM COATED ORAL at 08:16

## 2019-01-01 RX ADMIN — ONDANSETRON 4 MG: 2 INJECTION INTRAMUSCULAR; INTRAVENOUS at 14:31

## 2019-01-01 RX ADMIN — NYSTATIN: 100000 POWDER TOPICAL at 08:42

## 2019-01-01 RX ADMIN — DOCUSATE SODIUM 100 MG: 100 CAPSULE, LIQUID FILLED ORAL at 08:48

## 2019-01-01 RX ADMIN — LACTULOSE 20 G: 10 SOLUTION ORAL at 21:59

## 2019-01-01 RX ADMIN — ENOXAPARIN SODIUM 40 MG: 40 INJECTION SUBCUTANEOUS at 08:45

## 2019-01-01 RX ADMIN — POTASSIUM CHLORIDE 40 MEQ: 1500 TABLET, EXTENDED RELEASE ORAL at 08:30

## 2019-01-01 RX ADMIN — SENNOSIDES 8.6 MG: 8.6 TABLET, FILM COATED ORAL at 08:41

## 2019-01-01 RX ADMIN — LORAZEPAM 1 MG: 2 INJECTION INTRAMUSCULAR; INTRAVENOUS at 00:20

## 2019-01-01 RX ADMIN — METOLAZONE 5 MG: 5 TABLET ORAL at 12:05

## 2019-01-01 RX ADMIN — POTASSIUM CHLORIDE 40 MEQ: 1500 TABLET, EXTENDED RELEASE ORAL at 12:05

## 2019-01-01 RX ADMIN — LACTULOSE 20 G: 10 SOLUTION ORAL at 09:24

## 2019-01-01 RX ADMIN — ACETAMINOPHEN 650 MG: 325 TABLET, FILM COATED ORAL at 16:38

## 2019-01-01 RX ADMIN — ANORECTAL OINTMENT 1 APPLICATION: 15.7; .44; 24; 20.6 OINTMENT TOPICAL at 18:03

## 2019-01-01 RX ADMIN — GABAPENTIN 300 MG: 300 CAPSULE ORAL at 22:16

## 2019-01-01 RX ADMIN — POTASSIUM CHLORIDE 20 MEQ: 200 INJECTION, SOLUTION INTRAVENOUS at 08:38

## 2019-01-01 RX ADMIN — TORSEMIDE 40 MG: 20 TABLET ORAL at 08:25

## 2019-01-01 RX ADMIN — SPIRONOLACTONE 25 MG: 25 TABLET ORAL at 09:44

## 2019-01-01 RX ADMIN — CEFTRIAXONE 2000 MG: 2 INJECTION, POWDER, FOR SOLUTION INTRAMUSCULAR; INTRAVENOUS at 15:16

## 2019-01-01 RX ADMIN — LACTULOSE 20 G: 10 SOLUTION ORAL at 09:06

## 2019-01-01 RX ADMIN — TRAMADOL HYDROCHLORIDE 50 MG: 50 TABLET, COATED ORAL at 13:00

## 2019-01-01 RX ADMIN — TRAMADOL HYDROCHLORIDE 50 MG: 50 TABLET, COATED ORAL at 08:06

## 2019-01-01 RX ADMIN — ONDANSETRON 4 MG: 2 INJECTION INTRAMUSCULAR; INTRAVENOUS at 22:16

## 2019-01-01 RX ADMIN — SPIRONOLACTONE 50 MG: 25 TABLET, FILM COATED ORAL at 08:39

## 2019-01-01 RX ADMIN — ONDANSETRON 4 MG: 2 INJECTION INTRAMUSCULAR; INTRAVENOUS at 07:56

## 2019-01-01 RX ADMIN — POTASSIUM CHLORIDE 20 MEQ: 1500 TABLET, EXTENDED RELEASE ORAL at 01:40

## 2019-01-01 RX ADMIN — AMLODIPINE BESYLATE 10 MG: 10 TABLET ORAL at 09:33

## 2019-01-01 RX ADMIN — NYSTATIN: 100000 POWDER TOPICAL at 09:22

## 2019-01-01 RX ADMIN — THIAMINE HCL TAB 100 MG 100 MG: 100 TAB at 08:59

## 2019-01-01 RX ADMIN — IPRATROPIUM BROMIDE 0.5 MG: 0.5 SOLUTION RESPIRATORY (INHALATION) at 19:01

## 2019-01-01 RX ADMIN — AMITRIPTYLINE HYDROCHLORIDE 10 MG: 10 TABLET, FILM COATED ORAL at 21:59

## 2019-01-01 RX ADMIN — CEFTRIAXONE 2000 MG: 2 INJECTION, POWDER, FOR SOLUTION INTRAMUSCULAR; INTRAVENOUS at 18:26

## 2019-01-01 RX ADMIN — DICYCLOMINE HYDROCHLORIDE 10 MG: 10 CAPSULE ORAL at 04:17

## 2019-01-01 RX ADMIN — AMITRIPTYLINE HYDROCHLORIDE 10 MG: 10 TABLET, FILM COATED ORAL at 22:47

## 2019-01-01 RX ADMIN — QUETIAPINE FUMARATE 150 MG: 100 TABLET ORAL at 21:28

## 2019-01-01 RX ADMIN — HEPARIN SODIUM 5000 UNITS: 5000 INJECTION INTRAVENOUS; SUBCUTANEOUS at 13:20

## 2019-01-01 RX ADMIN — ALBUTEROL SULFATE 2 PUFF: 90 AEROSOL, METERED RESPIRATORY (INHALATION) at 05:58

## 2019-01-01 RX ADMIN — FUROSEMIDE 80 MG: 10 INJECTION, SOLUTION INTRAMUSCULAR; INTRAVENOUS at 18:11

## 2019-01-01 RX ADMIN — ALBUMIN (HUMAN) 25 G: 0.25 INJECTION, SOLUTION INTRAVENOUS at 18:48

## 2019-01-01 RX ADMIN — MAGNESIUM SULFATE HEPTAHYDRATE 2 G: 40 INJECTION, SOLUTION INTRAVENOUS at 11:13

## 2019-01-01 RX ADMIN — NYSTATIN: 100000 POWDER TOPICAL at 18:34

## 2019-01-01 RX ADMIN — SPIRONOLACTONE 50 MG: 50 TABLET ORAL at 09:00

## 2019-01-01 RX ADMIN — PIPERACILLIN SODIUM,TAZOBACTAM SODIUM 4.5 G: 4; .5 INJECTION, POWDER, FOR SOLUTION INTRAVENOUS at 22:41

## 2019-01-01 RX ADMIN — AMITRIPTYLINE HYDROCHLORIDE 10 MG: 10 TABLET, FILM COATED ORAL at 21:28

## 2019-01-01 RX ADMIN — AMITRIPTYLINE HYDROCHLORIDE 10 MG: 10 TABLET, FILM COATED ORAL at 21:18

## 2019-01-01 RX ADMIN — ALBUMIN (HUMAN) 25 G: 0.25 INJECTION, SOLUTION INTRAVENOUS at 17:41

## 2019-01-01 RX ADMIN — TRAMADOL HYDROCHLORIDE 50 MG: 50 TABLET, COATED ORAL at 09:24

## 2019-01-01 RX ADMIN — ONDANSETRON 4 MG: 2 INJECTION INTRAMUSCULAR; INTRAVENOUS at 20:20

## 2019-01-01 RX ADMIN — HEPARIN SODIUM 5000 UNITS: 5000 INJECTION INTRAVENOUS; SUBCUTANEOUS at 21:45

## 2019-01-01 RX ADMIN — POTASSIUM CHLORIDE 40 MEQ: 1500 TABLET, EXTENDED RELEASE ORAL at 11:01

## 2019-01-01 RX ADMIN — LORAZEPAM 1 MG: 2 INJECTION INTRAMUSCULAR; INTRAVENOUS at 11:55

## 2019-01-01 RX ADMIN — POTASSIUM CHLORIDE 40 MEQ: 1500 TABLET, EXTENDED RELEASE ORAL at 17:54

## 2019-01-01 RX ADMIN — ANORECTAL OINTMENT: 15.7; .44; 24; 20.6 OINTMENT TOPICAL at 13:00

## 2019-01-01 RX ADMIN — LORAZEPAM 1 MG: 2 INJECTION INTRAMUSCULAR; INTRAVENOUS at 04:53

## 2019-01-01 RX ADMIN — ONDANSETRON 4 MG: 2 INJECTION INTRAMUSCULAR; INTRAVENOUS at 16:11

## 2019-01-01 RX ADMIN — IOHEXOL 100 ML: 350 INJECTION, SOLUTION INTRAVENOUS at 13:56

## 2019-01-01 RX ADMIN — NYSTATIN 1 APPLICATION: 100000 POWDER TOPICAL at 18:04

## 2019-01-01 RX ADMIN — IPRATROPIUM BROMIDE 0.5 MG: 0.5 SOLUTION RESPIRATORY (INHALATION) at 08:02

## 2019-01-01 RX ADMIN — FUROSEMIDE 80 MG: 10 INJECTION, SOLUTION INTRAMUSCULAR; INTRAVENOUS at 09:22

## 2019-01-01 RX ADMIN — FERROUS SULFATE TAB 325 MG (65 MG ELEMENTAL FE) 325 MG: 325 (65 FE) TAB at 08:48

## 2019-01-01 RX ADMIN — PIPERACILLIN SODIUM,TAZOBACTAM SODIUM 4.5 G: 4; .5 INJECTION, POWDER, FOR SOLUTION INTRAVENOUS at 00:50

## 2019-01-01 RX ADMIN — LIDOCAINE 1 PATCH: 50 PATCH TOPICAL at 09:37

## 2019-01-01 RX ADMIN — FUROSEMIDE 80 MG: 10 INJECTION, SOLUTION INTRAMUSCULAR; INTRAVENOUS at 16:57

## 2019-01-01 RX ADMIN — GABAPENTIN 300 MG: 300 CAPSULE ORAL at 16:11

## 2019-01-01 RX ADMIN — FUROSEMIDE 80 MG: 10 INJECTION, SOLUTION INTRAMUSCULAR; INTRAVENOUS at 11:10

## 2019-01-01 RX ADMIN — SODIUM CHLORIDE 0.7 MCG/KG/HR: 9 INJECTION, SOLUTION INTRAVENOUS at 04:54

## 2019-01-01 RX ADMIN — DOCUSATE SODIUM 100 MG: 100 CAPSULE, LIQUID FILLED ORAL at 16:11

## 2019-01-01 RX ADMIN — LORAZEPAM 1 MG: 2 INJECTION INTRAMUSCULAR; INTRAVENOUS at 05:24

## 2019-01-01 RX ADMIN — DICYCLOMINE HYDROCHLORIDE 10 MG: 10 CAPSULE ORAL at 18:42

## 2019-01-01 RX ADMIN — LEVALBUTEROL 1.25 MG: 1.25 SOLUTION, CONCENTRATE RESPIRATORY (INHALATION) at 19:01

## 2019-01-01 RX ADMIN — GABAPENTIN 300 MG: 300 CAPSULE ORAL at 20:24

## 2019-01-01 RX ADMIN — POTASSIUM CHLORIDE 20 MEQ: 200 INJECTION, SOLUTION INTRAVENOUS at 22:43

## 2019-01-01 RX ADMIN — KETOROLAC TROMETHAMINE 15 MG: 30 INJECTION, SOLUTION INTRAMUSCULAR at 16:04

## 2019-01-01 RX ADMIN — ISODIUM CHLORIDE 3 ML: 0.03 SOLUTION RESPIRATORY (INHALATION) at 21:16

## 2019-01-01 RX ADMIN — ALBUMIN (HUMAN) 25 G: 0.25 INJECTION, SOLUTION INTRAVENOUS at 17:34

## 2019-01-01 RX ADMIN — CHLORDIAZEPOXIDE HYDROCHLORIDE 10 MG: 10 CAPSULE ORAL at 21:40

## 2019-01-01 RX ADMIN — THIAMINE HCL TAB 100 MG 100 MG: 100 TAB at 09:24

## 2019-01-01 RX ADMIN — SERTRALINE HYDROCHLORIDE 100 MG: 100 TABLET ORAL at 20:17

## 2019-01-01 RX ADMIN — THIAMINE HCL TAB 100 MG 100 MG: 100 TAB at 09:22

## 2019-01-01 RX ADMIN — ENOXAPARIN SODIUM 40 MG: 40 INJECTION SUBCUTANEOUS at 08:42

## 2019-01-01 RX ADMIN — POTASSIUM CHLORIDE 20 MEQ: 1500 TABLET, EXTENDED RELEASE ORAL at 08:49

## 2019-01-01 RX ADMIN — SODIUM CHLORIDE 1000 ML: 0.9 INJECTION, SOLUTION INTRAVENOUS at 16:04

## 2019-01-01 RX ADMIN — ALBUMIN (HUMAN) 25 G: 0.25 INJECTION, SOLUTION INTRAVENOUS at 17:54

## 2019-01-01 RX ADMIN — FOLIC ACID 1 MG: 1 TABLET ORAL at 08:54

## 2019-01-01 RX ADMIN — LORAZEPAM 1 MG: 2 INJECTION INTRAMUSCULAR; INTRAVENOUS at 15:56

## 2019-01-01 RX ADMIN — LORAZEPAM 1 MG: 2 INJECTION INTRAMUSCULAR; INTRAVENOUS at 19:05

## 2019-01-01 RX ADMIN — ENOXAPARIN SODIUM 40 MG: 40 INJECTION SUBCUTANEOUS at 09:33

## 2019-01-01 RX ADMIN — MAGNESIUM SULFATE HEPTAHYDRATE 3 G: 500 INJECTION, SOLUTION INTRAMUSCULAR; INTRAVENOUS at 16:31

## 2019-01-01 RX ADMIN — CHLORDIAZEPOXIDE HYDROCHLORIDE 5 MG: 5 CAPSULE ORAL at 21:48

## 2019-01-01 RX ADMIN — TORSEMIDE 40 MG: 20 TABLET ORAL at 09:24

## 2019-01-01 RX ADMIN — AMITRIPTYLINE HYDROCHLORIDE 10 MG: 10 TABLET, FILM COATED ORAL at 21:01

## 2019-01-01 RX ADMIN — POTASSIUM CHLORIDE 40 MEQ: 400 INJECTION, SOLUTION INTRAVENOUS at 16:25

## 2019-01-01 RX ADMIN — LACTULOSE 20 G: 10 SOLUTION ORAL at 08:59

## 2019-01-01 RX ADMIN — TRAMADOL HYDROCHLORIDE 50 MG: 50 TABLET, COATED ORAL at 10:43

## 2019-01-01 RX ADMIN — PIPERACILLIN SODIUM,TAZOBACTAM SODIUM 4.5 G: 4; .5 INJECTION, POWDER, FOR SOLUTION INTRAVENOUS at 13:40

## 2019-01-01 RX ADMIN — SPIRONOLACTONE 50 MG: 50 TABLET ORAL at 08:06

## 2019-01-01 RX ADMIN — POTASSIUM CHLORIDE 40 MEQ: 1500 TABLET, EXTENDED RELEASE ORAL at 08:25

## 2019-01-01 RX ADMIN — LACTULOSE 20 G: 10 SOLUTION ORAL at 20:32

## 2019-01-01 RX ADMIN — MENTHOL, METHYL SALICYLATE 1 APPLICATION: 10; 15 CREAM TOPICAL at 08:38

## 2019-01-01 RX ADMIN — TRAMADOL HYDROCHLORIDE 50 MG: 50 TABLET, COATED ORAL at 09:44

## 2019-01-01 RX ADMIN — LACTULOSE 10 G: 10 SOLUTION ORAL at 08:37

## 2019-01-01 RX ADMIN — RIFAXIMIN 400 MG: 200 TABLET ORAL at 16:27

## 2019-01-01 RX ADMIN — RIFAXIMIN 400 MG: 200 TABLET ORAL at 21:29

## 2019-01-01 RX ADMIN — TORSEMIDE 40 MG: 20 TABLET ORAL at 08:14

## 2019-01-01 RX ADMIN — LORAZEPAM 2 MG: 2 INJECTION INTRAMUSCULAR; INTRAVENOUS at 00:00

## 2019-01-01 RX ADMIN — ANORECTAL OINTMENT 1 APPLICATION: 15.7; .44; 24; 20.6 OINTMENT TOPICAL at 18:23

## 2019-01-01 RX ADMIN — ALBUMIN (HUMAN) 25 G: 0.25 INJECTION, SOLUTION INTRAVENOUS at 11:11

## 2019-01-01 RX ADMIN — LIDOCAINE 1 PATCH: 50 PATCH TOPICAL at 09:44

## 2019-01-01 RX ADMIN — ACETAMINOPHEN 650 MG: 325 TABLET, FILM COATED ORAL at 12:11

## 2019-01-01 RX ADMIN — LACTULOSE 20 G: 10 SOLUTION ORAL at 21:18

## 2019-01-01 RX ADMIN — FOLIC ACID 1 MG: 1 TABLET ORAL at 08:06

## 2019-01-01 RX ADMIN — FOLIC ACID 1 MG: 1 TABLET ORAL at 09:27

## 2019-01-01 RX ADMIN — QUETIAPINE FUMARATE 150 MG: 100 TABLET ORAL at 21:00

## 2019-01-01 RX ADMIN — FOLIC ACID 1 MG: 1 TABLET ORAL at 08:41

## 2019-01-01 RX ADMIN — FUROSEMIDE 80 MG: 10 INJECTION, SOLUTION INTRAMUSCULAR; INTRAVENOUS at 06:31

## 2019-01-01 RX ADMIN — PIPERACILLIN SODIUM,TAZOBACTAM SODIUM 4.5 G: 4; .5 INJECTION, POWDER, FOR SOLUTION INTRAVENOUS at 06:54

## 2019-01-01 RX ADMIN — IOHEXOL 100 ML: 350 INJECTION, SOLUTION INTRAVENOUS at 21:50

## 2019-01-01 RX ADMIN — LORAZEPAM 1 MG: 2 INJECTION INTRAMUSCULAR; INTRAVENOUS at 23:30

## 2019-01-01 RX ADMIN — ALBUMIN (HUMAN) 25 G: 0.25 INJECTION, SOLUTION INTRAVENOUS at 09:51

## 2019-01-01 RX ADMIN — TRAMADOL HYDROCHLORIDE 50 MG: 50 TABLET, COATED ORAL at 04:54

## 2019-01-01 RX ADMIN — PIPERACILLIN SODIUM,TAZOBACTAM SODIUM 4.5 G: 4; .5 INJECTION, POWDER, FOR SOLUTION INTRAVENOUS at 19:28

## 2019-02-05 NOTE — DISCHARGE INSTRUCTIONS
Patellar Dislocation   WHAT YOU NEED TO KNOW:   A patellar dislocation occurs when your patella (kneecap) is forced out of place  It can be caused by a fall or a direct blow to your knee  It can also happen if your knee twists or rotates  It is most likely to happen during physical activity, such as sports, [place] training, or dance  DISCHARGE INSTRUCTIONS:   You may need any of the following:  · NSAIDs , such as ibuprofen, help decrease swelling, pain, and fever  This medicine is available with or without a doctor's order  NSAIDs can cause stomach bleeding or kidney problems in certain people  If you take blood thinner medicine, always ask if NSAIDs are safe for you  Always read the medicine label and follow directions  Do not give these medicines to children under 10months of age without direction from your child's healthcare provider  · Acetaminophen  decreases pain  It is available without a doctor's order  Ask how much to take and how often to take it  Follow directions  Acetaminophen can cause liver damage if not taken correctly  · Prescription pain medicine  may be given to decrease your pain  Do not wait until the pain is severe before you take this medicine  · Take your medicine as directed  Contact your healthcare provider if you think your medicine is not helping or if you have side effects  Tell him of her if you are allergic to any medicine  Keep a list of the medicines, vitamins, and herbs you take  Include the amounts, and when and why you take them  Bring the list or the pill bottles to follow-up visits  Carry your medicine list with you in case of an emergency  Follow up with your healthcare provider or orthopedic surgeon within 2 weeks or as directed:  Write down your questions so you remember to ask them during your visits  Self-care:   · Ice  helps decrease swelling and pain  Ice may also help prevent tissue damage  Use an ice pack, or put crushed ice in a plastic bag  Cover it with a towel and place it on your knee for 15 to 20 minutes every hour or as directed  · Raise your knee  above the level of your heart as often as you can  This will help decrease swelling and pain  Prop your knee on pillows or blankets to keep it elevated comfortably  · Immobilize your knee  for 3 to 6 weeks or as directed  Your healthcare provider may give you a brace, cast, or splint  He may tell you to wrap your knee with athletic tape  This is done to decrease pain and hold your knee joint still to help it heal  This may also help prevent your kneecap from dislocating again  · Use crutches  if your healthcare provider tells you not to put weight on your injured knee  Healthcare providers will show you how to use crutches  You may need them for 4 to 6 weeks  · Physical therapy  teaches you exercises to increase the range of motion in your knee  Exercises make your knee stronger, increase balance, and decrease pain  You may also need to strengthen your stomach, back, hip, and leg muscles  You must continue these exercises after physical therapy ends to help prevent another dislocation  Contact your healthcare provider:   · You have more knee pain  · Your knee feels like it is going to give out  · You have questions or concerns about your condition or care  Return to the emergency department if:   · Your kneecap dislocates again  · You have severe pain and swelling in your knee  © 2017 [employer] [employer] Information is for End User's use only and may not be sold, redistributed or otherwise used for commercial purposes  All illustrations and images included in CareNotes® are the copyrighted property of A [employer]  or Orlando Gomez  The above information is an  only  It is not intended as medical advice for individual conditions or treatments   Talk to your doctor, nurse or pharmacist before following any medical regimen to see if it is safe and effective for you

## 2019-02-05 NOTE — ED NOTES
Pt observed to be yelling from room to hallway  This writer was previously speaking with Pt's caretaker and informed caretaker this writer and other staff will be in shortly to reposition Pt  Pt began yelling at this time "You have hands  You will move me now  NOW!" Informed Pt this writer required additional staff to reposition Pt due to her being low in bed to make Pt more comfortable  Pt again began yelling "Now  You will let me go home and take me home " Pt continues to yell  Again informed Pt staff will be in and the nurses are with other Pts and will be in shortly to assist this writer       Wu Hinton  12/75/37 [id number]

## 2019-02-05 NOTE — ED PROVIDER NOTES
History  Chief Complaint   Patient presents with    Knee Pain     Patient presents via EMS, report patient was getting out of bed, fell, injuring left knee  EMS reports knee appeared "dislocated" however realigned after transfer onto stretcher  Reports 10/10 pain and decreased sensation in extremity  EMS reports patient was on ground for approximately 39 minutes-1hr      Fall     79-year-old female with history of morbid obesity, chronic pain syndrome, sarcoidosis, and scoliosis presents emergency department for evaluation of left knee and lower leg pain after falling out of bed today  Patient states that she stood up out of bed and felt weak, promptly fell to the ground  She is uncertain how she landed, but denies head strike or loss of consciousness  She was unable to call for help immediately, laid on the ground for approximately 45 minutes until EMS arrived  She is reporting severe, 10/10 pain to the left knee, radiating to the hip as well as the ankle  She is unable to bear weight  She denies per history of significant injuries to the knee  She was recently under the care of pain management last summer, no longer on long-term opiate therapy  Prior to Admission Medications   Prescriptions Last Dose Informant Patient Reported? Taking?    GABAPENTIN PO   Yes Yes   Sig: Take 600 mg by mouth 3 (three) times a day     Oyster Shell (OYSTER CALCIUM) 500 MG TABS   Yes No   QUEtiapine (SEROquel) 100 mg tablet   No Yes   Sig: Take 1 5 tablets (150 mg total) by mouth daily at bedtime   acetaminophen (TYLENOL) 325 mg tablet   No No   Sig: Take 2 tablets by mouth every 6 (six) hours as needed for mild pain or fever   amLODIPine (NORVASC) 10 mg tablet   No No   Sig: Take 1 tablet (10 mg total) by mouth daily   amitriptyline (ELAVIL) 10 mg tablet   Yes No   Sig: Take 10 mg by mouth daily at bedtime   docusate sodium (COLACE) 100 mg capsule   No No   Sig: Take 1 capsule (100 mg total) by mouth 2 (two) times a day   ferrous sulfate 325 (65 Fe) mg tablet   Yes No   Sig: Take 1 tablet by mouth daily   folic acid (FOLVITE) 1 mg tablet   Yes No   Sig: Take by mouth daily   lactulose 20 g/30 mL   No No   Sig: Take 30 mL by mouth daily   lidocaine (LIDODERM) 5 %   No No   Sig: Apply 1 patch topically daily Remove & Discard patch within 12 hours or as directed by MD   magnesium oxide (MAG-OX) 400 mg   No No   Sig: Take 1 tablet (400 mg total) by mouth 2 (two) times a day   nystatin (MYCOSTATIN) powder   No No   Sig: Apply topically 2 (two) times a day   omeprazole (PriLOSEC) 20 mg delayed release capsule   Yes No   Sig: Take 20 mg by mouth daily   polyethylene glycol (GOLYTELY) 4000 mL solution   No No   Sig: Take 4,000 mL by mouth once for 1 dose   potassium chloride (K-DUR,KLOR-CON) 20 mEq tablet   No No   Sig: Take 1 tablet by mouth 4 (four) times a day (with meals and at bedtime)   spironolactone (ALDACTONE) 25 mg tablet   No No   Sig: Take 1 tablet (25 mg total) by mouth daily   torsemide (DEMADEX) 20 mg tablet   No No   Sig: Take 2 tablets (40 mg total) by mouth 2 (two) times a day      Facility-Administered Medications: None       Past Medical History:   Diagnosis Date    Arthritis     Fibromyalgia     Herniated cervical disc     Hypertension     Psychiatric disorder     Sarcoidosis     Scoliosis        Past Surgical History:   Procedure Laterality Date    GASTRIC BYPASS      HERNIA REPAIR      OOPHORECTOMY Left 2013    TOTAL ABDOMINAL HYSTERECTOMY W/ BILATERAL SALPINGOOPHORECTOMY      TUBAL LIGATION         Family History   Problem Relation Age of Onset    Thyroid disease Mother         disorder    Lung cancer Father     Hypertension Father     Hypertension Paternal Aunt     Obesity Paternal Aunt     Diabetes Paternal Aunt      I have reviewed and agree with the history as documented      Social History   Substance Use Topics    Smoking status: Never Smoker    Smokeless tobacco: Never Used    Alcohol use Yes      Comment: occassional        Review of Systems   Constitutional: Negative for fever  Respiratory: Negative for shortness of breath  Cardiovascular: Negative for chest pain  Gastrointestinal: Negative for abdominal pain  Musculoskeletal: Positive for arthralgias and gait problem  Negative for back pain, joint swelling and myalgias  Skin: Negative for color change and wound  Allergic/Immunologic: Negative for immunocompromised state  Neurological: Negative for weakness and numbness  Hematological: Does not bruise/bleed easily  Psychiatric/Behavioral: Negative for confusion  Physical Exam  Physical Exam   Constitutional: She is oriented to person, place, and time  She appears well-developed and well-nourished  No distress  Morbid obesity limits exam   HENT:   Head: Normocephalic and atraumatic  Mouth/Throat: Oropharynx is clear and moist    Eyes: Pupils are equal, round, and reactive to light  No scleral icterus  Neck: No JVD present  Cardiovascular: Normal rate and regular rhythm  Exam reveals no gallop and no friction rub  No murmur heard  Pulmonary/Chest: No respiratory distress  She has no wheezes  She has no rales  Abdominal: Soft  Bowel sounds are normal  She exhibits no distension and no mass  There is no tenderness  There is no rebound and no guarding  Musculoskeletal: She exhibits no edema  Obesity limits exam, there is moderate soft tissue swelling to the left knee  Alignment of the left knee appears normal, patellar low I is normal   Patient does not tolerate any passive range of motion of the left knee, screams in pain frequently  Patient screams in pain to palpation of the left hip, left thigh, left shin, left foot  Left foot appears well perfused with strong dorsalis pedis pulse and normal capillary refill  Neurological: She is alert and oriented to person, place, and time  Skin: Skin is warm and dry  Capillary refill takes less than 2 seconds  She is not diaphoretic  No pallor  Psychiatric: She has a normal mood and affect  Her behavior is normal    Vitals reviewed  Vital Signs  ED Triage Vitals [02/05/19 1506]   Temperature Pulse Respirations Blood Pressure SpO2   98 2 °F (36 8 °C) 78 20 117/71 93 %      Temp Source Heart Rate Source Patient Position - Orthostatic VS BP Location FiO2 (%)   Oral Monitor Sitting Right arm --      Pain Score       Worst Possible Pain           Vitals:    02/05/19 1506 02/05/19 1607 02/05/19 1721   BP: 117/71 115/62 117/65   Pulse: 78 84 84   Patient Position - Orthostatic VS: Sitting Sitting Lying       Visual Acuity      ED Medications  Medications   ketorolac (TORADOL) injection 15 mg (15 mg Intravenous Given 2/5/19 1604)   sodium chloride 0 9 % bolus 1,000 mL (0 mL Intravenous Stopped 2/5/19 1749)   oxyCODONE (ROXICODONE) IR tablet 5 mg (5 mg Oral Given 2/5/19 1722)   oxyCODONE (ROXICODONE) IR tablet 5 mg (5 mg Oral Given 2/5/19 1757)       Diagnostic Studies  Results Reviewed     Procedure Component Value Units Date/Time    Basic metabolic panel [[id number]]  (Abnormal) Collected:  02/05/19 1603    Lab Status:  Final result Specimen:  Blood from Arm, Left Updated:  02/05/19 1644     Sodium 142 mmol/L      Potassium 3 1 (L) mmol/L      Chloride 105 mmol/L      CO2 27 mmol/L      ANION GAP 10 mmol/L      BUN 6 mg/dL      Creatinine 0 66 mg/dL      Glucose 92 mg/dL      Calcium 8 1 (L) mg/dL      eGFR 107 ml/min/1 73sq m     Narrative:         National Kidney Disease Education Program recommendations are as follows:  GFR calculation is accurate only with a steady state creatinine  Chronic Kidney disease less than 60 ml/min/1 73 sq  meters  Kidney failure less than 15 ml/min/1 73 sq  meters      CK Total with Reflex CKMB [[id number]]  (Normal) Collected:  02/05/19 1603    Lab Status:  Final result Specimen:  Blood from Arm, Left Updated:  02/05/19 1644     Total  U/L     CBC and differential [[id number]]  (Abnormal) Collected:  02/05/19 1603    Lab Status:  Final result Specimen:  Blood from Arm, Left Updated:  02/05/19 1618     WBC 3 74 (L) Thousand/uL      RBC 3 15 (L) Million/uL      Hemoglobin 11 3 (L) g/dL      Hematocrit 32 9 (L) %       (H) fL      MCH 35 9 (H) pg      MCHC 34 3 g/dL      RDW 25 0 (H) %      MPV 9 3 fL      Platelets 700 Thousands/uL      nRBC 0 /100 WBCs      Neutrophils Relative 59 %      Immat GRANS % 0 %      Lymphocytes Relative 26 %      Monocytes Relative 10 %      Eosinophils Relative 3 %      Basophils Relative 2 (H) %      Neutrophils Absolute 2 20 Thousands/µL      Immature Grans Absolute 0 01 Thousand/uL      Lymphocytes Absolute 0 98 Thousands/µL      Monocytes Absolute 0 39 Thousand/µL      Eosinophils Absolute 0 10 Thousand/µL      Basophils Absolute 0 06 Thousands/µL                  XR hip/pelv 2-3 vws left if performed   Final Result by David Rothman MD (02/05 1809)      No acute osseous abnormality  Workstation performed: [id number]         CT knee left wo contrast   Final Result by Margaret Streeter MD (02/05 1738)      No acute fracture  Somewhat lateral lie of the patella without definite medial retinacular injury  Moderate prepatellar subcutaneous edema      Workstation performed: [id number]         XR ankle 3+ views LEFT   ED Interpretation by Dionna Kenyon PA-C (02/05 1611)   No acute osseus abnormality      Final Result by Karly Fuentes MD (02/05 1636)      No acute osseous abnormality  Workstation performed: [id number]         XR knee 4+ vw left injury   ED Interpretation by Dionna Kenyon PA-C (02/05 1611)   No acute osseus abnormality      Final Result by Karly Fuentes MD (02/05 1635)      No acute osseous abnormality              Workstation performed: [id number]                    Procedures  Procedures       Phone Contacts  ED Phone Contact    ED Course  ED Course as of Feb 05 2219   Tue Feb 05, 2019   1645 Still waiting on hip series                                MDM  Number of Diagnoses or Management Options  Closed patellar dislocation, left, initial encounter: new and requires workup  Diagnosis management comments: 48-year-old female presents after a ground level fall while attempting to get out of bed  X-rays of the left hip, left knee, and left ankle were obtained which were unremarkable  I have a high degree of concern for significant left knee injury given her obesity and significant pain, thus prompting CT of the left knee  CT scan revealed a questionable lateral alignment of the patella, which is in keeping with the reported history of a temporary patellar dislocation  Labs were obtained due to prolonged downtime, revealing no evidence of rhabdomyolysis  Patient's pain was treated with oxycodone 10 mg in the emergency department  She is placed in a knee immobilizer, has a wheelchair at home as well as a home care aide that will assist her in getting around  She will be referred to Orthopedics for follow-up  Amount and/or Complexity of Data Reviewed  Clinical lab tests: ordered and reviewed  Tests in the radiology section of CPT®: ordered and reviewed  Tests in the medicine section of CPT®: ordered and reviewed  Review and summarize past medical records: yes  Independent visualization of images, tracings, or specimens: yes        Disposition  Final diagnoses:   Closed patellar dislocation, left, initial encounter     Time reflects when diagnosis was documented in both MDM as applicable and the Disposition within this note     Time User Action Codes Description Comment    2/5/2019  5:49 PM Arleen Smoker Add [[id number]] Closed patellar dislocation, left, initial encounter       ED Disposition     ED Disposition Condition Date/Time Comment    Discharge  Tue Feb 5, 2019  5:49 PM Keyshawn Alvarado discharge to home/self care      Condition at discharge: Good        Follow-up Information     Follow up With Specialties Details Why Contact Info Additional Information    [address] [facility]   [address]  [place] [address] [facility] [address]  [address] [facility], [address],  [address] [facility] [place], [place], [place], [address]          Discharge Medication List as of 2/5/2019  5:57 PM      START taking these medications    Details   methocarbamol (ROBAXIN) 500 mg tablet Take 1 tablet (500 mg total) by mouth 4 (four) times a day, Starting Tue 2/5/2019, Print         CONTINUE these medications which have NOT CHANGED    Details   acetaminophen (TYLENOL) 325 mg tablet Take 2 tablets by mouth every 6 (six) hours as needed for mild pain or fever, Starting Thu 12/14/2017, No Print      amitriptyline (ELAVIL) 10 mg tablet Take 10 mg by mouth daily at bedtime, Historical Med      amLODIPine (NORVASC) 10 mg tablet Take 1 tablet (10 mg total) by mouth daily, Starting Mon 7/2/2018, Normal      docusate sodium (COLACE) 100 mg capsule Take 1 capsule (100 mg total) by mouth 2 (two) times a day, Starting Wed 6/13/2018, Print      ferrous sulfate 325 (65 Fe) mg tablet Take 1 tablet by mouth daily, Starting Mon 4/18/2016, Historical Med      folic acid (FOLVITE) 1 mg tablet Take by mouth daily, Historical Med      GABAPENTIN PO Take 600 mg by mouth 3 (three) times a day  , Historical Med      lactulose 20 g/30 mL Take 30 mL by mouth daily, Starting Fri 12/15/2017, No Print      lidocaine (LIDODERM) 5 % Apply 1 patch topically daily Remove & Discard patch within 12 hours or as directed by MD, Starting Mon 11/26/2018, Print      magnesium oxide (MAG-OX) 400 mg Take 1 tablet (400 mg total) by mouth 2 (two) times a day, Starting Wed 6/13/2018, Print      nystatin (MYCOSTATIN) powder Apply topically 2 (two) times a day, Starting Wed 6/13/2018, Print      omeprazole (PriLOSEC) 20 mg delayed release capsule Take 20 mg by mouth daily, Historical Med      [employer] (OYSTER CALCIUM) 500 MG TABS Starting Mon 4/11/2016, Historical Med      polyethylene glycol (GOLYTELY) 4000 mL solution Take 4,000 mL by mouth once for 1 dose, Starting Tue 10/23/2018, Print      potassium chloride (K-DUR,KLOR-CON) 20 mEq tablet Take 1 tablet by mouth 4 (four) times a day (with meals and at bedtime), Starting Tue 9/19/2017, Print      QUEtiapine (SEROquel) 100 mg tablet Take 1 5 tablets (150 mg total) by mouth daily at bedtime, Starting Wed 6/13/2018, No Print      spironolactone (ALDACTONE) 25 mg tablet Take 1 tablet (25 mg total) by mouth daily, Starting Wed 6/13/2018, Print      torsemide (DEMADEX) 20 mg tablet Take 2 tablets (40 mg total) by mouth 2 (two) times a day, Starting Wed 6/13/2018, Print           No discharge procedures on file      ED Provider  Electronically Signed by           Yuni Sevilla PA-C  02/05/19 2241

## 2019-02-06 PROBLEM — R91.1 PULMONARY NODULE: Status: ACTIVE | Noted: 2019-01-01

## 2019-02-06 PROBLEM — Z76.5 DRUG-SEEKING BEHAVIOR: Status: ACTIVE | Noted: 2019-01-01

## 2019-02-06 NOTE — ED PROVIDER NOTES
History  Chief Complaint   Patient presents with    Fall     Pt reports falling down 7 steps  Pt states, "I have been falling a lot and I can't walk anymore"  Denies hitting head  "I have pain everywhere"  A 39 y/o female with pmhx of arthritis, fibromyalgia, HTN, sarcoidosis and scoliosis; presents with generalized pain after a fall down 7 steps  Patient states pain is greatest in the bilateral knees, abdomen and neck  Patient is unsure what caused her to fall down the steps, however reports frequent falls  Patient states she falls on a daily basis  Patient was evaluated in the ED earlier today after a fall out of bed, diagnosed with a suspected left patellar dislocation that had relocated prior to arrival   Patient was ultimately discharged home in a knee immobilizer with orthopedic follow-up  Patient states shortly after returning home she sustained another fall, landing on her abdomen  Patient denies striking her head and loss of consciousness  Currently patient denies headache, blurred vision, dizziness, lightheadedness, chest pain, shortness of breath, nausea, vomiting, diarrhea, paresthesias and weakness  Of note, patient is very focused on receiving narcotic pain medication  Patient repeatedly asking for narcotics during my evaluation  Patient does request oxy 30 mg, and when I refused requested at least 10 mg  On review of records, patient does have a longstanding chronic pain issue, however patient adamantly denies  Patient has been seen by several physicians for her chronic pain, and ultimately discharged from pain management  Assessment & plan:  Fall, patient now complaining of abdominal pain, knee pain and neck pain  Will obtain imaging to rule out traumatic injury  Discussed with patient that she will not receive narcotic pain medications for her pain  Offered patient Tylenol and Motrin, for which she refused  Will give Robaxin          History provided by:  Patient and medical records  Fall   Associated symptoms: abdominal pain, back pain and neck pain        Prior to Admission Medications   Prescriptions Last Dose Informant Patient Reported? Taking?    GABAPENTIN PO   Yes Yes   Sig: Take 600 mg by mouth 3 (three) times a day     Linaclotide (LINZESS) 145 MCG CAPS   Yes Yes   Sig: Take 145 mcg by mouth daily   QUEtiapine (SEROquel) 100 mg tablet   No Yes   Sig: Take 1 5 tablets (150 mg total) by mouth daily at bedtime   Patient taking differently: Take 100 mg by mouth daily at bedtime     acetaminophen (TYLENOL) 325 mg tablet   No Yes   Sig: Take 2 tablets by mouth every 6 (six) hours as needed for mild pain or fever   albuterol (PROVENTIL HFA,VENTOLIN HFA) 90 mcg/act inhaler   Yes Yes   Sig: Inhale 2 puffs every 4 (four) hours as needed for wheezing   amLODIPine (NORVASC) 10 mg tablet   No No   Sig: Take 1 tablet (10 mg total) by mouth daily   amitriptyline (ELAVIL) 10 mg tablet   Yes Yes   Sig: Take 10 mg by mouth daily at bedtime   ferrous sulfate 325 (65 Fe) mg tablet   Yes Yes   Sig: Take 1 tablet by mouth daily   fluticasone (FLONASE) 50 mcg/act nasal spray   Yes Yes   Si spray into each nostril daily   folic acid (FOLVITE) 1 mg tablet   Yes Yes   Sig: Take by mouth daily   lactulose 20 g/30 mL   No Yes   Sig: Take 30 mL by mouth daily   lidocaine (LIDODERM) 5 %   No Yes   Sig: Apply 1 patch topically daily Remove & Discard patch within 12 hours or as directed by MD   Patient taking differently: Apply 1 patch topically daily Remove & Discard patch within 12 hours or as directed by MD    oxyCODONE (OXY-IR) 5 MG capsule   Yes Yes   Sig: Take 5 mg by mouth every 4 (four) hours as needed for moderate pain   potassium chloride (K-DUR,KLOR-CON) 20 mEq tablet   No No   Sig: Take 1 tablet by mouth 4 (four) times a day (with meals and at bedtime)   spironolactone (ALDACTONE) 25 mg tablet   No Yes   Sig: Take 1 tablet (25 mg total) by mouth daily   torsemide (DEMADEX) 20 mg tablet   No Yes Sig: Take 2 tablets (40 mg total) by mouth 2 (two) times a day   Patient taking differently: Take 40 mg by mouth 2 (two) times a day Takes daily due to incontinence at night       Facility-Administered Medications: None       Past Medical History:   Diagnosis Date    Arthritis     Fibromyalgia     Herniated cervical disc     Hypertension     Psychiatric disorder     Sarcoidosis     Scoliosis        Past Surgical History:   Procedure Laterality Date    GASTRIC BYPASS      HERNIA REPAIR      OOPHORECTOMY Left 2013    TOTAL ABDOMINAL HYSTERECTOMY W/ BILATERAL SALPINGOOPHORECTOMY      TUBAL LIGATION         Family History   Problem Relation Age of Onset    Thyroid disease Mother         disorder    Lung cancer Father     Hypertension Father     Hypertension Paternal Aunt     Obesity Paternal Aunt     Diabetes Paternal Aunt      I have reviewed and agree with the history as documented  Social History   Substance Use Topics    Smoking status: Never Smoker    Smokeless tobacco: Never Used    Alcohol use Yes      Comment: occassional        Review of Systems   Gastrointestinal: Positive for abdominal pain  Musculoskeletal: Positive for arthralgias, back pain and neck pain  All other systems reviewed and are negative  Physical Exam  Physical Exam   General Appearance: alert and oriented, nad, non toxic appearing; morbidly obese  Skin:  Warm, dry, intact  HEENT: atraumatic, normocephalic  Neck: Supple, trachea midline  No midline cervical spine tenderness  Cardiac: RRR; no murmurs, rub, gallops  Pulmonary: lungs CTAB; no wheezes, rales, rhonchi  Gastrointestinal: abdomen soft, lower abdominal tenderness, nondistended; no guarding or rebound tenderness; good bowel sounds, no mass or bruits  No external signs of trauma  Extremities:  Scattered areas of ecchymosis over bilateral upper extremities  Extremities nontender with full ROM    No pedal edema, 2+ pulses; no calf tenderness, no clubbing, no cyanosis  No midline spinal tenderness    Neuro:  no focal motor or sensory deficits, CN 2-12 grossly intact  Psych:  Normal mood and affect, normal judgement and insight      Vital Signs  ED Triage Vitals [02/05/19 2008]   Temperature Pulse Respirations Blood Pressure SpO2   98 4 °F (36 9 °C) 88 16 135/62 97 %      Temp Source Heart Rate Source Patient Position - Orthostatic VS BP Location FiO2 (%)   Oral Monitor Sitting Right arm --      Pain Score       Worst Possible Pain           Vitals:    02/06/19 0030 02/06/19 0738 02/06/19 0933 02/06/19 1532   BP: 113/57 108/54 118/62 127/57   Pulse: 92 88  (!) 110   Patient Position - Orthostatic VS: Lying Lying  Lying       Visual Acuity      ED Medications  Medications   pneumococcal 23-valent polysaccharide vaccine (PNEUMOVAX-23) injection 0 5 mL (not administered)   albuterol (PROVENTIL HFA,VENTOLIN HFA) inhaler 2 puff (2 puffs Inhalation Given 2/6/19 0558)   amitriptyline (ELAVIL) tablet 10 mg (10 mg Oral Given 2/6/19 0140)   ferrous sulfate tablet 325 mg (325 mg Oral Given 2/8/25 4009)   folic acid (FOLVITE) tablet 1 mg (1 mg Oral Given 2/6/19 0927)   lactulose 20 g/30 mL oral solution 20 g (not administered)   lidocaine (LIDODERM) 5 % patch 1 patch (1 patch Topical Medication Applied 2/6/19 0929)   potassium chloride (K-DUR,KLOR-CON) CR tablet 20 mEq (20 mEq Oral Given 2/6/19 0927)   spironolactone (ALDACTONE) tablet 25 mg (25 mg Oral Given 2/6/19 0933)   torsemide (DEMADEX) tablet 40 mg (40 mg Oral Given 2/6/19 0924)   ondansetron (ZOFRAN) injection 4 mg (not administered)   senna (SENOKOT) tablet 8 6 mg (8 6 mg Oral Not Given 2/6/19 0929)   docusate sodium (COLACE) capsule 100 mg (100 mg Oral Not Given 2/6/19 0927)   enoxaparin (LOVENOX) subcutaneous injection 40 mg (40 mg Subcutaneous Given 2/6/19 0928)   acetaminophen (TYLENOL) tablet 650 mg (650 mg Oral Given 2/6/19 4827)   nystatin (MYCOSTATIN) powder ( Topical Not Given 2/6/19 7148)   QUEtiapine (SEROquel) tablet 150 mg (not administered)   traMADol (ULTRAM) tablet 50 mg (50 mg Oral Given 2/6/19 1356)   gabapentin (NEURONTIN) capsule 300 mg (300 mg Oral Given 2/6/19 1638)   methocarbamol (ROBAXIN) tablet 500 mg (500 mg Oral Given 2/5/19 2044)   iohexol (OMNIPAQUE) 350 MG/ML injection (MULTI-DOSE) 100 mL (100 mL Intravenous Given 2/5/19 2150)   potassium chloride (K-DUR,KLOR-CON) CR tablet 20 mEq (20 mEq Oral Given 2/6/19 0140)       Diagnostic Studies  Results Reviewed     None                 CT abdomen pelvis with contrast   Final Result by Diane Post DO (02/05 2243)   No evidence of acute visceral or vascular injury in the abdomen/pelvis  Fatty infiltration of the liver is suspected  In the setting of abdominal pain and/or elevated liver function tests, consider steatohepatitis  Prominent perisplenic, perirenal, and perigastric veins/varices are noted which have increased in prominence from the prior study       Cholelithiasis without discrete CT evidence of acute cholecystitis, correlation with the patient's symptoms and laboratory values recommended  Shotty lymph nodes in the retroperitoneum, upper abdomen, and zelda hepatis, as described; nonspecific, possibly reactive  Suspected fibroid uterus, colonic diverticulosis without discrete evidence of acute diverticulitis, body wall edema/anasarca, and other findings as above  Workstation performed: [id number]         CT cervical spine without contrast   Final Result by Diane Post DO (02/05 2228)      Degenerative changes as described but no evidence of acute cervical spine injury  Subcentimeter pulmonary nodules as described  Based on current Fleischner Society 2017 Guidelines on incidental pulmonary nodule, six-month follow-up chest CT is recommended to evaluate for stability/interval change                        Workstation performed: [id number]         CT head without contrast   Final Result by Octavia Zimmer Yarelis Bryant DO (02/05 2216)      No intracranial hemorrhage or calvarial fracture is seen  Sinus disease as described, question acute on chronic left maxillary sinusitis  Fluid in the left mastoid air cells is nonspecific although a mastoiditis could be considered in the appropriate clinical setting  Other findings as above  Workstation performed: [id number]                    Procedures  Procedures       Phone Contacts  ED Phone Contact    ED Course  ED Course as of Feb 06 1653 Tue Feb 05, 2019 2035 Pt had lab work a few hours ago during initial ED visit, which was within normal limits  Will hold on repeating labs  MDM    Disposition  Final diagnoses:   Frequent falls   Chronic pain   Drug-seeking behavior     Time reflects when diagnosis was documented in both MDM as applicable and the Disposition within this note     Time User Action Codes Description Comment    2/5/2019 11:15 PM Carisa WHITAKER Add [R29 6] Frequent falls     2/5/2019 11:16 PM John Lazaro Add [G89 29] Chronic pain     2/5/2019 11:16 PM Carisa WHITAKER Add [Z76 5] Drug-seeking behavior     2/6/2019  9:22 AM Graham Alfonso Add [F31 63] Bipolar disorder, current episode mixed, severe, without psychotic features ([address] )     2/6/2019  9:22 AM Graham Alfonso Modify [F31 63] Bipolar disorder, current episode mixed, severe, without psychotic features ([address] )     2/6/2019 10:39 AM Graham Alfonso Add [R29 6] Recurrent falls       ED Disposition     ED Disposition Condition Date/Time Comment    Admit  Tue Feb 5, 2019 11:16 PM Case was discussed with Jay SNEED and the patient's admission status was agreed to be Admission Status: inpatient status to the service of Dr Johnathan Preston           Follow-up Information    None         Current Discharge Medication List      CONTINUE these medications which have NOT CHANGED    Details   acetaminophen (TYLENOL) 325 mg tablet Take 2 tablets by mouth every 6 (six) hours as needed for mild pain or fever  Qty: 30 tablet, Refills: 0      albuterol (PROVENTIL HFA,VENTOLIN HFA) 90 mcg/act inhaler Inhale 2 puffs every 4 (four) hours as needed for wheezing      amitriptyline (ELAVIL) 10 mg tablet Take 10 mg by mouth daily at bedtime      ferrous sulfate 325 (65 Fe) mg tablet Take 1 tablet by mouth daily      fluticasone (FLONASE) 50 mcg/act nasal spray 1 spray into each nostril daily      folic acid (FOLVITE) 1 mg tablet Take by mouth daily      GABAPENTIN PO Take 600 mg by mouth 3 (three) times a day        lactulose 20 g/30 mL Take 30 mL by mouth daily  Refills: 0      lidocaine (LIDODERM) 5 % Apply 1 patch topically daily Remove & Discard patch within 12 hours or as directed by MD  Qty: 30 patch, Refills: 0    Associated Diagnoses: Acute exacerbation of chronic low back pain      Linaclotide (LINZESS) 145 MCG CAPS Take 145 mcg by mouth daily      oxyCODONE (OXY-IR) 5 MG capsule Take 5 mg by mouth every 4 (four) hours as needed for moderate pain      QUEtiapine (SEROquel) 100 mg tablet Take 1 5 tablets (150 mg total) by mouth daily at bedtime  Qty: 10 tablet    Associated Diagnoses: Bipolar affective disorder in remission (HCC)      spironolactone (ALDACTONE) 25 mg tablet Take 1 tablet (25 mg total) by mouth daily  Qty: 30 tablet, Refills: 0    Associated Diagnoses: Anasarca      torsemide (DEMADEX) 20 mg tablet Take 2 tablets (40 mg total) by mouth 2 (two) times a day  Qty: 60 tablet, Refills: 0    Associated Diagnoses: Anasarca      amLODIPine (NORVASC) 10 mg tablet Take 1 tablet (10 mg total) by mouth daily  Qty: 30 tablet, Refills: 0    Associated Diagnoses: Essential hypertension      potassium chloride (K-DUR,KLOR-CON) 20 mEq tablet Take 1 tablet by mouth 4 (four) times a day (with meals and at bedtime)  Qty: 60 tablet, Refills: 0           No discharge procedures on file      ED Provider  Electronically Signed by           Hailey Tineo DO  02/06/19 [address]

## 2019-02-06 NOTE — ASSESSMENT & PLAN NOTE
· Patient focused on pain medication regimen, states she was given oxycodone earlier today with no relief of pain  States she takes oxycodone q4 hours scheduled and does not miss a dose  · PMED reviewed, patient has not received Rx for oxycodone since 6/29/2018  Last Rx: Oxycodone 5mg, Qty 30/9days  · As per pain mgmt note 6/29/17: Patient had a random urine drug screen which was abnormal, because she broke her opiate agreement she was discharged from his practice on February 24, 2017  Patient appeared to be using her opioid analgesics with self escalation, [place] PDMP noted that she received 35 prescriptions from 16 different providers since July 2016  It was decided that patient was not a candidate for opiate analgesics  · Will treat with p r n   Tramadol, Ibuprofen, APAP, and robaxin  · Topical Lidocaine TD patch

## 2019-02-06 NOTE — ASSESSMENT & PLAN NOTE
· CT shows several subpleural nodules including a 6-7mm nodule in the posterior right apex and a 4-5mm nodule posterior BAUTISTA  Recommended 6 month follow-up chest CT for stability/interval change  Can follow up outpatient

## 2019-02-06 NOTE — PHYSICAL THERAPY NOTE
PHYSICAL THERAPY NOTE          Patient Name: Castillo BORRERO Date: 2/6/2019  PT consult received  Pt admitted for recurrent falls; 2 that occurred yesterday  Pt seen in ED following first fall and found to have had potential patellar dislocation  Per imagining not acute fracture or acute osseous abnormality; however questioned alignment of patella  Pt was to f/u with ortho on outpatient basis prior to being admitted after 2nd fall  Spoke to Dr Nilda Brown; he is going to consult orthopedics in house  For optimal patient care and safety; PT cancelled at this time and will f/u post orthopedic consult and as schedule permits      Eric Zhu, PT,DPT

## 2019-02-06 NOTE — UTILIZATION REVIEW
Initial Clinical Review    Admission: Date/Time/Statement:   OBS  Order  2/5  @   2317  Orders Placed This Encounter   Procedures    Place in Observation (expected length of stay for this patient is less than two midnights)     Standing Status:   Standing     Number of Occurrences:   1     Order Specific Question:   Admitting Physician     Answer:   Diego Dior     Order Specific Question:   Level of Care     Answer:   Med Surg [16]     ED: Date/Time/Mode of Arrival:   ED Arrival Information     Expected Arrival Acuity Means of Arrival Escorted By Service Admission Type    - 2/5/2019 20:05 Urgent Ambulance [address] [facility] Urgent    Arrival Complaint    Fall        Chief Complaint:   Chief Complaint   Patient presents with    Fall     Pt reports falling down 7 steps  Pt states, "I have been falling a lot and I can't walk anymore"  Denies hitting head  "I have pain everywhere"  History of Illness: Ryan Solis is a 38 y o  female who presents with c/o fall, states she fell earlier today, went to ER was discharged home, as she was trying to walk up the steps she fell again hitting her face and abdomen  C/o generalized body aches and weakness in left leg, states she is bed ridden and has a home attendant  I asked how she ambulates and she reports that she is bed ridden, I asked if bedridden how she fell walking upstairs   she states she uses a cane or walker to ambulate  Denies loss of consciousness  Complains of generalized pain, requesting pain medication, states she was given oxycodone without relief  Patient answers yes to every question asked during my assessment, reports numbness and weakness of all extremities; reports nausea and vomiting every morning secondary to gastric bypass, reports constipation and diarrhea, reports dysuria secondary to a sore on her buttocks      ED Vital Signs:   ED Triage Vitals [02/05/19 2008]   Temperature Pulse Respirations Blood Pressure SpO2   98 4 °F (36 9 °C) 88 16 135/62 97 %      Temp Source Heart Rate Source Patient Position - Orthostatic VS BP Location FiO2 (%)   Oral Monitor Sitting Right arm --      Pain Score       Worst Possible Pain        Wt Readings from Last 1 Encounters:   02/06/19 (!) 158 kg (348 lb 15 8 oz)     Vital Signs (abnormal):   above    Pertinent Labs/Diagnostic Test Results:    Ct  Abd/pelvis:    No evidence of acute visceral or vascular injury in the abdomen/pelvis  Fatty infiltration of the liver is suspected   In the setting of abdominal pain and/or elevated liver function tests, consider steatohepatitis       Prominent perisplenic, perirenal, and perigastric veins/varices are noted which have increased in prominence from the prior study     Cholelithiasis without discrete CT evidence of acute cholecystitis, correlation with the patient's symptoms and laboratory values recommended  Shotty lymph nodes in the retroperitoneum, upper abdomen, and zelda hepatis, as described; nonspecific, possibly reactive  Suspected fibroid uterus, colonic diverticulosis without discrete evidence of acute diverticulitis, body wall edema/anasarca, and other findings as above  Ct  Head:   No  Intracranial abnormality  Ct  C/spine:   No acute  fracture    ED Treatment:   Medication Administration from 02/05/2019 2005 to 02/06/2019 0010       Date/Time Order Dose Route Action Action by Comments     02/05/2019 2044 methocarbamol (ROBAXIN) tablet 500 mg 500 mg Oral Given April Mitchell RN      02/05/2019 2150 iohexol (OMNIPAQUE) 350 MG/ML injection (MULTI-DOSE) 100 mL 100 mL Intravenous Given Garland Bence         Past Medical/Surgical History:    Active Ambulatory Problems     Diagnosis Date Noted    Ambulatory dysfunction 08/29/2017    Recurrent falls 08/29/2017    Chronic pain syndrome 08/29/2017    Morbid obesity with BMI of 50 0-59 9, adult ([facility] ) 08/29/2017    Hypokalemia 11/26/2017    Anasarca 11/26/2017    Chest pain 11/26/2017    Other bipolar disorder ([facility] ) 12/23/2014    Noncompliance with medication regimen 73/82/8782    Alcoholic liver disease ([place] [address] ) 11/28/2017    RUQ pain 12/01/2017    Febrile illness 12/05/2017    Coag negative Staphylococcus bacteremia 12/07/2017    Anemia 12/07/2017    Recurrent falls while walking 05/29/2018    Acute hypokalemia 05/29/2018    Hyperammonemia ([facility] ) 05/29/2018    Bipolar disorder ([place] [address] ) 06/02/2018    Other cirrhosis of liver ([place] [address] ) 06/11/2018     Resolved Ambulatory Problems     Diagnosis Date Noted    Peripheral edema 09/09/2017    Hypomagnesemia 05/29/2018    Elevated CPK 05/29/2018    Lower abdominal pain 06/05/2018     Past Medical History:   Diagnosis Date    Arthritis     Fibromyalgia     Herniated cervical disc     Hypertension     Psychiatric disorder     Sarcoidosis     Scoliosis      Admitting Diagnosis: Back pain [M54 9]  Chronic pain [G89 29]  Drug-seeking behavior [Z76 5]  Frequent falls [R29 6]     Assessment/Plan:   Recurrent falls   Assessment & Plan     · History of recurrent falls  Seen earlier today in ER with c/o fall, diagnosed closed patellar dislocation on left  Knee immobilizer placed, discharged home with outpatient orthopedic follow-up  · Patient reports another fall when she arrived home; reports falling on her abdomen and face, CT of abd/pelvis: Negative for acute visceral or vascular injury  CT Head and C spine: negative  · CT L knee: Neg for acute fx; somewhat lateral lie of the patella without definite medial retinacular injury  · XR L knee and hip/pelv:  Negative for acute fracture or dislocation  · PT OT consult  · Continue left knee immobilizer, can follow up with Ortho as outpatient     Drug-seeking behavior   Assessment & Plan     · Patient focused on pain medication regimen, states she was given oxycodone earlier today with no relief of pain  States she takes oxycodone q4 hours scheduled and does not miss a dose    · PMED reviewed, patient has not received Rx for oxycodone since 6/29/2018  Last Rx: Oxycodone 5mg, Qty 30/9days  · As per pain mgmt note 6/29/17: Patient had a random urine drug screen which was abnormal, because she broke her opiate agreement she was discharged from his practice on February 24, 2017  Patient appeared to be using her opioid analgesics with self escalation, [place] PD[place] that she received 35 prescriptions from 16 different providers since July 2016  It was decided that patient was not a candidate for opiate analgesics  · Will treat with p r n  Tramadol, Ibuprofen, APAP, and robaxin  · Topical Lidocaine TD patch      Morbid obesity with BMI of 50 0-59 9, adult (HCC)   Assessment & Plan     · S/p gastric bypass; Diet control     Pulmonary nodule   Assessment & Plan     · CT shows several subpleural nodules including a 6-7mm nodule in the posterior right apex and a 4-5mm nodule posterior BAUTISTA  Recommended 6 month follow-up chest CT for stability/interval change  Can follow up outpatient       Al[facility] ([facility] )   Assessment & Plan     · Reports sobriety; no transaminitis, avoid hepatotoxins  · On Aldactone and torsemide for suspected underlying cirrhosis      Other bipolar disorder (HCC)   Assessment & Plan     · Continue amitriptyline and Seroquel      Hypokalemia   Assessment & Plan     · K 3 1, replete  Monitor BMP        Ambulatory dysfunction   Assessment & Plan     · Reports she is bed bound and has home health aide at home  · Supportive care  Anticipated Length of Stay:  Patient will be admitted on an Observation basis with an anticipated length of stay of  < 2 midnights     Justification for Hospital Stay: fall          Admission Orders:   OBS  Order  2/5  @   7835  Scheduled Meds:   Current Facility-Administered Medications:  acetaminophen 650 mg Oral Q6H PRN Leeland Power, CRNP   albuterol 2 puff Inhalation Q4H PRN Pat Power, CRNP   amitriptyline 10 mg Oral HS Zahra Chidester, CRNP   amLODIPine 10 mg Oral Daily Zahra Chidester, CRNP   docusate sodium 100 mg Oral BID Zahra Chidester, CRNP   enoxaparin 40 mg Subcutaneous Daily Zahra Chidester, CRNP   ferrous sulfate 325 mg Oral Daily Zahra Chidester, CRNP   folic acid 1 mg Oral Daily Zahra Chidester, CRNP   gabapentin 600 mg Oral TID Zahra Chidester, CRNP   ibuprofen 400 mg Oral Q8H PRN Zahra Chidester, CRNP   lactulose 20 g Oral Daily PRN Zahra Chidester, CRNP   lidocaine 1 patch Topical Daily Zahra Chidester, CRNP   methocarbamol 500 mg Oral Q6H PRN Zahra Chidester, CRNP   nystatin  Topical BID Zahra Chidester, CRNP   ondansetron 4 mg Intravenous Q6H PRN Zahra Chidester, CRNP   pneumococcal 23-valent polysaccharide vaccine 0 5 mL Subcutaneous Prior to discharge Zahra Chidester, CRNP   potassium chloride 20 mEq Oral Daily Zahra Chidester, CRNP   QUEtiapine 100 mg Oral HS Zahra Chidester, CRNP   senna 1 tablet Oral Daily Zahra Chidester, CRNP   spironolactone 25 mg Oral Daily Zahra Chidester, CRNP   torsemide 40 mg Oral Daily Zahra Chidester, CRNP   traMADol 50 mg Oral Q6H PRN Zahra Chidester, CRNP     Continuous Infusions:    PRN Meds:   acetaminophen    albuterol    ibuprofen    lactulose    methocarbamol    ondansetron    pneumococcal 23-valent polysaccharide vaccine    traMADol      PT/OT    Network Utilization Review Department  Phone: [phone]; Fax [phone]  [email or web address]  ATTENTION: Please call with any questions or concerns to [phone]  and carefully listen to the prompts so that you are directed to the right person  Send all requests for admission clinical reviews, approved or denied determinations and any other requests to fax [phone]   All voicemails are confidential

## 2019-02-06 NOTE — ASSESSMENT & PLAN NOTE
· History of recurrent falls  Seen earlier today in ER with c/o fall, diagnosed closed patellar dislocation on left  Knee immobilizer placed, discharged home with outpatient orthopedic follow-up  · Patient reports another fall when she arrived home; reports falling on her abdomen and face, CT of abd/pelvis: Negative for acute visceral or vascular injury  CT Head and C spine: negative  · CT L knee: Neg for acute fx; somewhat lateral lie of the patella without definite medial retinacular injury  · XR L knee and hip/pelv:  Negative for acute fracture or dislocation     · PT OT consult  · Continue left knee immobilizer, can follow up with Ortho as outpatient

## 2019-02-06 NOTE — OCCUPATIONAL THERAPY NOTE
Occupational Therapy         Patient Name: Huey Staton  [id number] Date: 2/6/2019       MD's orders received  Pt pending ortho consult 2* L knee injury  Will continue when cleared by ortho for allowed activities   Kavon Thibodeaux OT

## 2019-02-06 NOTE — ASSESSMENT & PLAN NOTE
· Reports sobriety; no transaminitis, avoid hepatotoxins  · On Aldactone and torsemide for suspected underlying cirrhosis

## 2019-02-06 NOTE — ED NOTES
Per Dr Ardeen Hatchet, blood work does not need to be drawn due to pts recently blood work done        Abraham Gibson RN  02/05/19 2247

## 2019-02-06 NOTE — H&P
H&P- Keyshawn Alvarado 1975, 38 y o  female MRN: [id number]    Unit/Bed#: E2 -01 Encounter: [id number]    Primary Care Provider: Yumi Arzate MD   Date and time admitted to hospital: 2/5/2019  8:05 PM      * Recurrent falls   Assessment & Plan    · History of recurrent falls  Seen earlier today in ER with c/o fall, diagnosed closed patellar dislocation on left  Knee immobilizer placed, discharged home with outpatient orthopedic follow-up  · Patient reports another fall when she arrived home; reports falling on her abdomen and face, CT of abd/pelvis: Negative for acute visceral or vascular injury  CT Head and C spine: negative  · CT L knee: Neg for acute fx; somewhat lateral lie of the patella without definite medial retinacular injury  · XR L knee and hip/pelv:  Negative for acute fracture or dislocation  · PT OT consult  · Continue left knee immobilizer, can follow up with Ortho as outpatient     Drug-seeking behavior   Assessment & Plan    · Patient focused on pain medication regimen, states she was given oxycodone earlier today with no relief of pain  States she takes oxycodone q4 hours scheduled and does not miss a dose  · PMED reviewed, patient has not received Rx for oxycodone since 6/29/2018  Last Rx: Oxycodone 5mg, Qty 30/9days  · As per pain mgmt note 6/29/17: Patient had a random urine drug screen which was abnormal, because she broke her opiate agreement she was discharged from his practice on February 24, 2017  Patient appeared to be using her opioid analgesics with self escalation, [place] that she received 35 prescriptions from 16 different providers since July 2016  It was decided that patient was not a candidate for opiate analgesics  · Will treat with p r n   Tramadol, Ibuprofen, APAP, and robaxin  · Topical Lidocaine TD patch     Morbid obesity with BMI of 50 0-59 9, adult (HCC)   Assessment & Plan    · S/p gastric bypass; Diet control     Pulmonary nodule   Assessment & Plan    · CT shows several subpleural nodules including a 6-7mm nodule in the posterior right apex and a 4-5mm nodule posterior BAUTISTA  Recommended 6 month follow-up chest CT for stability/interval change  Can follow up outpatient  Alcoholic liver disease ([facility] )   Assessment & Plan    · Reports sobriety; no transaminitis, avoid hepatotoxins  · On Aldactone and torsemide for suspected underlying cirrhosis     Other bipolar disorder (HCC)   Assessment & Plan    · Continue amitriptyline and Seroquel     Hypokalemia   Assessment & Plan    · K 3 1, replete  Monitor BMP     Ambulatory dysfunction   Assessment & Plan    · Reports she is bed bound and has home health aide at home  · Supportive care  VTE Prophylaxis: Enoxaparin (Lovenox)  / reason for no mechanical VTE prophylaxis a/c   Code Status: FC  POLST: POLST is not applicable to this patient  Discussion with family:     Anticipated Length of Stay:  Patient will be admitted on an Observation basis with an anticipated length of stay of  < 2 midnights  Justification for Hospital Stay: fall    Total Time for Visit, including Counseling / Coordination of Care: 45 minutes  Greater than 50% of this total time spent on direct patient counseling and coordination of care  Chief Complaint:   Fall, pain all over    History of Present Illness:    Gerry Masterson is a 38 y o  female who presents with c/o fall, states she fell earlier today, went to ER was discharged home, as she was trying to walk up the steps she fell again hitting her face and abdomen  C/o generalized body aches and weakness in left leg, states she is bed ridden and has a home attendant  I asked how she ambulates and she reports that she is bed ridden, I asked if bedridden how she fell walking upstairs   she states she uses a cane or walker to ambulate  Denies loss of consciousness  Complains of generalized pain, requesting pain medication, states she was given oxycodone without relief    Patient answers yes to every question asked during my assessment, reports numbness and weakness of all extremities; reports nausea and vomiting every morning secondary to gastric bypass, reports constipation and diarrhea, reports dysuria secondary to a sore on her buttocks  · Admitted in May for recurrent falls and transferred to inpatient rehab    Review of Systems:    Review of Systems   HENT: Negative  Respiratory: Negative  Cardiovascular: Negative  Gastrointestinal: Positive for abdominal pain, constipation, nausea and vomiting  Negative for diarrhea  Genitourinary: Negative  Musculoskeletal: Positive for arthralgias, back pain, gait problem and myalgias  Neurological: Positive for weakness  Past Medical and Surgical History:     Past Medical History:   Diagnosis Date    Arthritis     Fibromyalgia     Herniated cervical disc     Hypertension     Psychiatric disorder     Sarcoidosis     Scoliosis        Past Surgical History:   Procedure Laterality Date    GASTRIC BYPASS      HERNIA REPAIR      OOPHORECTOMY Left 2013    TOTAL ABDOMINAL HYSTERECTOMY W/ BILATERAL SALPINGOOPHORECTOMY      TUBAL LIGATION         Meds/Allergies:    Prior to Admission medications    Medication Sig Start Date End Date Taking?  Authorizing Provider   acetaminophen (TYLENOL) 325 mg tablet Take 2 tablets by mouth every 6 (six) hours as needed for mild pain or fever 12/14/17  Yes Radha Reynolds MD   albuterol (PROVENTIL HFA,VENTOLIN HFA) 90 mcg/act inhaler Inhale 2 puffs every 4 (four) hours as needed for wheezing   Yes Historical Provider, MD   amitriptyline (ELAVIL) 10 mg tablet Take 10 mg by mouth daily at bedtime   Yes Historical Provider, MD   ferrous sulfate 325 (65 Fe) mg tablet Take 1 tablet by mouth daily 4/18/16  Yes Historical Provider, MD   fluticasone (FLONASE) 50 mcg/act nasal spray 1 spray into each nostril daily   Yes Historical Provider, MD   folic acid (FOLVITE) 1 mg tablet Take by mouth daily Yes Historical Provider, MD   GABAPENTIN PO Take 600 mg by mouth 3 (three) times a day     Yes Historical Provider, MD   lactulose 20 g/30 mL Take 30 mL by mouth daily 12/15/17  Yes Becka Sotelo MD   lidocaine (LIDODERM) 5 % Apply 1 patch topically daily Remove & Discard patch within 12 hours or as directed by MD  Patient taking differently: Apply 1 patch topically daily Remove & Discard patch within 12 hours or as directed by MD  11/26/18  Yes Harleen Armas DO   Linaclotide (LINZESS) 145 MCG CAPS Take 145 mcg by mouth daily   Yes Historical Provider, MD   oxyCODONE (OXY-IR) 5 MG capsule Take 5 mg by mouth every 4 (four) hours as needed for moderate pain   Yes Historical Provider, MD   QUEtiapine (SEROquel) 100 mg tablet Take 1 5 tablets (150 mg total) by mouth daily at bedtime  Patient taking differently: Take 100 mg by mouth daily at bedtime   6/13/18  Yes Vishal Frankel MD   spironolactone (ALDACTONE) 25 mg tablet Take 1 tablet (25 mg total) by mouth daily 6/13/18  Yes Vishal Frankel MD   torsemide (DEMADEX) 20 mg tablet Take 2 tablets (40 mg total) by mouth 2 (two) times a day  Patient taking differently: Take 40 mg by mouth 2 (two) times a day Takes daily due to incontinence at night  6/13/18  Yes Vishal Frankel MD   amLODIPine (NORVASC) 10 mg tablet Take 1 tablet (10 mg total) by mouth daily 7/2/18   Mercy Machado MD   potassium chloride (K-DUR,KLOR-CON) 20 mEq tablet Take 1 tablet by mouth 4 (four) times a day (with meals and at bedtime) 9/19/17   Becka Sotelo MD     I have reviewed home medications with patient personally  Allergies:    Allergies   Allergen Reactions    Pregabalin Swelling       Social History:     Marital Status:    Occupation: disabled  Patient Pre-hospital Living Situation:  Resides at home with boyfriend  Patient Pre-hospital Level of Mobility:  Cane, walker, wheelchair  Patient Pre-hospital Diet Restrictions:   Substance Use History:   History   Alcohol Use  Yes     Comment: occassional     History   Smoking Status    Never Smoker   Smokeless Tobacco    Never Used     History   Drug Use No       Family History:    Family History   Problem Relation Age of Onset    Thyroid disease Mother         disorder    Lung cancer Father     Hypertension Father     Hypertension Paternal Aunt     Obesity Paternal Aunt     Diabetes Paternal Aunt        Physical Exam:     Vitals:   Blood Pressure: 113/57 (02/06/19 0030)  Pulse: 92 (02/06/19 0030)  Temperature: 98 4 °F (36 9 °C) (02/06/19 0030)  Temp Source: Tympanic (02/06/19 0030)  Respirations: 18 (02/06/19 0030)  Height: 5' 10" (177 8 cm) (02/06/19 0030)  Weight - Scale: (!) 158 kg (348 lb 15 8 oz) (02/06/19 0030)  SpO2: 97 % (02/06/19 0030)    Physical Exam   Constitutional: She is oriented to person, place, and time  She appears well-developed  No distress  Super obesity   HENT:   Head: Normocephalic and atraumatic  Eyes: Conjunctivae and EOM are normal  Right eye exhibits no discharge  Left eye exhibits no discharge  No scleral icterus  Neck: Neck supple  Cardiovascular: Normal rate, regular rhythm, normal heart sounds and intact distal pulses  No murmur heard  Pulmonary/Chest: Effort normal and breath sounds normal  No respiratory distress  She has no wheezes  She has no rales  She exhibits no tenderness  Abdominal: Soft  Bowel sounds are normal  She exhibits no distension and no mass  There is no rebound and no guarding  Musculoskeletal: She exhibits edema and tenderness  1+ B/L LE edema   Neurological: She is alert and oriented to person, place, and time  Skin: Skin is warm and dry  No rash noted  She is not diaphoretic  No erythema  No pallor  Psychiatric: She has a normal mood and affect  Judgment and thought content normal    hopeless     Additional Data:     Lab Results: I have personally reviewed pertinent reports          Results from last 7 days  Lab Units 02/05/19  1603   WBC Thousand/uL 3 74*   HEMOGLOBIN g/dL 11 3*   HEMATOCRIT % 32 9*   PLATELETS Thousands/uL 163   NEUTROS PCT % 59   LYMPHS PCT % 26   MONOS PCT % 10   EOS PCT % 3       Results from last 7 days  Lab Units 02/05/19  1603   SODIUM mmol/L 142   POTASSIUM mmol/L 3 1*   CHLORIDE mmol/L 105   CO2 mmol/L 27   BUN mg/dL 6   CREATININE mg/dL 0 66   ANION GAP mmol/L 10   CALCIUM mg/dL 8 1*   GLUCOSE RANDOM mg/dL 92                       Imaging: I have personally reviewed pertinent reports  CT abdomen pelvis with contrast   Final Result by Roosevelt Craft DO (02/05 2243)   No evidence of acute visceral or vascular injury in the abdomen/pelvis  Fatty infiltration of the liver is suspected  In the setting of abdominal pain and/or elevated liver function tests, consider steatohepatitis  Prominent perisplenic, perirenal, and perigastric veins/varices are noted which have increased in prominence from the prior study       Cholelithiasis without discrete CT evidence of acute cholecystitis, correlation with the patient's symptoms and laboratory values recommended  Shotty lymph nodes in the retroperitoneum, upper abdomen, and zelda hepatis, as described; nonspecific, possibly reactive  Suspected fibroid uterus, colonic diverticulosis without discrete evidence of acute diverticulitis, body wall edema/anasarca, and other findings as above  Workstation performed: [id number]         CT cervical spine without contrast   Final Result by Roosevelt Craft DO (02/05 2228)      Degenerative changes as described but no evidence of acute cervical spine injury  Subcentimeter pulmonary nodules as described  Based on current Fleischner Society 2017 Guidelines on incidental pulmonary nodule, six-month follow-up chest CT is recommended to evaluate for stability/interval change                        Workstation performed: [id number]         CT head without contrast   Final Result by Roosevelt Craft DO (02/05 2216) No intracranial hemorrhage or calvarial fracture is seen  Sinus disease as described, question acute on chronic left maxillary sinusitis  Fluid in the left mastoid air cells is nonspecific although a mastoiditis could be considered in the appropriate clinical setting  Other findings as above  Workstation performed: [id number]             EKG, Pathology, and Other Studies Reviewed on Admission:   · EKG: CT XR ECHO    Allscripts / Epic Records Reviewed: Yes     ** Please Note: This note has been constructed using a voice recognition system   **

## 2019-02-06 NOTE — PLAN OF CARE
DISCHARGE PLANNING     Discharge to home or other facility with appropriate resources Progressing        INFECTION - ADULT     Absence or prevention of progression during hospitalization Progressing        METABOLIC, FLUID AND ELECTROLYTES - ADULT     Electrolytes maintained within normal limits Progressing     Fluid balance maintained Progressing        MUSCULOSKELETAL - ADULT     Maintain or return mobility to safest level of function Progressing        PAIN - ADULT     Verbalizes/displays adequate comfort level or baseline comfort level Progressing        Potential for Falls     Patient will remain free of falls Progressing        Prexisting or High Potential for Compromised Skin Integrity     Skin integrity is maintained or improved Progressing        SAFETY ADULT     Maintain or return to baseline ADL function Progressing     Maintain or return mobility status to optimal level Progressing     Patient will remain free of falls Progressing        SKIN/TISSUE INTEGRITY - ADULT     Skin integrity remains intact Progressing

## 2019-02-07 PROBLEM — G89.29 OTHER CHRONIC PAIN: Status: ACTIVE | Noted: 2019-01-01
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
## 2019-02-07 NOTE — OCCUPATIONAL THERAPY NOTE
OccupationalTherapy Evaluation(time=0900-0930)     Patient Name: Sharron Correa  RNEFX'X Date: 2/7/2019  Problem List  Patient Active Problem List   Diagnosis    Ambulatory dysfunction    Recurrent falls    Chronic pain syndrome    Morbid obesity with BMI of 50 0-59 9, adult ([facility] [address] )    Hypokalemia    Anasarca    Chest pain    Other bipolar disorder ([place] [address] )    Noncompliance with medication regimen    [facility] liver disease ([facility] )    RUQ pain    Febrile illness    Coag negative Staphylococcus bacteremia    Anemia    Recurrent falls while walking    Acute hypokalemia    Hyperammonemia (HCC)    Bipolar disorder ([facility] )    Other cirrhosis of liver ([place] [address] )    Pulmonary nodule    Drug-seeking behavior     Past Medical History  Past Medical History:   Diagnosis Date    Arthritis     Fibromyalgia     Herniated cervical disc     Hypertension     Psychiatric disorder     Sarcoidosis     Scoliosis      Past Surgical History  Past Surgical History:   Procedure Laterality Date    GASTRIC BYPASS      HERNIA REPAIR      OOPHORECTOMY Left 2013    TOTAL ABDOMINAL HYSTERECTOMY W/ BILATERAL SALPINGOOPHORECTOMY      TUBAL LIGATION           02/07/19 0931   Note Type   Note type Eval only   Restrictions/Precautions   Weight Bearing Precautions Per Order Yes   LLE Weight Bearing Per Order WBAT   Braces or Orthoses LE Immobilizer  (order for J-brace;allowed OOB with immobilizer)   Other Precautions Fall Risk;Pain; Chair Alarm; Bed Alarm   Pain Assessment   Pain Assessment 0-10   Pain Score Worst Possible Pain   Pain Type Chronic pain;Acute pain   Pain Location Knee   Pain Orientation Left   Home Living   Type of Home House   Home Layout Two level;Bed/bath upstairs  ([address])   Bathroom Equipment Shower chair;Commode   Home Equipment Walker;Cane;Wheelchair-manual   Prior Function   Lives With Significant other   ADL Assistance Needs assistance   Falls in the last 6 months 5 to 10   Lifestyle   Autonomy PTA pt states that she had assistance with her ADLs; states independence with transfers(with RW); assistance with longer mobility; +falls=5; IINh4reov/wk/8hrs   Reciprocal Relationships 2 children   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   Patient Behaviors/Mood Anxious; Flat affect   Subjective   Subjective "It's too soon to get to the chair "   ADL   Where Assessed Edge of bed   Eating Assistance 6  Modified independent   Grooming Assistance 6  Modified Independent   UB Bathing Assistance 5  Supervision/Setup   LB Bathing Assistance 3  Moderate Assistance   UB Dressing Assistance 5  Supervision/Setup   LB Dressing Assistance 3  Moderate Assistance   Bed Mobility   Supine to Sit 2  Maximal assistance   Additional items Assist x 2; Increased time required;Verbal cues;LE management   Transfers   Sit to Stand 3  Moderate assistance   Additional items Assist x 2; Increased time required;Verbal cues   Stand to Sit 3  Moderate assistance   Additional items Assist x 2; Increased time required;Verbal cues   Functional Mobility   Functional Mobility 3  Moderate assistance   Additional Comments x2   Additional items Rolling walker   Balance   Static Sitting Fair   Dynamic Sitting Fair -   Static Standing Poor +   Dynamic Standing Poor   Activity Tolerance   Activity Tolerance Patient limited by fatigue;Patient limited by pain   Medical Staff Made Aware nsg, P T     RUE Assessment   RUE Assessment WFL   RUE Strength   RUE Overall Strength Within Functional Limits - able to perform ADL tasks with strength  (3+/5 throughout)   LUE Assessment   LUE Assessment WFL   LUE Strength   LUE Overall Strength Within Functional Limits - able to perform ADL tasks with strength  (3+/5 throughout)   Hand Function   Gross Motor Coordination Functional   Fine Motor Coordination Functional   Sensation   Light Touch No apparent deficits   Proprioception   Proprioception No apparent deficits   Vision-Basic Assessment   Current Vision (glasses)   Vision - Complex Assessment   Acuity Able to read clock/calendar on wall without difficulty   Perception   Inattention/Neglect Appears intact   Cognition   Overall Cognitive Status WFL   Arousal/Participation Alert   Attention Within functional limits   Orientation Level Oriented X4   Memory Decreased short term memory;Decreased recall of precautions   Following Commands Follows one step commands without difficulty   Assessment   Limitation Decreased ADL status; Decreased UE strength;Decreased Safe judgement during ADL;Decreased endurance;Decreased high-level ADLs   Prognosis Fair   Assessment Pt is a 45y/o female admitted to the hospital after falling at home  Pt initially came to the ED after falling the first time, noted with possible patellar dislocation and sent home with a knee immobilizer  When she got home, she had an additional fall and came back to the hospital  [address] with "J" brace in place  Brace ordered, but allowed OOB with immobilizer until J brace is available  PTA pt states that she had assistance with her ADLs; states independence with transfers(with RW); assistance with longer mobility; +falls=5; TDHs2ocia/wk/8hrs  During initial eval, pt demonstrated deficits with her functional balance, functional mobility, ADL status, activity tolerance(currently fair=15-20mins), b/l UE strength, and transfer safety  Pt would benefit from continued OT tx for the above deficits  3-5xwk/1-2wks   Goals   Patient Goals "to get pain medication"   STG Time Frame 3-5   Short Term Goal #1 Pt will demonstrate improved activity tolerance to good(20-30mins) and standing tolerance to 3-5mins to assist with ADLs  Short Term Goal #2 Pt will demonstrate Melissa with her bed mobility and sit-stand transfers to assist with ADLs/transfers  Short Term Goal  Pt will demonstrate proper walker/transfer safety 100% of the time      LTG Time Frame (5-10days)   Long Term Goal #1 Pt will demonstrate improved functional balance by 1 grade to assist with ADLs  Long Term Goal #2 Pt will demonstrate min A with her LE ADLs  Long Term Goal Pt will demonstrate improved b/l UE strength by 1/2 MM grade to assist with ADLs/transfers  Plan   Treatment Interventions ADL retraining;Functional transfer training;UE strengthening/ROM; Endurance training;Cognitive reorientation;Patient/family training;Equipment evaluation/education; Compensatory technique education   Goal Expiration Date 02/18/19   Treatment Day 0   OT Frequency 3-5x/wk   Recommendation   OT Discharge Recommendation Short Term Rehab   Barthel Index   Feeding 10   Bathing 0   Grooming Score 5   Dressing Score 5   Bladder Score 10   Bowels Score 10   Toilet Use Score 5   Transfers (Bed/Chair) Score 5   Mobility (Level Surface) Score 0   Stairs Score 0   Barthel Index Score 50   Christofer Postin, OT

## 2019-02-07 NOTE — ASSESSMENT & PLAN NOTE
· Multifactorial secondary to morbid obesity, ambulatory dysfunction, alcoholic liver disease  · Orthopedic recommendations reviewed  · Ambulate and weight bear as tolerated  · Short-term rehab placement when available
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
## 2019-02-07 NOTE — PLAN OF CARE
Problem: OCCUPATIONAL THERAPY ADULT  Goal: Performs self-care activities at highest level of function for planned discharge setting  See evaluation for individualized goals  Treatment Interventions: ADL retraining, Functional transfer training, UE strengthening/ROM, Endurance training, Cognitive reorientation, Patient/family training, Equipment evaluation/education, Compensatory technique education          See flowsheet documentation for full assessment, interventions and recommendations  Limitation: Decreased ADL status, Decreased UE strength, Decreased Safe judgement during ADL, Decreased endurance, Decreased high-level ADLs  Prognosis: Fair  Assessment: Pt is a 45y/o female admitted to the hospital after falling at home  Pt initially came to the ED after falling the first time, noted with possible patellar dislocation and sent home with a knee immobilizer  When she got home, she had an additional fall and came back to the hospital  [address] with "J" brace in place  Brace ordered, but allowed OOB with immobilizer until J brace is available  PTA pt states that she had assistance with her ADLs; states independence with transfers(with RW); assistance with longer mobility; +falls=5; DISd2caad/wk/8hrs  During initial eval, pt demonstrated deficits with her functional balance, functional mobility, ADL status, activity tolerance(currently fair=15-20mins), b/l UE strength, and transfer safety  Pt would benefit from continued OT tx for the above deficits   3-5xwk/1-2wks     OT Discharge Recommendation: Short Term Rehab

## 2019-02-07 NOTE — PHYSICAL THERAPY NOTE
PT EVALUATION  Time-In: 0855  Time-Out: 0915  Total Time: 20 minutes    38 y o     [id number]    Back pain [M54 9]  Chronic pain [G89 29]  Drug-seeking behavior [Z76 5]  Frequent falls [R29 6]    Length of Stay: 0    Past Medical History:   Diagnosis Date    Arthritis     Fibromyalgia     Herniated cervical disc     Hypertension     Psychiatric disorder     Sarcoidosis     Scoliosis          Past Surgical History:   Procedure Laterality Date    GASTRIC BYPASS      HERNIA REPAIR      OOPHORECTOMY Left 2013    TOTAL ABDOMINAL HYSTERECTOMY W/ BILATERAL SALPINGOOPHORECTOMY      TUBAL LIGATION          02/07/19 0930   Note Type   Note type Eval/Treat   Pain Assessment   Pain Assessment 0-10   Pain Score Worst Possible Pain   Pain Location Knee   Pain Orientation Left   Pain Frequency Constant/continuous   Pain Onset Ongoing   Clinical Progression Not changed   Effect of Pain on Daily Activities increases with all activity   Hospital Pain Intervention(s) Repositioned; Ambulation/increased activity; Elevated; Rest   Response to Interventions tolerated PT   Multiple Pain Sites Yes   Pain 2   Pain Score 2 10   Pain Type 2 Chronic pain   Pain Location 2 Back   Pain Intervention(s) 2 Repositioned; Emotional support; Rest   Response to Interventions 2 tolerated PT   Home Living   Type of Home House   Home Layout Two level;Bed/bath upstairs;Stairs to enter with rails  (8 DUTCH w/ single railing; 21 stairs to 2nd floor)   Bathroom Shower/Tub Tub/shower unit   Bathroom Toilet Standard   Bathroom Equipment Shower chair;Commode   Home Equipment Walker;Cane;Wheelchair-manual  (hospital bed)   Prior Function   Level of [place] Needs assistance with IADLs; Needs assistance with ADLs and functional mobility   Lives With Significant other  (boyfriend)   Receives Help From Friend(s); Home health  (boyfriend and home health aide)   ADL Assistance Needs assistance   IADLs Needs assistance   Falls in the last 6 months 5 to 10  (5) Vocational On disability   Comments PTA pt reports that she was assisted with ADLS and IADLS, (-) , 5 falls with 2 that occurred on 2/5/2019, ambulates short distances with assistance and use of RW  Pt reports that she has home health 7 days/week for 8 hours/day  Restrictions/Precautions   Weight Bearing Precautions Per Order Yes   LLE Weight Bearing Per Order WBAT   Braces or Orthoses (currently in knee immobilizer; awaiting J brace from [address] [place] [address])   Other Precautions Fall Risk;Pain; Chair Alarm; Bed Alarm;WBS   General   Additional Pertinent History Pt currently has knee immobilizer and okay for WBAT per orthopedics  Orthopedics has ordered a J brace and [facility] bringing it; once J brace arrives ROM permitted  Suspected patellar dislocation from first fall on 2/5  Per imaging: Somewhat lateral lie of the patella without definite medial retinacular injury  Family/Caregiver Present No   Cognition   Overall Cognitive Status WFL   Arousal/Participation Alert   Orientation Level Oriented X4   Memory Decreased recall of precautions;Decreased short term memory   Following Commands Follows one step commands with increased time or repetition   Comments Pt presents with emotional component to presentation with increased anxiety associated with pain and waiting on her medications  Pt needing continous cueing for encouragement  RUE Assessment   RUE Assessment WFL  (see OT eval for details)   LUE Assessment   LUE Assessment WFL  (see OT eval for details)   RLE Assessment   RLE Assessment WFL  (4-/5)   LLE Assessment   LLE Assessment X   LLE Overall AROM   L Knee Flexion ROM of L knee not permitted until J brace arrives     Strength LLE   L Hip Flexion 3-/5   L Knee Extension (contraindicated to test at this time)   L Ankle Dorsiflexion 3+/5   Coordination   Movements are Fluid and Coordinated 1   Sensation X   Light Touch   RLE Light Touch Grossly intact   LLE Light Touch Impaired   LLE Light Touch Comments Pt reports numbness and decreased sensation in heel   Bed Mobility   Supine to Sit 2  Maximal assistance   Additional items Assist x 2; Increased time required;Verbal cues;LE management   Transfers   Sit to Stand 3  Moderate assistance   Additional items Assist x 2; Increased time required;Verbal cues   Stand to Sit 3  Moderate assistance   Additional items Assist x 2; Increased time required;Verbal cues   Additional Comments Pt provided with verbal cueing for hand placement, technique, and safety  Balance   Static Sitting Fair   Dynamic Sitting Fair -   Static Standing Poor +   Endurance Deficit   Endurance Deficit Yes   Endurance Deficit Description fatigue, pain and anxiety   Activity Tolerance   Activity Tolerance Patient limited by fatigue;Patient limited by pain   Medical Staff [facility], OT   Nurse Made Aware yes; SCARLET Barry   Assessment   Prognosis Guarded   Problem List Decreased strength;Decreased range of motion;Decreased endurance; Impaired balance;Decreased mobility; Decreased safety awareness; Obesity;Orthopedic restrictions;Pain   Assessment PT consult received and evaluation complete  Pt is 38 y o  Female admitted from home w/ boyfriend and home health aide for 7 days/ week 8 hours/day for recurrent falls  Pt had 2 falls on 2/5  Pt suspected for L patellar dislocation  Orthopedics consulted and wrote for WBAT LLE and ROM permitted once J brace arrives  Pt currently in L knee immobilizer  Pt initially agreeable to participate w/ therapy and identified by 2 patient identifiers: name and birth date  Precautions include: chair alarm, bed alarm, fall risk, pain, wbs and J brace for L knee  Pt presents supine in bed  Pt has orders for up with assistance  PTA pt required A for mobility, lived in multi-level home and had 8 DUTCH w/ single railing, assistance ADLS, and assistance w/ IADLS, no driving, yes recent falls 5, and disabled   Pt presents w/ RLE MMT 4-/5, LLE MMT 3-/5 hip knee not tested in immobilizer 3+/5 ankle, supine>sit maxAx2, and 10/10 L knee pain  L knee motion not assessed d/t J brace not arrived yet  Pt would benefit from continued skilled PT for deficits in range of motion, strength, balance, locomotion, stair negotiation, functional endurance, functional mobility and safety awareness with mobility  At this time recommend d/c short term rehab  History: co-morbidities, fall risk, mult-level home, DUTCH, assisted for ADLS and assisted for IADLS Exam: strength, balance, functional endurance, functional mobility and safety awareness with mobility Barthel Index: 50 Modified Morris:4 Clinical: unstable/unpredictable:WBS, J brace, fall risk, obesity, ongoing management of primary diagnosis, anxiety, chair alarm, bed alarm, pain, decreased safety awareness, use of AD and 2 person assist Complexity: high   Barriers to Discharge Inaccessible home environment   Barriers to Discharge Comments 8 DUTCH and 21 stairs to 2nd floor   Goals   Patient Goals "to get her medications "   LTG Expiration Date 02/21/19   Long Term Goal #1 In 14 days pt will demonstrate: bed mobility minAx1 to promote OOB function, sit<>stand and functional transfers minAx1 w/ RW to promote OOB function, gait training 100ft minAx1 w/ RW to promote return to ambulation, steps minAx1 w/ railing to promote return to stair negotiation for home entrance and stairs to 2nd floor, improve BLE by 1/2 grade strength to optimize functional mobility, improve L knee ROM as tolerated in J brace only to promote WFL motion needed for [facility] gait pattern, improve balance by 1 grade to decrease fall risk, pt and family education on PT risk, role, benefits, POC, goals, and recommendations to optimize patient outcomes, patient functional, optimize LOS and promote discharge to least restrictive environment  Treatment Day 1   Plan   Treatment/Interventions Functional transfer training;LE strengthening/ROM; Therapeutic exercise; Endurance training;Patient/family training; Bed mobility;Gait training;Spoke to nursing;OT;Family   PT Frequency Other (Comment)  (3-5x/wk)   Recommendation   Recommendation (short term rehab)   Equipment Recommended (bariatric RW)   PT - OK to Discharge Yes  (yes to rehab)   Modified [place] Scale   Modified Sean Scale 4   Barthel Index   Feeding 10   Bathing 0   Grooming Score 5   Dressing Score 5   Bladder Score 10   Bowels Score 10   Toilet Use Score 5   Transfers (Bed/Chair) Score 5   Mobility (Level Surface) Score 0   Stairs Score 0   Barthel Index Score 50     Obdulia Saravia, PT ,DPT   PT TREATMENT IMMEDIATELY FOLLOWING EVALUATION    Time in: 0915  Time out: 0930  Total Time: 15 minutes      S: Pt began to get agitated EOB  Pt demonstrated increased anxiety related to pain and waiting for her pain medication  "It's too early for this, I didn't even get my medications they were due at 0845 "  O: Pt progressed mobility and gait by side stepping 6 steps to the L with minAx2 and support from RW  Pt demonstrates forward flexed posture, improper weight shift, short stride, and excessively slow shasta  Pt performed stand>sit modAx2 w/ cueing for hand placement and increased assistance for eccentric control of transfer  Pt provided maxAx2 for sit>supine needing increased assistance to lift BLE into bed and assist with trunk support  At end of PT treatment pt left supine in bed w/ HOB elevated, all needs in reach, call bell and phone in hand, and RN aware of patient status  A: Pt emotionally labile throughout treatment  Pt needing increased cueing for encouragement  Pt provided cueing for hand placement, technique, and safety with all mobility and transfers  Pt would benefit from continued skilled PT for gait, strength, balance, motion, and functional mobility  Recommend d/c short term rehab  Recommend use of bariatric RW and 2 person assistance at this time  P: Continue PT POC as written in initial evaluation      Romana Alvarado, PT,DPT

## 2019-02-07 NOTE — UTILIZATION REVIEW
Continued Stay Review    Date:  2/7/2019    Vital Signs: /65 (BP Location: Right arm)   Pulse 81   Temp 99 3 °F (37 4 °C) (Tympanic)   Resp 18   Ht 5' 10" (1 778 m)   Wt (!) 158 kg (348 lb 15 8 oz)   SpO2 96%   BMI 50 07 kg/m²      02/06/19 1532   100 7 °F (38 2 °C)   110  18  127/57  96 %  None (Room air)  Lying       Assessment/Plan: Left Knee pain - controlled with medication  JBrace ordered   PT Eval pending brace    Medications:   Scheduled Meds:   Current Facility-Administered Medications:  acetaminophen 650 mg Oral Q6H PRN    albuterol 2 puff Inhalation Q4H PRN    amitriptyline 10 mg Oral HS    docusate sodium 100 mg Oral BID    enoxaparin 40 mg Subcutaneous Daily    ferrous sulfate 325 mg Oral Daily    folic acid 1 mg Oral Daily    gabapentin 300 mg Oral TID    lactulose 20 g Oral Daily PRN    lidocaine 1 patch Topical Daily    nystatin  Topical BID    ondansetron 4 mg Intravenous Q6H PRN  IV x 1 2/7   pneumococcal 23-valent polysaccharide vaccine 0 5 mL Subcutaneous Prior to discharge    potassium chloride 20 mEq Oral Daily    QUEtiapine 150 mg Oral HS    senna 1 tablet Oral Daily    spironolactone 25 mg Oral Daily    torsemide 40 mg Oral Daily    traMADol 50 mg Oral Q6H PRN  x 2 2/7       Pertinent Labs/Diagnostic Results:  No labs or radiology    Age/Sex: 38 y o  female   Discharge Plan:  TBD      Network Utilization Review Department  Phone: [phone]; Fax [phone]  [email or web address]  ATTENTION: Please call with any questions or concerns to [phone]  and carefully listen to the prompts so that you are directed to the right person  Send all requests for admission clinical reviews, approved or denied determinations and any other requests to fax [phone]   All voicemails are confidential

## 2019-02-07 NOTE — PLAN OF CARE
Problem: PHYSICAL THERAPY ADULT  Goal: Performs mobility at highest level of function for planned discharge setting  See evaluation for individualized goals  Treatment/Interventions: Functional transfer training, LE strengthening/ROM, Therapeutic exercise, Endurance training, Patient/family training, Bed mobility, Gait training, Spoke to nursing, OT, Family  Equipment Recommended:  (bariatric RW)       See flowsheet documentation for full assessment, interventions and recommendations  Outcome: Progressing  Prognosis: Guarded  Problem List: Decreased strength, Decreased range of motion, Decreased endurance, Impaired balance, Decreased mobility, Decreased safety awareness, Obesity, Orthopedic restrictions, Pain  Assessment: PT consult received and evaluation complete  Pt is 38 y o  Female admitted from home w/ boyfriend and home health aide for 7 days/ week 8 hours/day for recurrent falls  Pt had 2 falls on 2/5  Pt suspected for L patellar dislocation  Orthopedics consulted and wrote for WBAT LLE and ROM permitted once J brace arrives  Pt currently in L knee immobilizer  Pt initially agreeable to participate w/ therapy and identified by 2 patient identifiers: name and birth date  Precautions include: chair alarm, bed alarm, fall risk, pain, wbs and J brace for L knee  Pt presents supine in bed  Pt has orders for up with assistance  PTA pt required A for mobility, lived in multi-level home and had 8 DUTCH w/ single railing, assistance ADLS, and assistance w/ IADLS, no driving, yes recent falls 5, and disabled  Pt presents w/ RLE MMT 4-/5, LLE MMT 3-/5 hip knee not tested in immobilizer 3+/5 ankle, supine>sit maxAx2, and 10/10 L knee pain  L knee motion not assessed d/t J brace not arrived yet  Pt would benefit from continued skilled PT for deficits in range of motion, strength, balance, locomotion, stair negotiation, functional endurance, functional mobility and safety awareness with mobility   At this time recommend d/c short term rehab  History: co-morbidities, fall risk, mult-level home, DUTCH, assisted for ADLS and assisted for IADLS Exam: strength, balance, functional endurance, functional mobility and safety awareness with mobility Barthel Index: 50 Modified Sean:4 Clinical: unstable/unpredictable:WBS, J brace, fall risk, obesity, ongoing management of primary diagnosis, anxiety, chair alarm, bed alarm, pain, decreased safety awareness, use of AD and 2 person assist Complexity: high  Barriers to Discharge: Inaccessible home environment  Barriers to Discharge Comments: 8 DUTCH and 21 stairs to 2nd floor  Recommendation:  (short term rehab)     PT - OK to Discharge: Yes (yes to rehab)    See flowsheet documentation for full assessment         Comments: Allen Pittman, PT,DPT

## 2019-02-07 NOTE — PROGRESS NOTES
Progress Note - Orthopedics   Fiona Wang 38 y o  female MRN: [id number]  Unit/Bed#: E2 -01 Encounter: [id number]    Assessment:  38yo female left knee pain    Plan:  Pain control prn  Med mgt per SLIM  J brace ordered  PT/OT WBAT LLE      Subjective: Pt seen and examined  Her pain is controlled with medicaton  She feels about the same at yesterday     Vitals: Blood pressure 116/69, pulse 87, temperature 97 6 °F (36 4 °C), temperature source Tympanic, resp  rate 18, height 5' 10" (1 778 m), weight (!) 158 kg (348 lb 15 8 oz), SpO2 95 %  ,Body mass index is 50 07 kg/m²  Intake/Output Summary (Last 24 hours) at 02/07/19 1301  Last data filed at 02/07/19 0815   Gross per 24 hour   Intake                0 ml   Output             2025 ml   Net            -2025 ml       Invasive Devices     Peripheral Intravenous Line            Peripheral IV 02/05/19 Left Antecubital 1 day                Ortho Exam: LLE  -Diffuse TTP over knee  -ROM limited due to pain 5-60  -no ecchymosis  -sensation L4, L5,S1 intact  -EHL/AT/GS intact  -palpable pedal pulse  -compartments soft  Lab, Imaging and other studies: I have personally reviewed pertinent lab results    CBC: No results found for: WBC, HGB, HCT, MCV, PLT, ADJUSTEDWBC, MCH, MCHC, RDW, MPV, NRBC  CMP: No results found for: SODIUM, CL, CO2, ANIONGAP, BUN, CREATININE, GLUCOSE, CALCIUM, AST, ALT, ALKPHOS, PROT, BILITOT, EGFR

## 2019-02-08 NOTE — OCCUPATIONAL THERAPY NOTE
OccupationalTherapy Progress Note(time=1550-1620)     Patient Name: Romario Carlson  CSZNZ'Y Date: 2/8/2019  Problem List  Patient Active Problem List   Diagnosis    Recurrent falls    Chronic pain syndrome    Morbid obesity with BMI of 50 0-59 9, adult ([facility] )    Hypokalemia    Anasarca    Chest pain    Other bipolar disorder ([facility] )    Noncompliance with medication regimen    Alcoholic liver disease ([facility] )    RUQ pain    Febrile illness    Coag negative Staphylococcus bacteremia    Anemia    Recurrent falls while walking    Acute hypokalemia    Hyperammonemia (HCC)    Bipolar disorder ([facility] )    Other cirrhosis of liver ([facility] )    Pulmonary nodule    Other chronic pain           02/08/19 1620   Restrictions/Precautions   Weight Bearing Precautions Per Order Yes   LLE Weight Bearing Per Order WBAT   Braces or Orthoses LE Immobilizer   Other Precautions Fall Risk;Pain   Pain Assessment   Pain Assessment FLACC   Pain Rating: FLACC (Rest) - Face 0   Pain Rating: FLACC (Rest) - Legs 0   Pain Rating: FLACC (Rest) - Activity 0   Pain Rating: FLACC (Rest) - Cry 1   Pain Rating: FLACC (Rest) - Consolability 0   Score: FLACC (Rest) 1   ADL   Where Assessed Edge of bed   LB Dressing Assistance 5  Supervision/Setup   LB Dressing Deficit Pull up over hips; Increased time to complete;Verbal cueing   Bed Mobility   Rolling R 5  Supervision   Rolling L 5  Supervision   Supine to Sit 5  Supervision   Additional items Increased time required;Verbal cues;LE management   Sit to Supine 4  Minimal assistance   Additional items Assist x 2; Increased time required;Verbal cues;LE management   Transfers   Sit to Stand 4  Minimal assistance   Additional items Assist x 1; Increased time required   Functional Mobility   Functional Mobility 4  Minimal assistance   Additional Comments x1   Additional items Rolling walker   Cognition   Overall Cognitive Status [facility]   Arousal/Participation Alert   Attention Attends with cues to redirect Following Commands Follows one step commands without difficulty   Activity Tolerance   Activity Tolerance Patient limited by fatigue;Patient limited by pain   Medical Staff Made Aware nsg, P T  Assessment   Assessment Pt seen for 30 min tx session with focus on functional balance, functional mobility, ADL status, and education  Pt was able to tolerate OOB mobility; sitting balance=g/g-, standing balance=f/f+  Pt required verbal cues to maintain transfer/walker safety  Pt able to demonstrate supervision with her LE ADLs  Pt required encouragement to increase activity level  Pt may benefit from inpt rehab to improve her overall level of independence  Will continue  Plan   Treatment Interventions ADL retraining;Functional transfer training; Endurance training;Patient/family training; Compensatory technique education   Goal Expiration Date 02/18/19   Treatment Day 1   OT Frequency 3-5x/wk   Recommendation   OT Discharge Recommendation Short Term Rehab   Barthel Index   Feeding 10   Bathing 0   Grooming Score 5   Dressing Score 5   Bladder Score 10   Bowels Score 10   Toilet Use Score 10   Transfers (Bed/Chair) Score 10   Mobility (Level Surface) Score 0   Stairs Score 0   Barthel Index Score 60   Juan Davison, OT

## 2019-02-08 NOTE — PLAN OF CARE
Problem: PHYSICAL THERAPY ADULT  Goal: Performs mobility at highest level of function for planned discharge setting  See evaluation for individualized goals  Treatment/Interventions: Functional transfer training, LE strengthening/ROM, Therapeutic exercise, Endurance training, Patient/family training, Bed mobility, Gait training, Spoke to nursing, OT, Family  Equipment Recommended:  (bariatric RW)       See flowsheet documentation for full assessment, interventions and recommendations  Outcome: Progressing  Prognosis: Guarded  Problem List: Decreased strength, Decreased range of motion, Decreased endurance, Impaired balance, Decreased mobility, Decreased cognition, Impaired judgement, Decreased safety awareness, Orthopedic restrictions, Pain, Decreased skin integrity, Obesity  Assessment: Pt  seated at EOB with OTR present upon my arrival  Per RN, brace received for J brace however unable to properly fit at this time  Thus other knee brace applied, will await further orders for when ROM acceptable with different bracing  Progressed with transfers continuing to require A of therapist with cues for hand placement/technique  Progressed with limited amb  trial with use of RW and A of therapist with cues for LE sequencing  Pt  remains fearful of falling at this time thus requiring quick return to EOB  Repositioned supine in bed with A of 2  Performance of limited HEP supine in bed with A of therapist  Pt  remained supine in bed with ice applied and alarm active at end of treatment session  PT will continue to recommend rehab upon d/c for continued improvement of noted impairments above when medically stable  Barriers to Discharge: Inaccessible home environment  Barriers to Discharge Comments: 8 DUTCH and 21 stairs to 2nd floor  Recommendation: Short-term skilled PT     PT - OK to Discharge: Yes (if d/c to rehab when medically stable )    See flowsheet documentation for full assessment

## 2019-02-08 NOTE — PHYSICAL THERAPY NOTE
Physical Therapy Progress Note   02/08/19 1638   Pain Assessment   Pain Assessment [facility] Pain Intervention(s) Ambulation/increased activity;Cold applied;Repositioned   Pain Rating: FLACC (Rest) - Face 0   Pain Rating: FLACC (Rest) - Legs 0   Pain Rating: FLACC (Rest) - Activity 0   Pain Rating: FLACC (Rest) - Cry 1   Pain Rating: FLACC (Rest) - Consolability 0   Score: FLACC (Rest) 1   Pain Rating: FLACC (Activity) - Face 1   Pain Rating: FLACC (Activity) - Legs 1   Pain Rating: FLACC (Activity) - Activity 1   Pain Rating: FLACC (Activity) - Cry 1   Pain Rating: FLACC (Activity) - Consolability 1   Score: FLACC (Activity) 5   Restrictions/Precautions   Weight Bearing Precautions Per Order Yes   LLE Weight Bearing Per Order WBAT   Braces or Orthoses LE Immobilizer  (J brace unable to fit properly per RN)   Other Precautions Fall Risk;Pain;WBS;Cognitive; Bed Alarm   General   Chart Reviewed Yes   Response to Previous Treatment Patient reporting fatigue but able to participate  Family/Caregiver Present No   Subjective   Subjective Willing to participate in therapy this PM    Bed Mobility   Supine to Sit 5  Supervision   Additional items Assist x 1;HOB elevated; Bedrails;Leg ; Increased time required;Verbal cues;LE management   Sit to Supine 4  Minimal assistance   Additional items Assist x 2;Bedrails;Leg ; Increased time required;LE management;Verbal cues   Transfers   Sit to Stand 4  Minimal assistance   Additional items Assist x 1;Bedrails; Increased time required;Verbal cues   Stand to Sit 4  Minimal assistance   Additional items Assist x 1;Bedrails; Increased time required;Verbal cues   Ambulation/Elevation   Gait pattern Improper Weight shift;Decreased foot clearance;Decreased L stance; Short stride; Step to;Excessively slow; Antalgic; Wide LEONIE; Inconsistent shasta   Gait Assistance 4  Minimal assist   Additional items Assist x 2;Verbal cues; Tactile cues   Assistive Device Bariatric Rolling walker   Distance 10' x 2 with a standing resting period in between   Balance   Static Sitting Fair   Dynamic Sitting Fair -   Static Standing Poor +   Dynamic Standing Poor   Ambulatory Poor   Endurance Deficit   Endurance Deficit Yes   Endurance Deficit Description fatigue/pain   Activity Tolerance   Activity Tolerance Patient limited by fatigue;Patient limited by pain   Nurse Made Aware Yes   Exercises   Hip Flexion Supine;10 reps;Bilateral;AAROM   Hip Abduction Supine;10 reps;AAROM; Bilateral   Hip Adduction Supine;10 reps;AAROM; Bilateral   Ankle Pumps Supine;10 reps;AROM; Bilateral   Assessment   Prognosis Guarded   Problem List Decreased strength;Decreased range of motion;Decreased endurance; Impaired balance;Decreased mobility; Decreased cognition; Impaired judgement;Decreased safety awareness;Orthopedic restrictions;Pain;Decreased skin integrity;Obesity   Assessment Pt  seated at EOB with OTR present upon my arrival  Per RN, brace received for J brace however unable to properly fit at this time  Thus other knee brace applied, will await further orders for when ROM acceptable with different bracing  Progressed with transfers continuing to require A of therapist with cues for hand placement/technique  Progressed with limited amb  trial with use of RW and A of therapist with cues for LE sequencing  Pt  remains fearful of falling at this time thus requiring quick return to EOB  Repositioned supine in bed with A of 2  Performance of limited HEP supine in bed with A of therapist  Pt  remained supine in bed with ice applied and alarm active at end of treatment session  PT will continue to recommend rehab upon d/c for continued improvement of noted impairments above when medically stable  Barriers to Discharge Inaccessible home environment   Barriers to Discharge Comments 8 DUTCH and 21 stairs to 2nd floor   Goals   Patient Goals To get better     LTG Expiration Date 02/21/19   Treatment Day 2   Plan Treatment/Interventions Functional transfer training;LE strengthening/ROM; Therapeutic exercise; Endurance training;Bed mobility;Gait training;Spoke to nursing;Spoke to case management;OT   Progress Slow progress, decreased activity tolerance   PT Frequency Other (Comment)  (3-5x/wk)   Recommendation   Recommendation Short-term skilled PT   Equipment Recommended Walker  (bariatric RW)   PT - OK to Discharge Yes  (if d/c to rehab when medically stable )     Maegan Jones, PTA
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
## 2019-02-08 NOTE — PLAN OF CARE
Problem: OCCUPATIONAL THERAPY ADULT  Goal: Performs self-care activities at highest level of function for planned discharge setting  See evaluation for individualized goals  Treatment Interventions: ADL retraining, Functional transfer training, UE strengthening/ROM, Endurance training, Cognitive reorientation, Patient/family training, Equipment evaluation/education, Compensatory technique education          See flowsheet documentation for full assessment, interventions and recommendations  Limitation: Decreased ADL status, Decreased UE strength, Decreased Safe judgement during ADL, Decreased endurance, Decreased high-level ADLs  Prognosis: Fair  Assessment: Pt seen for 30 min tx session with focus on functional balance, functional mobility, ADL status, and education  Pt was able to tolerate OOB mobility; sitting balance=g/g-, standing balance=f/f+  Pt required verbal cues to maintain transfer/walker safety  Pt able to demonstrate supervision with her LE ADLs  Pt required encouragement to increase activity level  Pt may benefit from inpt rehab to improve her overall level of independence  Will continue        OT Discharge Recommendation: Short Term Rehab
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
## 2019-02-09 NOTE — PROGRESS NOTES
Progress Note - Rosa Patrick 1975, 38 y o  female MRN: [id number]    Unit/Bed#: E2 -01 Encounter: [id number]    Primary Care Provider: Lisa King MD   Date and time admitted to hospital: 2/5/2019  8:05 PM        * Recurrent falls   Assessment & Plan    · Multifactorial secondary to morbid obesity, ambulatory dysfunction, alcoholic liver disease  · Orthopedic recommendations reviewed  · Ambulate and weight bear as tolerated  · Short-term rehab placement when available     Alcoholic liver disease ([facility] 75 )   Assessment & Plan    Continue torsemide 40 mg daily  Aldactone increased to 50 mg daily  Other bipolar disorder ([facility] 75 )   Assessment & Plan    · Continue amitriptyline 10 mg daily and Seroquel 150 mg at bedtime     Hypokalemia   Assessment & Plan    · Secondary to diuretic  · Continue potassium supplementation  · Aldactone dose increased to 50 mg from 25 mg     Other chronic pain   Assessment & Plan    The patient complains of persistent pain not improved with tramadol q 6  She reported that she was just prescribed oxycodone 10 mg recently by a new pain management doctor  I reviewed the [email or web address] website with her and last oxycodone dose was 5 mg in June 2018  I told her that I could not prescribe her oxycodone and that the most I can give his tramadol  Will increase tramadol to every 4 hr p r n  Pulmonary nodule   Assessment & Plan    · CT shows several subpleural nodules including a 6-7mm nodule in the posterior right apex and a 4-5mm nodule posterior BAUTISTA  Recommended 6 month follow-up chest CT for stability/interval change  Can follow up outpatient       Morbid obesity with BMI of 50 0-59 9, adult ([facility] 75 )   Assessment & Plan    · Remote history of gastric bypass but still with BMI of 50  · Unfortunately due to history of gastric bypass, NSAID could not be part of chronic pain regimen         VTE Pharmacologic Prophylaxis:   Pharmacologic: Enoxaparin (Lovenox)  Mechanical VTE Prophylaxis in Place: No    Patient Centered Rounds: I have performed bedside rounds with nursing staff today  Time Spent for Care: 20 minutes  More than 50% of total time spent on counseling and coordination of care as described above  Current Length of Stay: 0 day(s)    Current Patient Status: Observation   Certification Statement: The patient, admitted on an observation basis, will now require > 2 midnight hospital stay due to Placement    Discharge Plan:  Short-term rehab    Code Status: Level 1 - Full Code      Subjective:   Patient is complaining of persistent left knee pain  She was initially requesting stronger pain medication  She reported that she was recently prescribed oxycodone which was not confirmed on PDMP  She is requesting tramadol to be increased to every 4 hr     Objective:     Vitals:   Temp (24hrs), Av 9 °F (37 2 °C), Min:98 7 °F (37 1 °C), Max:99 3 °F (37 4 °C)    Temp:  [98 7 °F (37 1 °C)-99 3 °F (37 4 °C)] 98 8 °F (37 1 °C)  HR:  [80-87] 81  Resp:  [18] 18  BP: (115-119)/(55-62) 115/57  SpO2:  [93 %-96 %] 96 %  Body mass index is 50 07 kg/m²  Input and Output Summary (last 24 hours): Intake/Output Summary (Last 24 hours) at 19 0955  Last data filed at 19 0917   Gross per 24 hour   Intake                0 ml   Output              800 ml   Net             -800 ml       Physical Exam:     Physical Exam   Constitutional: She appears well-developed  Morbidly obese   HENT:   Head: Normocephalic and atraumatic  Eyes: No scleral icterus  Neck: No JVD present  Cardiovascular: Regular rhythm  Exam reveals no friction rub  No murmur heard  Pulmonary/Chest: Effort normal  No respiratory distress  She has no wheezes  Abdominal: Soft  She exhibits no distension  There is no tenderness  Musculoskeletal: She exhibits tenderness  She exhibits no deformity  Tenderness to palpation of left knee but no redness/warmth/open area   Skin: Skin is warm and dry     Psychiatric: She has a normal mood and affect  Additional Data:     Labs:      Results from last 7 days  Lab Units 02/08/19  0448   WBC Thousand/uL 5 26   HEMOGLOBIN g/dL 11 1*   HEMATOCRIT % 32 1*   PLATELETS Thousands/uL 146*   NEUTROS PCT % 60   LYMPHS PCT % 25   MONOS PCT % 11   EOS PCT % 3       Results from last 7 days  Lab Units 02/08/19  0448   SODIUM mmol/L 133*   POTASSIUM mmol/L 3 4*   CHLORIDE mmol/L 96*   CO2 mmol/L 30   BUN mg/dL 6   CREATININE mg/dL 0 78   ANION GAP mmol/L 7   CALCIUM mg/dL 8 1*   GLUCOSE RANDOM mg/dL 90                           * I Have Reviewed All Lab Data Listed Above  * Additional Pertinent Lab Tests Reviewed:  All Labs Within Last 24 Hours Reviewed    Imaging:    Imaging Reports Reviewed Today Include:  CT knee      Recent Cultures (last 7 days):           Last 24 Hours Medication List:     Current Facility-Administered Medications:  acetaminophen 650 mg Oral Q6H PRN Idalia Pompa MD   albuterol 2 puff Inhalation Q4H PRN Sandy Charisma, CRNP   amitriptyline 10 mg Oral HS Sandy Charisma, CRNP   docusate sodium 100 mg Oral BID Sandy Charisma, CRNP   enoxaparin 40 mg Subcutaneous Daily Sandy Charisma, CRNP   ferrous sulfate 325 mg Oral Daily Sandy Charisma, CRNP   folic acid 1 mg Oral Daily Sandy Charisma, CRNP   gabapentin 300 mg Oral TID Idalia Pompa MD   lactulose 20 g Oral Daily PRN Sandy Charisma, CRNP   lidocaine 1 patch Topical Daily Sandy Charisma, CRNP   nystatin  Topical BID Sandy Charisma, CRNP   ondansetron 4 mg Intravenous Q6H PRN Sandy Charisma, CRNP   pneumococcal 23-valent polysaccharide vaccine 0 5 mL Subcutaneous Prior to discharge Sandy Charisma, CRNP   potassium chloride 20 mEq Oral Daily Sandy Charisma, CRNP   QUEtiapine 150 mg Oral HS Idalia Pompa MD   senna 1 tablet Oral Daily Sandy Charisma, ALESHANP   spironolactone 50 mg Oral Daily Idalia Pompa MD   torsemide 40 mg Oral Daily Sandy Charisma, CRNP   traMADol 50 mg Oral Q4H PRN Rama Calderon MD        Today, Patient Was Seen By: Rama Calderon MD    ** Please Note: Dictation voice to text software may have been used in the creation of this document   **

## 2019-02-09 NOTE — ASSESSMENT & PLAN NOTE
The patient complains of persistent pain not improved with tramadol q 6  She reported that she was just prescribed oxycodone 10 mg recently by a new pain management doctor  I reviewed the [employer] website with her and last oxycodone dose was 5 mg in June 2018  I told her that I could not prescribe her oxycodone and that the most I can give his tramadol  Will increase tramadol to every 4 hr p r n

## 2019-02-09 NOTE — PROGRESS NOTES
Pt refusing to wear any brace at night while sleeping  "It's hot and itches"  Offered fan, powder, etc, but pt adamant about taking brace off  Willing to wear it when she needs to get OOB to use the [facility]

## 2019-02-09 NOTE — ASSESSMENT & PLAN NOTE
· Remote history of gastric bypass but still with BMI of 50  · Unfortunately due to history of gastric bypass, NSAID could not be part of chronic pain regimen

## 2019-02-09 NOTE — ASSESSMENT & PLAN NOTE
· Secondary to diuretic  · Continue potassium supplementation  · Aldactone dose increased to 50 mg from 25 mg

## 2019-02-09 NOTE — UTILIZATION REVIEW
Continued Stay Review    Date:    2/9/2019    Vital Signs: /57 (BP Location: Right arm)   Pulse 81   Temp 98 8 °F (37 1 °C) (Tympanic)   Resp 18   Ht 5' 10" (1 778 m)   Wt (!) 158 kg (348 lb 15 8 oz)   SpO2 96%   BMI 50 07 kg/m²      Assessment/Plan:   Recurrent falls   Assessment & Plan     · Multifactorial secondary to morbid obesity, ambulatory dysfunction, alcoholic liver disease  · Orthopedic recommendations reviewed  · Ambulate and weight bear as tolerated  · Short-term rehab placement when available      Alcoholic liver disease (HCC)   Assessment & Plan     Continue torsemide 40 mg daily  Aldactone increased to 50 mg daily        Other bipolar disorder (HCC)   Assessment & Plan     · Continue amitriptyline 10 mg daily and Seroquel 150 mg at bedtime      Hypokalemia   Assessment & Plan     · Secondary to diuretic  · Continue potassium supplementation  · Aldactone dose increased to 50 mg from 25 mg     Other chronic pain   Assessment & Plan     The patient complains of persistent pain not improved with tramadol q 6  She reported that she was just prescribed oxycodone 10 mg recently by a new pain management doctor  I reviewed the [email or web address] website with her and last oxycodone dose was 5 mg in June 2018  I told her that I could not prescribe her oxycodone and that the most I can give his tramadol  Will increase tramadol to every 4 hr p r n       Pulmonary nodule   Assessment & Plan     · CT shows several subpleural nodules including a 6-7mm nodule in the posterior right apex and a 4-5mm nodule posterior BAUTISTA  Recommended 6 month follow-up chest CT for stability/interval change    Can follow up outpatient       Morbid obesity with BMI of 50 0-59 9, adult ([facility] [address] )   Assessment & Plan     · Remote history of gastric bypass but still with BMI of 50  · Unfortunately due to history of gastric bypass, NSAID could not be part of chronic pain regimen     The patient, admitted on an observation basis, will now require > 2 midnight hospital stay due to Placement         Medications:   Scheduled Meds:   Current Facility-Administered Medications:  acetaminophen 650 mg Oral Q6H PRN Aureliano Alvarez MD   albuterol 2 puff Inhalation Q4H PRN Raford Ores, CRNP   amitriptyline 10 mg Oral HS Raford Ores, CRNP   docusate sodium 100 mg Oral BID Raford Ores, CRNP   enoxaparin 40 mg Subcutaneous Daily Raford Ores, CRNP   ferrous sulfate 325 mg Oral Daily Raford Ores, CRNP   folic acid 1 mg Oral Daily Raford Ores, CRNP   gabapentin 300 mg Oral TID Aureliano Alvarez MD   lactulose 20 g Oral Daily PRN Raford Ores, CRNP   lidocaine 1 patch Topical Daily Raford Ores, CRNP   nystatin  Topical BID Raford Ores, CRNP   ondansetron 4 mg Intravenous Q6H PRN Raford Ores, CRNP   pneumococcal 23-valent polysaccharide vaccine 0 5 mL Subcutaneous Prior to discharge Raford Ores, CRNP   potassium chloride 20 mEq Oral Daily Raford Ores, CRNP   QUEtiapine 150 mg Oral HS Aureliano Alvarez MD   senna 1 tablet Oral Daily Raford Ores, CRNP   spironolactone 50 mg Oral Daily Aureliano Alvarez MD   torsemide 40 mg Oral Daily Raford Ores, CRADRYAN   traMADol 50 mg Oral Q4H PRN Aureliano Alvarez MD     Continuous Infusions:    PRN Meds:   acetaminophen    albuterol    lactulose    ondansetron    pneumococcal 23-valent polysaccharide vaccine    traMADol     Pertinent Labs/Diagnostic Results:    No labs  2/9    Discharge Plan:    [address] Utilization Review Department  Phone: [phone]; Fax [phone]  [email or web address]  ATTENTION: Please call with any questions or concerns to [phone]  and carefully listen to the prompts so that you are directed to the right person  Send all requests for admission clinical reviews, approved or denied determinations and any other requests to fax [phone]   All voicemails are confidential

## 2019-02-10 NOTE — UTILIZATION REVIEW
Initial Clinical Review     Admission: Date/Time/Statement:   OBS  Order  2/5  @   2317    IP   ORDER  ENTERED   2/9  @   0797    The patient, admitted on an observation basis, will now require > 2 midnight hospital stay due to Ambulatory dysfucntion and STR             02/09/19 2346  Inpatient Admission Once     Transfer Service: General Medicine       Question Answer Comment   Admitting Physician Aramis Lyons    Level of Care Med Surg    Estimated length of stay More than 2 Midnights    Certification I certify that inpatient services are medically necessary for this patient for a duration of greater than two midnights  See H&P and MD Progress Notes for additional information about the patient's course of treatment  Start Status    02/09/19 2346 Completed Details       02/09/19 2345              ED: Date/Time/Mode of Arrival:             ED Arrival Information      Expected Arrival Acuity Means of Arrival Escorted By Service Admission Type     - 2/5/2019 20:05 Urgent Ambulance [facility] General Medicine Urgent     Arrival Complaint     Fall          Chief Complaint:        Chief Complaint   Patient presents with    Fall       Pt reports falling down 7 steps  Pt states, "I have been falling a lot and I can't walk anymore"  Denies hitting head  "I have pain everywhere"        History of Illness: Velinda Paget a 38 y  o  female who presents with c/o fall, states she fell earlier today, went to ER was discharged home, as she was trying to walk up the steps she fell again hitting her face and abdomen  C/o generalized body aches and weakness in left leg, states she is bed ridden and has a home attendant   I asked how she ambulates and she reports that she is bed ridden, I asked if bedridden how she fell walking upstairs   she states she uses a cane or walker to ambulate   Denies loss of consciousness   Complains of generalized pain, requesting pain medication, states she was given oxycodone without relief   Patient answers yes to every question asked during my assessment, reports numbness and weakness of all extremities; reports nausea and vomiting every morning secondary to gastric bypass, reports constipation and diarrhea, reports dysuria secondary to a sore on her buttocks      ED Vital Signs:            ED Triage Vitals [02/05/19 2008]   Temperature Pulse Respirations Blood Pressure SpO2   98 4 °F (36 9 °C) 88 16 135/62 97 %       Temp Source Heart Rate Source Patient Position - Orthostatic VS BP Location FiO2 (%)   Oral Monitor Sitting Right arm --       Pain Score           Worst Possible Pain                Wt Readings from Last 1 Encounters:   02/06/19 (!) 158 kg (348 lb 15 8 oz)      Vital Signs (abnormal):   above     Pertinent Labs/Diagnostic Test Results:    Ct  Abd/pelvis:    No evidence of acute visceral or vascular injury in the abdomen/pelvis  Fatty infiltration of the liver is suspected   In the setting of abdominal pain and/or elevated liver function tests, consider steatohepatitis       Prominent perisplenic, perirenal, and perigastric veins/varices are noted which have increased in prominence from the prior study     Cholelithiasis without discrete CT evidence of acute cholecystitis, correlation with the patient's symptoms and laboratory values recommended  Shotty lymph nodes in the retroperitoneum, upper abdomen, and zelda hepatis, as described; nonspecific, possibly reactive      Suspected fibroid uterus, colonic diverticulosis without discrete evidence of acute diverticulitis, body wall edema/anasarca, and other findings as above      Ct  Head:   No  Intracranial abnormality  Ct  C/spine:   No acute  fracture     ED Treatment:              Medication Administration from 02/05/2019 2005 to 02/06/2019 0010        Date/Time Order Dose Route Action Action by Comments       02/05/2019 2044 methocarbamol (ROBAXIN) tablet 500 mg 500 mg Oral Given Francisco Stephen RN         02/05/2019 4009 iohexol (OMNIPAQUE) 350 MG/ML injection (MULTI-DOSE) 100 mL 100 mL Intravenous Given Allyson Perez            Past Medical/Surgical History:                 Past Medical History:   Diagnosis Date    Arthritis      Fibromyalgia      Herniated cervical disc      Hypertension      Psychiatric disorder      Sarcoidosis      Scoliosis        Admitting Diagnosis: Back pain [M54 9]  Chronic pain [G89 29]  Drug-seeking behavior [Z76 5]  Frequent falls [R29 6]      Assessment/Plan:   Recurrent falls   Assessment & Plan     · History of recurrent falls  Seen earlier today in [address] c/o fall, diagnosed closed patellar dislocation on left  Knee immobilizer placed, discharged home with outpatient orthopedic follow-up  · Patient reports another fall when she arrived home; reports falling on her abdomen and face, CT of abd/pelvis: Negative for acute visceral or vascular injury  CT Head and C spine: negative  · CT L knee: Neg for acute fx; somewhat lateral lie of the patella without definite medial retinacular injury  · XR L knee and hip/pelv:  Negative for acute fracture or dislocation  · PT OT consult  · Continue left knee immobilizer, can follow up with Ortho as outpatient          Drug-seeking behavior   Assessment & Plan     · Patient focused on pain medication regimen, states she was given oxycodone earlier today with no relief of pain   States she takes oxycodone q4 hours scheduled and does not miss a dose  · PMED reviewed, patient has not received Rx for oxycodone since 6/29/2018  Last Rx: Oxycodone 5mg, Qty 30/9days  · As per pain mgmt note 6/29/17: Patient had a random urine drug screen which was abnormal, because she broke her opiate agreement she was discharged from his practice on February 24, 2017  Patient appeared to be using her opioid analgesics with self escalation, her [place] PDMP noted that she received 35 prescriptions from 16 different providers since July 2016   It was decided that patient was not a candidate for opiate analgesics  · Will treat with p r n  Tramadol, Ibuprofen, APAP, and robaxin  · Topical Lidocaine TD patch      Morbid obesity with BMI of 50 0-59 9, adult (HCC)   Assessment & Plan     · S/p gastric bypass; Diet control          Pulmonary nodule   Assessment & Plan     · CT shows several subpleural nodules including a 6-7mm nodule in the posterior right apex and a 4-5mm nodule posterior BAUTISTA    Recommended 6 month follow-up chest CT for stability/interval change   Can follow up outpatient       Alcoholic liver disease ([facility] [address] )   Assessment & Plan     · Reports sobriety; no transaminitis, avoid hepatotoxins  · On Aldactone and torsemide for suspected underlying cirrhosis      Other bipolar disorder (HCC)   Assessment & Plan     · Continue amitriptyline and Seroquel      Hypokalemia   Assessment & Plan     · K 3 1, replete   Monitor BMP             Ambulatory dysfunction   Assessment & Plan     · Reports she is bed bound and has home health aide at home     · Supportive care       Anticipated Length of Stay: Maximus Perez will be admitted on an Observation basis with an anticipated length of stay of  < 2 midnights    Justification for Hospital Stay: fall              Admission Orders:   IP   Order  2/9  @   6081  Scheduled Meds:   Current Facility-Administered Medications:  acetaminophen 650 mg Oral Q6H PRN Santiago Grimes, ALESHANP   albuterol 2 puff Inhalation Q4H PRN Santiago Grimes, AMANDA   amitriptyline 10 mg Oral HS Santiago Grimes, AMANDA   amLODIPine 10 mg Oral Daily Santiago Grimes, AMANDA   docusate sodium 100 mg Oral BID Santiago Grimes, AMANDA   enoxaparin 40 mg Subcutaneous Daily AMANDA Watson   ferrous sulfate 325 mg Oral Daily Santiago Grimes, AMANDA   folic acid 1 mg Oral Daily Santiago Grimes, AMANDA   gabapentin 600 mg Oral TID Santiago Grimes, AMANDA   ibuprofen 400 mg Oral Q8H PRN Santiago Grimes, AMANDA   lactulose 20 g Oral Daily PRN Santiago Grimes, AMANDA lidocaine 1 patch Topical Daily Relda Qualia, CRNP   methocarbamol 500 mg Oral Q6H PRN Relda Qualia, CRNP   nystatin   Topical BID Relda Qualia, CRNP   ondansetron 4 mg Intravenous Q6H PRN Relda Qualia, CRNP   pneumococcal 23-valent polysaccharide vaccine 0 5 mL Subcutaneous Prior to discharge Relda Qualia, CRNP   potassium chloride 20 mEq Oral Daily Relda Qualia, CRNP   QUEtiapine 100 mg Oral HS Relda Qualia, CRNP   senna 1 tablet Oral Daily Relda Qualia, CRNP   spironolactone 25 mg Oral Daily Relda Qualia, CRNP   torsemide 40 mg Oral Daily Relda Qualia, CRNP   traMADol 50 mg Oral Q6H PRN Relda Qualia, CRNP      Continuous Infusions:    PRN Meds:   acetaminophen    albuterol    ibuprofen    lactulose    methocarbamol    ondansetron    pneumococcal 23-valent polysaccharide vaccine    traMADol        PT/OT    PROGRESS  NOTE   2/8  Recurrent falls   Assessment & Plan     · Multifactorial secondary to morbid obesity, ambulatory dysfunction, alcoholic liver disease  · Orthopedic recommendations reviewed  · Ambulate and weight bear as tolerated  · IV  zofran  PRN  (  X 2)     PROGRESS  NOTE   2/9  Recurrent falls   Assessment & Plan     · Multifactorial secondary to morbid obesity, ambulatory dysfunction, alcoholic liver disease  · Orthopedic recommendations reviewed  · Ambulate and weight bear as tolerated   IV  zofran PRN   (  x2)               Network Utilization Review Department  Phone: [phone]; Fax [phone]  [email or web address]  ATTENTION: Please call with any questions or concerns to [phone]  and carefully listen to the prompts so that you are directed to the right person  Send all requests for admission clinical reviews, approved or denied determinations and any other requests to fax [phone]   All voicemails are confidential

## 2019-02-10 NOTE — PROGRESS NOTES
Progress Note - Mabel Cook 1975, 38 y o  female MRN: [id number]    Unit/Bed#: E2 -01 Encounter: [id number]    Primary Care Provider: Laila Busby MD   Date and time admitted to hospital: 2/5/2019  8:05 PM        * Recurrent falls  Assessment & Plan  · Multifactorial secondary to morbid obesity, ambulatory dysfunction, alcoholic liver disease  · Orthopedic recommendations reviewed  · Ambulate and weight bear as tolerated  · Short-term rehab placement when available    Alcoholic liver disease ([place] [address] )  Assessment & Plan  Continue torsemide 40 mg daily  Aldactone increased to 50 mg daily  Repeat labs in a m  Other bipolar disorder ([facility] )  Assessment & Plan  · Continue amitriptyline 10 mg daily and Seroquel 150 mg at bedtime    Hypokalemia  Assessment & Plan  · Secondary to diuretic  · Continue potassium supplementation  · Aldactone dose increased to 50 mg from 25 mg  · Recheck BMP in a m  Other chronic pain  Assessment & Plan  Pain seems to be improved today  Continue tramadol 50 mg Q 4 p r n  Due to history of opioid abuse, tramadol is the most I can give to her regarding pain  PDMP website reviewed  Pulmonary nodule  Assessment & Plan  · CT shows several subpleural nodules including a 6-7mm nodule in the posterior right apex and a 4-5mm nodule posterior BAUTISTA  Recommended 6 month follow-up chest CT for stability/interval change  Can follow up outpatient  Morbid obesity with BMI of 50 0-59 9, adult ([place] [address] )  Assessment & Plan  · Remote history of gastric bypass but still with BMI of 50  · Unfortunately due to history of gastric bypass, NSAID could not be part of chronic pain regimen        VTE Pharmacologic Prophylaxis:   Pharmacologic: Enoxaparin (Lovenox)  Mechanical VTE Prophylaxis in Place: No    Time Spent for Care: 20 minutes  More than 50% of total time spent on counseling and coordination of care as described above      Current Length of Stay: 1 day(s)    Current Patient Status: Inpatient   Certification Statement: The patient will continue to require additional inpatient hospital stay due to Placement    Discharge Plan:  Short-term rehab when available    Code Status: Level 1 - Full Code      Subjective:   Still with difficulty walking due to pain and weakness  She feels that her knee gives out when she tries to walk    Objective:     Vitals:   Temp (24hrs), Av 4 °F (36 9 °C), Min:98 °F (36 7 °C), Max:98 8 °F (37 1 °C)    Temp:  [98 °F (36 7 °C)-98 8 °F (37 1 °C)] 98 °F (36 7 °C)  HR:  [86-92] 86  Resp:  [20] 20  BP: (105-111)/(62-76) 111/76  SpO2:  [93 %-95 %] 95 %  Body mass index is 50 07 kg/m²  Input and Output Summary (last 24 hours): Intake/Output Summary (Last 24 hours) at 2/10/2019 1534  Last data filed at 2/10/2019 1146  Gross per 24 hour   Intake    Output 1150 ml   Net -1150 ml       Physical Exam:     Physical Exam   Constitutional:   Morbidly obese   HENT:   Head: Normocephalic and atraumatic  Eyes: No scleral icterus  Neck: No JVD present  Cardiovascular: Normal rate  Exam reveals no gallop and no friction rub  No murmur heard  Pulmonary/Chest: Effort normal  No stridor  No respiratory distress  She has no wheezes  Abdominal: Soft  She exhibits no distension  There is no tenderness  Musculoskeletal: She exhibits edema  Left knee is not as tender   Neurological: She is alert  Skin: No erythema  Psychiatric: She has a normal mood and affect  Her behavior is normal    Vitals reviewed          Additional Data:     Labs:    Results from last 7 days   Lab Units 19  0448   WBC Thousand/uL 5 26   HEMOGLOBIN g/dL 11 1*   HEMATOCRIT % 32 1*   PLATELETS Thousands/uL 146*   NEUTROS PCT % 60   LYMPHS PCT % 25   MONOS PCT % 11   EOS PCT % 3     Results from last 7 days   Lab Units 19  0448   SODIUM mmol/L 133*   POTASSIUM mmol/L 3 4*   CHLORIDE mmol/L 96*   CO2 mmol/L 30   BUN mg/dL 6   CREATININE mg/dL 0 78   ANION GAP mmol/L 7   CALCIUM mg/dL 8 1*   GLUCOSE RANDOM mg/dL 90                           * I Have Reviewed All Lab Data Listed Above  * Additional Pertinent Lab Tests Reviewed: All Labs Within Last 24 Hours Reviewed    Imaging:    Imaging Reports Reviewed Today Include:  None      Recent Cultures (last 7 days):           Last 24 Hours Medication List:     Current Facility-Administered Medications:  acetaminophen 650 mg Oral Q6H PRN Jaswant Ramirez MD   albuterol 2 puff Inhalation Q4H PRN Ercia Yobany, CRNP   amitriptyline 10 mg Oral HS Erica Yobany, CRNP   docusate sodium 100 mg Oral BID Erica Yobany, CRNP   enoxaparin 40 mg Subcutaneous Daily Erica Yobany, CRNP   ferrous sulfate 325 mg Oral Daily Erica Attilaz, CRNP   folic acid 1 mg Oral Daily Erica Yobany, CRNP   gabapentin 300 mg Oral TID Jaswant Ramirez MD   lactulose 20 g Oral Daily PRN Erica Yobany, CRNP   lidocaine 1 patch Topical Daily Erica Yobany, CRNP   nystatin  Topical BID Erica Yobany, CRNP   ondansetron 4 mg Intravenous Q6H PRN Erica Yobany, CRNP   pneumococcal 23-valent polysaccharide vaccine 0 5 mL Subcutaneous Prior to discharge Ericabee Haywood, CRNP   potassium chloride 20 mEq Oral Daily Erica Yobany, CRNP   QUEtiapine 150 mg Oral HS Jaswant Ramirez MD   senna 1 tablet Oral Daily Ericagillian Haywood, CRNP   spironolactone 50 mg Oral Daily Jaswant Ramirez MD   torsemide 40 mg Oral Daily Ericagillian Haywood, CRNP   traMADol 50 mg Oral Q4H PRN Jaswant Ramirez MD        Today, Patient Was Seen By: Jaswant Ramirez MD    ** Please Note: Dictation voice to text software may have been used in the creation of this document   **

## 2019-02-10 NOTE — PHYSICIAN ADVISOR
Current patient class: Observation  The patient is currently on Hospital Day: 5 at [address]      The patient was admitted to the hospital at N/A on N/A for the following diagnosis:  Back pain [M54 9]  Chronic pain [G89 29]  Drug-seeking behavior [Z76 5]  Frequent falls [R29 6]       There is documentation in the medical record of an expected length of stay of at least 2 midnights  The patient is therefore expected to satisfy the 2 midnight benchmark and given the 2 midnight presumption is appropriate for INPATIENT ADMISSION  Given this expectation of a satisfying stay, CMS instructs us that the patient is most often appropriate for inpatient admission under part A provided medical necessity is documented in the chart  After review of the relevant documentation, labs, vital signs and test results, the patient is appropriate for INPATIENT ADMISSION  Admission to the hospital as an inpatient is a complex decision making process which requires the practitioner to consider the patients presenting complaint, history and physical examination and all relevant testing  With this in mind, in this case, the patient was deemed appropriate for INPATIENT ADMISSION  After review of the documentation and testing available at the time of the admission I concur with this clinical determination of medical necessity  Rationale is as follows: The patient is a 38 yrs old Female who presented to the ED at 2/5/2019  8:05 PM with a chief complaint of Fall (Pt reports falling down 7 steps  Pt states, "I have been falling a lot and I can't walk anymore"  Denies hitting head  "I have pain everywhere"  )     Given the need for further hospitalization, and along with the documentation of medical necessity present in the chart, the patient is appropriate for inpatient admission  The patient is expected to satisfy the 2 midnight benchmark, and will require further acute medical care   The patient does have comorbid conditions which increases the risk for significant adverse outcome  Given this the patient is appropriate for inpatient admission        The patients vitals on arrival were ED Triage Vitals [02/05/19 2008]   Temperature Pulse Respirations Blood Pressure SpO2   98 4 °F (36 9 °C) 88 16 135/62 97 %      Temp Source Heart Rate Source Patient Position - Orthostatic VS BP Location FiO2 (%)   Oral Monitor Sitting Right arm --      Pain Score       Worst Possible Pain           Past Medical History:   Diagnosis Date    Arthritis     Fibromyalgia     Herniated cervical disc     Hypertension     Psychiatric disorder     Sarcoidosis     Scoliosis      Past Surgical History:   Procedure Laterality Date    GASTRIC BYPASS      HERNIA REPAIR      OOPHORECTOMY Left 2013    TOTAL ABDOMINAL HYSTERECTOMY W/ BILATERAL SALPINGOOPHORECTOMY      TUBAL LIGATION             Consults have been placed to:   IP CONSULT TO CASE MANAGEMENT  IP CONSULT TO CASE MANAGEMENT  IP CONSULT TO ORTHOPEDIC SURGERY    Vitals:    02/08/19 2322 02/09/19 0732 02/09/19 1500 02/09/19 2324   BP: 119/62 115/57 117/55 105/62   BP Location: Right arm Right arm Right arm Right arm   Pulse: 80 81 82 92   Resp: 18 18 20 20   Temp: 98 7 °F (37 1 °C) 98 8 °F (37 1 °C) 98 °F (36 7 °C) 98 8 °F (37 1 °C)   TempSrc: Tympanic Tympanic  Tympanic   SpO2: 93% 96% 96% 93%   Weight:       Height:           Most recent labs:    Recent Labs      02/08/19   0448   WBC  5 26   HGB  11 1*   HCT  32 1*   PLT  146*   K  3 4*   CALCIUM  8 1*   BUN  6   CREATININE  0 78       Scheduled Meds:  Current Facility-Administered Medications:  acetaminophen 650 mg Oral Q6H PRN Evelyn Blackwell MD   albuterol 2 puff Inhalation Q4H PRN AMANDA Woods   amitriptyline 10 mg Oral HS AMANDA Woods   docusate sodium 100 mg Oral BID AMANDA Woods   enoxaparin 40 mg Subcutaneous Daily AMANDA Woods   ferrous sulfate 325 mg Oral Daily AMANDA Parish   folic acid 1 mg Oral Daily AMANDA Parish   gabapentin 300 mg Oral TID Lakeisha Zapata MD   lactulose 20 g Oral Daily PRN AMANDA Parish   lidocaine 1 patch Topical Daily AMANDA Parish   nystatin  Topical BID AMANDA Parish   ondansetron 4 mg Intravenous Q6H PRN AMANDA Parish   pneumococcal 23-valent polysaccharide vaccine 0 5 mL Subcutaneous Prior to discharge AMANDA Parish   potassium chloride 20 mEq Oral Daily AMANDA Parish   QUEtiapine 150 mg Oral HS Lakeisha Zapata MD   senna 1 tablet Oral Daily AMANDA Parish   spironolactone 50 mg Oral Daily Lakeisha Zapata MD   torsemide 40 mg Oral Daily AMANDA Parish   traMADol 50 mg Oral Q4H PRN Lakeisha Zapata MD     Continuous Infusions:   PRN Meds:   acetaminophen    albuterol    lactulose    ondansetron    pneumococcal 23-valent polysaccharide vaccine    traMADol    Surgical procedures (if appropriate):

## 2019-02-10 NOTE — ASSESSMENT & PLAN NOTE
· Secondary to diuretic  · Continue potassium supplementation  · Aldactone dose increased to 50 mg from 25 mg  · Recheck BMP in a m

## 2019-02-10 NOTE — ASSESSMENT & PLAN NOTE
Pain seems to be improved today  Continue tramadol 50 mg Q 4 p r n  Due to history of opioid abuse, tramadol is the most I can give to her regarding pain  PDMP website reviewed

## 2019-02-11 NOTE — INCIDENTAL FINDINGS
The following findings require follow up:  Radiographic finding   Finding: Several subpleural nodules are redemonstrated including a 6 to 7 mm nodule in the posterior right apex and a 4 to 5 mm nodule in the posterior left upper lobe    Follow up required: REPEAT CT chest   Follow up should be done within 6 month(s)    Please notify the following clinician to assist with the follow up:   Dr Segundo Ramon MD

## 2019-02-11 NOTE — ASSESSMENT & PLAN NOTE
Patient was discharged from pain management office  Has improved with tramadol during hospitalization    Will be given limited supply for same

## 2019-02-11 NOTE — DISCHARGE SUMMARY
Discharge- Ela Sarmiento 1975, 38 y o  female MRN: [id number]  Unit/Bed#: E2 -01 Encounter: [id number]  Primary Care Provider: Yovani Crandall MD   Date and time admitted to hospital: 2/5/2019  8:05 PM    Admitting Provider:  Mitzi Gupta MD  Discharge Provider:  Teri Sheridan DO  Admission Date: 2/5/2019       Discharge Date: 02/11/19   LOS: 2  Primary Care Physician at Discharge: Yovani Crandall, [facility] [address]:  Ela Sarmiento is a 38 y o  female who presented the hospital with recurrent falls  She was walking on her porch assisted by boyfriend when she fell and landed on knee  She was placed in immobilizer and discharged home from emergency department however continued to have ongoing pain and was brought back to hospital for further evaluation  PT/OT had recommended rehab however today she is requesting discharge understanding that she is debilitated and is at risk for falls including further injury  She will be set up with home services  DISCHARGE DIAGNOSES  * Recurrent falls  Assessment & Plan  Recurrent falls poly factorial in the setting of debility/morbid obesity/alcoholic liver disease  PT/OT recommending rehab however today she is requesting discharge home services  She understands she is at risk for falls and recurrent admissions including further injury  Other chronic pain  Assessment & Plan  Patient was discharged from pain management office  Has improved with tramadol during hospitalization  Will be given limited supply for same    Pulmonary nodule  Assessment & Plan  Needs repeat imaging within 6 months    Alcoholic liver disease ([facility] [address] )  Assessment & Plan  Alcoholic liver disease  Spironolactone increased to 50 mg daily due to hypokalemia    Continue torsemide 40 mg daily    Other bipolar disorder (HCC)  Assessment & Plan  Mood remains stable on amitriptyline and quetiapine    Hypokalemia  Assessment & Plan  Hypokalemia in the setting of diuretic use   Spironolactone being increased to 50 mg daily    Morbid obesity with BMI of 50 0-59 9, adult ([facility] )  Assessment & Plan  Body mass index is 50 07 kg/m²  Patient does have history of bariatric surgery  Lakesha Chavez Hip/pelv 2-3 Vws Left If Performed  Result Date: 2/5/2019  Impression: No acute osseous abnormality  Workstation performed: [id number]     Xr Knee 4+ Vw Left Injury  Result Date: 2/5/2019  Impression: No acute osseous abnormality  Workstation performed: [id number]     Xr Ankle 3+ Views Left  Result Date: 2/5/2019  Impression: No acute osseous abnormality  Workstation performed: [id number]     Ct Head Without Contrast  Result Date: 2/5/2019  Impression: No intracranial hemorrhage or calvarial fracture is seen  Sinus disease as described, question acute on chronic left maxillary sinusitis  Fluid in the left mastoid air cells is nonspecific although a mastoiditis could be considered in the appropriate clinical setting  Other findings as above  Workstation performed: [id number]     Ct Cervical Spine Without Contrast  Result Date: 2/5/2019  Impression: Degenerative changes as described but no evidence of acute cervical spine injury  Subcentimeter pulmonary nodules as described  Based on current Fleischner Society 2017 Guidelines on incidental pulmonary nodule, six-month follow-up chest CT is recommended to evaluate for stability/interval change  Workstation performed: [id number]     Ct Knee Left Wo Contrast  Result Date: 2/5/2019  Impression: No acute fracture  Somewhat lateral lie of the patella without definite medial retinacular injury  Moderate prepatellar subcutaneous edema Workstation performed: [id number]     Ct Abdomen Pelvis With Contrast   Result Date: 2/5/2019  Impression: No evidence of acute visceral or vascular injury in the abdomen/pelvis  Fatty infiltration of the liver is suspected    In the setting of abdominal pain and/or elevated liver function tests, consider steatohepatitis  Prominent perisplenic, perirenal, and perigastric veins/varices are noted which have increased in prominence from the prior study Cholelithiasis without discrete CT evidence of acute cholecystitis, correlation with the patient's symptoms and laboratory values recommended  Shotty lymph nodes in the retroperitoneum, upper abdomen, and zelda hepatis, as described; nonspecific, possibly reactive  Suspected fibroid uterus, colonic diverticulosis without discrete evidence of acute diverticulitis, body wall edema/anasarca, and other findings as above  Workstation performed: [id number]       LABS  Results from last 7 days   Lab Units 02/11/19 0426 02/08/19 0448 02/06/19 0515 02/05/19  1603   WBC Thousand/uL 4 42 5 26 4 87 3 74*   HEMOGLOBIN g/dL 10 7* 11 1* 10 6* 11 3*   HEMATOCRIT % 32 0* 32 1* 30 9* 32 9*   MCV fL 110* 107* 105* 104*   PLATELETS Thousands/uL 171 146* 150 163     Results from last 7 days   Lab Units 02/11/19 0426 02/08/19 0448 02/06/19 0515 02/05/19  1603   SODIUM mmol/L 136 133* 140 142   POTASSIUM mmol/L 3 3* 3 4* 3 1* 3 1*   CHLORIDE mmol/L 100 96* 104 105   CO2 mmol/L 28 30 26 27   BUN mg/dL 5 6 6 6   CREATININE mg/dL 0 83 0 78 0 69 0 66   CALCIUM mg/dL 8 4 8 1* 8 0* 8 1*   EGFR ml/min/1 73sq m 85 92 105 107   GLUCOSE RANDOM mg/dL 81 90 88 92     Results from last 7 days   Lab Units 02/05/19  1603   CK TOTAL U/L 127       PHYSICAL EXAM:  Vitals:   Blood Pressure: 114/77 (02/11/19 0700)  Pulse: 77 (02/11/19 0700)  Temperature: 98 °F (36 7 °C) (02/11/19 0700)  Temp Source: Temporal (02/10/19 2300)  Respirations: 20 (02/11/19 0700)  Height: 5' 10" (177 8 cm) (02/06/19 0030)  Weight - Scale: (!) 158 kg (348 lb 15 8 oz) (02/06/19 0030)  SpO2: 96 % (02/11/19 0700)    General appearance: alert, appears stated age and cooperative  Head: atraumatic  Eyes: conjunctivae/corneas clear  PERRL, EOM's intact    Lungs: diminished breath sounds  Heart: regular rate and rhythm  Abdomen: Soft obese nontender  Back: range of motion normal  Extremities: edema +1 -2 lower extremities bilaterally  Neurologic: Grossly normal    Planned Re-admission:  No  Discharge Disposition:  Home VNA    Test Results Pending at Discharge:  None  Incidental findings: Several subpleural nodules are redemonstrated including a 6 to 7 mm nodule in the posterior right apex (axial image 142, series 6), and a 4 to 5 mm nodule in the posterior left upper lobe  Needs repeat CT scan in 6 months     Medications   Please see After Visit Summary for reconciled discharge medications provided to patient and family  Scheduled medications prescribed upon discharge:  Tramadol 50 mg ##30    Diet restrictions: Low sodium diet   Activity restrictions: No strenuous activity  Discharge Condition: stable    Outpatient Follow-Up  1  Nan Reyes MD [address] [place] [id number] [phone] - follow-up within one week    Code Status: Level 1 - Full Code  Discharge Statement   I spent 40 minutes discharging the patient  This time was spent on the day of discharge  Greater than 50% of total time was spent with the patient and / or family counseling and / or coordination of care  ** Please Note: This note has been constructed using a voice recognition system   **

## 2019-02-11 NOTE — ASSESSMENT & PLAN NOTE
Alcoholic liver disease  Spironolactone increased to 50 mg daily due to hypokalemia    Continue torsemide 40 mg daily

## 2019-02-11 NOTE — PLAN OF CARE
Progressing toward goals  No falls or injuries this shift  Several times, made reference to the children downstairs, and seeming confused  Later states "I just made that up because I thought that you would let me go home if I was confused  I'm sorry, it was just stories "  Using tramadol every 4 hrs or so, with partial relief of knee pain

## 2019-02-11 NOTE — ASSESSMENT & PLAN NOTE
Recurrent falls poly factorial in the setting of debility/morbid obesity/alcoholic liver disease  PT/OT recommending rehab however today she is requesting discharge home services  She understands she is at risk for falls and recurrent admissions including further injury

## 2019-05-04 PROBLEM — E80.6 HYPERBILIRUBINEMIA: Status: ACTIVE | Noted: 2019-01-01

## 2019-05-04 PROBLEM — R74.01 TRANSAMINITIS: Status: ACTIVE | Noted: 2019-01-01

## 2019-05-04 PROBLEM — D68.9 COAGULOPATHY (HCC): Status: ACTIVE | Noted: 2019-01-01

## 2019-05-04 PROBLEM — R79.89 ABNORMAL TSH: Status: ACTIVE | Noted: 2019-01-01

## 2019-05-04 PROBLEM — D69.6 THROMBOCYTOPENIA (HCC): Status: ACTIVE | Noted: 2019-01-01

## 2019-05-04 PROBLEM — K72.90 HEPATIC ENCEPHALOPATHY (HCC): Status: ACTIVE | Noted: 2019-01-01

## 2019-05-04 PROBLEM — F10.20 ETOH DEPENDENCE (HCC): Status: ACTIVE | Noted: 2019-01-01

## 2019-05-05 PROBLEM — K70.10 ALCOHOLIC HEPATITIS: Status: ACTIVE | Noted: 2019-01-01

## 2019-05-08 PROBLEM — R65.10 SIRS (SYSTEMIC INFLAMMATORY RESPONSE SYNDROME) (HCC): Status: ACTIVE | Noted: 2019-01-01

## 2019-05-09 PROBLEM — R78.81 GRAM-NEGATIVE BACTEREMIA: Status: ACTIVE | Noted: 2019-01-01

## 2019-05-10 PROBLEM — E87.70 VOLUME OVERLOAD: Status: ACTIVE | Noted: 2019-01-01

## 2019-05-10 PROBLEM — J96.01 ACUTE RESPIRATORY FAILURE WITH HYPOXIA (HCC): Status: ACTIVE | Noted: 2019-01-01

## 2019-05-10 PROBLEM — R78.81 BACTEREMIA DUE TO PSEUDOMONAS: Status: ACTIVE | Noted: 2019-01-01

## 2019-05-10 PROBLEM — B96.5 BACTEREMIA DUE TO PSEUDOMONAS: Status: ACTIVE | Noted: 2019-01-01

## 2019-05-10 PROBLEM — R13.19 OTHER DYSPHAGIA: Status: ACTIVE | Noted: 2019-01-01

## 2019-05-10 PROBLEM — E83.39 HYPOPHOSPHATEMIA: Status: ACTIVE | Noted: 2019-01-01

## 2019-05-16 PROBLEM — Z91.14 NONCOMPLIANCE WITH MEDICATION REGIMEN: Status: RESOLVED | Noted: 2017-07-03 | Resolved: 2019-01-01

## 2019-05-16 PROBLEM — E87.6 ACUTE HYPOKALEMIA: Status: RESOLVED | Noted: 2018-01-01 | Resolved: 2019-01-01

## 2019-05-16 PROBLEM — R29.6 RECURRENT FALLS WHILE WALKING: Status: RESOLVED | Noted: 2018-01-01 | Resolved: 2019-01-01

## 2019-05-16 PROBLEM — R13.19 OTHER DYSPHAGIA: Status: RESOLVED | Noted: 2019-01-01 | Resolved: 2019-01-01

## 2019-05-16 PROBLEM — R50.9 FEBRILE ILLNESS: Status: RESOLVED | Noted: 2017-12-05 | Resolved: 2019-01-01

## 2019-05-16 PROBLEM — G89.29 OTHER CHRONIC PAIN: Status: RESOLVED | Noted: 2019-01-01 | Resolved: 2019-01-01

## 2019-05-16 PROBLEM — R10.11 RUQ PAIN: Status: RESOLVED | Noted: 2017-12-01 | Resolved: 2019-01-01

## 2019-05-16 PROBLEM — E83.39 HYPOPHOSPHATEMIA: Status: RESOLVED | Noted: 2019-01-01 | Resolved: 2019-01-01

## 2019-05-16 PROBLEM — E87.6 HYPOKALEMIA: Status: RESOLVED | Noted: 2017-11-26 | Resolved: 2019-01-01

## 2019-05-16 PROBLEM — R07.9 CHEST PAIN: Status: RESOLVED | Noted: 2017-11-26 | Resolved: 2019-01-01

## 2019-05-16 PROBLEM — R78.81 GRAM-NEGATIVE BACTEREMIA: Status: RESOLVED | Noted: 2019-01-01 | Resolved: 2019-01-01

## 2019-05-16 PROBLEM — G89.4 CHRONIC PAIN SYNDROME: Status: RESOLVED | Noted: 2017-08-29 | Resolved: 2019-01-01

## 2019-05-16 PROBLEM — R29.6 RECURRENT FALLS: Status: RESOLVED | Noted: 2017-08-29 | Resolved: 2019-01-01

## 2019-05-16 PROBLEM — K74.69 OTHER CIRRHOSIS OF LIVER (HCC): Status: RESOLVED | Noted: 2018-01-01 | Resolved: 2019-01-01

## 2019-10-26 NOTE — PLAN OF CARE
Depression - IP adult     Effects of depression will be minimized Progressing        DISCHARGE PLANNING - CARE MANAGEMENT     Discharge to post-acute care or home with appropriate resources Progressing        GASTROINTESTINAL - ADULT     Maintains or returns to baseline bowel function Progressing        GENITOURINARY - ADULT     Maintains or returns to baseline urinary function Progressing     Absence of urinary retention Progressing        METABOLIC, FLUID AND ELECTROLYTES - ADULT     Electrolytes maintained within normal limits Progressing     Fluid balance maintained Progressing        NEUROSENSORY - ADULT     Remains free of injury related to seizures activity Progressing        Potential for Falls     Patient will remain free of falls Progressing        Prexisting or High Potential for Compromised Skin Integrity     Skin integrity is maintained or improved Progressing Rest, drink plenty of fluids  Advance activity as tolerated  Continue all previously prescribed medications as directed  Follow up with your PMD or return to the ED for suture removal in 5-7 days  Return to the ER for discharge, redness, swelling, fevers, or other new or concerning symptoms

## 2020-09-08 NOTE — CONSULTS
Consultation - Orthopedics   Gabriella Anna 38 y o  female MRN: [id number]  Unit/Bed#: E2 -01 Encounter: [id number]      Assessment/Plan     Assessment:  Left knee pain after a fall  Plan:  J brace   PT/OT WBAT LLE  Pain control prn  Med mgt per SLIM    History of Present Illness   Physician Requesting Consult: Ema Damon MD  Reason for Consult / Principal Problem: left knee pain, suspected patellar dislocation  HPI: Gabriella Anna is a 38y o  year old female who presents with left knee after a fall yesterday  She states she has trouble with her balance at baseline  She tripped and fell on the cement sterp and hit her left knee  She was unable to bear weight and went to the ER  At the ER she was diagnosed with a suspected patellar dislocation and discharged in a knee immobilizer to follow with ortho outpatient  She then fell again at home and came back to the ER  She was then admitted due to frequent falls  She denies any issues with her knee prior to the falls  She walks with a walker due to a history of balance issues and past ankle surgeries making it difficult to walk  She complains of pain in her left knee She denies any numbness or tingling  Consults    ROS: see HPI, all other systems negative      Historical Information   Past Medical History:   Diagnosis Date    Arthritis     Fibromyalgia     Herniated cervical disc     Hypertension     Psychiatric disorder     Sarcoidosis     Scoliosis      Past Surgical History:   Procedure Laterality Date    GASTRIC BYPASS      HERNIA REPAIR      OOPHORECTOMY Left 2013    TOTAL ABDOMINAL HYSTERECTOMY W/ BILATERAL SALPINGOOPHORECTOMY      TUBAL LIGATION       Social History   History   Alcohol Use    Yes     Comment: occassional     History   Drug Use No     History   Smoking Status    Never Smoker   Smokeless Tobacco    Never Used     Family History:   Family History   Problem Relation Age of Onset    Thyroid disease Mother         disorder    Lung cancer Father     Hypertension Father     Hypertension Paternal Aunt     Obesity Paternal Aunt     Diabetes Paternal Aunt        Meds/Allergies   Prescriptions Prior to Admission   Medication    acetaminophen (TYLENOL) 325 mg tablet    albuterol (PROVENTIL HFA,VENTOLIN HFA) 90 mcg/act inhaler    amitriptyline (ELAVIL) 10 mg tablet    ferrous sulfate 325 (65 Fe) mg tablet    fluticasone (FLONASE) 50 mcg/act nasal spray    folic acid (FOLVITE) 1 mg tablet    GABAPENTIN PO    lactulose 20 g/30 mL    lidocaine (LIDODERM) 5 %    Linaclotide (LINZESS) 145 MCG CAPS    oxyCODONE (OXY-IR) 5 MG capsule    QUEtiapine (SEROquel) 100 mg tablet    spironolactone (ALDACTONE) 25 mg tablet    torsemide (DEMADEX) 20 mg tablet    amLODIPine (NORVASC) 10 mg tablet    potassium chloride (K-DUR,KLOR-CON) 20 mEq tablet     Allergies   Allergen Reactions    Pregabalin Swelling       Objective   Vitals: Blood pressure 127/57, pulse (!) 110, temperature (!) 100 7 °F (38 2 °C), temperature source Tympanic, resp  rate 18, height 5' 10" (1 778 m), weight (!) 158 kg (348 lb 15 8 oz), SpO2 96 %  ,Body mass index is 50 07 kg/m²  Intake/Output Summary (Last 24 hours) at 02/06/19 1837  Last data filed at 02/06/19 1723   Gross per 24 hour   Intake              960 ml   Output             3075 ml   Net            -2115 ml     I/O last 24 hours: In: 12 [P O :960]  Out: 3075 [Urine:3075]    Invasive Devices     Peripheral Intravenous Line            Peripheral IV 02/05/19 Left Antecubital less than 1 day                PE:  Gen:  Awake and alert  HEENT:  Hearing intact  Heart:  Regular rate  Lungs: no audible wheezing  GI: no abdominal distension    Ortho Exam LLE  -diffuse TTP over anterior knee  -ROM limited due to pain  -no ecchymosis   +swelling  -sensation L4, L5,S1 intact  -EHL/AT/GS intact  -palpable pedal pulse  -compartments soft  Lab Results:   I have personally reviewed pertinent lab results    CBC:   Lab Results   Component Value Date    WBC 4 87 02/06/2019    HGB 10 6 (L) 02/06/2019    HCT 30 9 (L) 02/06/2019     (H) 02/06/2019     02/06/2019    MCH 36 1 (H) 02/06/2019    MCHC 34 3 02/06/2019    RDW 25 0 (H) 02/06/2019    MPV 9 5 02/06/2019    NRBC 0 02/06/2019     CMP:   Lab Results   Component Value Date    SODIUM 140 02/06/2019     02/06/2019    CO2 26 02/06/2019    BUN 6 02/06/2019    CREATININE 0 69 02/06/2019    CALCIUM 8 0 (L) 02/06/2019    EGFR 105 02/06/2019     Imaging Studies: I have personally reviewed pertinent films in PACS  X-rays left knee and hip- no osseous abnormality  CT left knee-no fractures, lateral tracking patellar  Code Status: Level 1 - Full Code  Advance Directive and Living Will:      Power of :    POLST: Wheelchair/Stroller

## 2021-01-19 NOTE — PLAN OF CARE
Depression - IP adult     Effects of depression will be minimized Progressing        DISCHARGE PLANNING - CARE MANAGEMENT     Discharge to post-acute care or home with appropriate resources Progressing        GASTROINTESTINAL - ADULT     Maintains or returns to baseline bowel function Progressing        GENITOURINARY - ADULT     Maintains or returns to baseline urinary function Progressing     Absence of urinary retention Progressing        METABOLIC, FLUID AND ELECTROLYTES - ADULT     Electrolytes maintained within normal limits Progressing     Fluid balance maintained Progressing        NEUROSENSORY - ADULT     Remains free of injury related to seizures activity Progressing        Nutrition/Hydration-ADULT     Nutrient/Hydration intake appropriate for improving, restoring or maintaining nutritional needs Progressing        Potential for Falls     Patient will remain free of falls Progressing        Prexisting or High Potential for Compromised Skin Integrity     Skin integrity is maintained or improved Progressing Patient returning call.      Patient said if you cannot reach him,  Please call his wife Wendi at [phone]

## 2022-05-17 NOTE — ASSESSMENT & PLAN NOTE
Hypokalemia in the setting of diuretic use    Spironolactone being increased to 50 mg daily Ultrasound Used Text: Patient has a location which was not amenable to ultrasound.

## 2022-12-02 NOTE — PROGRESS NOTES
[address] Internal Medicine Progress Note  Patient: Claus Solis 39 y o  female   MRN: [id number]  PCP: Shila Santoyo MD  Unit/Bed#: [address] [facility] 228-01 Encounter: [id number]  Date Of Visit: 06/03/18    Assessment:    Principal Problem:    Recurrent falls while walking  Active Problems:    Hypomagnesemia    Acute hypokalemia    Elevated CPK    Hyperammonemia (HCC)    Bipolar disorder ([address] )      Plan:  #1 frequent falls  Unknown cause at this time  This could certainly be related to hypokalemia  She reports improvement of muscle strength today  IV hydration completed  Correct electrolytes as needed  Pain control with Tylenol/Percocet as needed  Awaiting for psychiatry evaluation  I will place the patient in fall and aspiration precautions today  We will continue with current care  Patient has made substantialClinical improvement currently has no further complaints  #2 bipolar disorder  Appreciate psychiatry consultation  Continue Seroquel and Elavil  Mood affect appropriate  Denies suicidal homicidal ideation    #3 hypokalemia  Currently resolved  Follow-up on the labs    #4 generalized deconditioning  Patient will need acute short-term rehabilitation which she and her  are agreeable point at this time  Case management aware working on getting rehab, "she may need to be optioned "  VTE Pharmacologic Prophylaxis:   Pharmacologic: Heparin  Mechanical VTE Prophylaxis in Place: Yes    Patient Centered Rounds: I have performed bedside rounds with nursing staff today  Discussions with Specialists or Other Care Team Provider: yes    Education and Discussions with Family / Patient: now    Time Spent for Care: 45 minutes  More than 50% of total time spent on counseling and coordination of care as described above      Current Length of Stay: 5 day(s)    Current Patient Status: Inpatient   Certification Statement: The patient will continue to require additional inpatient hospital stay due to rhabdomyolysis, psychosis,    Discharge Plan / Estimated Discharge Date: to be determined    Code Status: Level 1 - Full Code      Subjective:   "I am doing better I want to thank you for attempting   to my  he said he got the call but he works for [employer] and is not allowed to speak  He is working now too "  Nursing reports  Objective:     Vitals:   Temp (24hrs), Av 9 °F (37 2 °C), Min:98 8 °F (37 1 °C), Max:99 2 °F (37 3 °C)    HR:  [75-92] 86  Resp:  [19-20] 19  BP: ()/(49-55) 97/55  SpO2:  [95 %-96 %] 96 %  Body mass index is 47 79 kg/m²  Input and Output Summary (last 24 hours): Intake/Output Summary (Last 24 hours) at 18 1359  Last data filed at 18 0441   Gross per 24 hour   Intake             2000 ml   Output              700 ml   Net             1300 ml       Physical Exam:     Physical Exam    GENERAL APPEARANCE: WD/WN in NAD pleasant  SKIN: no rash, small bruising in left lower extremity lateral section otherwise benign  HEENT: NC/AT, PERRLA (B), moist MM, no epistaxis  NECK: Supple, no JVD    LUNGS: CTA (B) mildly prolonged expiratory phase,   no use of accessory muscles    HEART:          S1S 2, RRR  , PMI is not displaced  ABDOMEN: Soft, nontender, nondistended, +BS  Rectal exam:  EXTREMITIES: no edema   PERIPHERAL VASCULAR: palpable pulses   NEURO:  AAO x 3, CN 2-12: non focal  MUSCLE STRENGHT: patient refused ambulation but moving all extremities at will  Additional Data:     Labs:      Results from last 7 days  Lab Units 18  0656   WBC Thousand/uL 4 88   HEMOGLOBIN g/dL 9 6*   HEMATOCRIT % 28 7*   PLATELETS Thousands/uL 232   NEUTROS PCT % 57   LYMPHS PCT % 26   MONOS PCT % 12   EOS PCT % 5       Results from last 7 days  Lab Units 18  0453  18  1310   SODIUM mmol/L 139  < > 142   POTASSIUM mmol/L 5 0  < > 2 2*   CHLORIDE mmol/L 110*  < > 101   CO2 mmol/L 23  < > 32   BUN mg/dL 6  < > 10   CREATININE mg/dL 0 78  < > 1 05   CALCIUM mg/dL 8 4  < > 7 8*   TOTAL PROTEIN g/dL  --   --  6 4   BILIRUBIN TOTAL mg/dL  --   --  1 47*   ALK PHOS U/L  --   --  78   ALT U/L  --   --  35   AST U/L  --   --  179*   GLUCOSE RANDOM mg/dL 65  < > 110   < > = values in this interval not displayed  * I Have Reviewed All Lab Data Listed Above  * Additional Pertinent Lab Tests Reviewed: [address] Admission Reviewed    Imaging:    Imaging Reports Reviewed Today Include: yes  Imaging Personally Reviewed by Myself Includes:  yes    Recent Cultures (last 7 days):           Last 24 Hours Medication List:     Current Facility-Administered Medications:  acetaminophen 650 mg Oral Q6H PRN Pierre Zabala MD   albuterol 2 puff Inhalation Q6H Pierre Zabala MD   amitriptyline 10 mg Oral HS Pierre Zabala MD   cyanocobalamin 1,000 mcg Oral Daily Pierre Zabala MD   docusate sodium 100 mg Oral BID Pierre Zabala MD   enoxaparin 40 mg Subcutaneous Daily Pierre Zabala MD   ferrous sulfate 325 mg Oral Daily Pierre Zabala MD   folic acid 1 mg Oral Daily Pierre Zabala MD   [START ON 6/4/2018] furosemide 40 mg Oral Daily Launie Schirmer, MD   gabapentin 300 mg Oral TID Pierre Zabala MD   lactulose 20 g Oral Daily Pierre Zabala MD   LORazepam 1 mg Oral Q8H PRN Evelia Velazquez PA-C   magnesium oxide 400 mg Oral BID Pierre Zabala MD   metolazone 5 mg Oral Once per day on Mon Thu Pierre Zabala MD   nystatin  Topical BID Evelia Velazquez PA-C   ondansetron 4 mg Intravenous Q6H PRN Pierre Zabala MD   oxyCODONE-acetaminophen 1 tablet Oral Q4H PRN Launie Schirmer, MD   pantoprazole 40 mg Oral Early Morning Pierre Zabala MD   QUEtiapine 50 mg Oral HS Pierre Zabala MD   [START ON 6/4/2018] spironolactone 25 mg Oral Daily Launie Schirmer, MD        Today, Patient Was Seen By: Launie Schirmer, MD    ** Please Note: This note has been constructed using a voice recognition system   ** 2 = A lot of assistance

## 2023-01-06 NOTE — ED NOTES
Impression: Other corneal scars and opacities: H17.89. Plan: Corneal Scar both eyes - s/p PK OU-Not visually significant.  Monitor Pt asking to be put to sleep because of the pain  Pt told that we cannot put her to sleep because she is constipated       Anton Cook RN  10/23/18 2349